# Patient Record
Sex: MALE | Race: WHITE | NOT HISPANIC OR LATINO | Employment: OTHER | URBAN - METROPOLITAN AREA
[De-identification: names, ages, dates, MRNs, and addresses within clinical notes are randomized per-mention and may not be internally consistent; named-entity substitution may affect disease eponyms.]

---

## 2017-01-02 DIAGNOSIS — F41.1 GENERALIZED ANXIETY DISORDER: ICD-10-CM

## 2017-01-04 ENCOUNTER — TELEPHONE (OUTPATIENT)
Dept: INTERNAL MEDICINE | Facility: CLINIC | Age: 62
End: 2017-01-04

## 2017-01-04 DIAGNOSIS — F41.1 GENERALIZED ANXIETY DISORDER: ICD-10-CM

## 2017-01-04 RX ORDER — ALPRAZOLAM 0.5 MG/1
TABLET ORAL
Qty: 60 TABLET | Refills: 0 | OUTPATIENT
Start: 2017-01-04

## 2017-01-04 RX ORDER — ALPRAZOLAM 0.5 MG/1
0.5 TABLET ORAL 2 TIMES DAILY PRN
Qty: 60 TABLET | Refills: 2 | OUTPATIENT
Start: 2017-01-04 | End: 2017-04-02 | Stop reason: SDUPTHER

## 2017-01-04 NOTE — TELEPHONE ENCOUNTER
Spoke with Sandee Rogers wife.    His last refill was 10/3/2016   Alaprazolam  #60 with 2 refills.   He is due for a refill.

## 2017-01-12 ENCOUNTER — OFFICE VISIT (OUTPATIENT)
Dept: INTERNAL MEDICINE | Facility: CLINIC | Age: 62
End: 2017-01-12

## 2017-01-12 VITALS
HEART RATE: 72 BPM | RESPIRATION RATE: 20 BRPM | SYSTOLIC BLOOD PRESSURE: 124 MMHG | WEIGHT: 195.38 LBS | DIASTOLIC BLOOD PRESSURE: 70 MMHG | TEMPERATURE: 97.7 F | BODY MASS INDEX: 28.44 KG/M2

## 2017-01-12 DIAGNOSIS — F41.1 GENERALIZED ANXIETY DISORDER: Primary | ICD-10-CM

## 2017-01-12 DIAGNOSIS — Z79.899 HIGH RISK MEDICATION USE: ICD-10-CM

## 2017-01-12 PROCEDURE — 99213 OFFICE O/P EST LOW 20 MIN: CPT | Performed by: INTERNAL MEDICINE

## 2017-01-12 RX ORDER — TRAZODONE HYDROCHLORIDE 100 MG/1
TABLET ORAL
Qty: 60 TABLET | Refills: 2
Start: 2017-01-12 | End: 2017-09-07

## 2017-01-12 RX ORDER — TRAZODONE HYDROCHLORIDE 100 MG/1
TABLET ORAL
Qty: 60 TABLET | Refills: 0 | Status: CANCELLED | OUTPATIENT
Start: 2017-01-12

## 2017-01-12 RX ORDER — TRAZODONE HYDROCHLORIDE 100 MG/1
100 TABLET ORAL NIGHTLY PRN
Qty: 60 TABLET | Refills: 2 | Status: SHIPPED | OUTPATIENT
Start: 2017-01-12 | End: 2017-01-12 | Stop reason: SDUPTHER

## 2017-01-12 RX ORDER — CLOBETASOL PROPIONATE 0.5 MG/G
CREAM TOPICAL 2 TIMES DAILY
Qty: 60 G | Refills: 3 | Status: SHIPPED | OUTPATIENT
Start: 2017-01-12 | End: 2017-09-26 | Stop reason: SDUPTHER

## 2017-01-12 NOTE — PROGRESS NOTES
Subjective       Yevgeniy Vaughn is a 61 y.o. male.     Chief Complaint   Patient presents with   • Diabetes     3 month follow up   fasting        History of Present Illness     Anxiety Disorder (Follow-Up): The patient is being seen for follow-up of Anxiety. The patient reports doing well.   Interval symptoms: stable anxiety, but denies difficulty concentrating, denies restlessness, denies sleep disruption, denies panic attacks,  and denies depression    Associated symptoms: no suicidal ideation.   Medication: Alprazolam BID.     The [facility].    Current Outpatient Prescriptions on File Prior to Visit   Medication Sig Dispense Refill   • ALPRAZolam (XANAX) 0.5 MG tablet Take 1 tablet by mouth 2 (Two) Times a Day As Needed for anxiety. 60 tablet 2   • atorvastatin (LIPITOR) 20 MG tablet TAKE ONE TABLET BY MOUTH AT BEDTIME 30 tablet 5   • Canagliflozin (INVOKANA) 100 MG tablet Take 1 tablet by mouth     • gabapentin (NEURONTIN) 800 MG tablet Take 1 tablet by mouth 3 (three) times a day.     • ibuprofen (ADVIL,MOTRIN) 800 MG tablet TAKE ONE TABLET BY MOUTH THREE TIMES DAILY AS NEEDED 90 tablet 0   • JANUVIA 100 MG tablet TAKE ONE TABLET BY MOUTH ONCE DAILY 30 tablet 6   • lisinopril (PRINIVIL,ZESTRIL) 40 MG tablet Take 1 tablet by mouth     • metFORMIN (GLUCOPHAGE) 1000 MG tablet TAKE ONE TABLET BY MOUTH TWICE DAILY 60 tablet 5   • methocarbamol (ROBAXIN) 750 MG tablet TAKE ONE TABLET BY MOUTH THREE TIMES DAILY AS NEEDED FOR PAIN 90 tablet 0   • Omega-3 Fatty Acids (FISH OIL) 1000 MG capsule capsule Take 1 capsule by mouth every 12 (twelve) hours     • oxyCODONE-acetaminophen (PERCOCET) 7.5-325 MG per tablet Take 1 tablet by mouth 3 (three) times a day.     • promethazine (PHENERGAN) 25 MG tablet TAKE ONE TABLET BY MOUTH EVERY 4 TO 6 HOURS AS NEEDED FOR NAUSEA AND VOMITING 30 tablet 0   • traZODone (DESYREL) 100 MG tablet TAKE ONE & ONE-HALF TO TWO TABLETS BY  MOUTH AT BEDTIME AS NEEDED FOR SLEEP 60 tablet 2   • [DISCONTINUED] clobetasol (TEMOVATE) 0.05 % cream APPLY  CREAM TOPICALLY (SPARINGLY) TO AFFECTED AREA(S)  TWICE DAILY 60 g 1     No current facility-administered medications on file prior to visit.          The following portions of the patient's history were reviewed and updated as appropriate: allergies, current medications, past family history, past medical history, past social history, past surgical history and problem list.    Review of Systems   Constitutional: Negative for unexpected weight change (intentional 3-4 pound weight loss).   Musculoskeletal: Positive for back pain (chronic).        Chronic right arm and shoulder pain.   Neurological:        Denies memory and concentration problems.   Psychiatric/Behavioral: Negative for decreased concentration, sleep disturbance and suicidal ideas. The patient is nervous/anxious.         Denies depression.         Objective       Blood pressure 124/70, pulse 72, temperature 97.7 °F (36.5 °C), temperature source Temporal Artery , resp. rate 20, weight 195 lb 6 oz (88.6 kg).      Physical Exam   Constitutional:   Overweight.   Psychiatric: He has a normal mood and affect.   Nursing note and vitals reviewed.       Counseling was given to patient for the following topics: discussed labs and diagnostic tests performed since last visit, importance of medication compliance, risks and benefits of treament options and side effects of medications . Total time of the encounter was 15 minutes and 15 minutes was spent counseling.        Assessment / Plan:    Diagnoses and all orders for this visit:    Generalized anxiety disorder    High risk medication use    Other orders  -     clobetasol (TEMOVATE) 0.05 % cream; Apply  topically 2 (Two) Times a Day.      The patient was instructed in the side effects of the medication. Risks of the potential for tolerance, dependence, and addiction were discussed. The patient was  instructed to take the lowest dosage of the medication, at the lowest frequency, and for the shortest period of time possible. The patient was instructed not to receive controlled substances or narcotics from other doctors, and not to giveaway or sell the medication.    The patient was instructed to abstain from illicit drug use.     Narcotics/controlled substance agreement, Lonnie report, and Urine Drug Screen were updated today if needed.    Return in about 3 months (around 4/12/2017) for Recheck-Diabetes fasting.

## 2017-01-12 NOTE — MR AVS SNAPSHOT
Yevgeniy Vaughn   1/12/2017 9:00 AM   Office Visit    Provider:  Christine Rousseau MD   Department:  [facility]   Dept Phone:  [phone]                Your Full Care Plan              Today's Medication Changes          These changes are accurate as of: 1/12/17 10:18 AM.  If you have any questions, ask your nurse or doctor.               Medication(s)that have changed:     clobetasol 0.05 % cream   Commonly known as:  TEMOVATE   Apply  topically 2 (Two) Times a Day.   What changed:  See the new instructions.   Changed by:  Christine Rousseau MD            Where to Get Your Medications      These medications were sent to [facility] [address] [place] [address] - [address] [place] [address] - [phone]  - [phone] [place] [id number]     Phone:  [phone]     clobetasol 0.05 % cream                  Your Updated Medication List          This list is accurate as of: 1/12/17 10:18 AM.  Always use your most recent med list.                ALPRAZolam 0.5 MG tablet   Commonly known as:  XANAX   Take 1 tablet by mouth 2 (Two) Times a Day As Needed for anxiety.       atorvastatin 20 MG tablet   Commonly known as:  LIPITOR   TAKE ONE TABLET BY MOUTH AT BEDTIME       clobetasol 0.05 % cream   Commonly known as:  TEMOVATE   Apply  topically 2 (Two) Times a Day.       fish oil 1000 MG capsule capsule       gabapentin 800 MG tablet   Commonly known as:  NEURONTIN       ibuprofen 800 MG tablet   Commonly known as:  ADVIL,MOTRIN   TAKE ONE TABLET BY MOUTH THREE TIMES DAILY AS NEEDED       INVOKANA 100 MG tablet   Generic drug:  Canagliflozin       JANUVIA 100 MG tablet   Generic drug:  SITagliptin   TAKE ONE TABLET BY MOUTH ONCE DAILY       lisinopril 40 MG tablet   Commonly known as:  PRINIVIL,ZESTRIL       metFORMIN 1000 MG tablet   Commonly known as:  GLUCOPHAGE   TAKE ONE TABLET BY MOUTH TWICE DAILY       methocarbamol 750 MG  tablet   Commonly known as:  ROBAXIN   TAKE ONE TABLET BY MOUTH THREE TIMES DAILY AS NEEDED FOR PAIN       oxyCODONE-acetaminophen 7.5-325 MG per tablet   Commonly known as:  PERCOCET       promethazine 25 MG tablet   Commonly known as:  PHENERGAN   TAKE ONE TABLET BY MOUTH EVERY 4 TO 6 HOURS AS NEEDED FOR NAUSEA AND VOMITING       traZODone 100 MG tablet   Commonly known as:  DESYREL   TAKE ONE & ONE-HALF TO TWO TABLETS BY MOUTH AT BEDTIME AS NEEDED FOR SLEEP               You Were Diagnosed With        Codes Comments    Generalized anxiety disorder    -  Primary ICD-10-CM: F41.1  ICD-9-CM: 300.02     High risk medication use     ICD-10-CM: Z79.899  ICD-9-CM: V58.69       Instructions     None    Patient Instructions History      MyChart Signup     Our records indicate that you have declined [facility] signup. If you would like to sign up for [employer], please email [email or web address] or call [phone] to obtain an activation code.             Other Info from Your Visit           Your Appointments     Apr 12, 2017  9:00 AM EDT   Follow Up with Christine Rousseau MD   [facility] (--)    [address] [address] [phone]           Arrive 15 minutes prior to appointment.              Allergies     Lovaza  [Omega-3-acid Ethyl Esters]      Other reaction(s): CRAWLING FEELING      Reason for Visit     Anxiety 3 month follow up         Vital Signs     Blood Pressure Pulse Temperature Respirations Weight Body Mass Index    124/70 (BP Location: Left arm) 72 97.7 °F (36.5 °C) (Temporal Artery ) 20 195 lb 6 oz (88.6 kg) 28.44 kg/m2    Smoking Status                   Current Every Day Smoker           Problems and Diagnoses Noted     Anxiety disorder    High risk medication use             Care Plan (most recent)      Care Management - 01/12/17 2774     Lifestyle Goals    Lifestyle Goals Decrease stress;Eat  breakfast;Exercise 150 min/wk - moderate activity;Exercise a number of times per week;Family dinner at table more often;Fewer doctor appointments;Fewer ER/urgent care visits;Increase physical activity;Less pain;Less sadness/anxiety;Lose weight;Lower blood sugar;Maintain blood pressure < 130/80;Quit smoking;Reduce caffeine intake;Routine foot care;Self monitor blood sugar    Barriers    Barriers Family Obligations;Financial;Pain;Stress;Unhealthy food    Self Management     Self Management Dietary Changes - Eat More Fruits/Vegetables;Dietary Changes -  Reduce Caloric Intake;Get flu/pneumonia shot;Home BP Monitoring;Home Glucose Monitoring;Weight Monitoring    Medication Adherence    Medication Adherence Financial reason;Medication side effects;Medications understood    Goal Progress    Goal Progress Making Progress Toward Goal(s)    Readiness Scale    Readiness Scale 10    COPD Assessment Test    Cough frequency 0 (I never cough)    Phlegm 0 (I have no phlegm in my chest)    Chest tightness 0 (I have no phlegm in my chest)    Breathless when walking 0 (When I walk up a hill or flight of stairs I am not breathless)    Home activities 5 (I am very limited doing activities at home)    Confidence leaving home 0 (I am confident leaving my home despite my lung condition)    Sleep 0 (I sleep soundly)    Energy 2

## 2017-01-13 ENCOUNTER — TELEPHONE (OUTPATIENT)
Dept: INTERNAL MEDICINE | Facility: CLINIC | Age: 62
End: 2017-01-13

## 2017-01-13 NOTE — TELEPHONE ENCOUNTER
----- Message from Savannah Nogueira sent at 1/12/2017 11:44 AM EST -----  Contact: JAMEL  PATIENT CALLED TO GIVE THE NAME OF HIS DIABETIC SUPPLY. IT IS ONE TOUCH ULTRA MINI

## 2017-01-16 RX ORDER — ATORVASTATIN CALCIUM 20 MG/1
TABLET, FILM COATED ORAL
Qty: 30 TABLET | Refills: 5 | Status: SHIPPED | OUTPATIENT
Start: 2017-01-16 | End: 2017-07-19 | Stop reason: SDUPTHER

## 2017-01-16 RX ORDER — SITAGLIPTIN 100 MG/1
TABLET, FILM COATED ORAL
Qty: 30 TABLET | Refills: 5 | Status: SHIPPED | OUTPATIENT
Start: 2017-01-16 | End: 2017-07-15 | Stop reason: SDUPTHER

## 2017-01-20 ENCOUNTER — TELEPHONE (OUTPATIENT)
Dept: INTERNAL MEDICINE | Facility: CLINIC | Age: 62
End: 2017-01-20

## 2017-01-20 RX ORDER — LANCETS
EACH MISCELLANEOUS
Qty: 100 EACH | Refills: 5 | Status: SHIPPED | OUTPATIENT
Start: 2017-01-20 | End: 2017-01-25 | Stop reason: SDUPTHER

## 2017-01-20 RX ORDER — BLOOD-GLUCOSE METER
1 EACH MISCELLANEOUS 2 TIMES DAILY
Qty: 1 KIT | Refills: 0 | Status: SHIPPED | OUTPATIENT
Start: 2017-01-20 | End: 2020-06-11

## 2017-01-20 NOTE — TELEPHONE ENCOUNTER
Spoke with pharmacist   She states the accu-chek teddy plus monitor, test stripes and lancets are covered.  Rx sent to pharmacy.

## 2017-01-20 NOTE — TELEPHONE ENCOUNTER
----- Message from Simran Porter sent at 1/20/2017  8:59 AM EST -----  Contact: Pharmacy  [facility] received rx for glucose blood test strips and needs specific directions for insurance.  Call [facility] at [phone].

## 2017-01-25 RX ORDER — LANCETS
EACH MISCELLANEOUS
Qty: 100 EACH | Refills: 5 | Status: SHIPPED | OUTPATIENT
Start: 2017-01-25 | End: 2020-06-10 | Stop reason: SDUPTHER

## 2017-01-30 RX ORDER — PROMETHAZINE HYDROCHLORIDE 25 MG/1
TABLET ORAL
Qty: 30 TABLET | Refills: 0 | Status: SHIPPED | OUTPATIENT
Start: 2017-01-30 | End: 2017-06-07 | Stop reason: SDUPTHER

## 2017-02-06 RX ORDER — METHOCARBAMOL 750 MG/1
TABLET, FILM COATED ORAL
Qty: 90 TABLET | Refills: 1 | Status: SHIPPED | OUTPATIENT
Start: 2017-02-06 | End: 2017-04-07 | Stop reason: SDUPTHER

## 2017-02-10 RX ORDER — IBUPROFEN 800 MG/1
TABLET ORAL
Qty: 90 TABLET | Refills: 3 | Status: SHIPPED | OUTPATIENT
Start: 2017-02-10 | End: 2017-06-11 | Stop reason: SDUPTHER

## 2017-02-13 RX ORDER — LISINOPRIL 40 MG/1
TABLET ORAL
Qty: 30 TABLET | Refills: 5 | Status: SHIPPED | OUTPATIENT
Start: 2017-02-13 | End: 2017-08-05 | Stop reason: SDUPTHER

## 2017-02-13 RX ORDER — CANAGLIFLOZIN 100 MG/1
TABLET, FILM COATED ORAL
Qty: 30 TABLET | Refills: 5 | Status: SHIPPED | OUTPATIENT
Start: 2017-02-13 | End: 2017-08-14 | Stop reason: SDUPTHER

## 2017-04-02 DIAGNOSIS — F41.1 GENERALIZED ANXIETY DISORDER: ICD-10-CM

## 2017-04-03 RX ORDER — ALPRAZOLAM 0.5 MG/1
TABLET ORAL
Qty: 60 TABLET | Refills: 0 | OUTPATIENT
Start: 2017-04-03 | End: 2017-07-03 | Stop reason: SDUPTHER

## 2017-04-03 NOTE — TELEPHONE ENCOUNTER
"Yes... That is what I said.  \"30 days\" which equals 60 tablets.  Yevgeniy Tapia MD  4:18 PM  04/03/17    "

## 2017-04-07 RX ORDER — METHOCARBAMOL 750 MG/1
TABLET, FILM COATED ORAL
Qty: 90 TABLET | Refills: 0 | Status: SHIPPED | OUTPATIENT
Start: 2017-04-07 | End: 2017-06-07 | Stop reason: SDUPTHER

## 2017-04-12 ENCOUNTER — OFFICE VISIT (OUTPATIENT)
Dept: INTERNAL MEDICINE | Facility: CLINIC | Age: 62
End: 2017-04-12

## 2017-04-12 VITALS
RESPIRATION RATE: 21 BRPM | WEIGHT: 195 LBS | BODY MASS INDEX: 28.38 KG/M2 | HEART RATE: 98 BPM | DIASTOLIC BLOOD PRESSURE: 74 MMHG | SYSTOLIC BLOOD PRESSURE: 118 MMHG | TEMPERATURE: 98 F

## 2017-04-12 DIAGNOSIS — G47.00 INSOMNIA, UNSPECIFIED TYPE: ICD-10-CM

## 2017-04-12 DIAGNOSIS — Z12.5 SCREENING FOR PROSTATE CANCER: ICD-10-CM

## 2017-04-12 DIAGNOSIS — Z11.59 NEED FOR HEPATITIS C SCREENING TEST: ICD-10-CM

## 2017-04-12 DIAGNOSIS — E78.5 HYPERLIPIDEMIA, UNSPECIFIED HYPERLIPIDEMIA TYPE: ICD-10-CM

## 2017-04-12 DIAGNOSIS — I10 ESSENTIAL HYPERTENSION: ICD-10-CM

## 2017-04-12 DIAGNOSIS — K63.5 BENIGN COLONIC POLYP: ICD-10-CM

## 2017-04-12 DIAGNOSIS — Z79.899 HIGH RISK MEDICATION USE: ICD-10-CM

## 2017-04-12 DIAGNOSIS — G89.4 CHRONIC PAIN SYNDROME: ICD-10-CM

## 2017-04-12 DIAGNOSIS — E11.43 TYPE 2 DIABETES MELLITUS WITH DIABETIC AUTONOMIC NEUROPATHY, WITHOUT LONG-TERM CURRENT USE OF INSULIN (HCC): Primary | ICD-10-CM

## 2017-04-12 DIAGNOSIS — F41.1 GENERALIZED ANXIETY DISORDER: ICD-10-CM

## 2017-04-12 LAB
ALBUMIN SERPL-MCNC: 4.5 G/DL (ref 3.2–4.8)
ALBUMIN/GLOB SERPL: 1.6 G/DL (ref 1.5–2.5)
ALP SERPL-CCNC: 70 U/L (ref 25–100)
ALT SERPL W P-5'-P-CCNC: 25 U/L (ref 7–40)
ANION GAP SERPL CALCULATED.3IONS-SCNC: 3 MMOL/L (ref 3–11)
ARTICHOKE IGE QN: 84 MG/DL (ref 0–130)
AST SERPL-CCNC: 27 U/L (ref 0–33)
BASOPHILS # BLD AUTO: 0.02 10*3/MM3 (ref 0–0.2)
BASOPHILS NFR BLD AUTO: 0.2 % (ref 0–1)
BILIRUB SERPL-MCNC: 0.4 MG/DL (ref 0.3–1.2)
BUN BLD-MCNC: 8 MG/DL (ref 9–23)
BUN/CREAT SERPL: 10 (ref 7–25)
CALCIUM SPEC-SCNC: 10.3 MG/DL (ref 8.7–10.4)
CHLORIDE SERPL-SCNC: 95 MMOL/L (ref 99–109)
CHOLEST SERPL-MCNC: 152 MG/DL (ref 0–200)
CO2 SERPL-SCNC: 31 MMOL/L (ref 20–31)
CREAT BLD-MCNC: 0.8 MG/DL (ref 0.6–1.3)
DEPRECATED RDW RBC AUTO: 44.7 FL (ref 37–54)
EOSINOPHIL # BLD AUTO: 0.39 10*3/MM3 (ref 0.1–0.3)
EOSINOPHIL NFR BLD AUTO: 2.9 % (ref 0–3)
ERYTHROCYTE [DISTWIDTH] IN BLOOD BY AUTOMATED COUNT: 13.2 % (ref 11.3–14.5)
EXPIRATION DATE: NORMAL
GFR SERPL CREATININE-BSD FRML MDRD: 99 ML/MIN/1.73
GLOBULIN UR ELPH-MCNC: 2.8 GM/DL
GLUCOSE BLD-MCNC: 136 MG/DL (ref 70–100)
HBA1C MFR BLD: 8.2 %
HCT VFR BLD AUTO: 46.5 % (ref 38.9–50.9)
HCV AB SER DONR QL: NORMAL
HDLC SERPL-MCNC: 35 MG/DL (ref 40–60)
HGB BLD-MCNC: 16.5 G/DL (ref 13.1–17.5)
IMM GRANULOCYTES # BLD: 0.05 10*3/MM3 (ref 0–0.03)
IMM GRANULOCYTES NFR BLD: 0.4 % (ref 0–0.6)
LYMPHOCYTES # BLD AUTO: 2.19 10*3/MM3 (ref 0.6–4.8)
LYMPHOCYTES NFR BLD AUTO: 16.5 % (ref 24–44)
Lab: NORMAL
MCH RBC QN AUTO: 32.8 PG (ref 27–31)
MCHC RBC AUTO-ENTMCNC: 35.5 G/DL (ref 32–36)
MCV RBC AUTO: 92.4 FL (ref 80–99)
MONOCYTES # BLD AUTO: 1.31 10*3/MM3 (ref 0–1)
MONOCYTES NFR BLD AUTO: 9.9 % (ref 0–12)
NEUTROPHILS # BLD AUTO: 9.29 10*3/MM3 (ref 1.5–8.3)
NEUTROPHILS NFR BLD AUTO: 70.1 % (ref 41–71)
PLATELET # BLD AUTO: 221 10*3/MM3 (ref 150–450)
PMV BLD AUTO: 9 FL (ref 6–12)
POTASSIUM BLD-SCNC: 5.1 MMOL/L (ref 3.5–5.5)
PROT SERPL-MCNC: 7.3 G/DL (ref 5.7–8.2)
PSA SERPL-MCNC: 0.21 NG/ML (ref 0–4)
RBC # BLD AUTO: 5.03 10*6/MM3 (ref 4.2–5.76)
SODIUM BLD-SCNC: 129 MMOL/L (ref 132–146)
TRIGL SERPL-MCNC: 245 MG/DL (ref 0–150)
TSH SERPL DL<=0.05 MIU/L-ACNC: 0.59 MIU/ML (ref 0.35–5.35)
WBC NRBC COR # BLD: 13.25 10*3/MM3 (ref 3.5–10.8)

## 2017-04-12 PROCEDURE — 80053 COMPREHEN METABOLIC PANEL: CPT | Performed by: INTERNAL MEDICINE

## 2017-04-12 PROCEDURE — 86803 HEPATITIS C AB TEST: CPT | Performed by: INTERNAL MEDICINE

## 2017-04-12 PROCEDURE — 85025 COMPLETE CBC W/AUTO DIFF WBC: CPT | Performed by: INTERNAL MEDICINE

## 2017-04-12 PROCEDURE — 80061 LIPID PANEL: CPT | Performed by: INTERNAL MEDICINE

## 2017-04-12 PROCEDURE — 99214 OFFICE O/P EST MOD 30 MIN: CPT | Performed by: INTERNAL MEDICINE

## 2017-04-12 PROCEDURE — G0103 PSA SCREENING: HCPCS | Performed by: INTERNAL MEDICINE

## 2017-04-12 PROCEDURE — 83036 HEMOGLOBIN GLYCOSYLATED A1C: CPT | Performed by: INTERNAL MEDICINE

## 2017-04-12 PROCEDURE — 84443 ASSAY THYROID STIM HORMONE: CPT | Performed by: INTERNAL MEDICINE

## 2017-04-12 NOTE — PROGRESS NOTES
Subjective       Yevgeniy Vaughn is a 61 y.o. male.     Chief Complaint   Patient presents with   • Hyperlipidemia, unspecified     Follow up       History obtained from the patient.      History of Present Illness     HPI: The patient is here for a 6 month follow-up.    Primary Care Cardiac Diagnostic Constellation: The patient is here today for a follow-up visit.      His Diabetes Mellitus type 2 is stable.  The patient is adherent with his medication regimen. He denies medication side effects.   Medication(s): Metformin HCl, Invokana,  and Januvia.   His Hypertension is primary and stable. The patient is adherent with his medication regimen.   He denies medication side effects.   Medication(s): Lisinopril.   His Hyperlipidemia has been stable. His LDL goal is 70 mg/dL and last LDL was 51 mg/dL, .   The patient is adherent with his medication regimen. He denies medication side effects.   Medication(s): Atorvastatin and fish oil.      Interval Events:  Last HgA1C was 6.6.  The patient states his blood sugar at home is 100-130 fasting and < 200 post prandial. He denies episodes of hypoglycemia.  He states his blood pressure at home has been 120 's / 80's. The patient states he checks his feet regularly. He states his last ophthalmology appointment was in 2014.      Symptoms: Stable numbness of the feet and stable foot pain, but denies a foot ulcer.  Denies chest pain, denies intermittent leg claudication, denies dyspnea, denies lower extremity edema, denies exercise intolerance, denies fatigue,  denies visual impairment, denies muscle pain and denies muscle weakness. Associated symptoms include no recent weight changes,  no palpitations, no syncope, no headache, no orthopnea, no PND, no polydipsia, no polyuria, no focal neurologic deficits, and no memory loss.      Lifestyle and Disease Management: Diet: He does  have a healthy diet. Weight Issues: He has weight concerns. Exercise: He does not exercise  regularly. He walks once a week.   Smoking: He does not use tobacco.      Chronic Pain (Follow-Up): The patient is being seen for follow-up of Chronic Neck Pain and Chronic Right Arm and Shoulder Pain. The patient reports symptoms are stable.   The patient is seeing pain management for his chronic pain.   Interval symptoms: stable neck pain and stable upper extremity pain, but denies headache, denies back pain, denies abdominal pain,  and denies lower extremity pain.   Medications include Robaxin, Neurontin, and Percocet.   Medications: the patient is adherent to his medication regimen, but he denies medication side effects.      Anxiety Disorder (Follow-Up): The patient is being seen for follow-up of Anxiety. The patient reports doing well.   Interval symptoms: stable anxiety, denies difficulty concentrating, denies restlessness, denies sleep disruption, denies panic attacks,  and denies depression       Associated symptoms: no suicidal ideation.   Medication: Alprazolam BID.       Colonic Polyp (Brief): The patient is being seen for a routine clinic follow-up of Colon Polyp(s). Current diagnosis was determined by colonoscopy and last 1/20/14 was normal, but poor prep.   Symptoms: no hematochezia, no melena, no diarrhea, no constipation, no decreased stool caliber, no change in bowel habits and no abdominal pain. Associated symptoms: no rectal prolapse.   The patient is not currently being treated for this problem.      Insomnia (Follow-Up): The patient is being seen for follow-up of Insomnia. The patient reports doing well. Comorbid Illnesses: anxiety.   The Trazodone is not working as well as it used to.   Interval symptoms: improved difficulty falling asleep, improved  difficulty staying asleep and denies fatigue.   Medications include Trazodone.   Medications: the patient is adherent to his medication regimen, but he denies medication side effects.     Current Outpatient Prescriptions on File Prior to Visit    Medication Sig Dispense Refill   • ALPRAZolam (XANAX) 0.5 MG tablet TAKE TWO TABLETS BY MOUTH ONCE DAILY AS NEEDED 60 tablet 0   • atorvastatin (LIPITOR) 20 MG tablet TAKE ONE TABLET BY MOUTH AT BEDTIME 30 tablet 5   • Blood Glucose Monitoring Suppl (ACCU-CHEK ELYSIA PLUS) W/DEVICE kit 1 each 2 (Two) Times a Day. 1 kit 0   • clobetasol (TEMOVATE) 0.05 % cream Apply  topically 2 (Two) Times a Day. 60 g 3   • gabapentin (NEURONTIN) 800 MG tablet Take 1 tablet by mouth 3 (three) times a day.     • glucose blood (ACCU-CHEK ELYSIA PLUS) test strip Test twice daily 100 each 5   • ibuprofen (ADVIL,MOTRIN) 800 MG tablet TAKE ONE TABLET BY MOUTH THREE TIMES DAILY AS NEEDED 90 tablet 3   • INVOKANA 100 MG tablet TAKE ONE TABLET BY MOUTH ONCE DAILY 30 tablet 5   • JANUVIA 100 MG tablet TAKE ONE TABLET BY MOUTH ONCE DAILY 30 tablet 5   • Lancet Devices (ACCU-CHEK SOFTCLIX) lancets Test twice daily as needed 100 each 5   • Lancets (ACCU-CHEK MULTICLIX) lancets Test twice daily as needed 100 each 5   • lisinopril (PRINIVIL,ZESTRIL) 40 MG tablet TAKE ONE TABLET BY MOUTH AT BEDTIME 30 tablet 5   • metFORMIN (GLUCOPHAGE) 1000 MG tablet TAKE ONE TABLET BY MOUTH TWICE DAILY 60 tablet 5   • methocarbamol (ROBAXIN) 750 MG tablet TAKE ONE TABLET BY MOUTH THREE TIMES DAILY AS NEEDED FOR PAIN 90 tablet 0   • Omega-3 Fatty Acids (FISH OIL) 1000 MG capsule capsule Take 1 capsule by mouth every 12 (twelve) hours     • oxyCODONE-acetaminophen (PERCOCET) 7.5-325 MG per tablet Take 1 tablet by mouth 3 (three) times a day.     • promethazine (PHENERGAN) 25 MG tablet TAKE ONE TABLET BY MOUTH EVERY 4 TO 6 HOURS AS NEEDED FOR NAUSEA AND VOMITING 30 tablet 0   • traZODone (DESYREL) 100 MG tablet Take 1 1/2 to 2 tablets at night as needed for sleep 60 tablet 2     No current facility-administered medications on file prior to visit.          The following portions of the patient's history were reviewed and updated as appropriate: allergies, current  medications, past family history, past medical history, past social history, past surgical history and problem list.    Review of Systems   Constitutional: Negative for fatigue and unexpected weight change.   Eyes: Negative for visual disturbance.   Respiratory: Negative for cough, shortness of breath and wheezing.    Cardiovascular: Negative for chest pain, palpitations and leg swelling.        No SALAMANCA, orthopnea, or claudication.   Gastrointestinal: Negative for abdominal pain, blood in stool, constipation, diarrhea, nausea and vomiting.        Denies.   Endocrine: Negative for polydipsia and polyuria.   Musculoskeletal: Positive for neck pain. Negative for arthralgias and myalgias.   Neurological: Negative for dizziness, syncope, light-headedness and headaches.        No memory issues.   Psychiatric/Behavioral: Negative for decreased concentration, sleep disturbance and suicidal ideas. The patient is nervous/anxious.         Denies depression.         Objective       Blood pressure 118/74, pulse 98, temperature 98 °F (36.7 °C), temperature source Temporal Artery , resp. rate 21, weight 195 lb (88.5 kg).      Physical Exam   Constitutional:   Overweight.   Neck: Normal range of motion. Neck supple. Carotid bruit is not present. No thyromegaly present.   Cardiovascular: Normal rate, regular rhythm, normal heart sounds and intact distal pulses.  Exam reveals no gallop and no friction rub.    No murmur heard.  No peripheral edema.   Pulmonary/Chest: Effort normal and breath sounds normal.   Abdominal: Soft. Bowel sounds are normal. He exhibits no distension, no abdominal bruit and no mass. There is no hepatosplenomegaly. There is no tenderness.    Yevgeniy had a diabetic foot exam performed today.   During the foot exam he had a monofilament test performed (see form).   Skin Integrity  -  His right foot skin is not intact.   Yevgeniy's left foot skin is not intact. .  Psychiatric: He has a normal mood and affect.    Nursing note and vitals reviewed.    Lab Results   Component Value Date    HGBA1C 8.2 04/12/2017       Assessment / Plan:    Yevgeniy was seen today for hyperlipidemia, unspecified.    Diagnoses and all orders for this visit:    Type 2 diabetes mellitus with diabetic autonomic neuropathy, without long-term current use of insulin  -     POC Glycosylated Hemoglobin (Hb A1C)  -     Comprehensive Metabolic Panel  -     CBC & Differential  -     TSH  -     CBC Auto Differential    Essential hypertension    Hyperlipidemia, unspecified hyperlipidemia type  -     Lipid Panel    Benign colonic polyp    Chronic pain syndrome    Insomnia, unspecified type    Generalized anxiety disorder    Need for hepatitis C screening test  -     Hepatitis C Antibody    High risk medication use  -     Cancel: Urine Drug Screen    Screening for prostate cancer  -     PSA Screen      The patient was instructed in the side effects of the medication. Risks of the potential for tolerance, dependence, and addiction were discussed. The patient was instructed to take the lowest dosage of the medication, at the lowest frequency, and for the shortest period of time possible. The patient was instructed not to receive controlled substances or narcotics from other doctors, and not to giveaway or sell the medication.     The patient was instructed to abstain from illicit drug use.      Narcotics/controlled substance agreement, Lonnie report, and Urine Drug Screen were updated today if needed.    Return in about 6 months (around 10/12/2017) for Recheck-Diabetes fasting.

## 2017-04-17 DIAGNOSIS — E87.1 HYPONATREMIA: ICD-10-CM

## 2017-04-17 DIAGNOSIS — D72.828 OTHER ELEVATED WHITE BLOOD CELL (WBC) COUNT: Primary | ICD-10-CM

## 2017-04-25 ENCOUNTER — TELEPHONE (OUTPATIENT)
Dept: INTERNAL MEDICINE | Facility: CLINIC | Age: 62
End: 2017-04-25

## 2017-04-25 RX ORDER — TRAZODONE HYDROCHLORIDE 100 MG/1
TABLET ORAL
Qty: 60 TABLET | Refills: 5 | Status: SHIPPED | OUTPATIENT
Start: 2017-04-25 | End: 2017-11-08 | Stop reason: SDUPTHER

## 2017-04-25 NOTE — TELEPHONE ENCOUNTER
----- Message from Celi Romano sent at 4/24/2017  5:42 PM EDT -----  HOPE [phone]    RETURNED CALL-PLEASE CALL BACK

## 2017-04-25 NOTE — TELEPHONE ENCOUNTER
Spoke with pt's wife and informed her that I was just waiting to see what Dr. Rousseau says about pt's Xanax refill.

## 2017-05-08 RX ORDER — METHOCARBAMOL 750 MG/1
TABLET, FILM COATED ORAL
Qty: 90 TABLET | Refills: 0 | OUTPATIENT
Start: 2017-05-08

## 2017-06-07 ENCOUNTER — TELEPHONE (OUTPATIENT)
Dept: INTERNAL MEDICINE | Facility: CLINIC | Age: 62
End: 2017-06-07

## 2017-06-07 NOTE — TELEPHONE ENCOUNTER
S/W patients wife she stated patient will be in this week for his blood work.  She greatly appreciated the phone call to reminder them of the blood work.

## 2017-06-08 RX ORDER — METHOCARBAMOL 750 MG/1
TABLET, FILM COATED ORAL
Qty: 90 TABLET | Refills: 5 | Status: SHIPPED | OUTPATIENT
Start: 2017-06-08 | End: 2018-01-09 | Stop reason: SDUPTHER

## 2017-06-08 RX ORDER — PROMETHAZINE HYDROCHLORIDE 25 MG/1
TABLET ORAL
Qty: 30 TABLET | Refills: 5 | Status: SHIPPED | OUTPATIENT
Start: 2017-06-08 | End: 2020-06-16 | Stop reason: HOSPADM

## 2017-06-09 ENCOUNTER — LAB (OUTPATIENT)
Dept: INTERNAL MEDICINE | Facility: CLINIC | Age: 62
End: 2017-06-09

## 2017-06-09 DIAGNOSIS — E87.1 HYPONATREMIA: ICD-10-CM

## 2017-06-09 DIAGNOSIS — D72.828 OTHER ELEVATED WHITE BLOOD CELL (WBC) COUNT: ICD-10-CM

## 2017-06-09 LAB
ANION GAP SERPL CALCULATED.3IONS-SCNC: 4 MMOL/L (ref 3–11)
BASOPHILS # BLD AUTO: 0.02 10*3/MM3 (ref 0–0.2)
BASOPHILS NFR BLD AUTO: 0.2 % (ref 0–1)
BUN BLD-MCNC: 8 MG/DL (ref 9–23)
BUN/CREAT SERPL: 10 (ref 7–25)
CALCIUM SPEC-SCNC: 10.2 MG/DL (ref 8.7–10.4)
CHLORIDE SERPL-SCNC: 98 MMOL/L (ref 99–109)
CO2 SERPL-SCNC: 29 MMOL/L (ref 20–31)
CREAT BLD-MCNC: 0.8 MG/DL (ref 0.6–1.3)
DEPRECATED RDW RBC AUTO: 45.3 FL (ref 37–54)
EOSINOPHIL # BLD AUTO: 0.32 10*3/MM3 (ref 0.1–0.3)
EOSINOPHIL NFR BLD AUTO: 3.9 % (ref 0–3)
ERYTHROCYTE [DISTWIDTH] IN BLOOD BY AUTOMATED COUNT: 13.2 % (ref 11.3–14.5)
GFR SERPL CREATININE-BSD FRML MDRD: 99 ML/MIN/1.73
GLUCOSE BLD-MCNC: 137 MG/DL (ref 70–100)
HCT VFR BLD AUTO: 45.2 % (ref 38.9–50.9)
HGB BLD-MCNC: 15.6 G/DL (ref 13.1–17.5)
IMM GRANULOCYTES # BLD: 0.04 10*3/MM3 (ref 0–0.03)
IMM GRANULOCYTES NFR BLD: 0.5 % (ref 0–0.6)
LYMPHOCYTES # BLD AUTO: 1.52 10*3/MM3 (ref 0.6–4.8)
LYMPHOCYTES NFR BLD AUTO: 18.3 % (ref 24–44)
MCH RBC QN AUTO: 32.3 PG (ref 27–31)
MCHC RBC AUTO-ENTMCNC: 34.5 G/DL (ref 32–36)
MCV RBC AUTO: 93.6 FL (ref 80–99)
MONOCYTES # BLD AUTO: 0.71 10*3/MM3 (ref 0–1)
MONOCYTES NFR BLD AUTO: 8.6 % (ref 0–12)
NEUTROPHILS # BLD AUTO: 5.69 10*3/MM3 (ref 1.5–8.3)
NEUTROPHILS NFR BLD AUTO: 68.5 % (ref 41–71)
PLATELET # BLD AUTO: 209 10*3/MM3 (ref 150–450)
PMV BLD AUTO: 9 FL (ref 6–12)
POTASSIUM BLD-SCNC: 5.3 MMOL/L (ref 3.5–5.5)
RBC # BLD AUTO: 4.83 10*6/MM3 (ref 4.2–5.76)
SODIUM BLD-SCNC: 131 MMOL/L (ref 132–146)
WBC NRBC COR # BLD: 8.3 10*3/MM3 (ref 3.5–10.8)

## 2017-06-09 PROCEDURE — 80048 BASIC METABOLIC PNL TOTAL CA: CPT | Performed by: INTERNAL MEDICINE

## 2017-06-09 PROCEDURE — 36415 COLL VENOUS BLD VENIPUNCTURE: CPT | Performed by: INTERNAL MEDICINE

## 2017-06-09 PROCEDURE — 85025 COMPLETE CBC W/AUTO DIFF WBC: CPT | Performed by: INTERNAL MEDICINE

## 2017-06-12 RX ORDER — IBUPROFEN 800 MG/1
TABLET ORAL
Qty: 90 TABLET | Refills: 0 | Status: SHIPPED | OUTPATIENT
Start: 2017-06-12 | End: 2017-10-16 | Stop reason: SDUPTHER

## 2017-07-03 DIAGNOSIS — F41.1 GENERALIZED ANXIETY DISORDER: ICD-10-CM

## 2017-07-06 NOTE — TELEPHONE ENCOUNTER
Okay for #60 with 2 refills.  The patient is due for his 3 month follow-up for controlled medication use, which we forgot to schedule last visit.  Please schedule appointment this month nonfasting.,

## 2017-07-10 RX ORDER — ALPRAZOLAM 0.5 MG/1
TABLET ORAL
Qty: 60 TABLET | Refills: 0 | Status: SHIPPED | OUTPATIENT
Start: 2017-07-10 | End: 2017-10-06 | Stop reason: SDUPTHER

## 2017-07-17 RX ORDER — SITAGLIPTIN 100 MG/1
TABLET, FILM COATED ORAL
Qty: 30 TABLET | Refills: 5 | Status: SHIPPED | OUTPATIENT
Start: 2017-07-17 | End: 2018-01-09 | Stop reason: SDUPTHER

## 2017-07-19 RX ORDER — ATORVASTATIN CALCIUM 20 MG/1
TABLET, FILM COATED ORAL
Qty: 30 TABLET | Refills: 5 | Status: SHIPPED | OUTPATIENT
Start: 2017-07-19 | End: 2018-01-09 | Stop reason: SDUPTHER

## 2017-08-07 RX ORDER — LISINOPRIL 40 MG/1
TABLET ORAL
Qty: 30 TABLET | Refills: 5 | Status: SHIPPED | OUTPATIENT
Start: 2017-08-07 | End: 2018-02-04 | Stop reason: SDUPTHER

## 2017-08-14 RX ORDER — CANAGLIFLOZIN 100 MG/1
TABLET, FILM COATED ORAL
Qty: 30 TABLET | Refills: 0 | Status: SHIPPED | OUTPATIENT
Start: 2017-08-14 | End: 2017-09-22 | Stop reason: SDUPTHER

## 2017-09-07 ENCOUNTER — OFFICE VISIT (OUTPATIENT)
Dept: INTERNAL MEDICINE | Facility: CLINIC | Age: 62
End: 2017-09-07

## 2017-09-07 VITALS
RESPIRATION RATE: 20 BRPM | TEMPERATURE: 97.5 F | HEART RATE: 100 BPM | SYSTOLIC BLOOD PRESSURE: 132 MMHG | WEIGHT: 197.38 LBS | DIASTOLIC BLOOD PRESSURE: 70 MMHG | BODY MASS INDEX: 28.73 KG/M2

## 2017-09-07 DIAGNOSIS — E11.43 TYPE 2 DIABETES MELLITUS WITH DIABETIC AUTONOMIC NEUROPATHY, WITHOUT LONG-TERM CURRENT USE OF INSULIN (HCC): Primary | ICD-10-CM

## 2017-09-07 DIAGNOSIS — I10 ESSENTIAL HYPERTENSION: ICD-10-CM

## 2017-09-07 DIAGNOSIS — Z79.899 HIGH RISK MEDICATION USE: ICD-10-CM

## 2017-09-07 DIAGNOSIS — K63.5 BENIGN COLONIC POLYP: ICD-10-CM

## 2017-09-07 DIAGNOSIS — F41.1 GENERALIZED ANXIETY DISORDER: ICD-10-CM

## 2017-09-07 DIAGNOSIS — G47.00 INSOMNIA, UNSPECIFIED TYPE: ICD-10-CM

## 2017-09-07 DIAGNOSIS — G89.4 CHRONIC PAIN SYNDROME: ICD-10-CM

## 2017-09-07 DIAGNOSIS — E78.5 HYPERLIPIDEMIA, UNSPECIFIED HYPERLIPIDEMIA TYPE: ICD-10-CM

## 2017-09-07 LAB
A/C: NORMAL
ALBUMIN SERPL-MCNC: 4.4 G/DL (ref 3.2–4.8)
ALBUMIN/GLOB SERPL: 1.6 G/DL (ref 1.5–2.5)
ALP SERPL-CCNC: 73 U/L (ref 25–100)
ALT SERPL W P-5'-P-CCNC: 34 U/L (ref 7–40)
ANION GAP SERPL CALCULATED.3IONS-SCNC: 7 MMOL/L (ref 3–11)
ARTICHOKE IGE QN: 109 MG/DL (ref 0–130)
AST SERPL-CCNC: 24 U/L (ref 0–33)
BASOPHILS # BLD AUTO: 0.04 10*3/MM3 (ref 0–0.2)
BASOPHILS NFR BLD AUTO: 0.5 % (ref 0–1)
BILIRUB SERPL-MCNC: 0.5 MG/DL (ref 0.3–1.2)
BUN BLD-MCNC: 9 MG/DL (ref 9–23)
BUN/CREAT SERPL: 11.3 (ref 7–25)
CALCIUM SPEC-SCNC: 9.9 MG/DL (ref 8.7–10.4)
CHLORIDE SERPL-SCNC: 95 MMOL/L (ref 99–109)
CHOLEST SERPL-MCNC: 184 MG/DL (ref 0–200)
CLARITY, POC: CLEAR
CO2 SERPL-SCNC: 28 MMOL/L (ref 20–31)
COLOR UR: YELLOW
CREAT BLD-MCNC: 0.8 MG/DL (ref 0.6–1.3)
DEPRECATED RDW RBC AUTO: 46.3 FL (ref 37–54)
EOSINOPHIL # BLD AUTO: 0.33 10*3/MM3 (ref 0–0.3)
EOSINOPHIL NFR BLD AUTO: 4.2 % (ref 0–3)
ERYTHROCYTE [DISTWIDTH] IN BLOOD BY AUTOMATED COUNT: 13 % (ref 11.3–14.5)
EXPIRATION DATE: ABNORMAL
EXPIRATION DATE: NORMAL
EXPIRATION DATE: NORMAL
GFR SERPL CREATININE-BSD FRML MDRD: 99 ML/MIN/1.73
GLOBULIN UR ELPH-MCNC: 2.8 GM/DL
GLUCOSE BLD-MCNC: 130 MG/DL (ref 70–100)
GLUCOSE UR STRIP-MCNC: ABNORMAL MG/DL
HBA1C MFR BLD: 7.2 %
HCT VFR BLD AUTO: 46.2 % (ref 38.9–50.9)
HDLC SERPL-MCNC: 40 MG/DL (ref 40–60)
HGB BLD-MCNC: 16.2 G/DL (ref 13.1–17.5)
IMM GRANULOCYTES # BLD: 0.03 10*3/MM3 (ref 0–0.03)
IMM GRANULOCYTES NFR BLD: 0.4 % (ref 0–0.6)
KETONES UR QL: NEGATIVE
LEUKOCYTE EST, POC: NEGATIVE
LYMPHOCYTES # BLD AUTO: 2.31 10*3/MM3 (ref 0.6–4.8)
LYMPHOCYTES NFR BLD AUTO: 29.5 % (ref 24–44)
Lab: ABNORMAL
Lab: NORMAL
Lab: NORMAL
MCH RBC QN AUTO: 34 PG (ref 27–31)
MCHC RBC AUTO-ENTMCNC: 35.1 G/DL (ref 32–36)
MCV RBC AUTO: 97.1 FL (ref 80–99)
MONOCYTES # BLD AUTO: 0.77 10*3/MM3 (ref 0–1)
MONOCYTES NFR BLD AUTO: 9.8 % (ref 0–12)
NEUTROPHILS # BLD AUTO: 4.36 10*3/MM3 (ref 1.5–8.3)
NEUTROPHILS NFR BLD AUTO: 55.6 % (ref 41–71)
NITRITE UR-MCNC: NEGATIVE MG/ML
PH UR: 6 [PH] (ref 5–8)
PLATELET # BLD AUTO: 193 10*3/MM3 (ref 150–450)
PMV BLD AUTO: 9.5 FL (ref 6–12)
POC CREATININE URINE: 10
POC MICROALBUMIN URINE: 10
POTASSIUM BLD-SCNC: 5.1 MMOL/L (ref 3.5–5.5)
PROT SERPL-MCNC: 7.2 G/DL (ref 5.7–8.2)
PROT UR STRIP-MCNC: NEGATIVE MG/DL
PROT/CREAT UR: 50 MG/G CREA
RBC # BLD AUTO: 4.76 10*6/MM3 (ref 4.2–5.76)
RBC # UR STRIP: NEGATIVE /UL
SODIUM BLD-SCNC: 130 MMOL/L (ref 132–146)
SP GR UR: 1 (ref 1–1.03)
TRIGL SERPL-MCNC: 340 MG/DL (ref 0–150)
WBC NRBC COR # BLD: 7.84 10*3/MM3 (ref 3.5–10.8)

## 2017-09-07 PROCEDURE — 83036 HEMOGLOBIN GLYCOSYLATED A1C: CPT | Performed by: INTERNAL MEDICINE

## 2017-09-07 PROCEDURE — 80061 LIPID PANEL: CPT | Performed by: INTERNAL MEDICINE

## 2017-09-07 PROCEDURE — 82044 UR ALBUMIN SEMIQUANTITATIVE: CPT | Performed by: INTERNAL MEDICINE

## 2017-09-07 PROCEDURE — 99214 OFFICE O/P EST MOD 30 MIN: CPT | Performed by: INTERNAL MEDICINE

## 2017-09-07 PROCEDURE — 80053 COMPREHEN METABOLIC PANEL: CPT | Performed by: INTERNAL MEDICINE

## 2017-09-07 PROCEDURE — 85025 COMPLETE CBC W/AUTO DIFF WBC: CPT | Performed by: INTERNAL MEDICINE

## 2017-09-07 NOTE — PROGRESS NOTES
Subjective       Yevgeniy Vaughn is a 61 y.o. male.     Chief Complaint   Patient presents with   • Diabetes     6 month follow up  fasting        History obtained from the patient.      History of Present Illness     Primary Care Cardiac Diagnostic Constellation: The patient is here today for a follow-up visit. The patient had a CMP and CBC done on 7/11/17 per Pain Management.      His Diabetes Mellitus type 2 is unstable.  Medication(s): Metformin HCl, Invokana,  and Januvia.   His Hypertension is stable.   Medication(s): Lisinopril.   His Hyperlipidemia has been stable. His LDL goal is 70 mg/dL and last LDL was 84 mg/dL, .   Medication(s): Atorvastatin and fish oil.  The patient is adherent with his medication regimen. He denies medication side effects.      Interval Events:  Last HgA1C was 8.2.  The patient states his blood sugar at home is 130-140 fasting and < 200 post prandial. He denies episodes of hypoglycemia.  He states his blood pressure at home has been 120 's / 80's. The patient states he checks his feet regularly. He states his last ophthalmology appointment was in 2014.      Symptoms: Stable numbness of the feet, cold feet,  And foot pain, but denies a foot ulcer.  Denies chest pain, denies intermittent leg claudication, denies dyspnea, denies lower extremity edema, denies exercise intolerance, denies fatigue,  denies visual impairment, denies muscle pain and denies muscle weakness.   Associated symptoms include a 2 pound  weight gain, but no palpitations, no syncope, no headache, no orthopnea, no PND, no polydipsia, no polyuria, no focal neurologic deficits, and no memory loss.      Lifestyle and Disease Management: Diet: He does have a healthy diet. Weight Issues: He has weight concerns. Exercise: He does not exercise regularly. He walks once a week.   Smoking: He does not use tobacco.       Chronic Pain (Follow-Up): The patient is being seen for follow-up of Chronic Neck Pain and Chronic Left  Arm and Shoulder Pain. The patient reports symptoms are stable.   Interval Events:  The patient is seeing pain management for his chronic pain.   Interval symptoms: stable neck pain and stable upper extremity pain, but denies headache, denies back pain, denies abdominal pain,  and denies lower extremity pain.   Medications include Robaxin, Neurontin, and Percocet.   Medications: the patient is adherent to his medication regimen, but he denies medication side effects.         Colonic Polyp (Brief): The patient is being seen for a routine clinic follow-up of Colon Polyp(s).   Current diagnosis was determined by Colonoscopy and last 1/20/14 was normal, but poor prep.   Symptoms: no hematochezia, no melena, no diarrhea, no constipation, no decreased stool caliber, no change in bowel habits and no abdominal pain. Associated symptoms: no rectal prolapse.   The patient is not currently being treated for this problem.       Insomnia (Follow-Up): The patient is being seen for follow-up of Insomnia. The patient reports doing well.   Comorbid Illnesses: Anxiety.   Interval Events:  None.  Interval symptoms:  Stable  difficulty falling asleep and difficulty staying asleep.  Denies fatigue.   Medications include Trazodone.   The patient is adherent to his medication regimen, and he denies medication side effects.     Anxiety Disorder (Follow-Up): The patient is being seen for follow-up of Anxiety. The patient reports doing well.   Interval symptoms: stable anxiety and denies sleep disruption.  Denies difficulty concentrating, denies restlessness,  denies panic attacks,  and denies depression    Associated symptoms: no suicidal ideation.   Medication: Alprazolam BID.       Current Outpatient Prescriptions on File Prior to Visit   Medication Sig Dispense Refill   • ALPRAZolam (XANAX) 0.5 MG tablet TAKE TWO TABLETS BY MOUTH ONCE DAILY AS NEEDED 60 tablet 0   • atorvastatin (LIPITOR) 20 MG tablet TAKE ONE TABLET BY MOUTH AT BEDTIME  30 tablet 5   • Blood Glucose Monitoring Suppl (ACCU-CHEK ELYSIA PLUS) W/DEVICE kit 1 each 2 (Two) Times a Day. 1 kit 0   • clobetasol (TEMOVATE) 0.05 % cream Apply  topically 2 (Two) Times a Day. 60 g 3   • gabapentin (NEURONTIN) 800 MG tablet Take 1 tablet by mouth 3 (three) times a day.     • glucose blood (ACCU-CHEK ELYSIA PLUS) test strip Test twice daily 100 each 5   • ibuprofen (ADVIL,MOTRIN) 800 MG tablet TAKE ONE TABLET BY MOUTH THREE TIMES DAILY AS NEEDED 90 tablet 0   • INVOKANA 100 MG tablet TAKE ONE TABLET BY MOUTH ONCE DAILY 30 tablet 0   • JANUVIA 100 MG tablet TAKE ONE TABLET BY MOUTH ONCE DAILY 30 tablet 5   • Lancet Devices (ACCU-CHEK SOFTCLIX) lancets Test twice daily as needed 100 each 5   • Lancets (ACCU-CHEK MULTICLIX) lancets Test twice daily as needed 100 each 5   • lisinopril (PRINIVIL,ZESTRIL) 40 MG tablet TAKE ONE TABLET BY MOUTH ONCE DAILY AT BEDTIME 30 tablet 5   • metFORMIN (GLUCOPHAGE) 1000 MG tablet TAKE ONE TABLET BY MOUTH TWICE DAILY 60 tablet 5   • methocarbamol (ROBAXIN) 750 MG tablet TAKE ONE TABLET BY MOUTH THREE TIMES DAILY AS NEEDED FOR PAIN 90 tablet 5   • Omega-3 Fatty Acids (FISH OIL) 1000 MG capsule capsule Take 1 capsule by mouth every 12 (twelve) hours     • oxyCODONE-acetaminophen (PERCOCET) 7.5-325 MG per tablet Take 1 tablet by mouth 3 (three) times a day.     • promethazine (PHENERGAN) 25 MG tablet TAKE ONE TABLET BY MOUTH EVERY 4 TO 6 HOURS AS NEEDED FOR NAUSEA AND VOMITING 30 tablet 5   • traZODone (DESYREL) 100 MG tablet TAKE ONE AND ONE-HALF TO TWO TABLETS BY MOUTH AT BEDTIME AS NEEDED FOR SLEEP 60 tablet 5   • [DISCONTINUED] traZODone (DESYREL) 100 MG tablet Take 1 1/2 to 2 tablets at night as needed for sleep 60 tablet 2     No current facility-administered medications on file prior to visit.          The following portions of the patient's history were reviewed and updated as appropriate: allergies, current medications, past family history, past medical history,  past social history, past surgical history and problem list.    Review of Systems   Constitutional: Negative for fatigue and unexpected weight change.   Eyes: Negative for visual disturbance.   Respiratory: Negative for cough, shortness of breath and wheezing.    Cardiovascular: Negative for chest pain, palpitations and leg swelling.        No SALAMANCA, orthopnea, or claudication.   Gastrointestinal: Negative for abdominal pain, blood in stool, constipation, diarrhea, nausea and vomiting.        Denies melena.   Endocrine: Negative for polydipsia and polyuria.   Musculoskeletal: Negative for arthralgias and myalgias.   Neurological: Negative for dizziness, syncope, light-headedness and headaches.        No memory issues.   Psychiatric/Behavioral: Positive for sleep disturbance. Negative for decreased concentration and suicidal ideas. The patient is nervous/anxious.         Denies depression.         Objective       Blood pressure 132/70, pulse 100, temperature 97.5 °F (36.4 °C), temperature source Temporal Artery , resp. rate 20, weight 197 lb 6 oz (89.5 kg).      Physical Exam   Constitutional:   Overweight.   Neck: Normal range of motion. Neck supple. Carotid bruit is not present. No thyromegaly present.   Cardiovascular: Normal rate, regular rhythm, normal heart sounds and intact distal pulses.  Exam reveals no gallop and no friction rub.    No murmur heard.  No peripheral edema.   Pulmonary/Chest: Effort normal and breath sounds normal.   Abdominal: Soft. Bowel sounds are normal. He exhibits no distension, no abdominal bruit and no mass. There is no hepatosplenomegaly. There is no tenderness.   Psychiatric: He has a normal mood and affect.   Nursing note and vitals reviewed.       Results for orders placed or performed in visit on 09/07/17   POC Glycosylated Hemoglobin (Hb A1C)   Result Value Ref Range    Hemoglobin A1C 7.2 %    Lot Number [id number]     Expiration Date 5-19    POC Microalbumin   Result Value Ref Range     Microalbumin, Urine 10     Creatinine, Urine 10     A/C 30-300mg/g ABNORMAL     Lot Number [id number]     Expiration Date 8-31-18    POC Urinalysis Dipstick, Multipro   Result Value Ref Range    Color Yellow Yellow, Straw, Dark Yellow, Loli    Clarity, UA Clear Clear    Glucose,  mg/dL (A) Negative, 1000 mg/dL (3+) mg/dL    Ketones, UA Negative Negative    Specific Gravity  1.005 1.005 - 1.030    Blood, UA Negative Negative    pH, Urine 6.0 5.0 - 8.0    Protein, POC Negative Negative mg/dL    Leukocytes Negative Negative    Nitrite, UA Negative Negative    Protein/Creatinine Ratio, Urine 50.0 mg/G Crea    Lot Number [id number]     Expiration Date 11-17          Assessment / Plan:    Yevgeniy was seen today for diabetes.    Diagnoses and all orders for this visit:    Type 2 diabetes mellitus with diabetic autonomic neuropathy, without long-term current use of insulin  -     POC Glycosylated Hemoglobin (Hb A1C)  -     POC Microalbumin  -     POC Urinalysis Dipstick, Multipro  -     Comprehensive Metabolic Panel    Essential hypertension    Hyperlipidemia, unspecified hyperlipidemia type  -     Lipid Panel    Benign colonic polyp    Chronic pain syndrome    Insomnia, unspecified type    Generalized anxiety disorder    High risk medication use  -     CBC & Differential    The patient agrees to check on Zostavax coverage at the pharmacy.    The patient's wife states that he has an ophthalmology form at home to give at his upcoming appointment.      The patient was instructed in the side effects of the medication.  Risks of the potential for tolerance, dependence, and addiction were discussed.  The patient was instructed to take the lowest dosage of the medication, at the lowest frequency, and for the shortest period of time possible.  The patient was instructed not to receive controlled substances or narcotics from other doctors, and not to giveaway or sell the medication.    The patient was instructed to abstain from  illicit drug use.  The patient was instructed to avoid alcohol while taking these medications.      Narcotics/controlled substance agreement, Lonnie report, and Urine Drug Screen were updated today if needed.      Return in about 3 months (around 12/7/2017) for Recheck-Anxiety, non-fasting.

## 2017-09-25 RX ORDER — CANAGLIFLOZIN 100 MG/1
TABLET, FILM COATED ORAL
Qty: 30 TABLET | Refills: 5 | Status: SHIPPED | OUTPATIENT
Start: 2017-09-25 | End: 2018-03-03 | Stop reason: SDUPTHER

## 2017-09-26 RX ORDER — CLOBETASOL PROPIONATE 0.5 MG/G
CREAM TOPICAL
Qty: 60 G | Refills: 3 | Status: SHIPPED | OUTPATIENT
Start: 2017-09-26 | End: 2017-12-22 | Stop reason: SDUPTHER

## 2017-10-06 DIAGNOSIS — F41.1 GENERALIZED ANXIETY DISORDER: ICD-10-CM

## 2017-10-06 RX ORDER — ALPRAZOLAM 0.5 MG/1
TABLET ORAL
Qty: 60 TABLET | Refills: 2 | Status: SHIPPED | OUTPATIENT
Start: 2017-10-06 | End: 2018-01-04 | Stop reason: SDUPTHER

## 2017-10-16 RX ORDER — IBUPROFEN 800 MG/1
TABLET ORAL
Qty: 90 TABLET | Refills: 3 | Status: SHIPPED | OUTPATIENT
Start: 2017-10-16 | End: 2018-02-04 | Stop reason: SDUPTHER

## 2017-11-08 ENCOUNTER — OFFICE VISIT (OUTPATIENT)
Dept: INTERNAL MEDICINE | Facility: CLINIC | Age: 62
End: 2017-11-08

## 2017-11-08 VITALS
BODY MASS INDEX: 30.35 KG/M2 | TEMPERATURE: 97.2 F | SYSTOLIC BLOOD PRESSURE: 110 MMHG | HEIGHT: 68 IN | RESPIRATION RATE: 20 BRPM | HEART RATE: 80 BPM | DIASTOLIC BLOOD PRESSURE: 62 MMHG | WEIGHT: 200.25 LBS

## 2017-11-08 DIAGNOSIS — Z00.00 ENCOUNTER FOR HEALTH MAINTENANCE EXAMINATION IN ADULT: Primary | ICD-10-CM

## 2017-11-08 DIAGNOSIS — F41.1 GENERALIZED ANXIETY DISORDER: ICD-10-CM

## 2017-11-08 PROCEDURE — 99396 PREV VISIT EST AGE 40-64: CPT | Performed by: INTERNAL MEDICINE

## 2017-11-08 RX ORDER — TRAZODONE HYDROCHLORIDE 100 MG/1
TABLET ORAL
Qty: 60 TABLET | Refills: 5 | Status: SHIPPED | OUTPATIENT
Start: 2017-11-08 | End: 2018-10-17 | Stop reason: SDUPTHER

## 2017-11-08 NOTE — PATIENT INSTRUCTIONS
Health Maintenance, Male  A healthy lifestyle and preventative care can promote health and wellness.  · Maintain regular health, dental, and eye exams.  · Eat a healthy diet. Foods like vegetables, fruits, whole grains, low-fat dairy products, and lean protein foods contain the nutrients you need and are low in calories. Decrease your intake of foods high in solid fats, added sugars, and salt. Get information about a proper diet from your health care provider, if necessary.  · Regular physical exercise is one of the most important things you can do for your health. Most adults should get at least 150 minutes of moderate-intensity exercise (any activity that increases your heart rate and causes you to sweat) each week. In addition, most adults need muscle-strengthening exercises on 2 or more days a week.    · Maintain a healthy weight. The body mass index (BMI) is a screening tool to identify possible weight problems. It provides an estimate of body fat based on height and weight. Your health care provider can find your BMI and can help you achieve or maintain a healthy weight. For males 20 years and older:    A BMI below 18.5 is considered underweight.    A BMI of 18.5 to 24.9 is normal.    A BMI of 25 to 29.9 is considered overweight.    A BMI of 30 and above is considered obese.  · Maintain normal blood lipids and cholesterol by exercising and minimizing your intake of saturated fat. Eat a balanced diet with plenty of fruits and vegetables. Blood tests for lipids and cholesterol should begin at age 20 and be repeated every 5 years. If your lipid or cholesterol levels are high, you are over age 50, or you are at high risk for heart disease, you may need your cholesterol levels checked more frequently. Ongoing high lipid and cholesterol levels should be treated with medicines if diet and exercise are not working.  · If you smoke, find out from your health care provider how to quit. If you do not use tobacco, do not  start.  · Lung cancer screening is recommended for adults aged 55-80 years who are at high risk for developing lung cancer because of a history of smoking. A yearly low-dose CT scan of the lungs is recommended for people who have at least a 30-pack-year history of smoking and are current smokers or have quit within the past 15 years. A pack year of smoking is smoking an average of 1 pack of cigarettes a day for 1 year (for example, a 30-pack-year history of smoking could mean smoking 1 pack a day for 30 years or 2 packs a day for 15 years). Yearly screening should continue until the smoker has stopped smoking for at least 15 years. Yearly screening should be stopped for people who develop a health problem that would prevent them from having lung cancer treatment.  · If you choose to drink alcohol, do not have more than 2 drinks per day. One drink is considered to be 12 oz (360 mL) of beer, 5 oz (150 mL) of wine, or 1.5 oz (45 mL) of liquor.  · Avoid the use of street drugs. Do not share needles with anyone. Ask for help if you need support or instructions about stopping the use of drugs.  · High blood pressure causes heart disease and increases the risk of stroke. High blood pressure is more likely to develop in:    People who have blood pressure in the end of the normal range (100-139/85-89 mm Hg).    People who are overweight or obese.    People who are .  · If you are 18-39 years of age, have your blood pressure checked every 3-5 years. If you are 40 years of age or older, have your blood pressure checked every year. You should have your blood pressure measured twice--once when you are at a hospital or clinic, and once when you are not at a hospital or clinic. Record the average of the two measurements. To check your blood pressure when you are not at a hospital or clinic, you can use:    An automated blood pressure machine at a pharmacy.    A home blood pressure monitor.  · If you are 45-79 years  old, ask your health care provider if you should take aspirin to prevent heart disease.  · Diabetes screening involves taking a blood sample to check your fasting blood sugar level. This should be done once every 3 years after age 45 if you are at a normal weight and without risk factors for diabetes. Testing should be considered at a younger age or be carried out more frequently if you are overweight and have at least 1 risk factor for diabetes.  · Colorectal cancer can be detected and often prevented. Most routine colorectal cancer screening begins at the age of 50 and continues through age 75. However, your health care provider may recommend screening at an earlier age if you have risk factors for colon cancer. On a yearly basis, your health care provider may provide home test kits to check for hidden blood in the stool. A small camera at the end of a tube may be used to directly examine the colon (sigmoidoscopy or colonoscopy) to detect the earliest forms of colorectal cancer. Talk to your health care provider about this at age 50 when routine screening begins. A direct exam of the colon should be repeated every 5-10 years through age 75, unless early forms of precancerous polyps or small growths are found.  · People who are at an increased risk for hepatitis B should be screened for this virus. You are considered at high risk for hepatitis B if:    You were born in a country where hepatitis B occurs often. Talk with your health care provider about which countries are considered high risk.    Your parents were born in a high-risk country and you have not received a shot to protect against hepatitis B (hepatitis B vaccine).    You have HIV or AIDS.    You use needles to inject street drugs.    You live with, or have sex with, someone who has hepatitis B.    You are a man who has sex with other men (MSM).    You get hemodialysis treatment.    You take certain medicines for conditions like cancer, organ  transplantation, and autoimmune conditions.  · Hepatitis C blood testing is recommended for all people born from 1945 through 1965 and any individual with known risk factors for hepatitis C.  · Healthy men should no longer receive prostate-specific antigen (PSA) blood tests as part of routine cancer screening. Talk to your health care provider about prostate cancer screening.  · Testicular cancer screening is not recommended for adolescents or adult males who have no symptoms. Screening includes self-exam, a health care provider exam, and other screening tests. Consult with your health care provider about any symptoms you have or any concerns you have about testicular cancer.  · Practice safe sex. Use condoms and avoid high-risk sexual practices to reduce the spread of sexually transmitted infections (STIs).  · You should be screened for STIs, including gonorrhea and chlamydia if:    You are sexually active and are younger than 24 years.    You are older than 24 years, and your health care provider tells you that you are at risk for this type of infection.    Your sexual activity has changed since you were last screened, and you are at an increased risk for chlamydia or gonorrhea. Ask your health care provider if you are at risk.  · If you are at risk of being infected with HIV, it is recommended that you take a prescription medicine daily to prevent HIV infection. This is called pre-exposure prophylaxis (PrEP). You are considered at risk if:    You are a man who has sex with other men (MSM).    You are a heterosexual man who is sexually active with multiple partners.    You take drugs by injection.    You are sexually active with a partner who has HIV.    Talk with your health care provider about whether you are at high risk of being infected with HIV. If you choose to begin PrEP, you should first be tested for HIV. You should then be tested every 3 months for as long as you are taking PrEP.  · Use sunscreen. Apply  sunscreen liberally and repeatedly throughout the day. You should seek shade when your shadow is shorter than you. Protect yourself by wearing long sleeves, pants, a wide-brimmed hat, and sunglasses year round whenever you are outdoors.  · Tell your health care provider of new moles or changes in moles, especially if there is a change in shape or color. Also, tell your health care provider if a mole is larger than the size of a pencil eraser.  · A one-time screening for abdominal aortic aneurysm (AAA) and surgical repair of large AAAs by ultrasound is recommended for men aged 65-75 years who are current or former smokers.  · Stay current with your vaccines (immunizations).     This information is not intended to replace advice given to you by your health care provider. Make sure you discuss any questions you have with your health care provider.     Document Released: 06/15/2009 Document Revised: 01/08/2016 Document Reviewed: 09/20/2016  [employer] Patient Education ©2017 [employer]

## 2017-11-08 NOTE — PROGRESS NOTES
Subjective   History of Present Illness    History obtained from the patient.       Anxiety Disorder (Follow-Up): The patient is being seen for follow-up of Anxiety. The patient reports doing well.   Interval symptoms: Stable anxiety.  Denies difficulty concentrating, denies restlessness,  denies panic attacks, denies depression,  and denies sleep disruption.      Associated symptoms: no suicidal ideation.   Medication: Alprazolam BID.     Smokes 1 ppd, not quite ready to quit.      Yevgeniy Vaughn is a 61 y.o. male who presents for an Annual Physical.      PMH, PSH, SocHx, FamHx, Allergies, and Medications: Reviewed and updated.    Outpatient Medications Prior to Visit   Medication Sig Dispense Refill   • ALPRAZolam (XANAX) 0.5 MG tablet TAKE ONE TABLET BY MOUTH TWICE DAILY AS NEEDED 60 tablet 2   • atorvastatin (LIPITOR) 20 MG tablet TAKE ONE TABLET BY MOUTH AT BEDTIME 30 tablet 5   • Blood Glucose Monitoring Suppl (ACCU-CHEK ELYSIA PLUS) W/DEVICE kit 1 each 2 (Two) Times a Day. 1 kit 0   • clobetasol (TEMOVATE) 0.05 % cream APPLY CREAM TOPICALLY TO AFFECTED AREA TWICE DAILY 60 g 3   • gabapentin (NEURONTIN) 800 MG tablet Take 1 tablet by mouth 3 (three) times a day.     • glucose blood (ACCU-CHEK ELYSIA PLUS) test strip Test twice daily 100 each 5   • ibuprofen (ADVIL,MOTRIN) 800 MG tablet TAKE ONE TABLET BY MOUTH THREE TIMES DAILY AS NEEDED 90 tablet 3   • INVOKANA 100 MG tablet TAKE ONE TABLET BY MOUTH ONCE DAILY 30 tablet 5   • JANUVIA 100 MG tablet TAKE ONE TABLET BY MOUTH ONCE DAILY 30 tablet 5   • Lancet Devices (ACCU-CHEK SOFTCLIX) lancets Test twice daily as needed 100 each 5   • Lancets (ACCU-CHEK MULTICLIX) lancets Test twice daily as needed 100 each 5   • lisinopril (PRINIVIL,ZESTRIL) 40 MG tablet TAKE ONE TABLET BY MOUTH ONCE DAILY AT BEDTIME 30 tablet 5   • metFORMIN (GLUCOPHAGE) 1000 MG tablet TAKE ONE TABLET BY MOUTH TWICE DAILY 60 tablet 5   • methocarbamol (ROBAXIN) 750 MG tablet TAKE ONE  TABLET BY MOUTH THREE TIMES DAILY AS NEEDED FOR PAIN 90 tablet 5   • Omega-3 Fatty Acids (FISH OIL) 1000 MG capsule capsule Take 1 capsule by mouth every 12 (twelve) hours     • oxyCODONE-acetaminophen (PERCOCET) 7.5-325 MG per tablet Take 1 tablet by mouth 3 (three) times a day.     • promethazine (PHENERGAN) 25 MG tablet TAKE ONE TABLET BY MOUTH EVERY 4 TO 6 HOURS AS NEEDED FOR NAUSEA AND VOMITING 30 tablet 5   • traZODone (DESYREL) 100 MG tablet TAKE ONE & ONE-HALF TO TWO TABLETS BY MOUTH AT BEDTIME AS NEEDED FOR SLEEP 60 tablet 5   • traZODone (DESYREL) 100 MG tablet TAKE ONE AND ONE-HALF TO TWO TABLETS BY MOUTH AT BEDTIME AS NEEDED FOR SLEEP 60 tablet 5     No facility-administered medications prior to visit.        Immunization History   Administered Date(s) Administered   • Flu Vaccine Quad PF >18YRS 09/14/2016   • Influenza Quad Vaccine (Inpatient) 10/09/2017, 10/09/2017   • Influenza, Quadrivalent 12/22/2011, 10/21/2014, 11/04/2015   • Pneumococcal Conjugate 13-Valent 07/08/2016   • Pneumococcal Polysaccharide 11/19/2008   • Td, Not Adsorbed 07/01/2016   • Tdap 11/19/2008         Patient Active Problem List   Diagnosis   • Acute recurrent pancreatitis   • Anxiety disorder   • Benign colonic polyp   • Chronic neck pain   • Chronic pain syndrome   • Erectile dysfunction   • Hyperlipidemia   • Hypertension   • Insomnia   • Shoulder joint pain   • Psoriasis   • Ptosis of eyelid   • Type 2 diabetes mellitus   • Hyponatremia       Health Habits:  Dental Exam. up to date  Eye Exam. up to date  Hearing Loss:  No  Exercise: 0 times/week.  Current exercise activities include: none  Diet: Healthy  Multivitamin: Yes    Safe Driving:  Yes  Seat Belt:  Yes  Bike Helmet: Yes (motrocycle)  Skin Screening:  Yes  Sunscreen: Yes  SBE / DEVIN: Yes  Sexual Activity:  Yes  Birth Control:  N/A  STD Prevention:  N/A    Last Pap: N/A  Last Mammogram:  N/A  Last DEXA Scan: N/A  Last Colonoscopy: 1/20/14, normal, poor prep  Last PSA:  4/12/17 (0.210).    Social:    Social History     Social History   • Marital status:      Spouse name: N/A   • Number of children: 4   • Years of education: N/A     Occupational History   • Retired     Social History Main Topics   • Smoking status: Current Every Day Smoker   • Smokeless tobacco: Former user, chewed x 1 year in 21's      Comment: 1978- present  1 1/2 ppd.  Quit 6/1/14.  Restarted approximately August 2015, 3/4 ppd. Now 1 ppd   • Alcohol use None   • Drug use: None   • Sexual activity: Not on file     Other Topics Concern   • Not on file     Social History Narrative         Current Medical Providers:    Christine Rousseau MD (Internal Medicine / Pediatrics)    The [facility] providers who are involved in the care of this patient are listed above.         Review of Systems   Constitutional: Negative for appetite change, chills, fatigue, fever and unexpected weight change.        No weight gain or weight loss.  No night sweats.  No generalized pain.     HENT: Positive for ear discharge and tinnitus. Negative for congestion, ear pain, facial swelling, hearing loss, nosebleeds, postnasal drip, rhinorrhea, sinus pressure, sneezing, sore throat, trouble swallowing and voice change.         Reports snoring.   Eyes: Positive for discharge (watery) and itching. Negative for photophobia, pain, redness and visual disturbance.   Respiratory: Positive for cough (dry with allergies). Negative for chest tightness, shortness of breath and wheezing.         No chest congestion.  No hemoptysis. No orthopnea, shortness of breath with exertion, or PND.   Cardiovascular: Negative for chest pain, palpitations and leg swelling.        No claudication or syncope.   Gastrointestinal: Negative for abdominal distention, abdominal pain, blood in stool, constipation, diarrhea, nausea and vomiting.        No melena, heartburn, odynophagia, dysphagia, belching, or bloating.   Endocrine: Positive for cold intolerance.  "Negative for heat intolerance, polydipsia, polyphagia and polyuria.        No hair loss or dry skin. No generalized weakness.   Genitourinary: Negative for decreased urine volume, difficulty urinating, discharge, dysuria, flank pain, frequency, hematuria, scrotal swelling, testicular pain and urgency.        No nocturia, incomplete emptying, or incontinence.  ED better on Cialis and Viagra.   Musculoskeletal: Positive for arthralgias, back pain and myalgias. Negative for gait problem, joint swelling, neck pain and neck stiffness.        No joint stiffness.   Skin: Positive for rash (erythematous, right side of face, has Psoriasis).        No new skin lesions or changes in skin lesions.   Neurological: Negative for dizziness, tremors, speech difficulty, weakness, light-headedness, numbness and headaches.        No tingling. No memory loss. No decreased concentration.   Hematological: Negative for adenopathy. Does not bruise/bleed easily.   Psychiatric/Behavioral: Negative for confusion, sleep disturbance and suicidal ideas. The patient is nervous/anxious (stable).         No depression.           Objective     Vitals:    11/08/17 0936   BP: 110/62   BP Location: Right arm   Pulse: 80   Resp: 20   Temp: 97.2 °F (36.2 °C)   TempSrc: Temporal Artery    Weight: 200 lb 4 oz (90.8 kg)   Height: 68\" (172.7 cm)       Body mass index is 30.45 kg/(m^2).    Physical Exam   Constitutional:   Obese.   HENT:   Head: Normocephalic and atraumatic.   Right Ear: Tympanic membrane and ear canal normal.   Left Ear: Tympanic membrane and ear canal normal.   Mouth/Throat: Oropharynx is clear and moist.   Eyes: Conjunctivae and EOM are normal. Pupils are equal, round, and reactive to light.   Neck: Normal range of motion. Neck supple. No thyromegaly present.   Cardiovascular: Normal rate, regular rhythm and intact distal pulses.  Exam reveals no gallop and no friction rub.    No murmur heard.  Pulmonary/Chest: Effort normal and breath " sounds normal.   Abdominal: Soft. Bowel sounds are normal. He exhibits no distension and no mass. There is no tenderness. There is no rebound and no guarding.   Normal rectal tone.  No rectal masses.   Genitourinary: Prostate normal and penis normal. Right testis shows no mass, no swelling and no tenderness. Left testis shows no mass, no swelling and no tenderness.   Musculoskeletal: Normal range of motion.   Lymphadenopathy:     He has no cervical adenopathy.        Right: No inguinal and no supraclavicular adenopathy present.        Left: No inguinal and no supraclavicular adenopathy present.   Neurological: He is alert. He has normal strength and normal reflexes. No cranial nerve deficit. Coordination and gait normal.   Skin: Rash (scaly, erythematous on right side of face) noted.   No atypical skin lesions.   Psychiatric: He has a normal mood and affect.   Nursing note and vitals reviewed.          Assessment/Plan      Yevgeniy was seen today for annual exam.    Diagnoses and all orders for this visit:    Encounter for health maintenance examination in adult  -     Ambulatory Referral to Dermatology    Generalized anxiety disorder      The patient was instructed in the side effects of the medication.  Risks of the potential for tolerance, dependence, and addiction were discussed.  The patient was instructed to take the lowest dosage of the medication, at the lowest frequency, and for the shortest period of time possible.  The patient was instructed not to receive controlled substances or narcotics from other doctors, and not to giveaway or sell the medication.     The patient was instructed to abstain from illicit drug use.  The patient was instructed to avoid alcohol while taking these medications.       Narcotics/controlled substance agreement, Lonnie report, and Urine Drug Screen were updated today if needed.      Return in about 3 months (around 2/8/2018) for Recheck-Diabetes fasting.

## 2017-12-22 RX ORDER — CLOBETASOL PROPIONATE 0.5 MG/G
CREAM TOPICAL
Qty: 60 G | Refills: 3 | Status: SHIPPED | OUTPATIENT
Start: 2017-12-22 | End: 2021-10-12 | Stop reason: SDUPTHER

## 2018-01-04 DIAGNOSIS — F41.1 GENERALIZED ANXIETY DISORDER: ICD-10-CM

## 2018-01-09 RX ORDER — METHOCARBAMOL 750 MG/1
TABLET, FILM COATED ORAL
Qty: 90 TABLET | Refills: 5 | Status: SHIPPED | OUTPATIENT
Start: 2018-01-09 | End: 2018-02-08

## 2018-01-09 RX ORDER — SITAGLIPTIN 100 MG/1
TABLET, FILM COATED ORAL
Qty: 30 TABLET | Refills: 5 | Status: SHIPPED | OUTPATIENT
Start: 2018-01-09 | End: 2018-06-22 | Stop reason: SDUPTHER

## 2018-01-09 RX ORDER — ATORVASTATIN CALCIUM 20 MG/1
TABLET, FILM COATED ORAL
Qty: 30 TABLET | Refills: 5 | Status: SHIPPED | OUTPATIENT
Start: 2018-01-09 | End: 2018-06-22 | Stop reason: SDUPTHER

## 2018-01-09 RX ORDER — ALPRAZOLAM 0.5 MG/1
TABLET ORAL
Qty: 60 TABLET | Refills: 1 | OUTPATIENT
Start: 2018-01-09 | End: 2018-03-05 | Stop reason: SDUPTHER

## 2018-01-24 RX ORDER — BLOOD SUGAR DIAGNOSTIC
STRIP MISCELLANEOUS
Qty: 100 EACH | Refills: 5 | Status: SHIPPED | OUTPATIENT
Start: 2018-01-24 | End: 2019-06-25 | Stop reason: SDUPTHER

## 2018-01-30 ENCOUNTER — OFFICE VISIT (OUTPATIENT)
Dept: INTERNAL MEDICINE | Facility: CLINIC | Age: 63
End: 2018-01-30

## 2018-01-30 VITALS
WEIGHT: 205.25 LBS | HEART RATE: 72 BPM | RESPIRATION RATE: 20 BRPM | SYSTOLIC BLOOD PRESSURE: 130 MMHG | TEMPERATURE: 97 F | DIASTOLIC BLOOD PRESSURE: 80 MMHG | BODY MASS INDEX: 31.21 KG/M2

## 2018-01-30 DIAGNOSIS — H65.03 BILATERAL ACUTE SEROUS OTITIS MEDIA, RECURRENCE NOT SPECIFIED: Primary | ICD-10-CM

## 2018-01-30 PROCEDURE — 99214 OFFICE O/P EST MOD 30 MIN: CPT | Performed by: INTERNAL MEDICINE

## 2018-01-30 RX ORDER — FEXOFENADINE HCL 180 MG/1
180 TABLET ORAL DAILY
Qty: 30 TABLET | Refills: 2 | Status: SHIPPED | OUTPATIENT
Start: 2018-01-30 | End: 2018-05-08

## 2018-01-30 RX ORDER — METHYLPREDNISOLONE 4 MG/1
TABLET ORAL
Qty: 1 EACH | Refills: 0 | Status: SHIPPED | OUTPATIENT
Start: 2018-01-30 | End: 2018-02-08

## 2018-01-30 RX ORDER — FLUTICASONE PROPIONATE 50 MCG
2 SPRAY, SUSPENSION (ML) NASAL DAILY
Qty: 1 BOTTLE | Refills: 2 | Status: SHIPPED | OUTPATIENT
Start: 2018-01-30 | End: 2018-05-08

## 2018-01-30 NOTE — PROGRESS NOTES
Subjective       Yevgeniy Vaughn is a 62 y.o. male.     Chief Complaint   Patient presents with   • Ear Fullness     Rt ear x2 days        History obtained from the patient.      Ear Fullness    There is pain in the right ear. This is a new problem. Episode onset: 2-3 days ago. The problem occurs constantly. The problem has been unchanged. There has been no fever. The patient is experiencing no pain. Associated symptoms include coughing (chronic, productive), ear discharge (clear, non-bloody), hearing loss and rhinorrhea (clear). Pertinent negatives include no abdominal pain, diarrhea, neck pain, rash, sore throat or vomiting. Treatments tried: Peroxide. The treatment provided no relief. There is no history of a chronic ear infection, hearing loss or a tympanostomy tube.      He is not on any allergy medications.    The following portions of the patient's history were reviewed and updated as appropriate: allergies, current medications, past family history, past medical history, past social history, past surgical history and problem list.      Review of Systems   Constitutional: Negative for chills and fever.   HENT: Positive for congestion, ear discharge (clear, non-bloody), hearing loss and rhinorrhea (clear). Negative for postnasal drip, sinus pain, sinus pressure, sore throat and voice change.    Eyes: Negative for pain, discharge, redness and itching.   Respiratory: Positive for cough (chronic, productive). Negative for shortness of breath and wheezing.    Gastrointestinal: Negative for abdominal pain, diarrhea, nausea and vomiting.   Musculoskeletal: Negative for neck pain.   Skin: Negative for rash.   Neurological: Positive for dizziness.   Hematological: Negative for adenopathy.           Objective     Blood pressure 130/80, pulse 72, temperature 97 °F (36.1 °C), temperature source Temporal Artery , resp. rate 20, weight 93.1 kg (205 lb 4 oz).    Physical Exam   Constitutional: He appears well-developed and  well-nourished.   HENT:   Head: Normocephalic and atraumatic.   Right Ear: External ear and ear canal normal. Tympanic membrane is not erythematous (dull).   Left Ear: External ear and ear canal normal. Tympanic membrane is not erythematous (dull).   Mouth/Throat: Oropharynx is clear and moist and mucous membranes are normal. No oral lesions.   Tonsils normal.  No sinus tenderness to palpation.   Eyes: Conjunctivae are normal.   Neck: Normal range of motion. Neck supple.   Cardiovascular: Normal rate and regular rhythm.    No murmur heard.  Pulmonary/Chest: Effort normal and breath sounds normal.   Lymphadenopathy:     He has no cervical adenopathy.   Skin: No rash noted.   Psychiatric: He has a normal mood and affect.   Nursing note and vitals reviewed.        Assessment/Plan   Yevgeniy was seen today for ear fullness.    Diagnoses and all orders for this visit:    Bilateral acute serous otitis media, recurrence not specified  -     fexofenadine (ALLEGRA ALLERGY) 180 MG tablet; Take 1 tablet by mouth Daily.  -     fluticasone (FLONASE) 50 MCG/ACT nasal spray; 2 sprays into each nostril Daily.  -     MethylPREDNISolone (MEDROL, PILLO,) 4 MG tablet; Take as directed on package instructions.        Return for Next scheduled follow up.

## 2018-02-05 RX ORDER — IBUPROFEN 800 MG/1
TABLET ORAL
Qty: 90 TABLET | Refills: 3 | Status: SHIPPED | OUTPATIENT
Start: 2018-02-05 | End: 2018-05-25 | Stop reason: SDUPTHER

## 2018-02-05 RX ORDER — LISINOPRIL 40 MG/1
TABLET ORAL
Qty: 30 TABLET | Refills: 5 | Status: SHIPPED | OUTPATIENT
Start: 2018-02-05 | End: 2018-07-21 | Stop reason: SDUPTHER

## 2018-02-08 ENCOUNTER — OFFICE VISIT (OUTPATIENT)
Dept: INTERNAL MEDICINE | Facility: CLINIC | Age: 63
End: 2018-02-08

## 2018-02-08 VITALS
BODY MASS INDEX: 30.33 KG/M2 | RESPIRATION RATE: 20 BRPM | SYSTOLIC BLOOD PRESSURE: 120 MMHG | DIASTOLIC BLOOD PRESSURE: 62 MMHG | HEART RATE: 80 BPM | TEMPERATURE: 96.3 F | WEIGHT: 199.5 LBS

## 2018-02-08 DIAGNOSIS — G89.4 CHRONIC PAIN SYNDROME: ICD-10-CM

## 2018-02-08 DIAGNOSIS — H92.01 RIGHT EAR PAIN: ICD-10-CM

## 2018-02-08 DIAGNOSIS — Z79.899 HIGH RISK MEDICATION USE: ICD-10-CM

## 2018-02-08 DIAGNOSIS — Z72.0 TOBACCO ABUSE: ICD-10-CM

## 2018-02-08 DIAGNOSIS — E78.5 HYPERLIPIDEMIA, UNSPECIFIED HYPERLIPIDEMIA TYPE: ICD-10-CM

## 2018-02-08 DIAGNOSIS — F41.1 GENERALIZED ANXIETY DISORDER: ICD-10-CM

## 2018-02-08 DIAGNOSIS — E11.43 TYPE 2 DIABETES MELLITUS WITH DIABETIC AUTONOMIC NEUROPATHY, WITHOUT LONG-TERM CURRENT USE OF INSULIN (HCC): Primary | ICD-10-CM

## 2018-02-08 DIAGNOSIS — H91.8X1 OTHER SPECIFIED HEARING LOSS OF RIGHT EAR, UNSPECIFIED HEARING STATUS ON CONTRALATERAL SIDE: ICD-10-CM

## 2018-02-08 DIAGNOSIS — G47.00 INSOMNIA, UNSPECIFIED TYPE: ICD-10-CM

## 2018-02-08 DIAGNOSIS — K63.5 BENIGN COLONIC POLYP: ICD-10-CM

## 2018-02-08 DIAGNOSIS — I10 ESSENTIAL HYPERTENSION: ICD-10-CM

## 2018-02-08 LAB
ALBUMIN SERPL-MCNC: 4.6 G/DL (ref 3.2–4.8)
ALBUMIN/GLOB SERPL: 1.8 G/DL (ref 1.5–2.5)
ALP SERPL-CCNC: 63 U/L (ref 25–100)
ALT SERPL W P-5'-P-CCNC: 58 U/L (ref 7–40)
ANION GAP SERPL CALCULATED.3IONS-SCNC: 8 MMOL/L (ref 3–11)
ARTICHOKE IGE QN: 89 MG/DL (ref 0–130)
AST SERPL-CCNC: 34 U/L (ref 0–33)
BILIRUB SERPL-MCNC: 0.6 MG/DL (ref 0.3–1.2)
BUN BLD-MCNC: 13 MG/DL (ref 9–23)
BUN/CREAT SERPL: 13 (ref 7–25)
CALCIUM SPEC-SCNC: 9.8 MG/DL (ref 8.7–10.4)
CHLORIDE SERPL-SCNC: 95 MMOL/L (ref 99–109)
CHOLEST SERPL-MCNC: 164 MG/DL (ref 0–200)
CO2 SERPL-SCNC: 29 MMOL/L (ref 20–31)
CREAT BLD-MCNC: 1 MG/DL (ref 0.6–1.3)
EXPIRATION DATE: NORMAL
GFR SERPL CREATININE-BSD FRML MDRD: 76 ML/MIN/1.73
GLOBULIN UR ELPH-MCNC: 2.5 GM/DL
GLUCOSE BLD-MCNC: 157 MG/DL (ref 70–100)
HBA1C MFR BLD: 7.5 %
HDLC SERPL-MCNC: 41 MG/DL (ref 40–60)
Lab: NORMAL
POTASSIUM BLD-SCNC: 4.9 MMOL/L (ref 3.5–5.5)
PROT SERPL-MCNC: 7.1 G/DL (ref 5.7–8.2)
SODIUM BLD-SCNC: 132 MMOL/L (ref 132–146)
TRIGL SERPL-MCNC: 410 MG/DL (ref 0–150)

## 2018-02-08 PROCEDURE — 99214 OFFICE O/P EST MOD 30 MIN: CPT | Performed by: INTERNAL MEDICINE

## 2018-02-08 PROCEDURE — 80061 LIPID PANEL: CPT | Performed by: INTERNAL MEDICINE

## 2018-02-08 PROCEDURE — 80053 COMPREHEN METABOLIC PANEL: CPT | Performed by: INTERNAL MEDICINE

## 2018-02-08 PROCEDURE — 83036 HEMOGLOBIN GLYCOSYLATED A1C: CPT | Performed by: INTERNAL MEDICINE

## 2018-02-08 RX ORDER — OFLOXACIN 3 MG/ML
10 SOLUTION AURICULAR (OTIC) DAILY
Qty: 10 ML | Refills: 0 | Status: SHIPPED | OUTPATIENT
Start: 2018-02-08 | End: 2018-03-09 | Stop reason: SDUPTHER

## 2018-02-08 NOTE — PROGRESS NOTES
Subjective       Yevgeniy Vaughn is a 62 y.o. male.     Chief Complaint   Patient presents with   • Diabetes     3 month follow up fasting    • Hearing Loss     RT ear follow up        History obtained from the patient.      History of Present Illness       The patient was seen on 1/30/18 for right ear pain and hearing loss.  He was put on Allegra, Flonase, and steroids.  He is no better.    Primary Care Cardiac Diagnostic Constellation: The patient is here today for a follow-up visit.       His Diabetes Mellitus type 2 is unstable.  Medication(s): Metformin HCl, Invokana,  and Januvia.   His Hypertension is stable.   Medication(s): Lisinopril.   His Hyperlipidemia has been unstable.   His LDL goal is 70 mg/dL and last LDL was 109 mg/dL, .   Medication(s): Atorvastatin and Fish Oil.  The patient is adherent with his medication regimen. He denies medication side effects.       Interval Events:  Last HgA1C was 7.2.  The patient states his blood sugar at home is 130-140 fasting and < 200 post prandial. He denies episodes of hypoglycemia.  He states his blood pressure at home has been 120 's / 80's. The patient states he checks his feet regularly. His last ophthalmology appointment was 9/29/17, normal.      Symptoms: Stable numbness of the feet, cold feet,  And foot pain, but denies a foot ulcer.  Denies chest pain,  intermittent leg claudication, dyspnea, lower extremity edema, exercise intolerance, fatigue,  visual impairment, muscle pain,  and  muscle weakness.   Associated symptoms include a 6 pound  weight loss, polyuria, and polydipsia,  but no palpitations, no syncope, no headache, no orthopnea, no PND, no focal neurologic deficits, and no memory loss.      Lifestyle and Disease Management: Diet: He does have a healthy diet. Weight Issues: He has weight concerns. Exercise: He does not exercise regularly. He walks once a week.   Smoking: He is smoking 1 1/2 ppd.      Chronic Pain Follow-Up: The patient is  being seen for follow-up of Chronic Neck Pain and Chronic Left Arm and Shoulder Pain. The patient reports symptoms are stable.   Interval Events:  The patient is seeing pain management for his chronic pain.   Symptoms: stable neck pain and upper extremity pain, but denies headache,back pain,  abdominal pain,  and lower extremity pain.   Medications:   Neurontin and Percocet.  Off Robaxin.  The patient is adherent to his medication regimen, but he denies medication side effects.       Colonic Polyp Follow-up: The patient is being seen for a routine clinic follow-up of Colon Polyp(s).   Current diagnosis was determined by Colonoscopy and last 1/20/14 was normal, but poor prep.   Symptoms: no hematochezia, no melena, no diarrhea, no constipation, no decreased stool caliber, no change in bowel habits,  and no abdominal pain. Associated symptoms: no rectal prolapse.   The patient is not currently being treated for this problem.       Insomnia Follow-Up: The patient is being seen for follow-up of Insomnia. The patient reports doing well.   Comorbid Illnesses: Anxiety.   Interval Events:  None.  Symptoms:  Stable  difficulty falling asleep and difficulty staying asleep.  Denies fatigue.   Medications:  Trazodone.   The patient is adherent to his medication regimen, and he denies medication side effects.      Anxiety Disorder Follow-Up: The patient is being seen for follow-up of Anxiety. The patient reports doing well.   Symptoms: stable anxiety and  sleep disruption.  Denies difficulty concentrating,  restlessness, panic attacks,  and depression    Associated symptoms: no suicidal ideation.   Medication: Alprazolam BID.   The patient is adherent to his medication regimen, and he denies medication side ef    Current Outpatient Prescriptions on File Prior to Visit   Medication Sig Dispense Refill   • ACCU-CHEK ELYSIA PLUS test strip USE ONE STRIP TO CHECK GLUCOSE TWICE DAILY 100 each 5   • ALPRAZolam (XANAX) 0.5 MG tablet  TAKE ONE TABLET BY MOUTH TWICE DAILY AS NEEDED 60 tablet 1   • atorvastatin (LIPITOR) 20 MG tablet TAKE ONE TABLET BY MOUTH AT BEDTIME 30 tablet 5   • Blood Glucose Monitoring Suppl (ACCU-CHEK ELYSIA PLUS) W/DEVICE kit 1 each 2 (Two) Times a Day. 1 kit 0   • clobetasol (TEMOVATE) 0.05 % cream APPLY CREAM TOPICALLY TO AFFECTED AREA TWICE DAILY 60 g 3   • fexofenadine (ALLEGRA ALLERGY) 180 MG tablet Take 1 tablet by mouth Daily. 30 tablet 2   • fluticasone (FLONASE) 50 MCG/ACT nasal spray 2 sprays into each nostril Daily. 1 bottle 2   • gabapentin (NEURONTIN) 800 MG tablet Take 1 tablet by mouth 3 (three) times a day.     • glucose blood (ACCU-CHEK ELYSIA PLUS) test strip Test twice daily 100 each 5   • ibuprofen (ADVIL,MOTRIN) 800 MG tablet TAKE ONE TABLET BY MOUTH THREE TIMES DAILY AS NEEDED 90 tablet 3   • INVOKANA 100 MG tablet TAKE ONE TABLET BY MOUTH ONCE DAILY 30 tablet 5   • JANUVIA 100 MG tablet TAKE ONE TABLET BY MOUTH ONCE DAILY 30 tablet 5   • Lancet Devices (ACCU-CHEK SOFTCLIX) lancets Test twice daily as needed 100 each 5   • Lancets (ACCU-CHEK MULTICLIX) lancets Test twice daily as needed 100 each 5   • lisinopril (PRINIVIL,ZESTRIL) 40 MG tablet TAKE ONE TABLET BY MOUTH AT BEDTIME 30 tablet 5   • metFORMIN (GLUCOPHAGE) 1000 MG tablet TAKE ONE TABLET BY MOUTH TWICE DAILY 60 tablet 5   • Omega-3 Fatty Acids (FISH OIL) 1000 MG capsule capsule Take 1 capsule by mouth every 12 (twelve) hours     • oxyCODONE-acetaminophen (PERCOCET) 7.5-325 MG per tablet Take 1 tablet by mouth 3 (three) times a day.     • promethazine (PHENERGAN) 25 MG tablet TAKE ONE TABLET BY MOUTH EVERY 4 TO 6 HOURS AS NEEDED FOR NAUSEA AND VOMITING 30 tablet 5   • traZODone (DESYREL) 100 MG tablet TAKE ONE & ONE-HALF TO TWO TABLETS BY MOUTH AT BEDTIME AS NEEDED FOR SLEEP 60 tablet 5   • [DISCONTINUED] methocarbamol (ROBAXIN) 750 MG tablet TAKE ONE TABLET BY MOUTH THREE TIMES DAILY AS NEEDED FOR PAIN 90 tablet 5   • [DISCONTINUED]  MethylPREDNISolone (MEDROL, PILLO,) 4 MG tablet Take as directed on package instructions. 1 each 0     No current facility-administered medications on file prior to visit.          The following portions of the patient's history were reviewed and updated as appropriate: allergies, current medications, past family history, past medical history, past social history, past surgical history and problem list.    Review of Systems   Constitutional: Positive for unexpected weight change (6 pound intentional weight loss.). Negative for chills, fatigue and fever.   HENT: Positive for ear pain and hearing loss. Negative for congestion, ear discharge, postnasal drip, rhinorrhea, sinus pain, sinus pressure and sore throat.    Eyes: Negative for visual disturbance.   Respiratory: Negative for cough, shortness of breath and wheezing.    Cardiovascular: Negative for chest pain, palpitations and leg swelling.        No SALAMANCA, orthopnea, or claudication.   Gastrointestinal: Negative for abdominal pain, blood in stool, constipation, diarrhea, nausea and vomiting.        Denies melena.   Endocrine: Positive for polydipsia and polyuria.   Musculoskeletal: Negative for arthralgias and myalgias.   Neurological: Positive for dizziness (with ear pain) and light-headedness (with ear pain). Negative for syncope and headaches.        No memory issues.  Balance off with ear pain.   Hematological: Negative for adenopathy.   Psychiatric/Behavioral: Negative for decreased concentration.         Objective       Blood pressure 120/62, pulse 80, temperature 96.3 °F (35.7 °C), temperature source Temporal Artery , resp. rate 20, weight 90.5 kg (199 lb 8 oz).      Physical Exam   Constitutional:   Borderline obese.   HENT:   Right Ear: Tympanic membrane and external ear normal.   Left Ear: Tympanic membrane, external ear and ear canal normal.   Mouth/Throat: No oropharyngeal exudate or posterior oropharyngeal erythema.   Right ear canal with mild edema and  erythema.   Eyes: Conjunctivae are normal.   Neck: Normal range of motion. Neck supple. Carotid bruit is not present. No thyromegaly present.   Cardiovascular: Normal rate, regular rhythm, normal heart sounds and intact distal pulses.  Exam reveals no gallop and no friction rub.    No murmur heard.  No peripheral edema.   Pulmonary/Chest: Effort normal and breath sounds normal.   Abdominal: Soft. Bowel sounds are normal. He exhibits no distension, no abdominal bruit and no mass. There is no hepatosplenomegaly. There is no tenderness.   Lymphadenopathy:     He has no cervical adenopathy.   Psychiatric: He has a normal mood and affect.   Nursing note and vitals reviewed.      Lab Results   Component Value Date    HGBA1C 7.5 02/08/2018         Assessment / Plan:    Yevgeniy was seen today for diabetes and hearing loss.    Diagnoses and all orders for this visit:    Type 2 diabetes mellitus with diabetic autonomic neuropathy, without long-term current use of insulin  -     POC Glycosylated Hemoglobin (Hb A1C)  -     Comprehensive Metabolic Panel    Essential hypertension    Hyperlipidemia, unspecified hyperlipidemia type  -     Lipid Panel    Benign colonic polyp    Chronic pain syndrome    Insomnia, unspecified type    Generalized anxiety disorder    Tobacco abuse  -     varenicline (CHANTIX STARTING MONTH PAK) 0.5 MG X 11 & 1 MG X 42 tablet; Take 0.5 mg one daily on days 1-3 and and 0.5 mg twice daily on days 4-7.Then 1 mg twice daily for a total of 12 weeks.    Right ear pain  -     ofloxacin (FLOXIN OTIC) 0.3 % otic solution; Administer 10 drops to the right ear Daily.  -     Ambulatory Referral to ENT (Otolaryngology)    Other specified hearing loss of right ear, unspecified hearing status on contralateral side  -     Ambulatory Referral to ENT (Otolaryngology)    High risk medication use  -     Urine Drug Screen - Urine, Clean Catch    The patient was instructed in the side effects of the medication.  Risks of the  potential for tolerance, dependence, and addiction were discussed.  The patient was instructed to take the lowest dosage of the medication, at the lowest frequency, and for the shortest period of time possible.  The patient was instructed not to receive controlled substances or narcotics from other doctors, and not to giveaway or sell the medication.      The patient was instructed to abstain from illicit drug use.  The patient was instructed to avoid alcohol while taking these medications.        Narcotics/controlled substance agreement, Lonnie report, and Urine Drug Screen were updated today if needed.      Return in about 3 months (around 5/8/2018) for Recheck-Anxiety, non-fasting.

## 2018-02-22 ENCOUNTER — TELEPHONE (OUTPATIENT)
Dept: INTERNAL MEDICINE | Facility: CLINIC | Age: 63
End: 2018-02-22

## 2018-02-22 NOTE — TELEPHONE ENCOUNTER
----- Message from Berta Beckwith sent at 2/22/2018  2:33 PM EST -----  Concerning derm referral, pt cancelled the appointment and did not reschedule it. He has not responded to phone call or letter. Is this okay to cancel for now?

## 2018-02-26 ENCOUNTER — TRANSCRIBE ORDERS (OUTPATIENT)
Dept: ADMINISTRATIVE | Facility: HOSPITAL | Age: 63
End: 2018-02-26

## 2018-02-26 DIAGNOSIS — H90.3 NEURAL HEARING LOSS, BILATERAL: Primary | ICD-10-CM

## 2018-03-01 ENCOUNTER — HOSPITAL ENCOUNTER (OUTPATIENT)
Dept: MRI IMAGING | Facility: HOSPITAL | Age: 63
Discharge: HOME | End: 2018-03-01
Attending: OTOLARYNGOLOGY | Admitting: OTOLARYNGOLOGY

## 2018-03-01 DIAGNOSIS — H90.3 NEURAL HEARING LOSS, BILATERAL: ICD-10-CM

## 2018-03-01 PROCEDURE — A9577 INJ MULTIHANCE: HCPCS | Performed by: OTOLARYNGOLOGY

## 2018-03-01 PROCEDURE — 70553 MRI BRAIN STEM W/O & W/DYE: CPT

## 2018-03-01 PROCEDURE — 0 GADOBENATE DIMEGLUMINE 529 MG/ML SOLUTION: Performed by: OTOLARYNGOLOGY

## 2018-03-01 RX ADMIN — GADOBENATE DIMEGLUMINE 18 ML: 529 INJECTION, SOLUTION INTRAVENOUS at 16:15

## 2018-03-05 DIAGNOSIS — F41.1 GENERALIZED ANXIETY DISORDER: ICD-10-CM

## 2018-03-05 RX ORDER — CANAGLIFLOZIN 100 MG/1
TABLET, FILM COATED ORAL
Qty: 30 TABLET | Refills: 5 | Status: SHIPPED | OUTPATIENT
Start: 2018-03-05 | End: 2018-08-18 | Stop reason: SDUPTHER

## 2018-03-05 RX ORDER — ALPRAZOLAM 0.5 MG/1
TABLET ORAL
Qty: 60 TABLET | Refills: 2 | Status: SHIPPED | OUTPATIENT
Start: 2018-03-05 | End: 2018-06-04 | Stop reason: SDUPTHER

## 2018-03-05 NOTE — TELEPHONE ENCOUNTER
Last refill 1/9/2018  # 60  1 refill     2/8/18-  CSA signed, UDS neg for Alprazolam and pos for etoh (needs repeat random), and Lonnie appropriate.

## 2018-03-09 DIAGNOSIS — H92.01 RIGHT EAR PAIN: ICD-10-CM

## 2018-03-09 RX ORDER — OFLOXACIN 3 MG/ML
SOLUTION AURICULAR (OTIC)
Qty: 10 ML | Refills: 0 | Status: SHIPPED | OUTPATIENT
Start: 2018-03-09 | End: 2018-08-21

## 2018-03-14 ENCOUNTER — TELEPHONE (OUTPATIENT)
Dept: INTERNAL MEDICINE | Facility: CLINIC | Age: 63
End: 2018-03-14

## 2018-03-14 DIAGNOSIS — H91.8X1 OTHER SPECIFIED HEARING LOSS OF RIGHT EAR, UNSPECIFIED HEARING STATUS ON CONTRALATERAL SIDE: Primary | ICD-10-CM

## 2018-03-14 NOTE — TELEPHONE ENCOUNTER
MRI was normal with the exception of mild age related changes.  Have they discussed need for any additional evaluation or for hearing aids?

## 2018-03-14 NOTE — TELEPHONE ENCOUNTER
Yevgeniy states he went to [facility] Gauth[facility] and does not want to go back to him . He said he was useless and did not tell him anything.   Dr Gauthier did not tell him anything   Told him I will request the records from Dr Gauthier office and have Dr Rousseau review.      Called Lilia and she is going to fax the notes.

## 2018-03-14 NOTE — TELEPHONE ENCOUNTER
See other message    Harshal is c/o abdominal pain and thinks his pancreas is inflamed.    He states he got the lab letter from Dr Rousseau and he cannot exercise as it is too cold and it is too far to go to the mall to walk.  He is trying to eat healthier but he would like to increase his medication for triglyerides.

## 2018-03-14 NOTE — TELEPHONE ENCOUNTER
----- Message from Celi Mckeon sent at 3/14/2018 10:46 AM EDT -----  WIFE-PAULA STONECPZBS-[phone]    PT HAS HEARING LOSS-WENT TO ENT.  THEY SENT HIM TO HAVE MRI-HAVE YOU GOTTEN RESULTS?  ENT HAS NOT TOLD THEM ANYTHING EXCEPT THAT HE DOES NOT HAVE TUMORS.  PLEASE CALL TO DISCUSS

## 2018-03-15 NOTE — TELEPHONE ENCOUNTER
Dr. Gauthier's note stated he would prescribe antivirals and steroids if the MRI was normal.  I would be glad to send him to another ENT for a second opinion ([facility] only other option with insurance), but would recommend he first call Dr. Gauthier's office regarding the medication.    Please get more information on the abdominal pain.  Elevated triglyceride levels can cause pancreatitis.  I can see him in the office to evaluate tomorrow, but if pain is significant or severe, he should go to the ER today.

## 2018-03-16 NOTE — TELEPHONE ENCOUNTER
Yevgeniy states he did get a steroid rx taking 6 pills a day from Dr Gauthier.     And he had started taking them but his sugar went up to 300  . He stopped the steroid pills and his sugar 155 today  . He is not having any more abdominal pain is better.      He will will call Dr Gauthier office and see about the anti viral mediation     He would like a referral to [facility]  He is wondering if Dr Mackay takes his insurance       Advised him if his Abd pain gets worse he will go to the ER

## 2018-03-22 DIAGNOSIS — Z72.0 TOBACCO ABUSE: ICD-10-CM

## 2018-05-03 ENCOUNTER — TELEPHONE (OUTPATIENT)
Dept: INTERNAL MEDICINE | Facility: CLINIC | Age: 63
End: 2018-05-03

## 2018-05-03 DIAGNOSIS — G89.29 CHRONIC MIDLINE LOW BACK PAIN, WITH SCIATICA PRESENCE UNSPECIFIED: Primary | ICD-10-CM

## 2018-05-03 DIAGNOSIS — M54.5 CHRONIC MIDLINE LOW BACK PAIN, WITH SCIATICA PRESENCE UNSPECIFIED: Primary | ICD-10-CM

## 2018-05-03 NOTE — TELEPHONE ENCOUNTER
----- Message from Berta Beckwith sent at 5/3/2018  1:49 PM EDT -----  Shahab Beavers, pain management called and said they just needed updated referral sent for patient due to his insurance    Dx: M54.5--Low back pain]    Fax to [phone] attluis Celis

## 2018-05-08 ENCOUNTER — OFFICE VISIT (OUTPATIENT)
Dept: INTERNAL MEDICINE | Facility: CLINIC | Age: 63
End: 2018-05-08

## 2018-05-08 VITALS
DIASTOLIC BLOOD PRESSURE: 62 MMHG | WEIGHT: 199 LBS | BODY MASS INDEX: 29.39 KG/M2 | RESPIRATION RATE: 19 BRPM | SYSTOLIC BLOOD PRESSURE: 112 MMHG | HEART RATE: 84 BPM | TEMPERATURE: 97.7 F

## 2018-05-08 DIAGNOSIS — G89.4 CHRONIC PAIN SYNDROME: ICD-10-CM

## 2018-05-08 DIAGNOSIS — Z79.899 HIGH RISK MEDICATION USE: ICD-10-CM

## 2018-05-08 DIAGNOSIS — I10 ESSENTIAL HYPERTENSION: ICD-10-CM

## 2018-05-08 DIAGNOSIS — Z12.5 SCREENING FOR PROSTATE CANCER: ICD-10-CM

## 2018-05-08 DIAGNOSIS — F41.1 GENERALIZED ANXIETY DISORDER: ICD-10-CM

## 2018-05-08 DIAGNOSIS — E78.49 OTHER HYPERLIPIDEMIA: ICD-10-CM

## 2018-05-08 DIAGNOSIS — E11.43 TYPE 2 DIABETES MELLITUS WITH DIABETIC AUTONOMIC NEUROPATHY, WITHOUT LONG-TERM CURRENT USE OF INSULIN (HCC): Primary | ICD-10-CM

## 2018-05-08 LAB
ALT SERPL W P-5'-P-CCNC: 36 U/L (ref 7–40)
ARTICHOKE IGE QN: 90 MG/DL (ref 0–130)
AST SERPL-CCNC: 28 U/L (ref 0–33)
CHOLEST SERPL-MCNC: 145 MG/DL (ref 0–200)
EXPIRATION DATE: NORMAL
HBA1C MFR BLD: 7 %
HDLC SERPL-MCNC: 41 MG/DL (ref 40–60)
Lab: NORMAL
PSA SERPL-MCNC: 0.23 NG/ML (ref 0–4)
TRIGL SERPL-MCNC: 186 MG/DL (ref 0–150)

## 2018-05-08 PROCEDURE — 99214 OFFICE O/P EST MOD 30 MIN: CPT | Performed by: INTERNAL MEDICINE

## 2018-05-08 PROCEDURE — 84460 ALANINE AMINO (ALT) (SGPT): CPT | Performed by: INTERNAL MEDICINE

## 2018-05-08 PROCEDURE — 80061 LIPID PANEL: CPT | Performed by: INTERNAL MEDICINE

## 2018-05-08 PROCEDURE — G0103 PSA SCREENING: HCPCS | Performed by: INTERNAL MEDICINE

## 2018-05-08 PROCEDURE — 83036 HEMOGLOBIN GLYCOSYLATED A1C: CPT | Performed by: INTERNAL MEDICINE

## 2018-05-08 PROCEDURE — 84450 TRANSFERASE (AST) (SGOT): CPT | Performed by: INTERNAL MEDICINE

## 2018-05-08 NOTE — PROGRESS NOTES
Subjective       Yevgeniy Vaughn is a 62 y.o. male.     Chief Complaint   Patient presents with   • Diabetes     Follow up       History obtained from the patient.      History of Present Illness     The patient saw an ENT at  for second opinion on his right sided hearing loss.  He states he has an appointment on 5/21/18 to have a 1 1/2 hour hearing test, and to see if he needs hearing aids.    Primary Care Cardiac Diagnostic Constellation: The patient is here today for a follow-up visit.       His Diabetes Mellitus type 2 is unstable.  Medication(s): Metformin, Invokana,  and Januvia.   His Hypertension is stable.   Medication(s): Lisinopril.   His Hyperlipidemia has been unstable.   His LDL goal is 70 mg/dL and last LDL was 89 mg/dL, .   Medication(s): Atorvastatin and Fish Oil.  The patient is adherent with his medication regimen. He denies medication side effects.       Interval Events:  Last HgA1C was 7.5.  No medication changes were made after last visit.  The patient states his blood sugar at home is 100-130 fasting, but does not check post prandial. He denies episodes of hypoglycemia.  He states his blood pressure at home has been 120 's / 80's. The patient states he checks his feet regularly. His last ophthalmology appointment was 9/29/17, normal.      Symptoms: Stable numbness and tingling of the feet, cold feet, and foot pain, but denies a foot ulcer.  Denies chest pain,  intermittent leg claudication, dyspnea, lower extremity edema, exercise intolerance, fatigue,  visual impairment, muscle pain,  and  muscle weakness.   Associated symptoms:  no weight changes.  Has stable polyuria and polydipsia,  but no palpitations, no syncope, no headache, no orthopnea, no PND, no focal neurologic deficits, and no memory loss.      Lifestyle and Disease Management: Diet: He does have a healthy diet. Weight Issues: He has weight concerns. Exercise: He does exercise regularly. He walks twice a week, for the past  month.   Smoking: He quit smoking completely 4/1/18.      Chronic Pain Follow-Up: The patient is being seen for follow-up of Chronic Neck Pain and Chronic Left Arm and Shoulder Pain. The patient reports symptoms are stable.   Interval Events:  The patient is seeing pain management for his chronic pain. The patient states he has started drinking 2-3 beers in the evenings, which he does periodically.  Symptoms: stable neck pain, back pain, and upper extremity pain, but denies headache,  abdominal pain,  and lower extremity pain.   Medications:   Neurontin and Percocet.   The patient is adherent to his medication regimen, but he denies medication side effects.     Anxiety Disorder Follow-Up: The patient is being seen for follow-up of Anxiey, which is stable.  Symptoms: stable anxiety and sleep disruption.  Denies difficulty concentrating,  restlessness, panic attacks,  and depression    Associated symptoms: no suicidal ideation.   Medication: Alprazolam BID.   The patient is adherent to his medication regimen, and he denies medication side effects.    Current Outpatient Prescriptions on File Prior to Visit   Medication Sig Dispense Refill   • ACCU-CHEK ELYSIA PLUS test strip USE ONE STRIP TO CHECK GLUCOSE TWICE DAILY 100 each 5   • ALPRAZolam (XANAX) 0.5 MG tablet TAKE ONE TABLET BY MOUTH TWICE DAILY AS NEEDED 60 tablet 2   • atorvastatin (LIPITOR) 20 MG tablet TAKE ONE TABLET BY MOUTH AT BEDTIME 30 tablet 5   • Blood Glucose Monitoring Suppl (ACCU-CHEK ELYSIA PLUS) W/DEVICE kit 1 each 2 (Two) Times a Day. 1 kit 0   • clobetasol (TEMOVATE) 0.05 % cream APPLY CREAM TOPICALLY TO AFFECTED AREA TWICE DAILY 60 g 3   • gabapentin (NEURONTIN) 800 MG tablet Take 1 tablet by mouth 3 (three) times a day.     • glucose blood (ACCU-CHEK ELYSIA PLUS) test strip Test twice daily 100 each 5   • INVOKANA 100 MG tablet TAKE ONE TABLET BY MOUTH ONCE DAILY 30 tablet 5   • JANUVIA 100 MG tablet TAKE ONE TABLET BY MOUTH ONCE DAILY 30 tablet 5    • Lancet Devices (ACCU-CHEK SOFTCLIX) lancets Test twice daily as needed 100 each 5   • Lancets (ACCU-CHEK MULTICLIX) lancets Test twice daily as needed 100 each 5   • lisinopril (PRINIVIL,ZESTRIL) 40 MG tablet TAKE ONE TABLET BY MOUTH AT BEDTIME 30 tablet 5   • metFORMIN (GLUCOPHAGE) 1000 MG tablet TAKE ONE TABLET BY MOUTH TWICE DAILY 60 tablet 5   • ofloxacin (FLOXIN) 0.3 % otic solution INSTILL 10 DROPS INTO RIGHT EAR ONCE DAILY 10 mL 0   • Omega-3 Fatty Acids (FISH OIL) 1000 MG capsule capsule Take 1 capsule by mouth every 12 (twelve) hours     • oxyCODONE-acetaminophen (PERCOCET) 7.5-325 MG per tablet Take 1 tablet by mouth 3 (three) times a day.     • promethazine (PHENERGAN) 25 MG tablet TAKE ONE TABLET BY MOUTH EVERY 4 TO 6 HOURS AS NEEDED FOR NAUSEA AND VOMITING 30 tablet 5   • traZODone (DESYREL) 100 MG tablet TAKE ONE & ONE-HALF TO TWO TABLETS BY MOUTH AT BEDTIME AS NEEDED FOR SLEEP 60 tablet 5   • [DISCONTINUED] fexofenadine (ALLEGRA ALLERGY) 180 MG tablet Take 1 tablet by mouth Daily. 30 tablet 2   • [DISCONTINUED] fluticasone (FLONASE) 50 MCG/ACT nasal spray 2 sprays into each nostril Daily. 1 bottle 2   • CHANTIX STARTING MONTH PILLO 0.5 MG X 11 & 1 MG X 42 tablet TAKE BY MOUTH AS DIRECTED PER PACKAGE INSTRUCTIONS 53 tablet 0   • ibuprofen (ADVIL,MOTRIN) 800 MG tablet TAKE ONE TABLET BY MOUTH THREE TIMES DAILY AS NEEDED 90 tablet 3     No current facility-administered medications on file prior to visit.        Current outpatient and discharge medications have been reconciled for the patient.  Christine Rousseau MD        The following portions of the patient's history were reviewed and updated as appropriate: allergies, current medications, past family history, past medical history, past social history, past surgical history and problem list.    Review of Systems   Constitutional: Negative for fatigue and unexpected weight change.   Eyes: Negative for visual disturbance.   Respiratory: Negative for cough,  shortness of breath and wheezing.    Cardiovascular: Negative for chest pain, palpitations and leg swelling.        No SALAMANCA, orthopnea, or claudication.   Gastrointestinal: Negative for abdominal pain, blood in stool, constipation, diarrhea, nausea and vomiting.        Denies melena.   Endocrine: Positive for polydipsia and polyuria.   Musculoskeletal: Positive for arthralgias, back pain, myalgias and neck pain.   Neurological: Positive for numbness. Negative for dizziness, syncope, light-headedness and headaches.        No memory issues.   Psychiatric/Behavioral: Negative for decreased concentration, sleep disturbance and suicidal ideas. The patient is nervous/anxious.          Objective       Blood pressure 112/62, pulse 84, temperature 97.7 °F (36.5 °C), temperature source Temporal Artery , resp. rate 19, weight 90.3 kg (199 lb).      Physical Exam    Yevgeniy had a diabetic foot exam performed today.   During the foot exam he had a monofilament test performed (see form.).  Vascular Status -  His right foot exhibits normal foot vasculature . His left foot exhibits normal foot vasculature .  Skin Integrity  -  His right foot skin is intact (dry).His left foot skin is intact (dry)..    Results for orders placed or performed in visit on 05/08/18   POC Glycosylated Hemoglobin (Hb A1C)   Result Value Ref Range    Hemoglobin A1C 7.0 %    Lot Number [id number]     Expiration Date 11-19      Assessment / Plan:  Yevgeniy was seen today for hyperlipidemia.    Diagnoses and all orders for this visit:    Type 2 diabetes mellitus with diabetic autonomic neuropathy, without long-term current use of insulin  -     POC Glycosylated Hemoglobin (Hb A1C)    Other hyperlipidemia  -     Lipid Panel    Essential hypertension    Chronic pain syndrome    Generalized anxiety disorder    Screening for prostate cancer  -     PSA Screen    High risk medication use  -     AST  -     ALT      The patient was instructed in the side effects of the  medication.  Risks of the potential for tolerance, dependence, and addiction were discussed.  The patient was instructed to take the lowest dosage of the medication, at the lowest frequency, and for the shortest period of time possible.  The patient was instructed not to receive controlled substances or narcotics from other doctors, and not to giveaway or sell the medication.    The patient was instructed to abstain from illicit drug use.  The patient was instructed to avoid alcohol while taking these medications.  This was specifically discussed with the patient, since he has started drinking 2-3 beers in the evenings.    Narcotics/controlled substance agreement, Lonnie report, and Urine Drug Screen were updated today if needed.    Return in about 3 months (around 8/8/2018) for Recheck-Diabetes fasting.

## 2018-05-25 RX ORDER — IBUPROFEN 800 MG/1
TABLET ORAL
Qty: 90 TABLET | Refills: 3 | Status: SHIPPED | OUTPATIENT
Start: 2018-05-25 | End: 2019-02-10 | Stop reason: SDUPTHER

## 2018-06-04 DIAGNOSIS — F41.1 GENERALIZED ANXIETY DISORDER: ICD-10-CM

## 2018-06-05 RX ORDER — ALPRAZOLAM 0.5 MG/1
TABLET ORAL
Qty: 60 TABLET | Refills: 2 | OUTPATIENT
Start: 2018-06-05 | End: 2018-08-28 | Stop reason: SDUPTHER

## 2018-06-05 NOTE — TELEPHONE ENCOUNTER
Last refill   3/5/2018 # 60  Refills 2     2/8/18-  CSA signed, UDS neg for Alprazolam and pos for etoh (needs repeat random).  5/10/18- Lonnie appropriate.

## 2018-06-22 RX ORDER — ATORVASTATIN CALCIUM 20 MG/1
TABLET, FILM COATED ORAL
Qty: 30 TABLET | Refills: 5 | Status: SHIPPED | OUTPATIENT
Start: 2018-06-22 | End: 2019-01-14 | Stop reason: SDUPTHER

## 2018-06-22 RX ORDER — SITAGLIPTIN 100 MG/1
TABLET, FILM COATED ORAL
Qty: 30 TABLET | Refills: 5 | Status: SHIPPED | OUTPATIENT
Start: 2018-06-22 | End: 2019-01-14 | Stop reason: SDUPTHER

## 2018-07-23 RX ORDER — LISINOPRIL 40 MG/1
TABLET ORAL
Qty: 30 TABLET | Refills: 5 | Status: SHIPPED | OUTPATIENT
Start: 2018-07-23 | End: 2019-02-10 | Stop reason: SDUPTHER

## 2018-08-20 RX ORDER — CANAGLIFLOZIN 100 MG/1
TABLET, FILM COATED ORAL
Qty: 90 TABLET | Refills: 1 | Status: SHIPPED | OUTPATIENT
Start: 2018-08-20 | End: 2019-03-10 | Stop reason: SDUPTHER

## 2018-08-21 ENCOUNTER — OFFICE VISIT (OUTPATIENT)
Dept: INTERNAL MEDICINE | Facility: CLINIC | Age: 63
End: 2018-08-21

## 2018-08-21 VITALS
WEIGHT: 197.38 LBS | RESPIRATION RATE: 18 BRPM | DIASTOLIC BLOOD PRESSURE: 72 MMHG | TEMPERATURE: 96.9 F | SYSTOLIC BLOOD PRESSURE: 124 MMHG | BODY MASS INDEX: 29.15 KG/M2 | HEART RATE: 82 BPM

## 2018-08-21 DIAGNOSIS — K63.5 BENIGN COLONIC POLYP: ICD-10-CM

## 2018-08-21 DIAGNOSIS — E11.43 TYPE 2 DIABETES MELLITUS WITH DIABETIC AUTONOMIC NEUROPATHY, WITHOUT LONG-TERM CURRENT USE OF INSULIN (HCC): Primary | ICD-10-CM

## 2018-08-21 DIAGNOSIS — I10 ESSENTIAL HYPERTENSION: ICD-10-CM

## 2018-08-21 DIAGNOSIS — F41.1 GENERALIZED ANXIETY DISORDER: ICD-10-CM

## 2018-08-21 DIAGNOSIS — E78.49 OTHER HYPERLIPIDEMIA: ICD-10-CM

## 2018-08-21 LAB
A/C: NORMAL
ALBUMIN SERPL-MCNC: 4.42 G/DL (ref 3.2–4.8)
ALBUMIN/GLOB SERPL: 1.7 G/DL (ref 1.5–2.5)
ALP SERPL-CCNC: 53 U/L (ref 25–100)
ALT SERPL W P-5'-P-CCNC: 33 U/L (ref 7–40)
ANION GAP SERPL CALCULATED.3IONS-SCNC: 7 MMOL/L (ref 3–11)
ARTICHOKE IGE QN: 77 MG/DL (ref 0–130)
AST SERPL-CCNC: 26 U/L (ref 0–33)
BASOPHILS # BLD AUTO: 0.02 10*3/MM3 (ref 0–0.2)
BASOPHILS NFR BLD AUTO: 0.2 % (ref 0–1)
BILIRUB SERPL-MCNC: 0.4 MG/DL (ref 0.3–1.2)
BUN BLD-MCNC: 8 MG/DL (ref 9–23)
BUN/CREAT SERPL: 9.1 (ref 7–25)
CALCIUM SPEC-SCNC: 9.6 MG/DL (ref 8.7–10.4)
CHLORIDE SERPL-SCNC: 98 MMOL/L (ref 99–109)
CHOLEST SERPL-MCNC: 139 MG/DL (ref 0–200)
CLARITY, POC: CLEAR
CO2 SERPL-SCNC: 28 MMOL/L (ref 20–31)
COLOR UR: YELLOW
CREAT BLD-MCNC: 0.88 MG/DL (ref 0.6–1.3)
DEPRECATED RDW RBC AUTO: 44.2 FL (ref 37–54)
EOSINOPHIL # BLD AUTO: 0.17 10*3/MM3 (ref 0–0.3)
EOSINOPHIL NFR BLD AUTO: 1.6 % (ref 0–3)
ERYTHROCYTE [DISTWIDTH] IN BLOOD BY AUTOMATED COUNT: 12.6 % (ref 11.3–14.5)
EXPIRATION DATE: ABNORMAL
EXPIRATION DATE: NORMAL
EXPIRATION DATE: NORMAL
GFR SERPL CREATININE-BSD FRML MDRD: 88 ML/MIN/1.73
GLOBULIN UR ELPH-MCNC: 2.6 GM/DL
GLUCOSE BLD-MCNC: 149 MG/DL (ref 70–100)
GLUCOSE UR STRIP-MCNC: ABNORMAL MG/DL
HBA1C MFR BLD: 6.4 %
HCT VFR BLD AUTO: 46.9 % (ref 38.9–50.9)
HDLC SERPL-MCNC: 53 MG/DL (ref 40–60)
HGB BLD-MCNC: 16.3 G/DL (ref 13.1–17.5)
IMM GRANULOCYTES # BLD: 0.04 10*3/MM3 (ref 0–0.03)
IMM GRANULOCYTES NFR BLD: 0.4 % (ref 0–0.6)
KETONES UR QL: NEGATIVE
LEUKOCYTE EST, POC: NEGATIVE
LYMPHOCYTES # BLD AUTO: 2 10*3/MM3 (ref 0.6–4.8)
LYMPHOCYTES NFR BLD AUTO: 18.4 % (ref 24–44)
Lab: ABNORMAL
Lab: NORMAL
Lab: NORMAL
MCH RBC QN AUTO: 33.3 PG (ref 27–31)
MCHC RBC AUTO-ENTMCNC: 34.8 G/DL (ref 32–36)
MCV RBC AUTO: 95.9 FL (ref 80–99)
MONOCYTES # BLD AUTO: 0.96 10*3/MM3 (ref 0–1)
MONOCYTES NFR BLD AUTO: 8.8 % (ref 0–12)
NEUTROPHILS # BLD AUTO: 7.7 10*3/MM3 (ref 1.5–8.3)
NEUTROPHILS NFR BLD AUTO: 71 % (ref 41–71)
NITRITE UR-MCNC: NEGATIVE MG/ML
PH UR: 7 [PH] (ref 5–8)
PLATELET # BLD AUTO: 188 10*3/MM3 (ref 150–450)
PMV BLD AUTO: 9.5 FL (ref 6–12)
POC CREATININE URINE: 100
POC MICROALBUMIN URINE: 10
POTASSIUM BLD-SCNC: 5.3 MMOL/L (ref 3.5–5.5)
PROT SERPL-MCNC: 7 G/DL (ref 5.7–8.2)
PROT UR STRIP-MCNC: NEGATIVE MG/DL
PROT/CREAT UR: 50 MG/G CREA
RBC # BLD AUTO: 4.89 10*6/MM3 (ref 4.2–5.76)
RBC # UR STRIP: NEGATIVE /UL
SODIUM BLD-SCNC: 133 MMOL/L (ref 132–146)
SP GR UR: 1.01 (ref 1–1.03)
TRIGL SERPL-MCNC: 86 MG/DL (ref 0–150)
TSH SERPL DL<=0.05 MIU/L-ACNC: 0.47 MIU/ML (ref 0.35–5.35)
WBC NRBC COR # BLD: 10.85 10*3/MM3 (ref 3.5–10.8)

## 2018-08-21 PROCEDURE — 80061 LIPID PANEL: CPT | Performed by: INTERNAL MEDICINE

## 2018-08-21 PROCEDURE — 80053 COMPREHEN METABOLIC PANEL: CPT | Performed by: INTERNAL MEDICINE

## 2018-08-21 PROCEDURE — 83036 HEMOGLOBIN GLYCOSYLATED A1C: CPT | Performed by: INTERNAL MEDICINE

## 2018-08-21 PROCEDURE — 82044 UR ALBUMIN SEMIQUANTITATIVE: CPT | Performed by: INTERNAL MEDICINE

## 2018-08-21 PROCEDURE — 85025 COMPLETE CBC W/AUTO DIFF WBC: CPT | Performed by: INTERNAL MEDICINE

## 2018-08-21 PROCEDURE — 84443 ASSAY THYROID STIM HORMONE: CPT | Performed by: INTERNAL MEDICINE

## 2018-08-21 PROCEDURE — 99214 OFFICE O/P EST MOD 30 MIN: CPT | Performed by: INTERNAL MEDICINE

## 2018-08-21 NOTE — PROGRESS NOTES
Subjective       Yevgeniy Vaughn is a 62 y.o. male.     Chief Complaint   Patient presents with   • Follow-up     Diabetes, Refill medications       History obtained from the patient.      History of Present Illness     Primary Care Cardiac Diagnostic Constellation: The patient is here today for a follow-up visit.       His Diabetes Mellitus type 2 is stable.  Medication(s): Metformin, Invokana,  and Januvia.   His Hypertension is stable.   Medication(s): Lisinopril.   His Hyperlipidemia has been unstable.   His LDL goal is 70 mg/dL and last LDL was 90  mg/dL, .   Medication(s): Atorvastatin and Fish Oil.  The patient is adherent with his medication regimen. He denies medication side effects.       Interval Events:  Last HgA1C was 7.0.  The patient has not been checking his blood sugar at home.  He states his blood pressure at home has been 130s / 80s.  He denies episodes of hypoglycemia.  The patient states he checks his feet regularly. His last ophthalmology appointment was 9/29/17, normal.      Symptoms: Stable numbness and tingling of the feet, cold feet, and foot pain, but denies a foot ulcer.  Denies chest pain,  intermittent leg claudication, dyspnea, lower extremity edema, exercise intolerance, fatigue, visual impairment, muscle pain, and  muscle weakness.   Associated symptoms:  no significant weight changes.  Has stable polyuria and polydipsia,  but no palpitations, no syncope, no headache, no orthopnea, no PND, no focal neurologic deficits, and no memory loss.      Lifestyle and Disease Management: Diet: He does have a healthy diet. Weight Issues: He has weight concerns. Exercise: He does not exercise regularly. He walks occasionally, and play with his dog.  Smoking: He quit smoking completely 4/1/18.      Chronic Pain Follow-Up: The patient is being seen for follow-up of Chronic Neck Pain and Chronic Left Arm and Shoulder Pain, which are stable.   Interval Events:  The patient is seeing pain  management for his chronic pain.   Symptoms: stable neck pain, back pain, and upper extremity pain, but denies headache,  abdominal pain,  and lower extremity pain.   Medications:   Neurontin, Ibuprofen,  and Percocet.   The patient is adherent to his medication regimen, and he denies medication side effects.     Colonic Polyp Follow-up: The patient is being seen for a routine clinic follow-up of Colon Polyp(s), which is stable.   Interval Events:  Current diagnosis was determined by Colonoscopy and last 1/20/14 was normal, but poor prep.   Symptoms: no hematochezia, no melena, no diarrhea, no constipation, no decreased stool caliber, no change in bowel habits,  and no abdominal pain. Associated symptoms: no rectal prolapse.   Medication:  None.      Insomnia Follow-Up: The patient is being seen for follow-up of Insomnia, which is stable.  Comorbid Illnesses: Anxiety.   Interval Events:  None.  Symptoms:  Stable difficulty falling asleep and difficulty staying asleep.  Denies fatigue.   Medications:  Trazodone.   The patient is adherent to his medication regimen, and he denies medication side effects.      Anxiety Disorder Follow-Up: The patient is being seen for follow-up of Anxiey, which is stable.  Comorbid Illnesses: Insomnia   Symptoms: stable anxiety and sleep disruption.  Denies difficulty concentrating,  restlessness, panic attacks,  and depression    Associated symptoms: no suicidal ideation.   Medication: Alprazolam BID.   The patient is adherent to his medication regimen, and he denies medication side effects.    Current Outpatient Prescriptions on File Prior to Visit   Medication Sig Dispense Refill   • ACCU-CHEK ELYSIA PLUS test strip USE ONE STRIP TO CHECK GLUCOSE TWICE DAILY 100 each 5   • ALPRAZolam (XANAX) 0.5 MG tablet TAKE 1 TABLET BY MOUTH TWICE DAILY AS NEEDED 60 tablet 2   • atorvastatin (LIPITOR) 20 MG tablet TAKE ONE TABLET BY MOUTH AT BEDTIME 30 tablet 5   • Blood Glucose Monitoring Suppl  (ACCU-CHEK ELYSIA PLUS) W/DEVICE kit 1 each 2 (Two) Times a Day. 1 kit 0   • clobetasol (TEMOVATE) 0.05 % cream APPLY CREAM TOPICALLY TO AFFECTED AREA TWICE DAILY 60 g 3   • gabapentin (NEURONTIN) 800 MG tablet Take 1 tablet by mouth 3 (three) times a day.     • glucose blood (ACCU-CHEK ELYSIA PLUS) test strip Test twice daily 100 each 5   • ibuprofen (ADVIL,MOTRIN) 800 MG tablet TAKE 1 TABLET BY MOUTH THREE TIMES DAILY AS NEEDED 90 tablet 3   • INVOKANA 100 MG tablet TAKE 1 TABLET BY MOUTH ONCE DAILY 90 tablet 1   • JANUVIA 100 MG tablet TAKE ONE TABLET BY MOUTH ONCE DAILY 30 tablet 5   • Lancet Devices (ACCU-CHEK SOFTCLIX) lancets Test twice daily as needed 100 each 5   • Lancets (ACCU-CHEK MULTICLIX) lancets Test twice daily as needed 100 each 5   • lisinopril (PRINIVIL,ZESTRIL) 40 MG tablet TAKE 1 TABLET BY MOUTH AT BEDTIME 30 tablet 5   • metFORMIN (GLUCOPHAGE) 1000 MG tablet TAKE ONE TABLET BY MOUTH TWICE DAILY 60 tablet 5   • Omega-3 Fatty Acids (FISH OIL) 1000 MG capsule capsule Take 1 capsule by mouth every 12 (twelve) hours     • oxyCODONE-acetaminophen (PERCOCET) 7.5-325 MG per tablet Take 1 tablet by mouth 3 (three) times a day.     • promethazine (PHENERGAN) 25 MG tablet TAKE ONE TABLET BY MOUTH EVERY 4 TO 6 HOURS AS NEEDED FOR NAUSEA AND VOMITING 30 tablet 5   • traZODone (DESYREL) 100 MG tablet TAKE ONE & ONE-HALF TO TWO TABLETS BY MOUTH AT BEDTIME AS NEEDED FOR SLEEP 60 tablet 5   • CHANTIX STARTING MONTH PILLO 0.5 MG X 11 & 1 MG X 42 tablet TAKE BY MOUTH AS DIRECTED PER PACKAGE INSTRUCTIONS 53 tablet 0   • [DISCONTINUED] ofloxacin (FLOXIN) 0.3 % otic solution INSTILL 10 DROPS INTO RIGHT EAR ONCE DAILY 10 mL 0     No current facility-administered medications on file prior to visit.        Current outpatient and discharge medications have been reconciled for the patient.  Reviewed by: Christine Rousseau MD        The following portions of the patient's history were reviewed and updated as appropriate:  allergies, current medications, past family history, past medical history, past social history, past surgical history and problem list.    Review of Systems   Constitutional: Negative for fatigue and unexpected weight change.   Eyes: Negative for visual disturbance.   Respiratory: Negative for cough, shortness of breath and wheezing.    Cardiovascular: Negative for chest pain, palpitations and leg swelling.        No SALAMANCA, orthopnea, or claudication.   Gastrointestinal: Negative for abdominal pain, blood in stool, constipation, diarrhea, nausea and vomiting.        Denies melena.   Endocrine: Positive for polydipsia and polyuria.   Musculoskeletal: Positive for arthralgias, back pain and neck pain. Negative for myalgias.   Neurological: Negative for dizziness, syncope, light-headedness and headaches.        No memory issues.   Psychiatric/Behavioral: Negative for decreased concentration. The patient is nervous/anxious.          Objective       Blood pressure 124/72, pulse 82, temperature 96.9 °F (36.1 °C), temperature source Temporal Artery , resp. rate 18, weight 89.5 kg (197 lb 6 oz).      Physical Exam   Constitutional: He appears well-developed and well-nourished.   Neck: Normal range of motion. Neck supple. Carotid bruit is not present. No thyromegaly present.   Cardiovascular: Normal rate, regular rhythm, normal heart sounds and intact distal pulses.  Exam reveals no gallop and no friction rub.    No murmur heard.  No peripheral edema.   Pulmonary/Chest: Effort normal and breath sounds normal.   Abdominal: Soft. Bowel sounds are normal. He exhibits no distension, no abdominal bruit and no mass. There is no hepatosplenomegaly. There is no tenderness.   Skin:   Superficial scrape along toes.   Psychiatric: He has a normal mood and affect.   Nursing note and vitals reviewed.    Results for orders placed or performed in visit on 08/21/18   POC Urinalysis Dipstick, Multipro   Result Value Ref Range    Color Yellow  Yellow, Straw, Dark Yellow, Loli    Clarity, UA Clear Clear    Glucose,  mg/dL (A) Negative, 1000 mg/dL (3+) mg/dL    Ketones, UA Negative Negative    Specific Gravity  1.015 1.005 - 1.030    Blood, UA Negative Negative    pH, Urine 7.0 5.0 - 8.0    Protein, POC Negative Negative mg/dL    Nitrite, UA Negative Negative    Leukocytes Negative Negative    Protein/Creatinine Ratio, Urine 50.0 mg/G Crea    Lot Number [id number]     Expiration Date 12-31-18    POC Microalbumin   Result Value Ref Range    Microalbumin, Urine 10     Creatinine, Urine 100     A/C <30mg/g NORMAL     Lot Number [id number]     Expiration Date 6-30-19    POC Glycosylated Hemoglobin (Hb A1C)   Result Value Ref Range    Hemoglobin A1C 6.4 %    Lot Number [id number]     Expiration Date 4-20        Assessment / Plan:  Yevgeniy was seen today for follow-up.    Diagnoses and all orders for this visit:    Type 2 diabetes mellitus with diabetic autonomic neuropathy, without long-term current use of insulin (CMS/[facility])  -     CBC & Differential  -     Comprehensive Metabolic Panel  -     TSH  -     POC Urinalysis Dipstick, Multipro  -     POC Microalbumin  -     POC Glycosylated Hemoglobin (Hb A1C)  -     CBC Auto Differential    Essential hypertension    Other hyperlipidemia  -     Lipid Panel    Benign colonic polyp    Generalized anxiety disorder         Recommended Shingrix vaccine at the pharmacy.    The patient was instructed in the side effects of the medication.  Risks of the potential for tolerance, dependence, and addiction were discussed.  The patient was instructed to take the lowest dosage of the medication, at the lowest frequency, and for the shortest period of time possible.  The patient was instructed not to receive controlled substances or narcotics from other doctors, and not to giveaway or sell the medication.    The patient was instructed to abstain from illicit drug use.  The patient was instructed to avoid alcohol while taking these  medications.      Narcotics/controlled substance agreement, Lonnie report, and Urine Drug Screen were updated today if needed.    Return in about 3 months (around 11/21/2018) for Recheck- Anxiety, non-fasting.

## 2018-08-28 DIAGNOSIS — F41.1 GENERALIZED ANXIETY DISORDER: ICD-10-CM

## 2018-08-28 NOTE — TELEPHONE ENCOUNTER
Last refill 6/5/2018  #60  Refills 2     2/8/18-  CSA signed, UDS neg for Alprazolam and pos for etoh (needs repeat random).  8/24/18- Lonnie appropriate.

## 2018-08-29 RX ORDER — ALPRAZOLAM 0.5 MG/1
TABLET ORAL
Qty: 60 TABLET | Refills: 2 | OUTPATIENT
Start: 2018-08-29 | End: 2018-11-26 | Stop reason: SDUPTHER

## 2018-10-17 RX ORDER — TRAZODONE HYDROCHLORIDE 100 MG/1
TABLET ORAL
Qty: 60 TABLET | Refills: 5 | Status: SHIPPED | OUTPATIENT
Start: 2018-10-17 | End: 2019-04-07 | Stop reason: SDUPTHER

## 2018-11-26 DIAGNOSIS — F41.1 GENERALIZED ANXIETY DISORDER: ICD-10-CM

## 2018-11-27 ENCOUNTER — OFFICE VISIT (OUTPATIENT)
Dept: INTERNAL MEDICINE | Facility: CLINIC | Age: 63
End: 2018-11-27

## 2018-11-27 VITALS
SYSTOLIC BLOOD PRESSURE: 140 MMHG | RESPIRATION RATE: 20 BRPM | HEART RATE: 80 BPM | DIASTOLIC BLOOD PRESSURE: 72 MMHG | WEIGHT: 193.5 LBS | TEMPERATURE: 97 F | BODY MASS INDEX: 28.57 KG/M2

## 2018-11-27 DIAGNOSIS — J20.9 ACUTE BRONCHITIS, UNSPECIFIED ORGANISM: ICD-10-CM

## 2018-11-27 DIAGNOSIS — F41.1 GENERALIZED ANXIETY DISORDER: ICD-10-CM

## 2018-11-27 DIAGNOSIS — G89.4 CHRONIC PAIN SYNDROME: Primary | ICD-10-CM

## 2018-11-27 PROCEDURE — 99214 OFFICE O/P EST MOD 30 MIN: CPT | Performed by: INTERNAL MEDICINE

## 2018-11-27 RX ORDER — AMOXICILLIN AND CLAVULANATE POTASSIUM 875; 125 MG/1; MG/1
1 TABLET, FILM COATED ORAL 2 TIMES DAILY
Qty: 20 TABLET | Refills: 0 | Status: SHIPPED | OUTPATIENT
Start: 2018-11-27 | End: 2018-12-07

## 2018-11-27 RX ORDER — ALPRAZOLAM 0.5 MG/1
TABLET ORAL
Qty: 60 TABLET | Refills: 2 | Status: SHIPPED | OUTPATIENT
Start: 2018-11-27 | End: 2019-05-06 | Stop reason: SDUPTHER

## 2018-11-27 NOTE — TELEPHONE ENCOUNTER
8/29/2018  Last refill #60  2 refills     2/8/18-  CSA signed, UDS neg for Alprazolam and pos for etoh (needs repeat random).  8/24/18- Lonnie appropriate.

## 2018-11-27 NOTE — PROGRESS NOTES
Subjective       Yevgeniy Vaughn is a 62 y.o. male.     Chief Complaint   Patient presents with   • Anxiety     3 month follow up    • Cough       History obtained from the patient.    Chronic Pain Follow-Up: The patient is being seen for follow-up of Chronic Neck Pain and Chronic Left Arm and Shoulder Pain, which are stable.   Interval Events:  The patient is seeing Pain Management for his chronic pain.   Symptoms: stable neck pain, back pain, and upper extremity pain, but denies headache, abdominal pain, and lower extremity pain.   Medications:   Neurontin, Ibuprofen, and Percocet (per Pain Management)   The patient is adherent to his medication regimen, and he denies medication side effects.      Anxiety Disorder Follow-Up: The patient is being seen for follow-up of Anxiey, which is stable.  Comorbid Illnesses: Insomnia   Symptoms: stable anxiety and sleep disruption.  Denies difficulty concentrating,  restlessness, panic attacks,  and depression    Associated symptoms: no suicidal ideation.   Medication: Alprazolam BID.   The patient is adherent to his medication regimen, and he denies medication side effects.      Cough   This is a new problem. Episode onset: 2-3 weeks ago. The problem has been unchanged. The problem occurs every few minutes. The cough is productive of purulent sputum (dry). Associated symptoms include myalgias and rhinorrhea (clear). Pertinent negatives include no chest pain, chills, ear pain, eye redness, fever, headaches, hemoptysis, nasal congestion, postnasal drip, rash, sore throat, shortness of breath or wheezing. Nothing aggravates the symptoms. Treatments tried: Mucinex and Ibuprofen. The treatment provided mild relief. There is no history of asthma, COPD or environmental allergies.        Current Outpatient Medications on File Prior to Visit   Medication Sig Dispense Refill   • ACCU-CHEK ELYSIA PLUS test strip USE ONE STRIP TO CHECK GLUCOSE TWICE DAILY 100 each 5   • atorvastatin  (LIPITOR) 20 MG tablet TAKE ONE TABLET BY MOUTH AT BEDTIME 30 tablet 5   • Blood Glucose Monitoring Suppl (ACCU-CHEK ELYSIA PLUS) W/DEVICE kit 1 each 2 (Two) Times a Day. 1 kit 0   • clobetasol (TEMOVATE) 0.05 % cream APPLY CREAM TOPICALLY TO AFFECTED AREA TWICE DAILY 60 g 3   • gabapentin (NEURONTIN) 800 MG tablet Take 1 tablet by mouth 3 (three) times a day.     • glucose blood (ACCU-CHEK ELYSIA PLUS) test strip Test twice daily 100 each 5   • ibuprofen (ADVIL,MOTRIN) 800 MG tablet TAKE 1 TABLET BY MOUTH THREE TIMES DAILY AS NEEDED 90 tablet 3   • INVOKANA 100 MG tablet TAKE 1 TABLET BY MOUTH ONCE DAILY 90 tablet 1   • JANUVIA 100 MG tablet TAKE ONE TABLET BY MOUTH ONCE DAILY 30 tablet 5   • Lancet Devices (ACCU-CHEK SOFTCLIX) lancets Test twice daily as needed 100 each 5   • Lancets (ACCU-CHEK MULTICLIX) lancets Test twice daily as needed 100 each 5   • lisinopril (PRINIVIL,ZESTRIL) 40 MG tablet TAKE 1 TABLET BY MOUTH AT BEDTIME 30 tablet 5   • metFORMIN (GLUCOPHAGE) 1000 MG tablet TAKE ONE TABLET BY MOUTH TWICE DAILY 60 tablet 5   • Omega-3 Fatty Acids (FISH OIL) 1000 MG capsule capsule Take 1 capsule by mouth every 12 (twelve) hours     • oxyCODONE-acetaminophen (PERCOCET) 7.5-325 MG per tablet Take 1 tablet by mouth 3 (three) times a day.     • promethazine (PHENERGAN) 25 MG tablet TAKE ONE TABLET BY MOUTH EVERY 4 TO 6 HOURS AS NEEDED FOR NAUSEA AND VOMITING 30 tablet 5   • traZODone (DESYREL) 100 MG tablet TAKE ONE & ONE-HALF TO TWO TABLETS BY MOUTH AT BEDTIME AS NEEDED FOR SLEEP 60 tablet 5   • ALPRAZolam (XANAX) 0.5 MG tablet TAKE 1 TABLET BY MOUTH TWICE DAILY 60 tablet 2   • [DISCONTINUED] CHANTIX STARTING MONTH PILLO 0.5 MG X 11 & 1 MG X 42 tablet TAKE BY MOUTH AS DIRECTED PER PACKAGE INSTRUCTIONS 53 tablet 0     No current facility-administered medications on file prior to visit.        Current outpatient and discharge medications have been reconciled for the patient.  Reviewed by: Christine Rousseau  MD        The following portions of the patient's history were reviewed and updated as appropriate: allergies, current medications, past family history, past medical history, past social history, past surgical history and problem list.    Review of Systems   Constitutional: Positive for fatigue. Negative for appetite change, chills, fever and unexpected weight change.   HENT: Positive for rhinorrhea (clear). Negative for congestion, ear pain, postnasal drip, sinus pressure, sinus pain, sneezing, sore throat and voice change.    Eyes: Negative for pain, discharge, redness and itching.   Respiratory: Positive for cough. Negative for hemoptysis, shortness of breath and wheezing.    Cardiovascular: Negative for chest pain.   Gastrointestinal: Negative for abdominal pain, diarrhea, nausea and vomiting.   Musculoskeletal: Positive for back pain, myalgias and neck pain.   Skin: Negative for rash.   Allergic/Immunologic: Negative for environmental allergies.   Neurological: Negative for headaches.        Denies memory loss.   Hematological: Negative for adenopathy.   Psychiatric/Behavioral: Negative for confusion, decreased concentration, sleep disturbance and suicidal ideas. The patient is nervous/anxious.          Objective       Blood pressure 140/72, pulse 80, temperature 97 °F (36.1 °C), temperature source Temporal, resp. rate 20, weight 87.8 kg (193 lb 8 oz).      Physical Exam   Constitutional:   Overweight.   HENT:   Head: Normocephalic and atraumatic.   Right Ear: Tympanic membrane, external ear and ear canal normal.   Left Ear: Tympanic membrane, external ear and ear canal normal.   Mouth/Throat: Oropharynx is clear and moist and mucous membranes are normal. No oral lesions.   Tonsils normal.  No sinus tenderness to palpation.   Eyes: Conjunctivae are normal.   Neck: Normal range of motion. Neck supple.   Cardiovascular: Normal rate and regular rhythm.   No murmur heard.  Pulmonary/Chest: Effort normal and breath  sounds normal.   Lymphadenopathy:     He has no cervical adenopathy.   Skin: No rash noted.   Psychiatric: He has a normal mood and affect.   Nursing note and vitals reviewed.    Counseling was given to patient for the following topics: appropriate exercise, discussed labs and diagnostic tests performed last visit or since last visit, disease prevention, healthy eating habits, side effects of medications, symptoms of anxiety and symptoms of depression . Total time of the encounter was 15 minutes and 12 minutes was spent counseling.        Assessment / Plan:  Yevgeniy was seen today for anxiety and cough.    Diagnoses and all orders for this visit:    Chronic pain syndrome   Continue current medication.    Generalized anxiety disorder   Continue current medication.    Acute bronchitis, unspecified organism  -     amoxicillin-clavulanate (AUGMENTIN) 875-125 MG per tablet; Take 1 tablet by mouth 2 (Two) Times a Day for 10 days.    Continue Mucinex.      The patient was instructed in the side effects of the medication.  Risks of the potential for tolerance, dependence, and addiction were discussed.  The patient was instructed to take the lowest dosage of the medication, at the lowest frequency, and for the shortest period of time possible.  The patient was instructed not to receive controlled substances or narcotics from other doctors, and not to giveaway or sell the medication.    The patient was instructed to abstain from illicit drug use.  The patient was instructed to avoid alcohol while taking these medications.      Narcotics/controlled substance agreement, Lonnie report, and Urine Drug Screen were updated today if needed.      Return in about 3 months (around 2/27/2019) for Annual physical, fasting.

## 2019-01-14 RX ORDER — ATORVASTATIN CALCIUM 20 MG/1
TABLET, FILM COATED ORAL
Qty: 90 TABLET | Refills: 1 | Status: SHIPPED | OUTPATIENT
Start: 2019-01-14 | End: 2019-07-21 | Stop reason: SDUPTHER

## 2019-01-14 RX ORDER — SITAGLIPTIN 100 MG/1
TABLET, FILM COATED ORAL
Qty: 90 TABLET | Refills: 1 | Status: SHIPPED | OUTPATIENT
Start: 2019-01-14 | End: 2019-07-21 | Stop reason: SDUPTHER

## 2019-02-11 RX ORDER — LISINOPRIL 40 MG/1
TABLET ORAL
Qty: 90 TABLET | Refills: 1 | Status: SHIPPED | OUTPATIENT
Start: 2019-02-11 | End: 2019-07-21 | Stop reason: SDUPTHER

## 2019-02-11 RX ORDER — IBUPROFEN 800 MG/1
TABLET ORAL
Qty: 90 TABLET | Refills: 3 | Status: SHIPPED | OUTPATIENT
Start: 2019-02-11 | End: 2020-02-25

## 2019-02-22 DIAGNOSIS — F41.1 GENERALIZED ANXIETY DISORDER: ICD-10-CM

## 2019-02-25 RX ORDER — ALPRAZOLAM 0.5 MG/1
TABLET ORAL
Qty: 60 TABLET | Refills: 2 | OUTPATIENT
Start: 2019-02-25

## 2019-02-25 NOTE — TELEPHONE ENCOUNTER
RX called into his pharmacy on 02/25/2019. LVM informing patient and need to keep upcoming appointment

## 2019-02-25 NOTE — TELEPHONE ENCOUNTER
2/8/18-  CSA signed, UDS neg for Alprazolam and pos for etoh (needs repeat random).  8/24/18- Lonnie appropriate.      11/27/2018- Last refill

## 2019-02-27 ENCOUNTER — OFFICE VISIT (OUTPATIENT)
Dept: INTERNAL MEDICINE | Facility: CLINIC | Age: 64
End: 2019-02-27

## 2019-02-27 VITALS
WEIGHT: 201.4 LBS | SYSTOLIC BLOOD PRESSURE: 126 MMHG | DIASTOLIC BLOOD PRESSURE: 74 MMHG | BODY MASS INDEX: 29.83 KG/M2 | HEIGHT: 69 IN | RESPIRATION RATE: 20 BRPM | TEMPERATURE: 97 F | HEART RATE: 92 BPM | OXYGEN SATURATION: 97 %

## 2019-02-27 DIAGNOSIS — E11.43 TYPE 2 DIABETES MELLITUS WITH DIABETIC AUTONOMIC NEUROPATHY, WITHOUT LONG-TERM CURRENT USE OF INSULIN (HCC): ICD-10-CM

## 2019-02-27 DIAGNOSIS — F51.01 PRIMARY INSOMNIA: ICD-10-CM

## 2019-02-27 DIAGNOSIS — I10 ESSENTIAL HYPERTENSION: ICD-10-CM

## 2019-02-27 DIAGNOSIS — F41.1 GENERALIZED ANXIETY DISORDER: ICD-10-CM

## 2019-02-27 DIAGNOSIS — Z79.899 HIGH RISK MEDICATION USE: ICD-10-CM

## 2019-02-27 DIAGNOSIS — K63.5 BENIGN COLONIC POLYP: ICD-10-CM

## 2019-02-27 DIAGNOSIS — E78.49 OTHER HYPERLIPIDEMIA: ICD-10-CM

## 2019-02-27 DIAGNOSIS — Z72.0 TOBACCO ABUSE: ICD-10-CM

## 2019-02-27 DIAGNOSIS — Z00.00 ENCOUNTER FOR HEALTH MAINTENANCE EXAMINATION IN ADULT: Primary | ICD-10-CM

## 2019-02-27 DIAGNOSIS — G89.4 CHRONIC PAIN SYNDROME: ICD-10-CM

## 2019-02-27 LAB
ALBUMIN SERPL-MCNC: 4.83 G/DL (ref 3.2–4.8)
ALBUMIN/GLOB SERPL: 1.8 G/DL (ref 1.5–2.5)
ALP SERPL-CCNC: 60 U/L (ref 25–100)
ALT SERPL W P-5'-P-CCNC: 34 U/L (ref 7–40)
ANION GAP SERPL CALCULATED.3IONS-SCNC: 11 MMOL/L (ref 3–11)
ARTICHOKE IGE QN: 97 MG/DL (ref 0–130)
AST SERPL-CCNC: 28 U/L (ref 0–33)
BASOPHILS # BLD AUTO: 0.02 10*3/MM3 (ref 0–0.2)
BASOPHILS NFR BLD AUTO: 0.2 % (ref 0–1)
BILIRUB SERPL-MCNC: 0.5 MG/DL (ref 0.3–1.2)
BUN BLD-MCNC: 8 MG/DL (ref 9–23)
BUN/CREAT SERPL: 9 (ref 7–25)
CALCIUM SPEC-SCNC: 10.2 MG/DL (ref 8.7–10.4)
CHLORIDE SERPL-SCNC: 94 MMOL/L (ref 99–109)
CHOLEST SERPL-MCNC: 157 MG/DL (ref 0–200)
CO2 SERPL-SCNC: 24 MMOL/L (ref 20–31)
CREAT BLD-MCNC: 0.89 MG/DL (ref 0.6–1.3)
DEPRECATED RDW RBC AUTO: 44.3 FL (ref 37–54)
EOSINOPHIL # BLD AUTO: 0.4 10*3/MM3 (ref 0–0.3)
EOSINOPHIL NFR BLD AUTO: 4.6 % (ref 0–3)
ERYTHROCYTE [DISTWIDTH] IN BLOOD BY AUTOMATED COUNT: 12.8 % (ref 11.3–14.5)
EXPIRATION DATE: NORMAL
GFR SERPL CREATININE-BSD FRML MDRD: 87 ML/MIN/1.73
GLOBULIN UR ELPH-MCNC: 2.7 GM/DL
GLUCOSE BLD-MCNC: 156 MG/DL (ref 70–100)
HBA1C MFR BLD: 7.1 %
HCT VFR BLD AUTO: 46.6 % (ref 38.9–50.9)
HDLC SERPL-MCNC: 45 MG/DL (ref 40–60)
HGB BLD-MCNC: 16.3 G/DL (ref 13.1–17.5)
IMM GRANULOCYTES # BLD AUTO: 0.03 10*3/MM3 (ref 0–0.05)
IMM GRANULOCYTES NFR BLD AUTO: 0.3 % (ref 0–0.6)
LYMPHOCYTES # BLD AUTO: 1.99 10*3/MM3 (ref 0.6–4.8)
LYMPHOCYTES NFR BLD AUTO: 22.7 % (ref 24–44)
Lab: NORMAL
MCH RBC QN AUTO: 33.3 PG (ref 27–31)
MCHC RBC AUTO-ENTMCNC: 35 G/DL (ref 32–36)
MCV RBC AUTO: 95.1 FL (ref 80–99)
MONOCYTES # BLD AUTO: 0.82 10*3/MM3 (ref 0–1)
MONOCYTES NFR BLD AUTO: 9.4 % (ref 0–12)
NEUTROPHILS # BLD AUTO: 5.49 10*3/MM3 (ref 1.5–8.3)
NEUTROPHILS NFR BLD AUTO: 62.8 % (ref 41–71)
PLATELET # BLD AUTO: 191 10*3/MM3 (ref 150–450)
PMV BLD AUTO: 9.2 FL (ref 6–12)
POTASSIUM BLD-SCNC: 4.8 MMOL/L (ref 3.5–5.5)
PROT SERPL-MCNC: 7.5 G/DL (ref 5.7–8.2)
RBC # BLD AUTO: 4.9 10*6/MM3 (ref 4.2–5.76)
SODIUM BLD-SCNC: 129 MMOL/L (ref 132–146)
TRIGL SERPL-MCNC: 109 MG/DL (ref 0–150)
WBC NRBC COR # BLD: 8.75 10*3/MM3 (ref 3.5–10.8)

## 2019-02-27 PROCEDURE — 99396 PREV VISIT EST AGE 40-64: CPT | Performed by: INTERNAL MEDICINE

## 2019-02-27 PROCEDURE — 83036 HEMOGLOBIN GLYCOSYLATED A1C: CPT | Performed by: INTERNAL MEDICINE

## 2019-02-27 PROCEDURE — 99406 BEHAV CHNG SMOKING 3-10 MIN: CPT | Performed by: INTERNAL MEDICINE

## 2019-02-27 PROCEDURE — 80053 COMPREHEN METABOLIC PANEL: CPT | Performed by: INTERNAL MEDICINE

## 2019-02-27 PROCEDURE — 85025 COMPLETE CBC W/AUTO DIFF WBC: CPT | Performed by: INTERNAL MEDICINE

## 2019-02-27 PROCEDURE — 80061 LIPID PANEL: CPT | Performed by: INTERNAL MEDICINE

## 2019-03-11 RX ORDER — CANAGLIFLOZIN 100 MG/1
TABLET, FILM COATED ORAL
Qty: 90 TABLET | Refills: 1 | Status: SHIPPED | OUTPATIENT
Start: 2019-03-11 | End: 2019-03-15 | Stop reason: CLARIF

## 2019-03-13 ENCOUNTER — TELEPHONE (OUTPATIENT)
Dept: INTERNAL MEDICINE | Facility: CLINIC | Age: 64
End: 2019-03-13

## 2019-03-13 NOTE — TELEPHONE ENCOUNTER
Sue Vaughn, [phone]. (Wife)  Calling to check on the status of the PA for Invokana.     Advised PA has been submitted, there were a couple of questions to update during today's phone call, I submitted the answer to those questions. Advised pt's wife, Malik states to  allow 5 business days for a response from insurance. Pt has enough of the medication to last until that time frame. Advised if PT runs out of the prescription prior to PA approval contact the office for samples until time being. Good verbal understanding.   Contact plan to follow up on [id number]

## 2019-03-14 ENCOUNTER — PRIOR AUTHORIZATION (OUTPATIENT)
Dept: INTERNAL MEDICINE | Facility: CLINIC | Age: 64
End: 2019-03-14

## 2019-03-14 NOTE — TELEPHONE ENCOUNTER
RAIMUNDO Rousseau changed to preferred medication   Steglatro 5 mg qam.  Rx sent to pharmacy . Yevgeniy notified on vm      Other preferred medication was Segluromet

## 2019-03-15 ENCOUNTER — TELEPHONE (OUTPATIENT)
Dept: INTERNAL MEDICINE | Facility: CLINIC | Age: 64
End: 2019-03-15

## 2019-03-15 NOTE — TELEPHONE ENCOUNTER
PA for wagner denied   Dr Rousseau changed to Steglatro 5mg qam - preferred medication with his insurance.  Rx sent to pharmacy  Notified Yevgeniy on his vm .   Medlist updated

## 2019-04-08 RX ORDER — TRAZODONE HYDROCHLORIDE 100 MG/1
TABLET ORAL
Qty: 180 TABLET | Refills: 1 | Status: SHIPPED | OUTPATIENT
Start: 2019-04-08 | End: 2020-01-30

## 2019-05-06 DIAGNOSIS — F41.1 GENERALIZED ANXIETY DISORDER: ICD-10-CM

## 2019-05-09 ENCOUNTER — TELEPHONE (OUTPATIENT)
Dept: INTERNAL MEDICINE | Facility: CLINIC | Age: 64
End: 2019-05-09

## 2019-05-09 RX ORDER — ALPRAZOLAM 0.5 MG/1
0.5 TABLET ORAL 2 TIMES DAILY PRN
Qty: 30 TABLET | Refills: 2 | Status: SHIPPED | OUTPATIENT
Start: 2019-05-09 | End: 2019-08-07 | Stop reason: SDUPTHER

## 2019-05-09 NOTE — TELEPHONE ENCOUNTER
Yevgeniy Vaughn [phone]  Pharmacy: [facility] in [place]    Pt is calling stating [facility] hasn't received the refill for Xanax 0.5 mg tablet. Advised I show the prescription was e-scribed to the pharmacy today, not showing a receipt from the pharmacy. Please confirm?

## 2019-05-10 NOTE — TELEPHONE ENCOUNTER
Patient's wife Sue called and states [facility] only gave him QTY 30 and it's supposed to be QTY 60. She can be reached at [phone].     ALPRAZolam (XANAX) 0.5 MG tablet   Take 1 tablet by mouth 2 (Two) Times a Day As Needed for Anxiety

## 2019-06-05 ENCOUNTER — TELEPHONE (OUTPATIENT)
Dept: INTERNAL MEDICINE | Facility: CLINIC | Age: 64
End: 2019-06-05

## 2019-06-05 DIAGNOSIS — L40.9 PSORIASIS: Primary | ICD-10-CM

## 2019-06-05 NOTE — TELEPHONE ENCOUNTER
----- Message from Deepthi Avilez sent at 6/5/2019  3:24 PM EDT -----  Patient states he needs a referral to [facility] for porosis. He can be reached at [phone].

## 2019-06-26 RX ORDER — BLOOD SUGAR DIAGNOSTIC
STRIP MISCELLANEOUS
Qty: 100 EACH | Refills: 11 | Status: SHIPPED | OUTPATIENT
Start: 2019-06-26 | End: 2020-06-11

## 2019-07-22 RX ORDER — ATORVASTATIN CALCIUM 20 MG/1
TABLET, FILM COATED ORAL
Qty: 90 TABLET | Refills: 1 | Status: SHIPPED | OUTPATIENT
Start: 2019-07-22 | End: 2019-12-30

## 2019-07-22 RX ORDER — LISINOPRIL 40 MG/1
TABLET ORAL
Qty: 90 TABLET | Refills: 1 | Status: SHIPPED | OUTPATIENT
Start: 2019-07-22 | End: 2020-01-30

## 2019-07-22 RX ORDER — SITAGLIPTIN 100 MG/1
TABLET, FILM COATED ORAL
Qty: 90 TABLET | Refills: 1 | Status: SHIPPED | OUTPATIENT
Start: 2019-07-22 | End: 2020-01-30

## 2019-08-07 DIAGNOSIS — F41.1 GENERALIZED ANXIETY DISORDER: ICD-10-CM

## 2019-08-08 NOTE — TELEPHONE ENCOUNTER
Last rx sent 7/22/2019  # 30  Refill 2   2/27/19--  CSA signed, UDS (leaked in transit) and Lonnie appropriate.

## 2019-08-09 RX ORDER — ALPRAZOLAM 0.5 MG/1
TABLET ORAL
Qty: 60 TABLET | Refills: 2 | Status: SHIPPED | OUTPATIENT
Start: 2019-08-09 | End: 2019-11-08 | Stop reason: SDUPTHER

## 2019-08-09 NOTE — TELEPHONE ENCOUNTER
Last refil was 5/9/2019 # 30  Refill 2   2/27/19--  CSA signed, UDS (leaked in transit) and Lonnie appropriate.

## 2019-08-27 ENCOUNTER — OFFICE VISIT (OUTPATIENT)
Dept: INTERNAL MEDICINE | Facility: CLINIC | Age: 64
End: 2019-08-27

## 2019-08-27 VITALS
DIASTOLIC BLOOD PRESSURE: 70 MMHG | SYSTOLIC BLOOD PRESSURE: 126 MMHG | HEART RATE: 78 BPM | WEIGHT: 201.13 LBS | RESPIRATION RATE: 20 BRPM | TEMPERATURE: 97 F | BODY MASS INDEX: 29.69 KG/M2

## 2019-08-27 DIAGNOSIS — K63.5 BENIGN COLONIC POLYP: ICD-10-CM

## 2019-08-27 DIAGNOSIS — Z12.83 SCREENING FOR SKIN CANCER: ICD-10-CM

## 2019-08-27 DIAGNOSIS — E11.43 TYPE 2 DIABETES MELLITUS WITH DIABETIC AUTONOMIC NEUROPATHY, WITHOUT LONG-TERM CURRENT USE OF INSULIN (HCC): Primary | ICD-10-CM

## 2019-08-27 DIAGNOSIS — E78.49 OTHER HYPERLIPIDEMIA: ICD-10-CM

## 2019-08-27 DIAGNOSIS — I10 ESSENTIAL HYPERTENSION: ICD-10-CM

## 2019-08-27 DIAGNOSIS — G89.4 CHRONIC PAIN SYNDROME: ICD-10-CM

## 2019-08-27 DIAGNOSIS — F51.01 PRIMARY INSOMNIA: ICD-10-CM

## 2019-08-27 DIAGNOSIS — Z12.5 SCREENING FOR PROSTATE CANCER: ICD-10-CM

## 2019-08-27 DIAGNOSIS — F41.1 GENERALIZED ANXIETY DISORDER: ICD-10-CM

## 2019-08-27 LAB
A/C: NORMAL
ALBUMIN SERPL-MCNC: 4.6 G/DL (ref 3.5–5.2)
ALBUMIN/GLOB SERPL: 1.6 G/DL
ALP SERPL-CCNC: 54 U/L (ref 39–117)
ALT SERPL W P-5'-P-CCNC: 41 U/L (ref 1–41)
ANION GAP SERPL CALCULATED.3IONS-SCNC: 11.3 MMOL/L (ref 5–15)
AST SERPL-CCNC: 30 U/L (ref 1–40)
BASOPHILS # BLD AUTO: 0.05 10*3/MM3 (ref 0–0.2)
BASOPHILS NFR BLD AUTO: 0.9 % (ref 0–1.5)
BILIRUB SERPL-MCNC: 0.4 MG/DL (ref 0.2–1.2)
BUN BLD-MCNC: 6 MG/DL (ref 8–23)
BUN/CREAT SERPL: 7.5 (ref 7–25)
CALCIUM SPEC-SCNC: 9.6 MG/DL (ref 8.6–10.5)
CHLORIDE SERPL-SCNC: 88 MMOL/L (ref 98–107)
CHOLEST SERPL-MCNC: 107 MG/DL (ref 0–200)
CLARITY, POC: CLEAR
CO2 SERPL-SCNC: 25.7 MMOL/L (ref 22–29)
COLOR UR: YELLOW
CREAT BLD-MCNC: 0.8 MG/DL (ref 0.76–1.27)
DEPRECATED RDW RBC AUTO: 40.5 FL (ref 37–54)
EOSINOPHIL # BLD AUTO: 0.25 10*3/MM3 (ref 0–0.4)
EOSINOPHIL NFR BLD AUTO: 4.7 % (ref 0.3–6.2)
ERYTHROCYTE [DISTWIDTH] IN BLOOD BY AUTOMATED COUNT: 11.7 % (ref 12.3–15.4)
EXPIRATION DATE: ABNORMAL
EXPIRATION DATE: NORMAL
EXPIRATION DATE: NORMAL
GFR SERPL CREATININE-BSD FRML MDRD: 98 ML/MIN/1.73
GLOBULIN UR ELPH-MCNC: 2.8 GM/DL
GLUCOSE BLD-MCNC: 188 MG/DL (ref 65–99)
GLUCOSE UR STRIP-MCNC: ABNORMAL MG/DL
HBA1C MFR BLD: 7 %
HCT VFR BLD AUTO: 41.5 % (ref 37.5–51)
HDLC SERPL-MCNC: 40 MG/DL (ref 40–60)
HGB BLD-MCNC: 14.7 G/DL (ref 13–17.7)
IMM GRANULOCYTES # BLD AUTO: 0.03 10*3/MM3 (ref 0–0.05)
IMM GRANULOCYTES NFR BLD AUTO: 0.6 % (ref 0–0.5)
KETONES UR QL: NEGATIVE
LDLC SERPL CALC-MCNC: 38 MG/DL (ref 0–100)
LDLC/HDLC SERPL: 0.95 {RATIO}
LEUKOCYTE EST, POC: NEGATIVE
LYMPHOCYTES # BLD AUTO: 1.58 10*3/MM3 (ref 0.7–3.1)
LYMPHOCYTES NFR BLD AUTO: 29.4 % (ref 19.6–45.3)
Lab: ABNORMAL
Lab: NORMAL
Lab: NORMAL
MCH RBC QN AUTO: 33.6 PG (ref 26.6–33)
MCHC RBC AUTO-ENTMCNC: 35.4 G/DL (ref 31.5–35.7)
MCV RBC AUTO: 95 FL (ref 79–97)
MONOCYTES # BLD AUTO: 0.59 10*3/MM3 (ref 0.1–0.9)
MONOCYTES NFR BLD AUTO: 11 % (ref 5–12)
NEUTROPHILS # BLD AUTO: 2.87 10*3/MM3 (ref 1.7–7)
NEUTROPHILS NFR BLD AUTO: 53.4 % (ref 42.7–76)
NITRITE UR-MCNC: NEGATIVE MG/ML
NRBC BLD AUTO-RTO: 0 /100 WBC (ref 0–0.2)
PH UR: 7 [PH] (ref 5–8)
PLATELET # BLD AUTO: 219 10*3/MM3 (ref 140–450)
PMV BLD AUTO: 8.8 FL (ref 6–12)
POC CREATININE URINE: 50
POC MICROALBUMIN URINE: 30
POTASSIUM BLD-SCNC: 5 MMOL/L (ref 3.5–5.2)
PROT SERPL-MCNC: 7.4 G/DL (ref 6–8.5)
PROT UR STRIP-MCNC: NEGATIVE MG/DL
PROT/CREAT UR: 50 MG/G CREA
PSA SERPL-MCNC: 0.22 NG/ML (ref 0–4)
RBC # BLD AUTO: 4.37 10*6/MM3 (ref 4.14–5.8)
RBC # UR STRIP: NEGATIVE /UL
SODIUM BLD-SCNC: 125 MMOL/L (ref 136–145)
SP GR UR: 1.01 (ref 1–1.03)
TRIGL SERPL-MCNC: 146 MG/DL (ref 0–150)
TSH SERPL DL<=0.05 MIU/L-ACNC: 0.59 MIU/ML (ref 0.27–4.2)
VLDLC SERPL-MCNC: 29.2 MG/DL (ref 5–40)
WBC NRBC COR # BLD: 5.37 10*3/MM3 (ref 3.4–10.8)

## 2019-08-27 PROCEDURE — 99214 OFFICE O/P EST MOD 30 MIN: CPT | Performed by: INTERNAL MEDICINE

## 2019-08-27 PROCEDURE — 80053 COMPREHEN METABOLIC PANEL: CPT | Performed by: INTERNAL MEDICINE

## 2019-08-27 PROCEDURE — 85025 COMPLETE CBC W/AUTO DIFF WBC: CPT | Performed by: INTERNAL MEDICINE

## 2019-08-27 PROCEDURE — 80061 LIPID PANEL: CPT | Performed by: INTERNAL MEDICINE

## 2019-08-27 PROCEDURE — 82044 UR ALBUMIN SEMIQUANTITATIVE: CPT | Performed by: INTERNAL MEDICINE

## 2019-08-27 PROCEDURE — G0103 PSA SCREENING: HCPCS | Performed by: INTERNAL MEDICINE

## 2019-08-27 PROCEDURE — 83036 HEMOGLOBIN GLYCOSYLATED A1C: CPT | Performed by: INTERNAL MEDICINE

## 2019-08-27 PROCEDURE — 84443 ASSAY THYROID STIM HORMONE: CPT | Performed by: INTERNAL MEDICINE

## 2019-08-27 NOTE — PROGRESS NOTES
Subjective       Yevgeniy Vaughn is a 63 y.o. male.     Chief Complaint   Patient presents with   • Diabetes     6 month follow up  fasting        History obtained from the patient.      History of Present Illness     Primary Care Cardiac Diagnostic Constellation: The patient is here today for a follow-up visit.       His Diabetes Mellitus type 2 is stable.  Medication(s): Metformin and Januvia.   His Hypertension is stable.   Medication(s): Lisinopril.   His Hyperlipidemia has been unstable.   His LDL goal is < 70 mg/dL and last LDL was 97 mg/dL, .   Medication(s): Atorvastatin and Fish Oil.  The patient is adherent with his medication regimen. He denies medication side effects.       Interval Events: The patient states he stopped Steglatro one month ago due to decreased energy and a rash.  He states his symptoms resolved when he went off the medication.  Last HgA1C was 7.1. The patient  states his blood sugar at home has been 120-147 non-fasting.  He has not checked fasting levels.  He denies episodes of low blood sugar. He states his blood pressure at home has been 130s / 80s.  The patient states he checks his feet regularly. His last Ophthalmology appointment was 9/29/17, normal.      Symptoms:  Denies chest pain, dyspnea, SALAMANCA, orthopnea, PND, palpitations, syncope, lower extremity edema (except feet), intermittent leg claudication, lightheadedness, and dizziness.  Associated symptoms: No significant weight change since last visit.  Has stable arthralgias. No fatigue, headache, polyuria, polydipsia, myalgias, visual impairment, memory loss, concentration problems, or focal neurologic deficits.  Stable pain, numbness, and tingling of the feet,  but denies a foot ulcer. On Neurontin.      Lifestyle and Disease Management: Diet: He does have a healthy diet. Weight Issues: He has weight concerns. Exercise: He walks daily and is a lot more active overall.  Smoking: He quit smoking completely 4/1/18. Re-started  Sept 2018, 1 ppd         Chronic Pain Follow-Up: The patient is being seen for follow-up of Chronic Neck Pain and Chronic Left Arm and Shoulder Pain, which are stable.   Interval Events:  The patient is seeing Pain Management for his chronic pain.   Symptoms: stable neck pain, back pain, and upper extremity pain, but denies headache, abdominal pain, and lower extremity pain.   Medications:   Neurontin, Ibuprofen, and Percocet per Pain Management.   The patient is adherent to his medication regimen, and he denies medication side effects.      Colonic Polyp Follow-up: The patient is being seen for a routine clinic follow-up of Colon Polyp(s), which is stable.   Interval Events:  Current diagnosis was determined by Colonoscopy and last 1/20/14 was normal, but poor prep.   Symptoms: no abdominal pain,  diarrhea, constipation, hematochezia, melena, decreased stool caliber, or change in bowel habits.  Associated Symptoms: no rectal prolapse.   Medication:  None.      Insomnia Follow-Up: The patient is being seen for follow-up of Insomnia, which is stable.  Comorbid Illnesses: Anxiety.   Interval Events:  None.  Symptoms:  Stable insomnia and anxiety.  Medications:  Trazodone.   The patient is adherent to his medication regimen, and he denies medication side effects.      Anxiety Disorder Follow-Up: The patient is being seen for follow-up of Anxiey, which is stable.  Comorbid Illnesses: Insomnia   Symptoms: stable anxiety and insomnia.  Denies depression, panic attacks, anhedonia, memory loss, and difficulty concentrating.    Associated Symptoms: no suicidal ideation.   Medication: Alprazolam BID   The patient is adherent to his medication regimen, and he denies medication side effects.    Current Outpatient Medications on File Prior to Visit   Medication Sig Dispense Refill   • ACCU-CHEK ELYSIA PLUS test strip USE ONE STRIP TO CHECK GLUCOSE TWICE DAILY 100 each 11   • ALPRAZolam (XANAX) 0.5 MG tablet TAKE 1 TABLET BY MOUTH  TWICE DAILY AS NEEDED FOR ANXIETY 60 tablet 2   • atorvastatin (LIPITOR) 20 MG tablet TAKE 1 TABLET BY MOUTH AT BEDTIME 90 tablet 1   • Blood Glucose Monitoring Suppl (ACCU-CHEK ELYSIA PLUS) W/DEVICE kit 1 each 2 (Two) Times a Day. 1 kit 0   • clobetasol (TEMOVATE) 0.05 % cream APPLY CREAM TOPICALLY TO AFFECTED AREA TWICE DAILY 60 g 3   • glucose blood (ACCU-CHEK ELYSIA PLUS) test strip Test twice daily 100 each 5   • ibuprofen (ADVIL,MOTRIN) 800 MG tablet TAKE 1 TABLET BY MOUTH THREE TIMES DAILY AS NEEDED 90 tablet 3   • JANUVIA 100 MG tablet TAKE 1 TABLET BY MOUTH ONCE DAILY 90 tablet 1   • Lancet Devices (ACCU-CHEK SOFTCLIX) lancets Test twice daily as needed 100 each 5   • Lancets (ACCU-CHEK MULTICLIX) lancets Test twice daily as needed 100 each 5   • lisinopril (PRINIVIL,ZESTRIL) 40 MG tablet TAKE 1 TABLET BY MOUTH AT BEDTIME 90 tablet 1   • metFORMIN (GLUCOPHAGE) 1000 MG tablet TAKE 1 TABLET BY MOUTH TWICE DAILY 180 tablet 1   • Omega-3 Fatty Acids (FISH OIL) 1000 MG capsule capsule Take 1 capsule by mouth every 12 (twelve) hours     • oxyCODONE-acetaminophen (PERCOCET) 7.5-325 MG per tablet Take 1 tablet by mouth 3 (three) times a day.     • promethazine (PHENERGAN) 25 MG tablet TAKE ONE TABLET BY MOUTH EVERY 4 TO 6 HOURS AS NEEDED FOR NAUSEA AND VOMITING 30 tablet 5   • traZODone (DESYREL) 100 MG tablet TAKE 1 & 1/2 TO 2 (ONE & ONE-HALF TO TWO) TABLETS BY MOUTH AT BEDTIME AS NEEDED FOR SLEEP 180 tablet 1   • gabapentin (NEURONTIN) 800 MG tablet Take 1 tablet by mouth 3 (three) times a day.     • [DISCONTINUED] Ertugliflozin L-PyroglutamicAc (STEGLATRO) 5 MG tablet Take 1 tablet by mouth Every Morning. 30 tablet 5     No current facility-administered medications on file prior to visit.        Current outpatient and discharge medications have been reconciled for the patient.  Reviewed by: Christine Rousseau MD        The following portions of the patient's history were reviewed and updated as appropriate: allergies,  current medications, past family history, past medical history, past social history, past surgical history and problem list.    Review of Systems   Constitutional: Negative for fatigue and unexpected weight change.   Eyes: Negative for visual disturbance.   Respiratory: Negative for cough, shortness of breath and wheezing.    Cardiovascular: Negative for chest pain, palpitations and leg swelling.        No SALAMANCA, orthopnea, or claudication.   Gastrointestinal: Negative for abdominal pain, blood in stool, constipation, diarrhea, nausea and vomiting.        Denies melena.   Endocrine: Negative for polydipsia and polyuria.   Musculoskeletal: Positive for arthralgias, back pain, joint swelling (feet) and neck pain. Negative for myalgias.   Neurological: Positive for numbness. Negative for dizziness, syncope, light-headedness and headaches.        No memory issues.   Psychiatric/Behavioral: Positive for sleep disturbance. Negative for decreased concentration and suicidal ideas. The patient is nervous/anxious.          Objective       Blood pressure 126/70, pulse 78, temperature 97 °F (36.1 °C), temperature source Temporal, resp. rate 20, weight 91.2 kg (201 lb 2 oz).      Physical Exam   Constitutional:   Overweight.   Neck: Normal range of motion. Neck supple. Carotid bruit is not present. No thyromegaly present.   Cardiovascular: Normal rate, regular rhythm, normal heart sounds and intact distal pulses. Exam reveals no gallop and no friction rub.   No murmur heard.  No peripheral edema.   Pulmonary/Chest: Effort normal and breath sounds normal.   Abdominal: Soft. Bowel sounds are normal. He exhibits no distension, no abdominal bruit and no mass. There is no hepatosplenomegaly. There is no tenderness.    Yevgeniy had a diabetic foot exam performed today.   During the foot exam he had a monofilament test performed (see form).  Vascular Status -  His right foot exhibits normal foot vasculature . His left foot exhibits normal  foot vasculature .  Skin Integrity  -  His right foot skin is intact.His left foot skin is intact..  Psychiatric: He has a normal mood and affect.   Nursing note and vitals reviewed.    Results for orders placed or performed in visit on 08/27/19   POC Glycosylated Hemoglobin (Hb A1C)   Result Value Ref Range    Hemoglobin A1C 7.0 %    Lot Number [id number]     Expiration Date 4-10-21    POC Urinalysis Dipstick, Multipro   Result Value Ref Range    Color Yellow Yellow, Straw, Dark Yellow, Loli    Clarity, UA Clear Clear    Glucose,  mg/dL (A) Negative, 1000 mg/dL (3+) mg/dL    Ketones, UA Negative Negative    Specific Gravity  1.010 1.005 - 1.030    Blood, UA Negative Negative    pH, Urine 7.0 5.0 - 8.0    Protein, POC Negative Negative mg/dL    Nitrite, UA Negative Negative    Leukocytes Negative Negative    Protein/Creatinine Ratio, Urine 50.0 mg/G Crea    Lot Number [id number]     Expiration Date 3-31-20    POC Microalbumin   Result Value Ref Range    Microalbumin, Urine 30     Creatinine, Urine 50     A/C 30-300Amg/g ABNORMAL     Lot Number [id number]     Expiration Date 7-31-20        Assessment / Plan:  Yevgeniy was seen today for diabetes.    Diagnoses and all orders for this visit:    Type 2 diabetes mellitus with diabetic autonomic neuropathy, without long-term current use of insulin (CMS/[facility])  -     POC Glycosylated Hemoglobin (Hb A1C)  -     POC Urinalysis Dipstick, Multipro  -     POC Microalbumin  -     Lipid Panel  -     Comprehensive Metabolic Panel  -     CBC & Differential  -     TSH  -     CBC Auto Differential   Continue current medication(s) as noted in the history of present illness.    Essential hypertension  -     POC Urinalysis Dipstick, Multipro  -     Lipid Panel  -     Comprehensive Metabolic Panel  -     CBC & Differential  -     TSH  -     CBC Auto Differential   Continue current medication(s) as noted in the history of present illness.    Other hyperlipidemia  -     Lipid Panel  -      Comprehensive Metabolic Panel  -     CBC & Differential  -     TSH  -     CBC Auto Differential   Continue current medication(s) as noted in the history of present illness.    Chronic pain syndrome   Continue current medication(s) as noted in the history of present illness.   Follow-up per Pain Management.    Benign colonic polyp   Colonoscopy due 2021.    Primary insomnia   Continue current medication(s) as noted in the history of present illness.    Generalized anxiety disorder   Continue current medication(s) as noted in the history of present illness.    The patient was instructed in the side effects of the medication.  Risks of the potential for tolerance, dependence, and addiction were discussed.  The patient was instructed to take the lowest dosage of the medication, at the lowest frequency, and for the shortest period of time possible.  The patient was instructed not to receive controlled substances or narcotics from other doctors, and not to giveaway or sell the medication.     The patient was instructed to abstain from illicit drug use.  The patient was instructed to avoid alcohol while taking these medications.       Narcotics/controlled substance agreement, Lonnie report, and Urine Drug Screen were updated today if needed.    Screening for prostate cancer  -     PSA Screen    Screening for skin cancer  -     Ambulatory Referral to Dermatology         Return in about 3 months (around 11/27/2019) for Recheck Anxiety, non-fasting.

## 2019-08-29 DIAGNOSIS — E87.1 HYPONATREMIA: Primary | ICD-10-CM

## 2019-09-26 ENCOUNTER — LAB (OUTPATIENT)
Dept: INTERNAL MEDICINE | Facility: CLINIC | Age: 64
End: 2019-09-26

## 2019-09-26 DIAGNOSIS — E87.1 HYPONATREMIA: ICD-10-CM

## 2019-09-26 LAB — SODIUM BLD-SCNC: 127 MMOL/L (ref 136–145)

## 2019-09-26 PROCEDURE — 84295 ASSAY OF SERUM SODIUM: CPT | Performed by: INTERNAL MEDICINE

## 2019-09-26 PROCEDURE — 36415 COLL VENOUS BLD VENIPUNCTURE: CPT | Performed by: INTERNAL MEDICINE

## 2019-11-08 DIAGNOSIS — F41.1 GENERALIZED ANXIETY DISORDER: ICD-10-CM

## 2019-11-08 NOTE — TELEPHONE ENCOUNTER
8/9/2019 Last refill   # 60  Refill 2   2/27/19--  CSA signed, UDS (leaked in transit).  9/6/19- Lonnie appropriate.

## 2019-11-11 RX ORDER — ALPRAZOLAM 0.5 MG/1
TABLET ORAL
Qty: 60 TABLET | Refills: 2 | Status: SHIPPED | OUTPATIENT
Start: 2019-11-11 | End: 2020-02-21

## 2019-12-11 ENCOUNTER — OFFICE VISIT (OUTPATIENT)
Dept: INTERNAL MEDICINE | Facility: CLINIC | Age: 64
End: 2019-12-11

## 2019-12-11 VITALS
BODY MASS INDEX: 29.83 KG/M2 | RESPIRATION RATE: 20 BRPM | DIASTOLIC BLOOD PRESSURE: 80 MMHG | TEMPERATURE: 98.1 F | HEART RATE: 88 BPM | SYSTOLIC BLOOD PRESSURE: 130 MMHG | WEIGHT: 202.13 LBS

## 2019-12-11 DIAGNOSIS — Z23 NEED FOR IMMUNIZATION AGAINST INFLUENZA: ICD-10-CM

## 2019-12-11 DIAGNOSIS — F41.1 GENERALIZED ANXIETY DISORDER: Primary | ICD-10-CM

## 2019-12-11 PROCEDURE — 99213 OFFICE O/P EST LOW 20 MIN: CPT | Performed by: INTERNAL MEDICINE

## 2019-12-11 PROCEDURE — 90471 IMMUNIZATION ADMIN: CPT | Performed by: INTERNAL MEDICINE

## 2019-12-11 PROCEDURE — 90686 IIV4 VACC NO PRSV 0.5 ML IM: CPT | Performed by: INTERNAL MEDICINE

## 2019-12-11 NOTE — PROGRESS NOTES
Subjective       Yevgeniy Vaughn is a 64 y.o. male.     Chief Complaint: Follow up Anxiety      History obtained from the patient.      History of Present Illness        Anxiety Disorder Follow-Up: The patient is being seen for follow-up of Anxiey, which is stable.  Comorbid Illnesses: Insomnia   Symptoms: stable anxiety and insomnia.  Denies depression, panic attacks, anhedonia, memory loss, and difficulty concentrating.    Associated Symptoms: no suicidal ideation.   Medication: Alprazolam BID   The patient is adherent to his medication regimen, and he denies medication side effects.    Current Outpatient Medications on File Prior to Visit   Medication Sig Dispense Refill   • ACCU-CHEK ELYSIA PLUS test strip USE ONE STRIP TO CHECK GLUCOSE TWICE DAILY 100 each 11   • ALPRAZolam (XANAX) 0.5 MG tablet TAKE 1 TABLET BY MOUTH TWICE DAILY AS NEEDED FOR ANXIETY 60 tablet 2   • atorvastatin (LIPITOR) 20 MG tablet TAKE 1 TABLET BY MOUTH AT BEDTIME 90 tablet 1   • Blood Glucose Monitoring Suppl (ACCU-CHEK ELYSIA PLUS) W/DEVICE kit 1 each 2 (Two) Times a Day. 1 kit 0   • clobetasol (TEMOVATE) 0.05 % cream APPLY CREAM TOPICALLY TO AFFECTED AREA TWICE DAILY 60 g 3   • gabapentin (NEURONTIN) 800 MG tablet Take 1 tablet by mouth 3 (three) times a day.     • glucose blood (ACCU-CHEK ELYSIA PLUS) test strip Test twice daily 100 each 5   • ibuprofen (ADVIL,MOTRIN) 800 MG tablet TAKE 1 TABLET BY MOUTH THREE TIMES DAILY AS NEEDED 90 tablet 3   • JANUVIA 100 MG tablet TAKE 1 TABLET BY MOUTH ONCE DAILY 90 tablet 1   • Lancet Devices (ACCU-CHEK SOFTCLIX) lancets Test twice daily as needed 100 each 5   • Lancets (ACCU-CHEK MULTICLIX) lancets Test twice daily as needed 100 each 5   • lisinopril (PRINIVIL,ZESTRIL) 40 MG tablet TAKE 1 TABLET BY MOUTH AT BEDTIME 90 tablet 1   • metFORMIN (GLUCOPHAGE) 1000 MG tablet TAKE 1 TABLET BY MOUTH TWICE DAILY 180 tablet 1   • Omega-3 Fatty Acids (FISH OIL) 1000 MG capsule capsule Take 1 capsule by mouth  every 12 (twelve) hours     • oxyCODONE-acetaminophen (PERCOCET) 7.5-325 MG per tablet Take 1 tablet by mouth 3 (three) times a day.     • promethazine (PHENERGAN) 25 MG tablet TAKE ONE TABLET BY MOUTH EVERY 4 TO 6 HOURS AS NEEDED FOR NAUSEA AND VOMITING 30 tablet 5   • traZODone (DESYREL) 100 MG tablet TAKE 1 & 1/2 TO 2 (ONE & ONE-HALF TO TWO) TABLETS BY MOUTH AT BEDTIME AS NEEDED FOR SLEEP 180 tablet 1     No current facility-administered medications on file prior to visit.        Current outpatient and discharge medications have been reconciled for the patient.  Reviewed by: Christine Rousseau MD        The following portions of the patient's history were reviewed and updated as appropriate: allergies, current medications, past family history, past medical history, past social history, past surgical history and problem list.    Review of Systems   Constitutional: Negative for unexpected weight change.   Neurological:        Denies memory loss   Psychiatric/Behavioral: Positive for sleep disturbance. Negative for decreased concentration and suicidal ideas. The patient is nervous/anxious.          Objective       Blood pressure 130/80, pulse 88, temperature 98.1 °F (36.7 °C), temperature source Temporal, resp. rate 20, weight 91.7 kg (202 lb 2 oz).      Physical Exam   Constitutional:   Overweight.   Cardiovascular: Normal rate, regular rhythm and normal heart sounds.   No murmur heard.  Pulmonary/Chest: Effort normal and breath sounds normal.   Neurological: He is alert.   Psychiatric: He has a normal mood and affect.   Nursing note and vitals reviewed.      Assessment / Plan:    The patient is here for follow-up of anxiety.    Diagnoses and all orders for this visit:    Generalized anxiety disorder   Continue current medication(s) as noted in the history of present illness.      The patient was instructed in the side effects of the medication.  Risks of the potential for tolerance, dependence, and addiction were  discussed.  The patient was instructed to take the lowest dosage of the medication, at the lowest frequency, and for the shortest period of time possible.  The patient was instructed not to receive controlled substances or narcotics from other doctors, and not to giveaway or sell the medication.    The patient was instructed to abstain from illicit drug use.  The patient was instructed to avoid alcohol while taking these medications.      Narcotics/controlled substance agreement, Lonnie report, and Urine Drug Screen were updated today if needed.    The patient agrees to call his insurance company to see if the varicella titer lab is covered, so we can check them, and have him get Shingrix (new Shingles vaccine) at the pharmacy.    Need for immunization against influenza  -     Fluarix/Fluzone/Afluria Quad>6 Months         Return in about 3 months (around 3/11/2020) for Annual physical, fasting .

## 2019-12-26 ENCOUNTER — TELEPHONE (OUTPATIENT)
Dept: INTERNAL MEDICINE | Facility: CLINIC | Age: 64
End: 2019-12-26

## 2019-12-26 DIAGNOSIS — F17.210 CIGARETTE SMOKER: Primary | ICD-10-CM

## 2019-12-30 RX ORDER — ATORVASTATIN CALCIUM 20 MG/1
TABLET, FILM COATED ORAL
Qty: 90 TABLET | Refills: 0 | Status: SHIPPED | OUTPATIENT
Start: 2019-12-30 | End: 2020-04-01

## 2020-01-30 RX ORDER — SITAGLIPTIN 100 MG/1
TABLET, FILM COATED ORAL
Qty: 90 TABLET | Refills: 0 | Status: SHIPPED | OUTPATIENT
Start: 2020-01-30 | End: 2020-05-11

## 2020-01-30 RX ORDER — TRAZODONE HYDROCHLORIDE 100 MG/1
TABLET ORAL
Qty: 180 TABLET | Refills: 0 | Status: SHIPPED | OUTPATIENT
Start: 2020-01-30 | End: 2020-05-26

## 2020-01-30 RX ORDER — LISINOPRIL 40 MG/1
TABLET ORAL
Qty: 90 TABLET | Refills: 0 | Status: SHIPPED | OUTPATIENT
Start: 2020-01-30 | End: 2020-04-01

## 2020-02-20 DIAGNOSIS — F41.1 GENERALIZED ANXIETY DISORDER: ICD-10-CM

## 2020-02-20 NOTE — TELEPHONE ENCOUNTER
Last appointment: 12/11/2019  Next appointment: 03/18/2020  Lonnie: 12/17/2019  UDS:02/27/2019  CSA: 02/27/2019    Last Refill: 11/11/2019  Quantity: 60  Refills:  2

## 2020-02-21 RX ORDER — ALPRAZOLAM 0.5 MG/1
TABLET ORAL
Qty: 60 TABLET | Refills: 0 | Status: SHIPPED | OUTPATIENT
Start: 2020-02-21 | End: 2020-03-19

## 2020-02-25 RX ORDER — IBUPROFEN 800 MG/1
TABLET ORAL
Qty: 180 TABLET | Refills: 2 | Status: SHIPPED | OUTPATIENT
Start: 2020-02-25 | End: 2020-06-16 | Stop reason: HOSPADM

## 2020-03-19 DIAGNOSIS — F41.1 GENERALIZED ANXIETY DISORDER: ICD-10-CM

## 2020-03-19 RX ORDER — ALPRAZOLAM 0.5 MG/1
TABLET ORAL
Qty: 60 TABLET | Refills: 0 | Status: SHIPPED | OUTPATIENT
Start: 2020-03-19 | End: 2020-04-20

## 2020-03-19 NOTE — TELEPHONE ENCOUNTER
LOV- 12/11/2019  Last Rx- 02/21/2020 2/27/19--  CSA signed, UDS (leaked in transit).  12/17/19- Lonnie appropriate.

## 2020-03-27 ENCOUNTER — TELEPHONE (OUTPATIENT)
Dept: INTERNAL MEDICINE | Facility: CLINIC | Age: 65
End: 2020-03-27

## 2020-04-01 RX ORDER — ATORVASTATIN CALCIUM 20 MG/1
TABLET, FILM COATED ORAL
Qty: 90 TABLET | Refills: 0 | Status: SHIPPED | OUTPATIENT
Start: 2020-04-01 | End: 2020-05-26

## 2020-04-01 RX ORDER — LISINOPRIL 40 MG/1
TABLET ORAL
Qty: 90 TABLET | Refills: 0 | Status: SHIPPED | OUTPATIENT
Start: 2020-04-01 | End: 2020-05-26

## 2020-04-18 DIAGNOSIS — F41.1 GENERALIZED ANXIETY DISORDER: ICD-10-CM

## 2020-04-20 RX ORDER — ALPRAZOLAM 0.5 MG/1
TABLET ORAL
Qty: 60 TABLET | Refills: 2 | Status: SHIPPED | OUTPATIENT
Start: 2020-04-20 | End: 2020-05-26

## 2020-04-20 NOTE — TELEPHONE ENCOUNTER
3/19/2020  Last refill # 60 refill 0   2/27/19--  CSA signed, UDS (leaked in transit).  12/17/19- Lonnie appropriate.

## 2020-05-11 RX ORDER — SITAGLIPTIN 100 MG/1
TABLET, FILM COATED ORAL
Qty: 30 TABLET | Refills: 0 | Status: SHIPPED | OUTPATIENT
Start: 2020-05-11 | End: 2020-05-26

## 2020-05-20 DIAGNOSIS — F17.210 CIGARETTE SMOKER: ICD-10-CM

## 2020-05-26 DIAGNOSIS — F17.210 CIGARETTE SMOKER: ICD-10-CM

## 2020-05-26 DIAGNOSIS — F41.1 GENERALIZED ANXIETY DISORDER: ICD-10-CM

## 2020-05-26 RX ORDER — TRAZODONE HYDROCHLORIDE 100 MG/1
TABLET ORAL
Qty: 180 TABLET | Refills: 1 | Status: SHIPPED | OUTPATIENT
Start: 2020-05-26 | End: 2020-06-16 | Stop reason: HOSPADM

## 2020-05-26 RX ORDER — ALPRAZOLAM 0.5 MG/1
TABLET ORAL
Qty: 60 TABLET | Refills: 0 | Status: SHIPPED | OUTPATIENT
Start: 2020-05-26 | End: 2020-07-23 | Stop reason: SDUPTHER

## 2020-05-26 RX ORDER — VARENICLINE TARTRATE 1 MG/1
1 TABLET, FILM COATED ORAL 2 TIMES DAILY
Qty: 60 TABLET | Refills: 4 | Status: SHIPPED | OUTPATIENT
Start: 2020-05-26 | End: 2021-01-04 | Stop reason: SDDI

## 2020-05-26 RX ORDER — ATORVASTATIN CALCIUM 20 MG/1
TABLET, FILM COATED ORAL
Qty: 90 TABLET | Refills: 1 | Status: SHIPPED | OUTPATIENT
Start: 2020-05-26 | End: 2020-11-29

## 2020-05-26 RX ORDER — LISINOPRIL 40 MG/1
TABLET ORAL
Qty: 90 TABLET | Refills: 1 | Status: SHIPPED | OUTPATIENT
Start: 2020-05-26 | End: 2020-11-29

## 2020-05-26 RX ORDER — SITAGLIPTIN 100 MG/1
TABLET, FILM COATED ORAL
Qty: 90 TABLET | Refills: 1 | Status: SHIPPED | OUTPATIENT
Start: 2020-05-26 | End: 2020-11-29

## 2020-05-26 NOTE — TELEPHONE ENCOUNTER
Spoke with Sue they have appointment on 6/10/ 2020      She is requesting a Chantix starter pack   Is this ok to refill?    4/20/2020 last rx Alprazolam  # 60  Refill 2

## 2020-06-10 ENCOUNTER — HOSPITAL ENCOUNTER (OUTPATIENT)
Dept: GENERAL RADIOLOGY | Facility: HOSPITAL | Age: 65
Discharge: HOME | End: 2020-06-10
Admitting: INTERNAL MEDICINE

## 2020-06-10 ENCOUNTER — OFFICE VISIT (OUTPATIENT)
Dept: INTERNAL MEDICINE | Facility: CLINIC | Age: 65
End: 2020-06-10

## 2020-06-10 VITALS
HEIGHT: 68 IN | BODY MASS INDEX: 30.16 KG/M2 | HEART RATE: 80 BPM | WEIGHT: 199 LBS | DIASTOLIC BLOOD PRESSURE: 70 MMHG | SYSTOLIC BLOOD PRESSURE: 130 MMHG | TEMPERATURE: 98.3 F | RESPIRATION RATE: 20 BRPM

## 2020-06-10 DIAGNOSIS — K63.5 BENIGN COLONIC POLYP: ICD-10-CM

## 2020-06-10 DIAGNOSIS — G89.4 CHRONIC PAIN SYNDROME: ICD-10-CM

## 2020-06-10 DIAGNOSIS — F41.1 GENERALIZED ANXIETY DISORDER: ICD-10-CM

## 2020-06-10 DIAGNOSIS — L97.319 CHRONIC ULCER OF RIGHT ANKLE, UNSPECIFIED ULCER STAGE (HCC): ICD-10-CM

## 2020-06-10 DIAGNOSIS — F17.219 CIGARETTE NICOTINE DEPENDENCE WITH NICOTINE-INDUCED DISORDER: ICD-10-CM

## 2020-06-10 DIAGNOSIS — I10 ESSENTIAL HYPERTENSION: ICD-10-CM

## 2020-06-10 DIAGNOSIS — Z79.899 HIGH RISK MEDICATION USE: ICD-10-CM

## 2020-06-10 DIAGNOSIS — E11.43 TYPE 2 DIABETES MELLITUS WITH DIABETIC AUTONOMIC NEUROPATHY, WITHOUT LONG-TERM CURRENT USE OF INSULIN (HCC): ICD-10-CM

## 2020-06-10 DIAGNOSIS — E78.49 OTHER HYPERLIPIDEMIA: ICD-10-CM

## 2020-06-10 DIAGNOSIS — Z00.00 ENCOUNTER FOR HEALTH MAINTENANCE EXAMINATION IN ADULT: Primary | ICD-10-CM

## 2020-06-10 PROBLEM — F17.210 CIGARETTE NICOTINE DEPENDENCE: Status: ACTIVE | Noted: 2020-06-10

## 2020-06-10 LAB
A/C: NORMAL
CLARITY, POC: CLEAR
COLOR UR: YELLOW
EXPIRATION DATE: ABNORMAL
EXPIRATION DATE: NORMAL
EXPIRATION DATE: NORMAL
GLUCOSE UR STRIP-MCNC: ABNORMAL MG/DL
HBA1C MFR BLD: 7.5 %
KETONES UR QL: NEGATIVE
LEUKOCYTE EST, POC: NEGATIVE
Lab: 1051
Lab: NORMAL
Lab: NORMAL
NITRITE UR-MCNC: NEGATIVE MG/ML
PH UR: 7 [PH] (ref 5–8)
POC CREATININE URINE: 50
POC MICROALBUMIN URINE: 80
PROT UR STRIP-MCNC: NEGATIVE MG/DL
PROT/CREAT UR: 50 MG/G CREA (ref 0–200)
RBC # UR STRIP: NEGATIVE /UL
SP GR UR: 1.01 (ref 1–1.03)

## 2020-06-10 PROCEDURE — 82306 VITAMIN D 25 HYDROXY: CPT | Performed by: INTERNAL MEDICINE

## 2020-06-10 PROCEDURE — 73610 X-RAY EXAM OF ANKLE: CPT

## 2020-06-10 PROCEDURE — 82044 UR ALBUMIN SEMIQUANTITATIVE: CPT | Performed by: INTERNAL MEDICINE

## 2020-06-10 PROCEDURE — 84443 ASSAY THYROID STIM HORMONE: CPT | Performed by: INTERNAL MEDICINE

## 2020-06-10 PROCEDURE — 85025 COMPLETE CBC W/AUTO DIFF WBC: CPT | Performed by: INTERNAL MEDICINE

## 2020-06-10 PROCEDURE — 83036 HEMOGLOBIN GLYCOSYLATED A1C: CPT | Performed by: INTERNAL MEDICINE

## 2020-06-10 PROCEDURE — 80053 COMPREHEN METABOLIC PANEL: CPT | Performed by: INTERNAL MEDICINE

## 2020-06-10 PROCEDURE — 80061 LIPID PANEL: CPT | Performed by: INTERNAL MEDICINE

## 2020-06-10 PROCEDURE — 99213 OFFICE O/P EST LOW 20 MIN: CPT | Performed by: INTERNAL MEDICINE

## 2020-06-10 PROCEDURE — 99396 PREV VISIT EST AGE 40-64: CPT | Performed by: INTERNAL MEDICINE

## 2020-06-10 NOTE — PATIENT INSTRUCTIONS
Please call if you would like to have a Cardiac CT Scan scheduled (information given previously).      Health Maintenance, Male  Adopting a healthy lifestyle and getting preventive care are important in promoting health and wellness. Ask your health care provider about:  · The right schedule for you to have regular tests and exams.  · Things you can do on your own to prevent diseases and keep yourself healthy.  What should I know about diet, weight, and exercise?  Eat a healthy diet    · Eat a diet that includes plenty of vegetables, fruits, low-fat dairy products, and lean protein.  · Do not eat a lot of foods that are high in solid fats, added sugars, or sodium.  Maintain a healthy weight  Body mass index (BMI) is a measurement that can be used to identify possible weight problems. It estimates body fat based on height and weight. Your health care provider can help determine your BMI and help you achieve or maintain a healthy weight.  Get regular exercise  Get regular exercise. This is one of the most important things you can do for your health. Most adults should:  · Exercise for at least 150 minutes each week. The exercise should increase your heart rate and make you sweat (moderate-intensity exercise).  · Do strengthening exercises at least twice a week. This is in addition to the moderate-intensity exercise.  · Spend less time sitting. Even light physical activity can be beneficial.  Watch cholesterol and blood lipids  Have your blood tested for lipids and cholesterol at 20 years of age, then have this test every 5 years.  You may need to have your cholesterol levels checked more often if:  · Your lipid or cholesterol levels are high.  · You are older than 40 years of age.  · You are at high risk for heart disease.  What should I know about cancer screening?  Many types of cancers can be detected early and may often be prevented. Depending on your health history and family history, you may need to have cancer  screening at various ages. This may include screening for:  · Colorectal cancer.  · Prostate cancer.  · Skin cancer.  · Lung cancer.  What should I know about heart disease, diabetes, and high blood pressure?  Blood pressure and heart disease  · High blood pressure causes heart disease and increases the risk of stroke. This is more likely to develop in people who have high blood pressure readings, are of  descent, or are overweight.  · Talk with your health care provider about your target blood pressure readings.  · Have your blood pressure checked:  ? Every 3-5 years if you are 18-39 years of age.  ? Every year if you are 40 years old or older.  · If you are between the ages of 65 and 75 and are a current or former smoker, ask your health care provider if you should have a one-time screening for abdominal aortic aneurysm (AAA).  Diabetes  Have regular diabetes screenings. This checks your fasting blood sugar level. Have the screening done:  · Once every three years after age 45 if you are at a normal weight and have a low risk for diabetes.  · More often and at a younger age if you are overweight or have a high risk for diabetes.  What should I know about preventing infection?  Hepatitis B  If you have a higher risk for hepatitis B, you should be screened for this virus. Talk with your health care provider to find out if you are at risk for hepatitis B infection.  Hepatitis C  Blood testing is recommended for:  · Everyone born from 1945 through 1965.  · Anyone with known risk factors for hepatitis C.  Sexually transmitted infections (STIs)  · You should be screened each year for STIs, including gonorrhea and chlamydia, if:  ? You are sexually active and are younger than 24 years of age.  ? You are older than 24 years of age and your health care provider tells you that you are at risk for this type of infection.  ? Your sexual activity has changed since you were last screened, and you are at increased risk  for chlamydia or gonorrhea. Ask your health care provider if you are at risk.  · Ask your health care provider about whether you are at high risk for HIV. Your health care provider may recommend a prescription medicine to help prevent HIV infection. If you choose to take medicine to prevent HIV, you should first get tested for HIV. You should then be tested every 3 months for as long as you are taking the medicine.  Follow these instructions at home:  Lifestyle  · Do not use any products that contain nicotine or tobacco, such as cigarettes, e-cigarettes, and chewing tobacco. If you need help quitting, ask your health care provider.  · Do not use street drugs.  · Do not share needles.  · Ask your health care provider for help if you need support or information about quitting drugs.  Alcohol use  · Do not drink alcohol if your health care provider tells you not to drink.  · If you drink alcohol:  ? Limit how much you have to 0-2 drinks a day.  ? Be aware of how much alcohol is in your drink. In the [place] one drink equals one 12 oz bottle of beer (355 mL), one 5 oz glass of wine (148 mL), or one 1½ oz glass of hard liquor (44 mL).  General instructions  · Schedule regular health, dental, and eye exams.  · Stay current with your vaccines.  · Tell your health care provider if:  ? You often feel depressed.  ? You have ever been abused or do not feel safe at home.  Summary  · Adopting a healthy lifestyle and getting preventive care are important in promoting health and wellness.  · Follow your health care provider's instructions about healthy diet, exercising, and getting tested or screened for diseases.  · Follow your health care provider's instructions on monitoring your cholesterol and blood pressure.  This information is not intended to replace advice given to you by your health care provider. Make sure you discuss any questions you have with your health care provider.  Document Released: 06/15/2009 Document Revised:  12/11/2019 Document Reviewed: 12/11/2019  [employer] Patient Education © 2020 [employer]      Heart-Healthy Eating Plan  Many factors influence your heart (coronary) health, including eating and exercise habits. Coronary risk increases with abnormal blood fat (lipid) levels. Heart-healthy meal planning includes limiting unhealthy fats, increasing healthy fats, and making other diet and lifestyle changes.  What is my plan?  Your health care provider may recommend that you:  · Limit your fat intake to _________% or less of your total calories each day.  · Limit your saturated fat intake to _________% or less of your total calories each day.  · Limit the amount of cholesterol in your diet to less than _________ mg per day.  What are tips for following this plan?  Cooking  Cook foods using methods other than frying. Baking, boiling, grilling, and broiling are all good options. Other ways to reduce fat include:  · Removing the skin from poultry.  · Removing all visible fats from meats.  · Steaming vegetables in water or broth.  Meal planning    · At meals, imagine dividing your plate into fourths:  ? Fill one-half of your plate with vegetables and green salads.  ? Fill one-fourth of your plate with whole grains.  ? Fill one-fourth of your plate with lean protein foods.  · Eat 4-5 servings of vegetables per day. One serving equals 1 cup raw or cooked vegetable, or 2 cups raw leafy greens.  · Eat 4-5 servings of fruit per day. One serving equals 1 medium whole fruit, ¼ cup dried fruit, ½ cup fresh, frozen, or canned fruit, or ½ cup 100% fruit juice.  · Eat more foods that contain soluble fiber. Examples include apples, broccoli, carrots, beans, peas, and barley. Aim to get 25-30 g of fiber per day.  · Increase your consumption of legumes, nuts, and seeds to 4-5 servings per week. One serving of dried beans or legumes equals ½ cup cooked, 1 serving of nuts is ¼ cup, and 1 serving of seeds equals 1  tablespoon.  Fats  · Choose healthy fats more often. Choose monounsaturated and polyunsaturated fats, such as olive and canola oils, flaxseeds, walnuts, almonds, and seeds.  · Eat more omega-3 fats. Choose salmon, mackerel, sardines, tuna, flaxseed oil, and ground flaxseeds. Aim to eat fish at least 2 times each week.  · Check food labels carefully to identify foods with trans fats or high amounts of saturated fat.  · Limit saturated fats. These are found in animal products, such as meats, butter, and cream. Plant sources of saturated fats include palm oil, palm kernel oil, and coconut oil.  · Avoid foods with partially hydrogenated oils in them. These contain trans fats. Examples are stick margarine, some tub margarines, cookies, crackers, and other baked goods.  · Avoid fried foods.  General information  · Eat more home-cooked food and less restaurant, buffet, and fast food.  · Limit or avoid alcohol.  · Limit foods that are high in starch and sugar.  · Lose weight if you are overweight. Losing just 5-10% of your body weight can help your overall health and prevent diseases such as diabetes and heart disease.  · Monitor your salt (sodium) intake, especially if you have high blood pressure. Talk with your health care provider about your sodium intake.  · Try to incorporate more vegetarian meals weekly.  What foods can I eat?  Fruits  All fresh, canned (in natural juice), or frozen fruits.  Vegetables  Fresh or frozen vegetables (raw, steamed, roasted, or grilled). Green salads.  Grains  Most grains. Choose whole wheat and whole grains most of the time. Rice and pasta, including brown rice and pastas made with whole wheat.  Meats and other proteins  Lean, well-trimmed beef, veal, pork, and lamb. Chicken and turkey without skin. All fish and shellfish. Wild duck, rabbit, pheasant, and venison. Egg whites or low-cholesterol egg substitutes. Dried beans, peas, lentils, and tofu. Seeds and most nuts.  Dairy  Low-fat or  nonfat cheeses, including ricotta and mozzarella. Skim or 1% milk (liquid, powdered, or evaporated). Buttermilk made with low-fat milk. Nonfat or low-fat yogurt.  Fats and oils  Non-hydrogenated (trans-free) margarines. Vegetable oils, including soybean, sesame, sunflower, olive, peanut, safflower, corn, canola, and cottonseed. Salad dressings or mayonnaise made with a vegetable oil.  Beverages  Water (mineral or sparkling). Coffee and tea. Diet carbonated beverages.  Sweets and desserts  Sherbet, gelatin, and fruit ice. Small amounts of dark chocolate.  Limit all sweets and desserts.  Seasonings and condiments  All seasonings and condiments.  The items listed above may not be a complete list of foods and beverages you can eat. Contact a dietitian for more options.  What foods are not recommended?  Fruits  Canned fruit in heavy syrup. Fruit in cream or butter sauce. Fried fruit. Limit coconut.  Vegetables  Vegetables cooked in cheese, cream, or butter sauce. Fried vegetables.  Grains  Breads made with saturated or trans fats, oils, or whole milk. Croissants. Sweet rolls. Donuts. High-fat crackers, such as cheese crackers.  Meats and other proteins  Fatty meats, such as hot dogs, ribs, sausage, fields, rib-eye roast or steak. High-fat deli meats, such as salami and bologna. Caviar. Domestic duck and goose. Organ meats, such as liver.  Dairy  Cream, sour cream, cream cheese, and creamed cottage cheese. Whole milk cheeses. Whole or 2% milk (liquid, evaporated, or condensed). Whole buttermilk. Cream sauce or high-fat cheese sauce. Whole-milk yogurt.  Fats and oils  Meat fat, or shortening. Cocoa butter, hydrogenated oils, palm oil, coconut oil, palm kernel oil. Solid fats and shortenings, including fields fat, salt pork, lard, and butter. Nondairy cream substitutes. Salad dressings with cheese or sour cream.  Beverages  Regular sodas and any drinks with added sugar.  Sweets and desserts  Frosting. Pudding. Cookies. Cakes.  Pies. Milk chocolate or white chocolate. Buttered syrups. Full-fat ice cream or ice cream drinks.  The items listed above may not be a complete list of foods and beverages to avoid. Contact a dietitian for more information.  Summary  · Heart-healthy meal planning includes limiting unhealthy fats, increasing healthy fats, and making other diet and lifestyle changes.  · Lose weight if you are overweight. Losing just 5-10% of your body weight can help your overall health and prevent diseases such as diabetes and heart disease.  · Focus on eating a balance of foods, including fruits and vegetables, low-fat or nonfat dairy, lean protein, nuts and legumes, whole grains, and heart-healthy oils and fats.  This information is not intended to replace advice given to you by your health care provider. Make sure you discuss any questions you have with your health care provider.  Document Released: 09/26/2009 Document Revised: 01/25/2019 Document Reviewed: 01/25/2019  [employer] Patient Education © 2020 [employer]      Exercising to Lose Weight  Exercise is structured, repetitive physical activity to improve fitness and health. Getting regular exercise is important for everyone. It is especially important if you are overweight. Being overweight increases your risk of heart disease, stroke, diabetes, high blood pressure, and several types of cancer. Reducing your calorie intake and exercising can help you lose weight.  Exercise is usually categorized as moderate or vigorous intensity. To lose weight, most people need to do a certain amount of moderate-intensity or vigorous-intensity exercise each week.  Moderate-intensity exercise    Moderate-intensity exercise is any activity that gets you moving enough to burn at least three times more energy (calories) than if you were sitting.  Examples of moderate exercise include:  · Walking a mile in 15 minutes.  · Doing light yard work.  · Biking at an easy pace.  Most people should get  at least 150 minutes (2 hours and 30 minutes) a week of moderate-intensity exercise to maintain their body weight.  Vigorous-intensity exercise  Vigorous-intensity exercise is any activity that gets you moving enough to burn at least six times more calories than if you were sitting. When you exercise at this intensity, you should be working hard enough that you are not able to carry on a conversation.  Examples of vigorous exercise include:  · Running.  · Playing a team sport, such as football, basketball, and soccer.  · Jumping rope.  Most people should get at least 75 minutes (1 hour and 15 minutes) a week of vigorous-intensity exercise to maintain their body weight.  How can exercise affect me?  When you exercise enough to burn more calories than you eat, you lose weight. Exercise also reduces body fat and builds muscle. The more muscle you have, the more calories you burn. Exercise also:  · Improves mood.  · Reduces stress and tension.  · Improves your overall fitness, flexibility, and endurance.  · Increases bone strength.  The amount of exercise you need to lose weight depends on:  · Your age.  · The type of exercise.  · Any health conditions you have.  · Your overall physical ability.  Talk to your health care provider about how much exercise you need and what types of activities are safe for you.  What actions can I take to lose weight?  Nutrition    · Make changes to your diet as told by your health care provider or diet and nutrition specialist (dietitian). This may include:  ? Eating fewer calories.  ? Eating more protein.  ? Eating less unhealthy fats.  ? Eating a diet that includes fresh fruits and vegetables, whole grains, low-fat dairy products, and lean protein.  ? Avoiding foods with added fat, salt, and sugar.  · Drink plenty of water while you exercise to prevent dehydration or heat stroke.  Activity  · Choose an activity that you enjoy and set realistic goals. Your health care provider can help  you make an exercise plan that works for you.  · Exercise at a moderate or vigorous intensity most days of the week.  ? The intensity of exercise may vary from person to person. You can tell how intense a workout is for you by paying attention to your breathing and heartbeat. Most people will notice their breathing and heartbeat get faster with more intense exercise.  · Do resistance training twice each week, such as:  ? Push-ups.  ? Sit-ups.  ? Lifting weights.  ? Using resistance bands.  · Getting short amounts of exercise can be just as helpful as long structured periods of exercise. If you have trouble finding time to exercise, try to include exercise in your daily routine.  ? Get up, stretch, and walk around every 30 minutes throughout the day.  ? Go for a walk during your lunch break.  ? Park your car farther away from your destination.  ? If you take public transportation, get off one stop early and walk the rest of the way.  ? Make phone calls while standing up and walking around.  ? Take the stairs instead of elevators or escalators.  · Wear comfortable clothes and shoes with good support.  · Do not exercise so much that you hurt yourself, feel dizzy, or get very short of breath.  Where to find more information  · [place] Department of Health and Human Services: www.hhs.gov  · Centers for Disease Control and Prevention (CDC): www.cdc.gov  Contact a health care provider:  · Before starting a new exercise program.  · If you have questions or concerns about your weight.  · If you have a medical problem that keeps you from exercising.  Get help right away if you have any of the following while exercising:  · Injury.  · Dizziness.  · Difficulty breathing or shortness of breath that does not go away when you stop exercising.  · Chest pain.  · Rapid heartbeat.  Summary  · Being overweight increases your risk of heart disease, stroke, diabetes, high blood pressure, and several types of cancer.  · Losing weight happens  when you burn more calories than you eat.  · Reducing the amount of calories you eat in addition to getting regular moderate or vigorous exercise each week helps you lose weight.  This information is not intended to replace advice given to you by your health care provider. Make sure you discuss any questions you have with your health care provider.  Document Released: 01/20/2012 Document Revised: 12/31/2018 Document Reviewed: 12/31/2018  [employer] Patient Education © 2020 [employer]

## 2020-06-10 NOTE — PROGRESS NOTES
Subjective     Chief Complaint:  Physical Exam.    History of Present Illness    History obtained from the patient and and his wife.    The patient is here today for a follow-up visit.     Primary Care Cardiac Diagnostic Constellation:      His Diabetes Mellitus type has been stable.  Medication(s): Metformin and Januvia.   His Hypertension has been stable.   Medication(s): Lisinopril.   His Hyperlipidemia has been stable.   His LDL goal is < 70 mg/dL and last LDL was 38 mg/dL, .   Medication(s): Atorvastatin and Fish Oil.  The patient is adherent with his medication regimen. He denies medication side effects.       Interval Events:   Last HgA1C was 7.0. The patient  states his blood sugar at home has been around 130 fasting, but he only checks it periodically.  He denies episodes of low blood sugar. He states his blood pressure at home has been 130s / 80s.  The patient states he checks his feet regularly.  His last Ophthalmology appointment was 9/29/17, normal.  Blood pressure at home has been 130's/70's.      Symptoms: Reports lower extremity edema, and feet, for several months.  Denies chest pain, dyspnea, SALAMANCA, orthopnea, PND, palpitations, syncope, lower extremity edema (except feet), intermittent leg claudication, lightheadedness, and dizziness.  Associated Symptoms:  He has noticed a superficial ulcer on his right ankle for the past 6 months, unchanged.  He has not treated this with anything.  Weight down 3 pounds since last visit.  Has stable arthralgias and memory issues. No fatigue, headache, polyuria, polydipsia, myalgias, visual impairment,  concentration problems, or focal neurologic deficits.  Stable pain, numbness, and tingling of the feet,  but denies a foot ulcer. On Neurontin.      Lifestyle and Disease Management: Diet: He does have a healthy diet. Weight Issues: He has weight concerns. Exercise: He walks twice per week.  Smoking: He quit smoking completely 4/1/18. Re-started Sept  2018, 1 ppd.  He has Chantix at home and states he will be starting it soon.    Chronic Pain Follow-Up: The patient is being seen for follow-up of Chronic Neck Pain and Chronic Left Arm and Shoulder Pain, which are stable.   Interval Events:  The patient is seeing Pain Management for his chronic pain.   Symptoms: stable neck pain, back pain, and upper extremity pain, but denies headache, abdominal pain, and lower extremity pain.   Medications:   Neurontin, Ibuprofen, and Percocet per Pain Management.   The patient is adherent to his medication regimen, and he denies medication side effects.      Colonic Polyp Follow-up: The patient is being seen for a routine clinic follow-up of Colon Polyp(s), which is stable.   Interval Events:  Current diagnosis was determined by Colonoscopy and last 1/20/14 was normal, but poor prep.   Symptoms: Reports intermittent blood from hemorrhoids.  No abdominal pain, diarrhea, constipation, melena, decreased stool caliber, or change in bowel habits.  Associated Symptoms: no rectal prolapse.   Medication:  None.      Insomnia Follow-Up: The patient is being seen for follow-up of Insomnia, which is stable.  Comorbid Illnesses: Anxiety.   Interval Events:  None.  Symptoms:  Stable insomnia and anxiety.  Medications:  Trazodone.   The patient is adherent to his medication regimen, and he denies medication side effects.     Anxiety Disorder Follow-Up: The patient is being seen for follow-up of Anxiey, which is stable.  Comorbid Illnesses: Insomnia   Symptoms: stable anxiety and insomnia.  Denies depression, panic attacks, anhedonia, memory loss, and difficulty concentrating.    Associated Symptoms: no suicidal ideation.   Medication: Alprazolam BID (last dose this morning)  The patient is adherent to his medication regimen, and he denies medication side effects.      Yevgeniy Vaughn is a 64 y.o. male who presents for an Annual Physical.      PMH, PSH, SocHx, FamHx, Allergies, and  Medications: Reviewed and updated.    Outpatient Medications Prior to Visit   Medication Sig Dispense Refill   • ACCU-CHEK ELYSIA PLUS test strip USE ONE STRIP TO CHECK GLUCOSE TWICE DAILY (Patient taking differently: Check blood sugar once daily) 100 each 11   • ALPRAZolam (XANAX) 0.5 MG tablet Take 1 tablet by mouth twice daily as needed for anxiety 60 tablet 0   • atorvastatin (LIPITOR) 20 MG tablet TAKE 1 TABLET BY MOUTH AT BEDTIME 90 tablet 1   • Blood Glucose Monitoring Suppl (ACCU-CHEK ELYSIA PLUS) W/DEVICE kit 1 each 2 (Two) Times a Day. 1 kit 0   • clobetasol (TEMOVATE) 0.05 % cream APPLY CREAM TOPICALLY TO AFFECTED AREA TWICE DAILY 60 g 3   • gabapentin (NEURONTIN) 800 MG tablet Take 1 tablet by mouth 3 (three) times a day.     • glucose blood (ACCU-CHEK ELYSIA PLUS) test strip Test twice daily 100 each 5   • ibuprofen (ADVIL,MOTRIN) 800 MG tablet TAKE 1 TABLET BY MOUTH THREE TIMES DAILY AS NEEDED 180 tablet 2   • JANUVIA 100 MG tablet Take 1 tablet by mouth once daily 90 tablet 1   • Lancet Devices (ACCU-CHEK SOFTCLIX) lancets Test twice daily as needed 100 each 5   • lisinopril (PRINIVIL,ZESTRIL) 40 MG tablet TAKE 1 TABLET BY MOUTH AT BEDTIME 90 tablet 1   • metFORMIN (GLUCOPHAGE) 1000 MG tablet Take 1 tablet by mouth twice daily 180 tablet 1   • Omega-3 Fatty Acids (FISH OIL) 1000 MG capsule capsule Take 1 capsule by mouth every 12 (twelve) hours     • oxyCODONE-acetaminophen (PERCOCET) 7.5-325 MG per tablet Take 1 tablet by mouth 3 (three) times a day.     • promethazine (PHENERGAN) 25 MG tablet TAKE ONE TABLET BY MOUTH EVERY 4 TO 6 HOURS AS NEEDED FOR NAUSEA AND VOMITING 30 tablet 5   • traZODone (DESYREL) 100 MG tablet TAKE ONE & ONE-HALF TO 2 TABLETS AT BEDTIME AS NEEDED FOR SLEEP 180 tablet 1   • varenicline (Chantix Continuing Month Casa) 1 MG tablet Take 1 tablet by mouth 2 (Two) Times a Day. 60 tablet 4   • Lancets (ACCU-CHEK MULTICLIX) lancets Test twice daily as needed 100 each 5     No  facility-administered medications prior to visit.        Immunization History   Administered Date(s) Administered   • FLUARIX/FLUZONE/AFLURIA/FLULAVAL QUAD 2019   • Flu Mist 2011, 10/21/2014, 2015   • Flu Vaccine Quad PF >18YRS 2016   • Flu Vaccine Quad PF >36MO 10/09/2018   • Hepatitis A 2018, 2018   • Influenza Quad Vaccine (Inpatient) 10/09/2017, 10/09/2017   • Pneumococcal Conjugate 13-Valent (PCV13) 2016   • Pneumococcal Polysaccharide (PPSV23) 2008   • Td, Not Adsorbed 2016   • Tdap 2008         Patient Active Problem List   Diagnosis   • Acute recurrent pancreatitis   • Anxiety disorder   • Benign colonic polyp   • Chronic neck pain   • Chronic pain syndrome   • Erectile dysfunction   • Hyperlipidemia   • Hypertension   • Insomnia   • Shoulder joint pain   • Psoriasis   • Ptosis of eyelid   • Type 2 diabetes mellitus (CMS/HCC)   • Cigarette nicotine dependence       Health Habits:  Dental Exam. up to date  Eye Exam. not up to date - last visit   Hearing Loss:  Yes  Exercise: 2 times/week.  Current exercise activities include: walking  Diet: Healthy  Multivitamin: No    Safe Driving:  Yes  Seat Belt:  Yes  Bike Helmet:  Yes  Skin Screening:  Yes  Sunscreen: Yes  SBE / DEVIN: Yes  Sexual Activity:  Yes  Birth Control:  Menopause  STD Prevention:  N/A    Last Pap: N/A  Last Mammogram:  N/A  Last DEXA Scan: N/A  Last Colonoscopy: 2014, normal (poor prep).  Last PSA: 2019 (0.220)    Social:    Social History     Socioeconomic History   • Marital status:      Spouse name: Not on file   • Number of children: 4   • Years of education: Not on file   • Highest education level: Not on file   Occupational History   • Occupation: Disabled   Tobacco Use   • Smoking status: Current Every Day Smoker     Types: Cigarettes     Last attempt to quit: 2018     Years since quittin.1   • Smokeless tobacco: Former User     Types: Chew     Quit  date: 1978   • Tobacco comment: 1978- present  1 1/2 ppd.  Quit 6/1/14.  Restarted approximately August 2015, 3/4 ppd, then 1 ppd.  Quit 4/21/18, re-started Sept 2018 1 ppd   Substance and Sexual Activity   • Alcohol use: No   • Drug use: No   • Sexual activity: Yes     Partners: Female     Birth control/protection: Post-menopausal         Current Medical Providers:    Christine Rousseau MD (Internal Medicine / Pediatrics)    The [facility] providers who are involved in the care of this patient are listed above.         Review of Systems   Constitutional: Negative for appetite change, chills, fatigue, fever and unexpected weight change.         No night sweats.     HENT: Positive for hearing loss. Negative for congestion, ear pain, facial swelling, nosebleeds, postnasal drip, rhinorrhea, sinus pressure, sinus pain, sneezing, sore throat, tinnitus, trouble swallowing and voice change.         Reports snoring.   Eyes: Negative for photophobia, pain, discharge, redness, itching and visual disturbance.   Respiratory: Negative for cough, chest tightness, shortness of breath and wheezing.         No chest congestion.  No hemoptysis.    Cardiovascular: Positive for leg swelling. Negative for chest pain and palpitations.        No orthopnea, SALAMANCA, or PND.  No claudication or syncope.   Gastrointestinal: Positive for blood in stool. Negative for abdominal distention, abdominal pain, constipation, diarrhea, nausea and vomiting.        Reports intermittent heartburn with certain foods.  No melena, odynophagia, dysphagia, early satiety, belching, or bloating.   Endocrine: Negative for cold intolerance, heat intolerance, polydipsia, polyphagia and polyuria.        No hair loss, but has dry skin.    Genitourinary: Negative for decreased urine volume, difficulty urinating, discharge, dysuria, flank pain, frequency, hematuria, scrotal swelling, testicular pain and urgency.        No nocturia, incomplete emptying, or incontinence.   "No erectile dysfunction.   Musculoskeletal: Positive for arthralgias, back pain and neck pain. Negative for gait problem, joint swelling, myalgias and neck stiffness.        No joint stiffness.   Skin: Negative for rash.        No new skin lesions or changes in skin lesions.     Neurological: Negative for dizziness, tremors, speech difficulty, weakness, light-headedness, numbness and headaches.        No tingling. Stable memory issues. No decreased concentration.  He reports shaking of his legs when he bends over.  Has some balance issues.   Hematological: Negative for adenopathy. Does not bruise/bleed easily.   Psychiatric/Behavioral: Positive for sleep disturbance. Negative for confusion and suicidal ideas. The patient is nervous/anxious.         No depression.           Objective     Vitals:    06/10/20 1338   BP: 130/70   BP Location: Right arm   Pulse: 80   Resp: 20   Temp: 98.3 °F (36.8 °C)   TempSrc: Temporal   Weight: 90.3 kg (199 lb)   Height: 172.1 cm (67.75\")       Body mass index is 30.48 kg/m².    Physical Exam   Constitutional:   Overweight, borderline obese.   HENT:   Head: Normocephalic and atraumatic.   Right Ear: Tympanic membrane, external ear and ear canal normal.   Left Ear: Tympanic membrane, external ear and ear canal normal.   Mouth/Throat: Oropharynx is clear and moist. No oral lesions.   Tonsils normal.   Eyes: Pupils are equal, round, and reactive to light. Conjunctivae and EOM are normal.   Neck: Normal range of motion. Neck supple. Carotid bruit is not present. No thyroid mass and no thyromegaly present.   Cardiovascular: Normal rate, regular rhythm and intact distal pulses. Exam reveals no gallop and no friction rub.   No murmur heard.  No peripheral edema.   Pulmonary/Chest: Effort normal and breath sounds normal.   Abdominal: Soft. Bowel sounds are normal. He exhibits no distension, no abdominal bruit and no mass. There is no hepatosplenomegaly. There is no tenderness. "   Genitourinary: Rectum normal, prostate normal and penis normal. Right testis shows no mass, no swelling and no tenderness. Left testis shows no mass, no swelling and no tenderness.   Musculoskeletal: Normal range of motion.    Yevgeniy had a diabetic foot exam performed today.   During the foot exam he had a monofilament test performed (see form).  Vascular Status -  His right foot exhibits normal foot vasculature . His left foot exhibits normal foot vasculature .  Skin Integrity  -  His right foot skin is not intact (superficial ulcer and mild edema over right lateral malleolus).His left foot skin is intact..  Lymphadenopathy:     He has no cervical adenopathy.        Right: No inguinal and no supraclavicular adenopathy present.        Left: No inguinal and no supraclavicular adenopathy present.   Neurological: He is alert. He has normal reflexes. No cranial nerve deficit. Coordination and gait normal.   Skin: No lesion and no rash noted.   No atypical skin lesions.   Psychiatric: He has a normal mood and affect.   Nursing note and vitals reviewed.      PHQ-2 Depression Screening  Little interest or pleasure in doing things? 0   Feeling down, depressed, or hopeless? 0   PHQ-2 Total Score 0         Counseling was given to patient for the following topics:  appropriate exercise, healthy eating habits, disease prevention, risk factors for cancer, importance of self testicular exam, sun safety, seatbelt use and safe driving. Also discussed the importance of regular dental and vision care, as well recommendation for a yearly screening skin exam after age 40.  Written information provided to patient on these topics and other health maintenance issues.    Results for orders placed or performed in visit on 06/10/20   POC Urinalysis Dipstick, Multipro   Result Value Ref Range    Color Yellow Yellow, Straw, Dark Yellow, Loli    Clarity, UA Clear Clear    Glucose,  mg/dL (A) Negative, 1000 mg/dL (3+) mg/dL    Ketones,  UA Negative Negative    Specific Gravity  1.015 1.005 - 1.030    Blood, UA Negative Negative    pH, Urine 7.0 5.0 - 8.0    Protein, POC Negative Negative mg/dL    Nitrite, UA Negative Negative    Leukocytes Negative Negative    Protein/Creatinine Ratio, Urine 50.0 0.0 - 200.0 mg/G Crea    Lot Number [id number]     Expiration Date 4-30-21    POC Microalbumin   Result Value Ref Range    Microalbumin, Urine 80     Creatinine, Urine 50     A/C >300mg/g HIGH ABNORMAL     Lot Number [id number]     Expiration Date 4-30-21    POC Glycosylated Hemoglobin (Hb A1C)   Result Value Ref Range    Hemoglobin A1C 7.5 %    Lot Number [id number]     Expiration Date 1-27-22        Assessment/Plan       Yevgeniy was seen today for diabetes, edema and ankle pain.    Diagnoses and all orders for this visit:    Encounter for health maintenance examination in adult  -     Lipid Panel  -     Comprehensive Metabolic Panel  -     TSH  -     Vitamin D 25 Hydroxy  -     CBC & Differential  -     POC Urinalysis Dipstick, Multipro  -     POC Microalbumin  -     CBC Auto Differential    Type 2 diabetes mellitus with diabetic autonomic neuropathy, without long-term current use of insulin (CMS/[facility])  -     Lipid Panel  -     Comprehensive Metabolic Panel  -     TSH  -     CBC & Differential  -     POC Urinalysis Dipstick, Multipro  -     POC Microalbumin  -     CBC Auto Differential  -     POC Glycosylated Hemoglobin (Hb A1C)   Continue current medication(s) as noted in the history of present illness.    Essential hypertension  -     Lipid Panel  -     Comprehensive Metabolic Panel  -     TSH  -     CBC & Differential  -     POC Urinalysis Dipstick, Multipro  -     CBC Auto Differential   Continue current medication(s) as noted in the history of present illness.    Other hyperlipidemia  -     Lipid Panel  -     Comprehensive Metabolic Panel  -     TSH  -     CBC & Differential  -     CBC Auto Differential   Continue current medication(s) as noted in the  history of present illness.    Chronic pain syndrome   Continue current medication(s) as noted in the history of present illness.   Follow-up per Pain Management.    Benign colonic polyp   The patient declined scheduling a Colonoscopy at this time.      Generalized anxiety disorder   Continue current medication(s) as noted in the history of present illness.    The patient was instructed in the side effects of the medication.  Risks of the potential for tolerance, dependence, and addiction were discussed.  The patient was instructed to take the lowest dosage of the medication, at the lowest frequency, and for the shortest period of time possible.  The patient was instructed not to receive controlled substances or narcotics from other doctors, and not to giveaway or sell the medication.    The patient was instructed to abstain from illicit drug use.  The patient was instructed to avoid alcohol while taking these medications.      Narcotics/controlled substance agreement, Lonnie report, and Urine Drug Screen were updated today if needed.    Chronic ulcer of right ankle, unspecified ulcer stage (CMS/HCC)  -     XR Ankle 3+ View Right; Future  -     Menthol-Zinc Oxide (Calmoseptine) 0.44-20.6 % ointment; Apply 1 each topically to the appropriate area as directed 2 (Two) Times a Day for 14 days.    High risk medication use  -     Urine Drug Screen - Urine, Clean Catch; Future    Cigarette nicotine dependence with nicotine-induced disorder   Recommended the patient start the Chantix.    The patient declined scheduling a Cardiac CT Scan at this time.      The patient declined scheduling a Sleep Study at this time.    Return in about 3 months (around 9/10/2020) for Recheck Chronic Pain, non-fasting.

## 2020-06-11 ENCOUNTER — HOSPITAL ENCOUNTER (INPATIENT)
Facility: HOSPITAL | Age: 65
LOS: 5 days | Discharge: HOME | End: 2020-06-16
Attending: EMERGENCY MEDICINE | Admitting: INTERNAL MEDICINE

## 2020-06-11 DIAGNOSIS — M25.511 PAIN OF BOTH SHOULDER JOINTS: ICD-10-CM

## 2020-06-11 DIAGNOSIS — M25.512 PAIN OF BOTH SHOULDER JOINTS: ICD-10-CM

## 2020-06-11 DIAGNOSIS — E87.1 HYPONATREMIA: Primary | ICD-10-CM

## 2020-06-11 LAB
25(OH)D3 SERPL-MCNC: 41 NG/ML (ref 30–100)
ALBUMIN SERPL-MCNC: 4.3 G/DL (ref 3.5–5.2)
ALBUMIN SERPL-MCNC: 4.5 G/DL (ref 3.5–5.2)
ALBUMIN/GLOB SERPL: 1.7 G/DL
ALBUMIN/GLOB SERPL: 1.7 G/DL
ALP SERPL-CCNC: 47 U/L (ref 39–117)
ALP SERPL-CCNC: 50 U/L (ref 39–117)
ALT SERPL W P-5'-P-CCNC: 34 U/L (ref 1–41)
ALT SERPL W P-5'-P-CCNC: 37 U/L (ref 1–41)
ANION GAP SERPL CALCULATED.3IONS-SCNC: 10.8 MMOL/L (ref 5–15)
ANION GAP SERPL CALCULATED.3IONS-SCNC: 9 MMOL/L (ref 5–15)
AST SERPL-CCNC: 34 U/L (ref 1–40)
AST SERPL-CCNC: 36 U/L (ref 1–40)
BASOPHILS # BLD AUTO: 0.05 10*3/MM3 (ref 0–0.2)
BASOPHILS NFR BLD AUTO: 0.8 % (ref 0–1.5)
BILIRUB SERPL-MCNC: 0.4 MG/DL (ref 0.2–1.2)
BILIRUB SERPL-MCNC: 0.5 MG/DL (ref 0.2–1.2)
BUN BLD-MCNC: 5 MG/DL (ref 8–23)
BUN BLD-MCNC: 5 MG/DL (ref 8–23)
BUN/CREAT SERPL: 6.3 (ref 7–25)
BUN/CREAT SERPL: 6.7 (ref 7–25)
CALCIUM SPEC-SCNC: 9.2 MG/DL (ref 8.6–10.5)
CALCIUM SPEC-SCNC: 9.4 MG/DL (ref 8.6–10.5)
CHLORIDE SERPL-SCNC: 82 MMOL/L (ref 98–107)
CHLORIDE SERPL-SCNC: 85 MMOL/L (ref 98–107)
CHOLEST SERPL-MCNC: 91 MG/DL (ref 0–200)
CO2 SERPL-SCNC: 24.2 MMOL/L (ref 22–29)
CO2 SERPL-SCNC: 25 MMOL/L (ref 22–29)
CREAT BLD-MCNC: 0.75 MG/DL (ref 0.76–1.27)
CREAT BLD-MCNC: 0.8 MG/DL (ref 0.76–1.27)
CREAT UR-MCNC: 37.2 MG/DL
DEPRECATED RDW RBC AUTO: 39.5 FL (ref 37–54)
EOSINOPHIL # BLD AUTO: 0.14 10*3/MM3 (ref 0–0.4)
EOSINOPHIL NFR BLD AUTO: 2.1 % (ref 0.3–6.2)
ERYTHROCYTE [DISTWIDTH] IN BLOOD BY AUTOMATED COUNT: 11.9 % (ref 12.3–15.4)
GFR SERPL CREATININE-BSD FRML MDRD: 105 ML/MIN/1.73
GFR SERPL CREATININE-BSD FRML MDRD: 98 ML/MIN/1.73
GLOBULIN UR ELPH-MCNC: 2.5 GM/DL
GLOBULIN UR ELPH-MCNC: 2.7 GM/DL
GLUCOSE BLD-MCNC: 138 MG/DL (ref 65–99)
GLUCOSE BLD-MCNC: 211 MG/DL (ref 65–99)
GLUCOSE BLDC GLUCOMTR-MCNC: 146 MG/DL (ref 70–130)
GLUCOSE BLDC GLUCOMTR-MCNC: 185 MG/DL (ref 70–130)
HBA1C MFR BLD: 7.9 % (ref 4.8–5.6)
HCT VFR BLD AUTO: 41.7 % (ref 37.5–51)
HDLC SERPL-MCNC: 51 MG/DL (ref 40–60)
HGB BLD-MCNC: 15 G/DL (ref 13–17.7)
IMM GRANULOCYTES # BLD AUTO: 0.02 10*3/MM3 (ref 0–0.05)
IMM GRANULOCYTES NFR BLD AUTO: 0.3 % (ref 0–0.5)
LDLC SERPL CALC-MCNC: 28 MG/DL (ref 0–100)
LDLC/HDLC SERPL: 0.55 {RATIO}
LYMPHOCYTES # BLD AUTO: 1.57 10*3/MM3 (ref 0.7–3.1)
LYMPHOCYTES NFR BLD AUTO: 24 % (ref 19.6–45.3)
MAGNESIUM SERPL-MCNC: 1.6 MG/DL (ref 1.6–2.4)
MCH RBC QN AUTO: 32.8 PG (ref 26.6–33)
MCHC RBC AUTO-ENTMCNC: 36 G/DL (ref 31.5–35.7)
MCV RBC AUTO: 91 FL (ref 79–97)
MONOCYTES # BLD AUTO: 0.6 10*3/MM3 (ref 0.1–0.9)
MONOCYTES NFR BLD AUTO: 9.2 % (ref 5–12)
NEUTROPHILS # BLD AUTO: 4.16 10*3/MM3 (ref 1.7–7)
NEUTROPHILS NFR BLD AUTO: 63.6 % (ref 42.7–76)
NRBC BLD AUTO-RTO: 0 /100 WBC (ref 0–0.2)
OSMOLALITY SERPL: 259 MOSM/KG (ref 275–295)
PHOSPHATE SERPL-MCNC: 3.5 MG/DL (ref 2.5–4.5)
PLATELET # BLD AUTO: 184 10*3/MM3 (ref 140–450)
PMV BLD AUTO: 8.9 FL (ref 6–12)
POTASSIUM BLD-SCNC: 4.4 MMOL/L (ref 3.5–5.2)
POTASSIUM BLD-SCNC: 5.1 MMOL/L (ref 3.5–5.2)
POTASSIUM BLD-SCNC: 5.2 MMOL/L (ref 3.5–5.2)
PROT SERPL-MCNC: 6.8 G/DL (ref 6–8.5)
PROT SERPL-MCNC: 7.2 G/DL (ref 6–8.5)
RBC # BLD AUTO: 4.58 10*6/MM3 (ref 4.14–5.8)
SODIUM BLD-SCNC: 117 MMOL/L (ref 136–145)
SODIUM BLD-SCNC: 119 MMOL/L (ref 136–145)
SODIUM BLD-SCNC: 123 MMOL/L (ref 136–145)
SODIUM UR-SCNC: 33 MMOL/L
SODIUM UR-SCNC: <20 MMOL/L
TRIGL SERPL-MCNC: 61 MG/DL (ref 0–150)
TSH SERPL DL<=0.05 MIU/L-ACNC: 0.65 UIU/ML (ref 0.27–4.2)
VLDLC SERPL-MCNC: 12.2 MG/DL (ref 5–40)
WBC NRBC COR # BLD: 6.54 10*3/MM3 (ref 3.4–10.8)

## 2020-06-11 PROCEDURE — 83036 HEMOGLOBIN GLYCOSYLATED A1C: CPT | Performed by: NURSE PRACTITIONER

## 2020-06-11 PROCEDURE — 83735 ASSAY OF MAGNESIUM: CPT | Performed by: EMERGENCY MEDICINE

## 2020-06-11 PROCEDURE — 84300 ASSAY OF URINE SODIUM: CPT | Performed by: NURSE PRACTITIONER

## 2020-06-11 PROCEDURE — 84132 ASSAY OF SERUM POTASSIUM: CPT | Performed by: NURSE PRACTITIONER

## 2020-06-11 PROCEDURE — 99284 EMERGENCY DEPT VISIT MOD MDM: CPT

## 2020-06-11 PROCEDURE — 84100 ASSAY OF PHOSPHORUS: CPT | Performed by: EMERGENCY MEDICINE

## 2020-06-11 PROCEDURE — G0378 HOSPITAL OBSERVATION PER HR: HCPCS

## 2020-06-11 PROCEDURE — 84295 ASSAY OF SERUM SODIUM: CPT | Performed by: NURSE PRACTITIONER

## 2020-06-11 PROCEDURE — 82962 GLUCOSE BLOOD TEST: CPT

## 2020-06-11 PROCEDURE — 84300 ASSAY OF URINE SODIUM: CPT | Performed by: EMERGENCY MEDICINE

## 2020-06-11 PROCEDURE — 80053 COMPREHEN METABOLIC PANEL: CPT | Performed by: EMERGENCY MEDICINE

## 2020-06-11 PROCEDURE — 82570 ASSAY OF URINE CREATININE: CPT | Performed by: EMERGENCY MEDICINE

## 2020-06-11 PROCEDURE — 82570 ASSAY OF URINE CREATININE: CPT | Performed by: NURSE PRACTITIONER

## 2020-06-11 PROCEDURE — 25010000002 HEPARIN (PORCINE) PER 1000 UNITS: Performed by: NURSE PRACTITIONER

## 2020-06-11 PROCEDURE — 99223 1ST HOSP IP/OBS HIGH 75: CPT | Performed by: NURSE PRACTITIONER

## 2020-06-11 PROCEDURE — 93005 ELECTROCARDIOGRAM TRACING: CPT | Performed by: EMERGENCY MEDICINE

## 2020-06-11 PROCEDURE — 83930 ASSAY OF BLOOD OSMOLALITY: CPT | Performed by: NURSE PRACTITIONER

## 2020-06-11 RX ORDER — SODIUM CHLORIDE 0.9 % (FLUSH) 0.9 %
10 SYRINGE (ML) INJECTION EVERY 12 HOURS SCHEDULED
Status: DISCONTINUED | OUTPATIENT
Start: 2020-06-11 | End: 2020-06-16 | Stop reason: HOSPADM

## 2020-06-11 RX ORDER — HEPARIN SODIUM 5000 [USP'U]/ML
5000 INJECTION, SOLUTION INTRAVENOUS; SUBCUTANEOUS EVERY 8 HOURS SCHEDULED
Status: DISCONTINUED | OUTPATIENT
Start: 2020-06-11 | End: 2020-06-16 | Stop reason: HOSPADM

## 2020-06-11 RX ORDER — SODIUM CHLORIDE 0.9 % (FLUSH) 0.9 %
10 SYRINGE (ML) INJECTION AS NEEDED
Status: DISCONTINUED | OUTPATIENT
Start: 2020-06-11 | End: 2020-06-16 | Stop reason: HOSPADM

## 2020-06-11 RX ORDER — ATORVASTATIN CALCIUM 20 MG/1
20 TABLET, FILM COATED ORAL NIGHTLY
Status: DISCONTINUED | OUTPATIENT
Start: 2020-06-11 | End: 2020-06-16 | Stop reason: HOSPADM

## 2020-06-11 RX ORDER — NICOTINE 21 MG/24HR
1 PATCH, TRANSDERMAL 24 HOURS TRANSDERMAL
Status: DISCONTINUED | OUTPATIENT
Start: 2020-06-11 | End: 2020-06-16 | Stop reason: HOSPADM

## 2020-06-11 RX ORDER — LISINOPRIL 40 MG/1
40 TABLET ORAL
Status: DISCONTINUED | OUTPATIENT
Start: 2020-06-11 | End: 2020-06-16 | Stop reason: HOSPADM

## 2020-06-11 RX ORDER — ACETAMINOPHEN 650 MG/1
650 SUPPOSITORY RECTAL EVERY 4 HOURS PRN
Status: DISCONTINUED | OUTPATIENT
Start: 2020-06-11 | End: 2020-06-16 | Stop reason: HOSPADM

## 2020-06-11 RX ORDER — DEXTROSE MONOHYDRATE 25 G/50ML
25 INJECTION, SOLUTION INTRAVENOUS
Status: DISCONTINUED | OUTPATIENT
Start: 2020-06-11 | End: 2020-06-16 | Stop reason: HOSPADM

## 2020-06-11 RX ORDER — CLOBETASOL PROPIONATE 0.5 MG/G
1 CREAM TOPICAL 2 TIMES DAILY
Status: DISCONTINUED | OUTPATIENT
Start: 2020-06-11 | End: 2020-06-16 | Stop reason: HOSPADM

## 2020-06-11 RX ORDER — ALPRAZOLAM 0.5 MG/1
0.5 TABLET ORAL 2 TIMES DAILY PRN
Status: DISCONTINUED | OUTPATIENT
Start: 2020-06-11 | End: 2020-06-12

## 2020-06-11 RX ORDER — CHOLECALCIFEROL (VITAMIN D3) 125 MCG
5 CAPSULE ORAL NIGHTLY PRN
Status: DISCONTINUED | OUTPATIENT
Start: 2020-06-11 | End: 2020-06-16 | Stop reason: HOSPADM

## 2020-06-11 RX ORDER — GABAPENTIN 400 MG/1
400 CAPSULE ORAL EVERY 8 HOURS SCHEDULED
Status: DISCONTINUED | OUTPATIENT
Start: 2020-06-11 | End: 2020-06-16 | Stop reason: HOSPADM

## 2020-06-11 RX ORDER — SODIUM CHLORIDE 9 MG/ML
75 INJECTION, SOLUTION INTRAVENOUS CONTINUOUS
Status: ACTIVE | OUTPATIENT
Start: 2020-06-11 | End: 2020-06-12

## 2020-06-11 RX ORDER — ACETAMINOPHEN 160 MG/5ML
650 SOLUTION ORAL EVERY 4 HOURS PRN
Status: DISCONTINUED | OUTPATIENT
Start: 2020-06-11 | End: 2020-06-16 | Stop reason: HOSPADM

## 2020-06-11 RX ORDER — TRAZODONE HYDROCHLORIDE 50 MG/1
50 TABLET ORAL NIGHTLY
Status: DISCONTINUED | OUTPATIENT
Start: 2020-06-11 | End: 2020-06-16 | Stop reason: HOSPADM

## 2020-06-11 RX ORDER — ACETAMINOPHEN 325 MG/1
650 TABLET ORAL EVERY 4 HOURS PRN
Status: DISCONTINUED | OUTPATIENT
Start: 2020-06-11 | End: 2020-06-16 | Stop reason: HOSPADM

## 2020-06-11 RX ORDER — NICOTINE POLACRILEX 4 MG
15 LOZENGE BUCCAL
Status: DISCONTINUED | OUTPATIENT
Start: 2020-06-11 | End: 2020-06-16 | Stop reason: HOSPADM

## 2020-06-11 RX ORDER — DOCUSATE SODIUM 100 MG/1
100 CAPSULE, LIQUID FILLED ORAL 2 TIMES DAILY PRN
Status: DISCONTINUED | OUTPATIENT
Start: 2020-06-11 | End: 2020-06-16 | Stop reason: HOSPADM

## 2020-06-11 RX ADMIN — GABAPENTIN 400 MG: 400 CAPSULE ORAL at 20:26

## 2020-06-11 RX ADMIN — TRAZODONE HYDROCHLORIDE 50 MG: 50 TABLET ORAL at 20:23

## 2020-06-11 RX ADMIN — HEPARIN SODIUM 5000 UNITS: 5000 INJECTION, SOLUTION INTRAVENOUS; SUBCUTANEOUS at 20:26

## 2020-06-11 RX ADMIN — ATORVASTATIN CALCIUM 20 MG: 20 TABLET, FILM COATED ORAL at 20:23

## 2020-06-11 RX ADMIN — LISINOPRIL 40 MG: 40 TABLET ORAL at 18:55

## 2020-06-11 RX ADMIN — SODIUM CHLORIDE, PRESERVATIVE FREE 10 ML: 5 INJECTION INTRAVENOUS at 20:23

## 2020-06-11 RX ADMIN — SODIUM CHLORIDE 75 ML/HR: 9 INJECTION, SOLUTION INTRAVENOUS at 17:11

## 2020-06-11 RX ADMIN — NICOTINE 1 PATCH: 21 PATCH TRANSDERMAL at 18:06

## 2020-06-11 NOTE — H&P
[facility] Medicine Services  Clinical Decision Unit (CDU)  HISTORY & PHYSICAL    Patient Name: Yevgeniy Vaughn  : 1955  MRN: [id number]  Primary Care Physician: Christine Rousseau MD  Date of admission: 2020  1:02 PM      Subjective   Subjective     Chief Complaint:  Low sodium    HPI:  Yevgeniy Vaughn is a 64 y.o. male PMH DMII, HLD, HTN, Insomnia, smoking hx presenting to the ED under the of his PCP due to sodium level of 117 noted on labs drawn yesterday.  Patient presented to PCP for 3-month follow-up on diabetes.  Over the last few days, patient reports being in the heat and sweating a lot.  He also reports drinking 8-9 bottles of water with 2-3 Gatorade with 3 cups of coffee in 24 hours over the last several days.  Review of chart, patient has chronic hyponatremia with an average sodium level 125-130.  As reviewed normal was in 2016.  View of patient's medications, trazodone can cause hyponatremia.  Patient takes 100 mg at bedtime.  Patient states his wife reports him to be confused at times but he feels like it is because he has trouble hearing out of his right ear.    Review of Systems   Gen- No fevers, chills  CV- No chest pain, palpitations  Resp- No cough, dyspnea  GI- No N/V/D, abd pain    All other systems reviewed and negative    Personal History     Past Medical History:   Diagnosis Date   • Acute recurrent pancreatitis     Description: s/p muple hospitalizations   • Anxiety disorder     Description: and panic disorder dx 711.   • Benign colonic polyp     Description: dx    • Chronic neck pain    • Chronic pain syndrome    • Cigarette nicotine dependence 6/10/2020   • Erectile dysfunction    • Hyperlipidemia     Description: Diagnosed    • Hypertension    • Insomnia    • Obesity     h/o phen-fen use   • Psoriasis    • Ptosis of eyelid    • Shoulder joint pain     Description: Bilateral   • Type 2 diabetes mellitus (CMS/HCC)     Description: Diagnosed   (Normal Stress Test 1/99)   • Unknown varicella vaccination status        Past Surgical History:   Procedure Laterality Date   • CHOLECYSTECTOMY  1998   • HUMERUS SURGERY Right     Repair of Humerus / Arm Right  Description: 1999- repair of biceps tendon rupture. 10/7/15- right biceps tendon repair.  12/7/15- debridement of wound and re-repair of right biceps tendon.   • ROTATOR CUFF REPAIR  12/2010    s/p Biceps muscle repair   • SHOULDER SURGERY Right 05/01/2005    repair of labrum and biceps tendon       Family History: family history includes Coronary artery disease (age of onset: 55) in his brother; Diabetes in his brother; Diabetes type II in his brother, mother, and sister; Diverticulosis in his brother; Heart failure (age of onset: 48) in his sister; Hyperlipidemia in his brother; Rheum arthritis in his mother. Otherwise pertinent FHx was reviewed and unremarkable.     Social History:  reports that he has been smoking cigarettes. He quit smokeless tobacco use about 42 years ago.  His smokeless tobacco use included chew. He reports that he does not drink alcohol or use drugs.  Social History     Social History Narrative   • Not on file       Medications:  Available home medication information reviewed.    (Not in a hospital admission)    Allergies   Allergen Reactions   • Lovaza [Omega-3-Acid Ethyl Esters] Other (See Comments)     Other reaction(s): CRAWLING FEELING   • Steglatro [Ertugliflozin] Nausea Only and Rash       Objective   Objective     Vital Signs:   Temp:  [98.4 °F (36.9 °C)] 98.4 °F (36.9 °C)  Heart Rate:  [75-88] 75  Resp:  [18] 18  BP: (133-155)/(87-89) 133/89        Physical Exam   Constitutional: Awake, alert  Eyes: PERRLA, sclerae anicteric, no conjunctival injection  HENT: NCAT, mucous membranes moist  Neck: Supple, no thyromegaly, no lymphadenopathy, trachea midline  Respiratory: Clear to auscultation bilaterally, nonlabored respirations   Cardiovascular: RRR, no murmurs, rubs, or  gallops, palpable pedal pulses bilaterally  Gastrointestinal: Positive bowel sounds, soft, nontender, nondistended  Musculoskeletal: No bilateral ankle edema, no clubbing or cyanosis to extremities  Psychiatric: Appropriate affect, cooperative  Neurologic: Oriented x 3, strength symmetric in all extremities, Cranial Nerves grossly intact to confrontation, speech clear  Skin: No rashes    Results Reviewed:  Results from last 7 days   Lab Units 06/10/20  1454   WBC 10*3/mm3 6.54   HEMOGLOBIN g/dL 15.0   HEMATOCRIT % 41.7   PLATELETS 10*3/mm3 184     Results from last 7 days   Lab Units 06/11/20  1359   SODIUM mmol/L 119*   POTASSIUM mmol/L 5.2   CHLORIDE mmol/L 85*   CO2 mmol/L 25.0   BUN mg/dL 5*   CREATININE mg/dL 0.80   CALCIUM mg/dL 9.2   BILIRUBIN mg/dL 0.4   ALK PHOS U/L 50   ALT (SGPT) U/L 34   AST (SGOT) U/L 34   GLUCOSE mg/dL 211*     Assessment/Plan   Assessment / Plan     Active Hospital Problems    Diagnosis POA   • Hyponatremia [E87.1] Yes     Hospital Course:  Yevgeniy Vaughn is a 64 y.o. male PMH DMII, HLD, HTN, Insomnia, smoking hx presenting to the ED under the of his PCP due to sodium level of 117 noted on labs drawn yesterday at PCP office.  Patient appears to have chronic hyponatremia since 2016.  Patient's sodium level typically between 125 and 130.  Today in ED, patient's sodium was 119.  Spouse reports him to be confused at times but he thinks is related to hearing difficulty.  Patient eats he drinks a lot of fluid throughout the day, to include 8-9 bottles of water, 2-3 Gatorade, 3 cups of coffee.    Plan:    Hyponatremia  -Sodium level 119 in the ED.  Corrected due to hyperglycemia sodium is 121  -sodium deficit with sodium goal of 130 is 397.4 mEq  -Hyponatremia possibly related to excessive fluid intake, trazodone.  -Decrease trazodone to 50 mg at bedtime, consider discontinuing over time  -Gentle IV fluid with normal saline at 75 mils per hour.  -Serial sodium every 6 hours  -Urine sodium,  urine creatinine, urine osmolarity  - seizure precaution due to hyponatremia    DMII  -A1c  -Hold oral diabetic medications  -Sliding scale insulin with a mild correction to adjust as needed  -Gabapentin 400 mg 3 times a day    HTN/HLD  -Lisinopril 40 mg at bedtime.  Monitor potassium level is potassium is 5.2 on arrival, repeat 4.4.  -Atorvastatin 20 mg at bedtime    Insomnia  -Titrate off of trazodone.  Patient will start trazodone 50 mg at bedtime  -Melatonin 5 mg at bedtime    Smoking history  -Nicotine patches/Nicorette gum  -Educated on the health benefits of smoking cessation      Admission Communication  Due to current limited visitation policies, an attempt will be made to update patient's identified best point-of-contact(s)   Contact: Sue Vaughn   Relation: Spouse   Time of communication: [phone]   Notes (if applicable):      CODE STATUS:    Code Status and Medical Interventions:   Ordered at: 06/11/20 1623     Level Of Support Discussed With:    Patient     Code Status:    CPR     Medical Interventions (Level of Support Prior to Arrest):    Full     Admission Status:   I believe this patient meets observation status as it is expected the patient will be stable and ready for discharged less than 2 midnights.     Discharge Blueprint (criteria for discharge):   1. Hyponatremia resolved  2. Vital signs stable  3. Labs with normal limits    Electronically signed by DIMITRY Levy, 06/11/20, 4:26 PM.

## 2020-06-11 NOTE — PROGRESS NOTES
Discharge Planning Assessment  [facility]     Patient Name: Yevgeniy Vaughn  MRN: [id number]  Today's Date: 6/11/2020    Admit Date: 6/11/2020    Discharge Needs Assessment     Row Name 06/11/20 1618       Living Environment    Lives With  spouse    Name(s) of Who Lives With Patient  Sue - spouse    Current Living Arrangements  home/apartment/condo    Primary Care Provided by  self    Provides Primary Care For  no one    Family Caregiver if Needed  spouse    Family Caregiver Names  Sue - spouse    Quality of Family Relationships  helpful;involved;supportive    Able to Return to Prior Arrangements  yes       Resource/Environmental Concerns    Resource/Environmental Concerns  none    Transportation Concerns  car, none       Transition Planning    Patient/Family Anticipates Transition to  home with family    Patient/Family Anticipated Services at Transition  none    Transportation Anticipated  family or friend will provide       Discharge Needs Assessment    Readmission Within the Last 30 Days  no previous admission in last 30 days    Concerns to be Addressed  denies needs/concerns at this time    Equipment Currently Used at Home  cane, straight    Anticipated Changes Related to Illness  none    Equipment Needed After Discharge  none        Discharge Plan     Row Name 06/11/20 1614       Plan    Plan  Home    Plan Comments  CM spoke with patient at bedside. Patient resides in [facility] w/ spouse. Patient is independent with ADL's. Patient denies any DME. Patient denies any current HH or outpatient services. Discharge plan is to return home with family. Patient denies any discharge planning needs at this time. CM will continue to follow.     Final Discharge Disposition Code  30 - still a patient               Demographic Summary     Row Name 06/11/20 1619       General Information    Arrived From  home    Referral Source  emergency department    Reason for Consult  discharge planning    Preferred Language   English     Used During This Interaction  no    General Information Comments  PCP: Christine Rousseau       Contact Information    Contact Information Comments  Spouse - Sue        Functional Status     Row Name 06/11/20 4488       Functional Status    Usual Activity Tolerance  good    Current Activity Tolerance  good       Functional Status, IADL    Medications  independent    Meal Preparation  independent    Housekeeping  independent    Laundry  independent    Shopping  independent       Mental Status    General Appearance WDL  WDL       Mental Status Summary    Recent Changes in Mental Status/Cognitive Functioning  no changes       Employment/    Employment Status  unemployed    Employment/ Comments  Patient states he is able to afford/obtain his medications without dfficulty                Shyla NGUYEN'David

## 2020-06-11 NOTE — PLAN OF CARE
Problem: Patient Care Overview  Goal: Plan of Care Review  Flowsheets  Taken 6/11/2020 2159  Progress: no change  Outcome Summary: Pt admit from ED at 1700. NSR per tele. NS infusing at 75 ml/hour. Q6 hour sodium labs. Will continue to monitor.  Taken 6/11/2020 1700  Plan of Care Reviewed With: patient

## 2020-06-12 LAB
ALBUMIN SERPL-MCNC: 3.9 G/DL (ref 3.5–5.2)
ALBUMIN/GLOB SERPL: 1.6 G/DL
ALP SERPL-CCNC: 47 U/L (ref 39–117)
ALT SERPL W P-5'-P-CCNC: 34 U/L (ref 1–41)
ANION GAP SERPL CALCULATED.3IONS-SCNC: 10 MMOL/L (ref 5–15)
AST SERPL-CCNC: 36 U/L (ref 1–40)
BASOPHILS # BLD AUTO: 0.02 10*3/MM3 (ref 0–0.2)
BASOPHILS NFR BLD AUTO: 0.5 % (ref 0–1.5)
BILIRUB SERPL-MCNC: 0.4 MG/DL (ref 0.2–1.2)
BUN BLD-MCNC: 5 MG/DL (ref 8–23)
BUN/CREAT SERPL: 7.2 (ref 7–25)
CALCIUM SPEC-SCNC: 8.8 MG/DL (ref 8.6–10.5)
CHLORIDE SERPL-SCNC: 91 MMOL/L (ref 98–107)
CO2 SERPL-SCNC: 23 MMOL/L (ref 22–29)
CREAT BLD-MCNC: 0.69 MG/DL (ref 0.76–1.27)
CREAT UR-MCNC: 17.9 MG/DL
DEPRECATED RDW RBC AUTO: 39.9 FL (ref 37–54)
EOSINOPHIL # BLD AUTO: 0.2 10*3/MM3 (ref 0–0.4)
EOSINOPHIL NFR BLD AUTO: 4.8 % (ref 0.3–6.2)
ERYTHROCYTE [DISTWIDTH] IN BLOOD BY AUTOMATED COUNT: 11.9 % (ref 12.3–15.4)
GFR SERPL CREATININE-BSD FRML MDRD: 116 ML/MIN/1.73
GLOBULIN UR ELPH-MCNC: 2.5 GM/DL
GLUCOSE BLD-MCNC: 154 MG/DL (ref 65–99)
GLUCOSE BLDC GLUCOMTR-MCNC: 170 MG/DL (ref 70–130)
GLUCOSE BLDC GLUCOMTR-MCNC: 187 MG/DL (ref 70–130)
GLUCOSE BLDC GLUCOMTR-MCNC: 273 MG/DL (ref 70–130)
HCT VFR BLD AUTO: 38.2 % (ref 37.5–51)
HGB BLD-MCNC: 13.6 G/DL (ref 13–17.7)
IMM GRANULOCYTES # BLD AUTO: 0.01 10*3/MM3 (ref 0–0.05)
IMM GRANULOCYTES NFR BLD AUTO: 0.2 % (ref 0–0.5)
LYMPHOCYTES # BLD AUTO: 1.35 10*3/MM3 (ref 0.7–3.1)
LYMPHOCYTES NFR BLD AUTO: 32.1 % (ref 19.6–45.3)
MCH RBC QN AUTO: 32.8 PG (ref 26.6–33)
MCHC RBC AUTO-ENTMCNC: 35.6 G/DL (ref 31.5–35.7)
MCV RBC AUTO: 92 FL (ref 79–97)
MONOCYTES # BLD AUTO: 0.53 10*3/MM3 (ref 0.1–0.9)
MONOCYTES NFR BLD AUTO: 12.6 % (ref 5–12)
NEUTROPHILS # BLD AUTO: 2.09 10*3/MM3 (ref 1.7–7)
NEUTROPHILS NFR BLD AUTO: 49.8 % (ref 42.7–76)
NRBC BLD AUTO-RTO: 0 /100 WBC (ref 0–0.2)
OSMOLALITY UR: 297 MOSM/KG (ref 300–1100)
PLATELET # BLD AUTO: 161 10*3/MM3 (ref 140–450)
PMV BLD AUTO: 8.8 FL (ref 6–12)
POTASSIUM BLD-SCNC: 4.5 MMOL/L (ref 3.5–5.2)
PROT SERPL-MCNC: 6.4 G/DL (ref 6–8.5)
RBC # BLD AUTO: 4.15 10*6/MM3 (ref 4.14–5.8)
SODIUM BLD-SCNC: 123 MMOL/L (ref 136–145)
SODIUM BLD-SCNC: 124 MMOL/L (ref 136–145)
SODIUM BLD-SCNC: 124 MMOL/L (ref 136–145)
SODIUM BLD-SCNC: 126 MMOL/L (ref 136–145)
SODIUM UR-SCNC: 48 MMOL/L
URATE SERPL-MCNC: 4.6 MG/DL (ref 3.4–7)
WBC NRBC COR # BLD: 4.2 10*3/MM3 (ref 3.4–10.8)

## 2020-06-12 PROCEDURE — 84300 ASSAY OF URINE SODIUM: CPT | Performed by: INTERNAL MEDICINE

## 2020-06-12 PROCEDURE — 63710000001 INSULIN LISPRO (HUMAN) PER 5 UNITS: Performed by: NURSE PRACTITIONER

## 2020-06-12 PROCEDURE — 84550 ASSAY OF BLOOD/URIC ACID: CPT | Performed by: INTERNAL MEDICINE

## 2020-06-12 PROCEDURE — 25010000002 HEPARIN (PORCINE) PER 1000 UNITS: Performed by: NURSE PRACTITIONER

## 2020-06-12 PROCEDURE — 85025 COMPLETE CBC W/AUTO DIFF WBC: CPT | Performed by: NURSE PRACTITIONER

## 2020-06-12 PROCEDURE — 82962 GLUCOSE BLOOD TEST: CPT

## 2020-06-12 PROCEDURE — 84295 ASSAY OF SERUM SODIUM: CPT | Performed by: INTERNAL MEDICINE

## 2020-06-12 PROCEDURE — 99232 SBSQ HOSP IP/OBS MODERATE 35: CPT | Performed by: INTERNAL MEDICINE

## 2020-06-12 PROCEDURE — 80053 COMPREHEN METABOLIC PANEL: CPT | Performed by: NURSE PRACTITIONER

## 2020-06-12 PROCEDURE — 63710000001 ONDANSETRON PER 8 MG: Performed by: INTERNAL MEDICINE

## 2020-06-12 PROCEDURE — 83935 ASSAY OF URINE OSMOLALITY: CPT | Performed by: INTERNAL MEDICINE

## 2020-06-12 RX ORDER — ONDANSETRON 4 MG/1
4 TABLET, FILM COATED ORAL EVERY 6 HOURS PRN
Status: DISCONTINUED | OUTPATIENT
Start: 2020-06-12 | End: 2020-06-16 | Stop reason: HOSPADM

## 2020-06-12 RX ORDER — OXYCODONE AND ACETAMINOPHEN 7.5; 325 MG/1; MG/1
1 TABLET ORAL 3 TIMES DAILY
Status: DISCONTINUED | OUTPATIENT
Start: 2020-06-12 | End: 2020-06-16 | Stop reason: HOSPADM

## 2020-06-12 RX ORDER — ALPRAZOLAM 0.5 MG/1
0.5 TABLET ORAL 2 TIMES DAILY
Status: DISCONTINUED | OUTPATIENT
Start: 2020-06-12 | End: 2020-06-16 | Stop reason: HOSPADM

## 2020-06-12 RX ADMIN — LISINOPRIL 40 MG: 40 TABLET ORAL at 09:46

## 2020-06-12 RX ADMIN — SODIUM CHLORIDE, PRESERVATIVE FREE 10 ML: 5 INJECTION INTRAVENOUS at 09:47

## 2020-06-12 RX ADMIN — ALPRAZOLAM 0.5 MG: 0.5 TABLET ORAL at 09:46

## 2020-06-12 RX ADMIN — SODIUM CHLORIDE, PRESERVATIVE FREE 10 ML: 5 INJECTION INTRAVENOUS at 20:38

## 2020-06-12 RX ADMIN — OXYCODONE HYDROCHLORIDE AND ACETAMINOPHEN 1 TABLET: 7.5; 325 TABLET ORAL at 20:38

## 2020-06-12 RX ADMIN — DOCUSATE SODIUM 100 MG: 100 CAPSULE, LIQUID FILLED ORAL at 20:38

## 2020-06-12 RX ADMIN — OXYCODONE HYDROCHLORIDE AND ACETAMINOPHEN 1 TABLET: 7.5; 325 TABLET ORAL at 09:46

## 2020-06-12 RX ADMIN — NICOTINE 1 PATCH: 21 PATCH TRANSDERMAL at 09:48

## 2020-06-12 RX ADMIN — ACETAMINOPHEN 650 MG: 325 TABLET, FILM COATED ORAL at 03:39

## 2020-06-12 RX ADMIN — TRAZODONE HYDROCHLORIDE 50 MG: 50 TABLET ORAL at 20:38

## 2020-06-12 RX ADMIN — ALPRAZOLAM 0.5 MG: 0.5 TABLET ORAL at 20:38

## 2020-06-12 RX ADMIN — HEPARIN SODIUM 5000 UNITS: 5000 INJECTION, SOLUTION INTRAVENOUS; SUBCUTANEOUS at 20:38

## 2020-06-12 RX ADMIN — HEPARIN SODIUM 5000 UNITS: 5000 INJECTION, SOLUTION INTRAVENOUS; SUBCUTANEOUS at 05:16

## 2020-06-12 RX ADMIN — ATORVASTATIN CALCIUM 20 MG: 20 TABLET, FILM COATED ORAL at 20:38

## 2020-06-12 RX ADMIN — ONDANSETRON HYDROCHLORIDE 4 MG: 4 TABLET, FILM COATED ORAL at 20:38

## 2020-06-12 RX ADMIN — CLOBETASOL PROPIONATE 1 APPLICATION: 0.5 CREAM TOPICAL at 09:47

## 2020-06-12 RX ADMIN — GABAPENTIN 400 MG: 400 CAPSULE ORAL at 20:38

## 2020-06-12 RX ADMIN — HEPARIN SODIUM 5000 UNITS: 5000 INJECTION, SOLUTION INTRAVENOUS; SUBCUTANEOUS at 15:37

## 2020-06-12 RX ADMIN — CLOBETASOL PROPIONATE 1 APPLICATION: 0.5 CREAM TOPICAL at 20:41

## 2020-06-12 RX ADMIN — ONDANSETRON HYDROCHLORIDE 4 MG: 4 TABLET, FILM COATED ORAL at 12:22

## 2020-06-12 RX ADMIN — INSULIN LISPRO 4 UNITS: 100 INJECTION, SOLUTION INTRAVENOUS; SUBCUTANEOUS at 17:55

## 2020-06-12 RX ADMIN — INSULIN LISPRO 2 UNITS: 100 INJECTION, SOLUTION INTRAVENOUS; SUBCUTANEOUS at 12:26

## 2020-06-12 RX ADMIN — OXYCODONE HYDROCHLORIDE AND ACETAMINOPHEN 1 TABLET: 7.5; 325 TABLET ORAL at 15:36

## 2020-06-12 RX ADMIN — GABAPENTIN 400 MG: 400 CAPSULE ORAL at 15:36

## 2020-06-12 RX ADMIN — GABAPENTIN 400 MG: 400 CAPSULE ORAL at 05:16

## 2020-06-12 NOTE — PLAN OF CARE
Problem: Patient Care Overview  Goal: Plan of Care Review  Outcome: Ongoing (interventions implemented as appropriate)  Flowsheets (Taken 6/12/2020 1826)  Progress: improving  Plan of Care Reviewed With: patient  Outcome Summary: pt a/o x4, VSS on RA, NSR; seizure precautions maintained; NS stopped per orders; pt given tylenol overnight for pain relief with little improvement; explained to patient that percocet was not reordered; will continue to monitor Na with 0600 lab draw

## 2020-06-12 NOTE — ED PROVIDER NOTES
Subjective   Pt presents with hyponatremia.  He was sent to the ED by PCP.  He says he had 3 month checkup yesterday and they mendez labs and called him about a sodium of 117.  He has had some nausea and generalized weakness for a few weeks but otherwise feels at baseline.  He has had no medication changes.  He denies fever, vomiting, cough, chest pain or altered mental status.          Review of Systems   Constitutional: Negative for fever.   Respiratory: Negative for shortness of breath.    Cardiovascular: Negative for chest pain.   Gastrointestinal: Positive for nausea. Negative for abdominal pain.   Neurological: Positive for weakness. Negative for headaches.   All other systems reviewed and are negative.      Past Medical History:   Diagnosis Date   • Acute recurrent pancreatitis     Description: s/p muple hospitalizations   • Anxiety disorder     Description: and panic disorder dx 711.   • Benign colonic polyp     Description: dx 12/12   • Chronic neck pain    • Chronic pain syndrome    • Cigarette nicotine dependence 6/10/2020   • Erectile dysfunction    • Hyperlipidemia     Description: Diagnosed 2000   • Hypertension    • Insomnia    • Obesity     h/o phen-fen use   • Psoriasis    • Ptosis of eyelid    • Shoulder joint pain     Description: Bilateral   • Type 2 diabetes mellitus (CMS/[facility])     Description: Diagnosed 1992 (Normal Stress Test 1/99)   • Unknown varicella vaccination status        Allergies   Allergen Reactions   • Lovaza [Omega-3-Acid Ethyl Esters] Other (See Comments)     Other reaction(s): CRAWLING FEELING   • Steglatro [Ertugliflozin] Nausea Only and Rash       Past Surgical History:   Procedure Laterality Date   • CHOLECYSTECTOMY  1998   • HUMERUS SURGERY Right     Repair of Humerus / Arm Right  Description: 1999- repair of biceps tendon rupture. 10/7/15- right biceps tendon repair.  12/7/15- debridement of wound and re-repair of right biceps tendon.   • ROTATOR CUFF REPAIR  12/2010    s/p  Biceps muscle repair   • SHOULDER SURGERY Right 2005    repair of labrum and biceps tendon       Family History   Problem Relation Age of Onset   • Rheum arthritis Mother    • Diabetes type II Mother    • Heart failure Sister 48   • Diabetes type II Sister    • Coronary artery disease Brother 55        Coronary artery bypass grafting (CABG)   • Hyperlipidemia Brother    • Diabetes type II Brother    • Diabetes Brother    • Diverticulosis Brother        Social History     Socioeconomic History   • Marital status:      Spouse name: Not on file   • Number of children: 4   • Years of education: Not on file   • Highest education level: Not on file   Occupational History   • Occupation: Disabled   Tobacco Use   • Smoking status: Current Every Day Smoker     Types: Cigarettes     Last attempt to quit: 2018     Years since quittin.1   • Smokeless tobacco: Former User     Types: Chew     Quit date:    • Tobacco comment: - present   ppd.  Quit 14.  Restarted approximately 2015, 3/4 ppd, then 1 ppd.  Quit 18, re-started 2018 1 ppd   Substance and Sexual Activity   • Alcohol use: No   • Drug use: No   • Sexual activity: Yes     Partners: Female     Birth control/protection: Post-menopausal           Objective   Physical Exam   Constitutional: He is oriented to person, place, and time. He appears well-developed and well-nourished. He is cooperative.  Non-toxic appearance. No distress.   HENT:   Head: Normocephalic and atraumatic.   Mouth/Throat: Oropharynx is clear and moist and mucous membranes are normal.   Eyes: Conjunctivae and EOM are normal. No scleral icterus.   Neck: Normal range of motion. Neck supple.   Cardiovascular: Normal rate, regular rhythm and normal heart sounds.   No murmur heard.  Pulmonary/Chest: Effort normal and breath sounds normal. No respiratory distress. He has no wheezes. He has no rhonchi. He has no rales.   Abdominal: Soft. Bowel sounds are  normal. There is no tenderness. There is no rebound and no guarding.   Musculoskeletal: Normal range of motion.   Neurological: He is alert and oriented to person, place, and time. He has normal strength.   Skin: Skin is warm and dry. No rash noted.   Psychiatric: He has a normal mood and affect. His speech is normal and behavior is normal.   Nursing note and vitals reviewed.      Procedures           ED Course         Severe hyponatremia which appears acute-on-chronic since old values are upper 120s.  He is not on a diuretic or other obvious medication to cause this.  Patient stable on serial rechecks.  Discussed exam findings, test results so far and concerns in detail at the bedside.  Discussed need for admission for further evaluation and treatment.                                    MDM  Number of Diagnoses or Management Options  Hyponatremia:      Amount and/or Complexity of Data Reviewed  Clinical lab tests: reviewed and ordered  Decide to obtain previous medical records or to obtain history from someone other than the patient: yes  Review and summarize past medical records: yes  Discuss the patient with other providers: yes        Final diagnoses:   Hyponatremia            Victorino Saeed MD  06/11/20 2009

## 2020-06-12 NOTE — CONSULTS
NAL Consult Note    Yevgeniy Vaughn  1955  [id number]    Date of Admit:  6/11/2020    Date of Consult: 6/12/2020        Requesting Provider: No ref. provider found  Evaluating Physician: Delio Barnes MD        Reason for Consultation: Hyponatremia    History of present illness:    Patient is a 64 y.o.  Yr old male with PMH DMII, HLD, HTN, Insomnia, smoking hx presenting to the ED under the of his PCP due to sodium level of 117 noted on labs drawn yesterday.  Patient presented to PCP for 3-month follow-up on diabetes.  Over the last few days, patient reports being in the heat and sweating a lot.  He also reports drinking 8-9 bottles of water with 2-3 Gatorade with 3 cups of coffee in 24 hours over the last several days.  Pt says he does not add salt and actually restrict salt in food too. Review of chart, patient has chronic hyponatremia with an average sodium level 125-130.  As reviewed normal was in 2016.  View of patient's medications, trazodone can cause hyponatremia.  Patient takes 100 mg at bedtime.  Patient was found to be confused by his wife     Past Medical History:   Diagnosis Date   • Acute recurrent pancreatitis     Description: s/p muple hospitalizations   • Anxiety disorder     Description: and panic disorder dx 711.   • Benign colonic polyp     Description: dx 12/12   • Chronic neck pain    • Chronic pain syndrome    • Cigarette nicotine dependence 6/10/2020   • Erectile dysfunction    • Hyperlipidemia     Description: Diagnosed 2000   • Hypertension    • Insomnia    • Obesity     h/o phen-fen use   • Psoriasis    • Ptosis of eyelid    • Shoulder joint pain     Description: Bilateral   • Type 2 diabetes mellitus (CMS/HCC)     Description: Diagnosed 1992 (Normal Stress Test 1/99)   • Unknown varicella vaccination status        Past Surgical History:   Procedure Laterality Date   • CHOLECYSTECTOMY  1998   • HUMERUS SURGERY Right     Repair of Humerus / Arm Right  Description: 1999- repair of  biceps tendon rupture. 10/7/15- right biceps tendon repair.  12/7/15- debridement of wound and re-repair of right biceps tendon.   • ROTATOR CUFF REPAIR  2010    s/p Biceps muscle repair   • SHOULDER SURGERY Right 2005    repair of labrum and biceps tendon       Social History     Socioeconomic History   • Marital status:      Spouse name: Not on file   • Number of children: 4   • Years of education: Not on file   • Highest education level: Not on file   Occupational History   • Occupation: Disabled   Tobacco Use   • Smoking status: Current Every Day Smoker     Types: Cigarettes     Last attempt to quit: 2018     Years since quittin.2   • Smokeless tobacco: Former User     Types: Chew     Quit date:    • Tobacco comment: - present   ppd.  Quit 14.  Restarted approximately 2015, 3/4 ppd, then 1 ppd.  Quit 18, re-started 2018 1 ppd   Substance and Sexual Activity   • Alcohol use: No   • Drug use: No   • Sexual activity: Yes     Partners: Female     Birth control/protection: Post-menopausal       family history includes Coronary artery disease (age of onset: 55) in his brother; Diabetes in his brother; Diabetes type II in his brother, mother, and sister; Diverticulosis in his brother; Heart failure (age of onset: 48) in his sister; Hyperlipidemia in his brother; Rheum arthritis in his mother.    Allergies   Allergen Reactions   • Lovaza [Omega-3-Acid Ethyl Esters] Other (See Comments)     Other reaction(s): CRAWLING FEELING   • Steglatro [Ertugliflozin] Nausea Only and Rash       Medication:    Current Facility-Administered Medications:   •  acetaminophen (TYLENOL) tablet 650 mg, 650 mg, Oral, Q4H PRN, 650 mg at 20 0339 **OR** acetaminophen (TYLENOL) 160 MG/5ML solution 650 mg, 650 mg, Oral, Q4H PRN **OR** acetaminophen (TYLENOL) suppository 650 mg, 650 mg, Rectal, Q4H PRN, Waleska Bray APRN  •  ALPRAZolam (XANAX) tablet 0.5 mg, 0.5 mg, Oral, BID,  Sumi Johnson, DO, 0.5 mg at 06/12/20 0946  •  atorvastatin (LIPITOR) tablet 20 mg, 20 mg, Oral, Nightly, Waleska Bray APRN, 20 mg at 06/11/20 2023  •  clobetasol (TEMOVATE) 0.05 % cream 1 application, 1 application, Topical, BID, Waleska Bray APRN, 1 application at 06/12/20 0947  •  dextrose (D50W) 25 g/ 50mL Intravenous Solution 25 g, 25 g, Intravenous, Q15 Min PRN, Waleska Bray APRN  •  dextrose (GLUTOSE) oral gel 15 g, 15 g, Oral, Q15 Min PRN, Waleska Bray APRN  •  docusate sodium (COLACE) capsule 100 mg, 100 mg, Oral, BID PRN, Waleska Bray APRN  •  gabapentin (NEURONTIN) capsule 400 mg, 400 mg, Oral, Q8H, Waleska Bray APRN, 400 mg at 06/12/20 0516  •  glucagon (human recombinant) (GLUCAGEN DIAGNOSTIC) injection 1 mg, 1 mg, Subcutaneous, Q15 Min PRN, Waleska Bray APRN  •  heparin (porcine) 5000 UNIT/ML injection 5,000 Units, 5,000 Units, Subcutaneous, Q8H, Waleska Bray APRN, 5,000 Units at 06/12/20 0516  •  insulin lispro (humaLOG) injection 0-7 Units, 0-7 Units, Subcutaneous, TID AC, Waleska Bray APRN  •  lisinopril (PRINIVIL,ZESTRIL) tablet 40 mg, 40 mg, Oral, Q24H, Waleska Bray APRN, 40 mg at 06/12/20 0946  •  melatonin tablet 5 mg, 5 mg, Oral, Nightly PRN, Waleska Bray APRN  •  nicotine (NICODERM CQ) 21 MG/24HR patch 1 patch, 1 patch, Transdermal, Q24H, Waleska Bray APRN, 1 patch at 06/12/20 0948  •  nicotine polacrilex (NICORETTE) gum 2 mg, 2 mg, Mouth/Throat, Q1H PRN, Bray, Waleska, APRN  •  ondansetron (ZOFRAN) tablet 4 mg, 4 mg, Oral, Q6H PRN, Sumi Johnson DO  •  oxyCODONE-acetaminophen (PERCOCET) 7.5-325 MG per tablet 1 tablet, 1 tablet, Oral, TID, Sumi Johnson DO, 1 tablet at 06/12/20 0946  •  [COMPLETED] Insert peripheral IV, , , Once **AND** sodium chloride 0.9 % flush 10 mL, 10 mL, Intravenous, PRN, Victorino Saeed MD  •  sodium chloride 0.9 % flush 10 mL, 10 mL, Intravenous, Q12H, Waleska Bray APRN, 10 mL at  06/12/20 0947  •  sodium chloride 0.9 % flush 10 mL, 10 mL, Intravenous, PRN, Waleska Bray APRN  •  traZODone (DESYREL) tablet 50 mg, 50 mg, Oral, Nightly, Waleska Bray APRN, 50 mg at 06/11/20 2023    Medications Prior to Admission   Medication Sig Dispense Refill Last Dose   • ALPRAZolam (XANAX) 0.5 MG tablet Take 1 tablet by mouth twice daily as needed for anxiety 60 tablet 0 Taking   • atorvastatin (LIPITOR) 20 MG tablet TAKE 1 TABLET BY MOUTH AT BEDTIME 90 tablet 1 Taking   • clobetasol (TEMOVATE) 0.05 % cream APPLY CREAM TOPICALLY TO AFFECTED AREA TWICE DAILY 60 g 3 Taking   • gabapentin (NEURONTIN) 800 MG tablet Take 1 tablet by mouth 3 (three) times a day.   Taking   • ibuprofen (ADVIL,MOTRIN) 800 MG tablet TAKE 1 TABLET BY MOUTH THREE TIMES DAILY AS NEEDED 180 tablet 2 Taking   • JANUVIA 100 MG tablet Take 1 tablet by mouth once daily 90 tablet 1 Taking   • lisinopril (PRINIVIL,ZESTRIL) 40 MG tablet TAKE 1 TABLET BY MOUTH AT BEDTIME 90 tablet 1 Taking   • Menthol-Zinc Oxide (Calmoseptine) 0.44-20.6 % ointment Apply 1 each topically to the appropriate area as directed 2 (Two) Times a Day for 14 days. 71 g 0    • metFORMIN (GLUCOPHAGE) 1000 MG tablet Take 1 tablet by mouth twice daily 180 tablet 1 Taking   • Omega-3 Fatty Acids (FISH OIL) 1000 MG capsule capsule Take 1 capsule by mouth every 12 (twelve) hours   Taking   • oxyCODONE-acetaminophen (PERCOCET) 7.5-325 MG per tablet Take 1 tablet by mouth 3 (three) times a day.   Taking   • promethazine (PHENERGAN) 25 MG tablet TAKE ONE TABLET BY MOUTH EVERY 4 TO 6 HOURS AS NEEDED FOR NAUSEA AND VOMITING 30 tablet 5 Taking   • traZODone (DESYREL) 100 MG tablet TAKE ONE & ONE-HALF TO 2 TABLETS AT BEDTIME AS NEEDED FOR SLEEP 180 tablet 1 Taking   • varenicline (Chantix Continuing Month Casa) 1 MG tablet Take 1 tablet by mouth 2 (Two) Times a Day. 60 tablet 4 Taking       Review of Systems:    Constitutional-- No Fever, chills or sweats. No significant change  "in weight  Eye-- no diplopia, no conjunctivitis  ENT-- No new hearing or throat complaints.  No epistaxis or oral sores. No odynophagia or dysphagia. No headache, photophobia or neck stiffness.  CV-- No chest pain, palpitations, soa, or edema  Resp-- No SOB/cough/Hemoptysis  GI- No nausea, vomiting, or diarrhea.  No hematochezia, melena, or hematemesis.  -- No dysuria, hematuria, or flank pain. No lower tract obstructive symptoms  Skin-- No rash, warm and dry  Lymph- no painful or swollen lymph nodes in neck/axilla or groin.   Heme- No active bruising or bleeding; no Hx of DVT or PE.  MS-- no swelling or pain in the joints  Neuro-- No acute focal weakness or numbness in the arms or legs.  No seizures.  Psych--No anxiety or depression  Endo--No cold or heat intolerance.  No polyuria, polydipsia, or polyphagia    Full review of systems reviewed and negative otherwise for acute complaints    Physical Exam:   Vital Signs   Blood pressure 114/81, pulse 77, temperature 97.7 °F (36.5 °C), temperature source Oral, resp. rate 18, height 171.5 cm (67.5\"), weight 90.3 kg (199 lb 1.6 oz), SpO2 94 %.     GENERAL: Awake and alert, in no acute distress.   HEENT: Normocephalic, atraumatic.  PER.  No conjunctivitis. No icterus. Oropharynx clear without evidence of thrush or exudate. No evidence of peridontal disease.    NECK: Supple without nuchal rigidity. No mass.  HEART: RRR; No murmur, rubs, gallops. No bruits in neck, abdomen, or groins, no edema  LUNGS: Normal respiratory effort. Nonlabored. No dullness.  No crepitus.  Clear to auscultation bilaterally without wheezing, rales, rhonchi.  ABDOMEN: Soft, nontender, nondistended. Positive bowel sounds. No rebound or guarding. NO mass or HSM.  JOINTS:  Full range of motion, no redness or tenderness.  EXT:  No cyanosis, clubbing or edema.  :  No rodriguez  SKIN: Warm and dry without rash  NEURO: Oriented to PPT. No focal neurological deficits. Strength equal bilateral  PSYCHIATRIC: " Normal insight and judgement. Cooperative with PE    Laboratory Data  Results from last 7 days   Lab Units 06/12/20  0553 06/10/20  1454   HEMOGLOBIN g/dL 13.6 15.0   HEMATOCRIT % 38.2 41.7     Results from last 7 days   Lab Units 06/12/20  0904 06/12/20  0553 06/11/20  2347 06/11/20  1736 06/11/20  1359 06/10/20  1439   SODIUM mmol/L 123* 124* 124* 123* 119* 117*   POTASSIUM mmol/L  --  4.5  --  4.4 5.2 5.1   CHLORIDE mmol/L  --  91*  --   --  85* 82*   CO2 mmol/L  --  23.0  --   --  25.0 24.2   BUN mg/dL  --  5*  --   --  5* 5*   CREATININE mg/dL  --  0.69*  --   --  0.80 0.75*   CALCIUM mg/dL  --  8.8  --   --  9.2 9.4   PHOSPHORUS mg/dL  --   --   --   --  3.5  --    MAGNESIUM mg/dL  --   --   --   --  1.6  --    ALBUMIN g/dL  --  3.90  --   --  4.30 4.50     Results from last 7 days   Lab Units 06/12/20  0553   GLUCOSE mg/dL 154*     Results from last 7 days   Lab Units 06/10/20  1455   COLOR UA  Yellow   CLARITY UA  Clear   PH, URINE  7.0   KETONES UA  Negative   LEUKOCYTES UA  Negative     Results from last 7 days   Lab Units 06/12/20  0553   ALK PHOS U/L 47   BILIRUBIN mg/dL 0.4   ALT (SGPT) U/L 34   AST (SGOT) U/L 36     Estimated Creatinine Clearance: 118.6 mL/min (A) (by C-G formula based on SCr of 0.69 mg/dL (L)).    Radiology:      Renal Imaging:    I personally read  the radiographic studies    Impression:   Hyponatremia. Likely due to low salt intake and increase free fluid intake. Recent drop seems to be from loos of salt in sweat and also use of trazodone with SIADH element    PLAN: Thank you for asking us to see Yevgeniy Vaughn, I recommend the following:   Restrict fluid intake to 1000 cc a day  Liberalize salt in food  Cont recking serum sodium every 4 hours if sodium dropping under 122 may need ivf normal saline 0.9    high risk \  Monitor closely plz      Please note that portions of this note were completed with a voice recognition program efforts were made to add at the dictations but words  may be mistranscribed.    Delio Barnes MD  6/12/2020  11:59

## 2020-06-12 NOTE — PROGRESS NOTES
[facility] Medicine Services  PROGRESS NOTE    Patient Name: Yevgeniy Vaughn  : 1955  MRN: [id number]    Date of Admission: 2020  Primary Care Physician: Christine Rousseau MD    Subjective   Subjective     CC:  Hyponatremia     HPI:  No acute events.  States that he overall feels fine and had no symptoms of hyponatremia.  Wife at bedside and answered all questions.    Review of Systems  Gen- No fevers, chills  CV- No chest pain, palpitations  Resp- No cough, dyspnea  GI- No N/V/D, abd pain    Objective   Objective     Vital Signs:   Temp:  [97.6 °F (36.4 °C)-98.4 °F (36.9 °C)] 97.7 °F (36.5 °C)  Heart Rate:  [70-89] 77  Resp:  [18-20] 18  BP: (118-177)/(69-92) 138/80        Physical Exam:  Constitutional: No acute distress, awake, alert  HENT: NCAT, mucous membranes moist, hard of hearing  Respiratory: Clear to auscultation bilaterally, respiratory effort normal   Cardiovascular: RRR, no murmurs, rubs, or gallops, palpable pedal pulses bilaterally  Gastrointestinal: Positive bowel sounds, soft, nontender, nondistended  Musculoskeletal: No bilateral ankle edema  Psychiatric: flat affect, cooperative  Neurologic: Oriented x 3, strength symmetric in all extremities, Cranial Nerves grossly intact to confrontation, speech clear  Skin: No rashes; multiple tattoos    Results Reviewed:  Results from last 7 days   Lab Units 20  0553 06/10/20  1454   WBC 10*3/mm3 4.20 6.54   HEMOGLOBIN g/dL 13.6 15.0   HEMATOCRIT % 38.2 41.7   PLATELETS 10*3/mm3 161 184     Results from last 7 days   Lab Units 20  0553 20  2347 20  1736 20  1359 06/10/20  1439   SODIUM mmol/L 124* 124* 123* 119* 117*   POTASSIUM mmol/L 4.5  --  4.4 5.2 5.1   CHLORIDE mmol/L 91*  --   --  85* 82*   CO2 mmol/L 23.0  --   --  25.0 24.2   BUN mg/dL 5*  --   --  5* 5*   CREATININE mg/dL 0.69*  --   --  0.80 0.75*   GLUCOSE mg/dL 154*  --   --  211* 138*   CALCIUM mg/dL 8.8  --   --  9.2 9.4   ALT  (SGPT) U/L 34  --   --  34 37   AST (SGOT) U/L 36  --   --  34 36     Estimated Creatinine Clearance: 118.6 mL/min (A) (by C-G formula based on SCr of 0.69 mg/dL (L)).    Microbiology Results Abnormal     None          Imaging Results (Last 24 Hours)     ** No results found for the last 24 hours. **               I have reviewed the medications:  Scheduled Meds:  ALPRAZolam 0.5 mg Oral BID   atorvastatin 20 mg Oral Nightly   clobetasol 1 application Topical BID   gabapentin 400 mg Oral Q8H   heparin (porcine) 5,000 Units Subcutaneous Q8H   insulin lispro 0-7 Units Subcutaneous TID AC   lisinopril 40 mg Oral Q24H   nicotine 1 patch Transdermal Q24H   oxyCODONE-acetaminophen 1 tablet Oral TID   sodium chloride 10 mL Intravenous Q12H   traZODone 50 mg Oral Nightly     Continuous Infusions:   PRN Meds:.•  acetaminophen **OR** acetaminophen **OR** acetaminophen  •  dextrose  •  dextrose  •  docusate sodium  •  glucagon (human recombinant)  •  melatonin  •  nicotine polacrilex  •  [COMPLETED] Insert peripheral IV **AND** sodium chloride  •  sodium chloride    Assessment/Plan   Assessment & Plan     Active Hospital Problems    Diagnosis  POA   • Hyponatremia [E87.1]  Yes      Resolved Hospital Problems   No resolved problems to display.        Brief Hospital Course to date:  Yevgeniy Vaughn is a 64 y.o. male PMH DMII, HLD, HTN, Insomnia, smoking hx presenting to the ED under the of his PCP due to sodium level of 117 noted on labs drawn yesterday at PCP office.  Patient appears to have chronic hyponatremia since 2016.  Patient's sodium level typically between 125 and 130.  Today in ED, patient's sodium was 119.  Spouse reports him to be confused at times but he thinks is related to hearing difficulty.  Patient eats he drinks a lot of fluid throughout the day, to include 8-9 bottles of water, 2-3 Gatorade, 3 cups of coffee.     Plan:     Hyponatremia  -Sodium level 119 in the ED.  Corrected due to hyperglycemia sodium is  121  -Hyponatremia possibly related to excessive fluid intake, trazodone.  -Decrease trazodone to 50 mg at bedtime, consider discontinuing over time  - Low urine Na and urine osms   - Continue to monitor q4 hrs   - Neph consult   - up to 124 goal not greater than 127  - hold any additional fluids      DMII  -A1c 7.9  -Hold oral diabetic medications  -Sliding scale insulin with a mild correction to adjust as needed  -Gabapentin 400 mg 3 times a day     HTN/HLD  -Lisinopril 40 mg at bedtime.  Monitor potassium level is potassium is 5.2 on arrival, repeat improved  -Atorvastatin 20 mg at bedtime     Insomnia  -Titrate off of trazodone.  Patient will start trazodone 50 mg at bedtime  -Melatonin 5 mg at bedtime     Smoking history  -Nicotine patches/Nicorette gum  -Educated on the health benefits of smoking cessation    Chronic pain  Anxiety   - Take percocet and xanax scheduled at home - will resume       DVT - heparin     Daily Care Communication  Due to current limited visitation policies, an attempt will be made daily to update patient's identified best point-of-contact(s)   Contact:    Relation:    Type of communication (phone or televideo):    Time of communication:    Notes (if applicable): Wife at bedside      Disposition: I expect the patient to be discharged home in 1-3 days.    CODE STATUS:   Code Status and Medical Interventions:   Ordered at: 06/11/20 1623     Level Of Support Discussed With:    Patient     Code Status:    CPR     Medical Interventions (Level of Support Prior to Arrest):    Full         Electronically signed by Sumi Johnson DO, 06/12/20, 09:30.

## 2020-06-13 LAB
ANION GAP SERPL CALCULATED.3IONS-SCNC: 10 MMOL/L (ref 5–15)
BUN BLD-MCNC: 6 MG/DL (ref 8–23)
BUN/CREAT SERPL: 8.5 (ref 7–25)
CALCIUM SPEC-SCNC: 9 MG/DL (ref 8.6–10.5)
CHLORIDE SERPL-SCNC: 94 MMOL/L (ref 98–107)
CO2 SERPL-SCNC: 22 MMOL/L (ref 22–29)
CREAT BLD-MCNC: 0.71 MG/DL (ref 0.76–1.27)
GFR SERPL CREATININE-BSD FRML MDRD: 112 ML/MIN/1.73
GLUCOSE BLD-MCNC: 170 MG/DL (ref 65–99)
GLUCOSE BLDC GLUCOMTR-MCNC: 180 MG/DL (ref 70–130)
GLUCOSE BLDC GLUCOMTR-MCNC: 186 MG/DL (ref 70–130)
GLUCOSE BLDC GLUCOMTR-MCNC: 215 MG/DL (ref 70–130)
GLUCOSE BLDC GLUCOMTR-MCNC: 226 MG/DL (ref 70–130)
GLUCOSE BLDC GLUCOMTR-MCNC: 235 MG/DL (ref 70–130)
POTASSIUM BLD-SCNC: 4.4 MMOL/L (ref 3.5–5.2)
SODIUM BLD-SCNC: 126 MMOL/L (ref 136–145)
SODIUM BLD-SCNC: 127 MMOL/L (ref 136–145)
SODIUM BLD-SCNC: 128 MMOL/L (ref 136–145)
SODIUM BLD-SCNC: 129 MMOL/L (ref 136–145)
SODIUM BLD-SCNC: 129 MMOL/L (ref 136–145)

## 2020-06-13 PROCEDURE — 25010000002 HEPARIN (PORCINE) PER 1000 UNITS: Performed by: NURSE PRACTITIONER

## 2020-06-13 PROCEDURE — 99232 SBSQ HOSP IP/OBS MODERATE 35: CPT | Performed by: INTERNAL MEDICINE

## 2020-06-13 PROCEDURE — 80048 BASIC METABOLIC PNL TOTAL CA: CPT | Performed by: INTERNAL MEDICINE

## 2020-06-13 PROCEDURE — 84295 ASSAY OF SERUM SODIUM: CPT | Performed by: INTERNAL MEDICINE

## 2020-06-13 PROCEDURE — 63710000001 INSULIN LISPRO (HUMAN) PER 5 UNITS: Performed by: NURSE PRACTITIONER

## 2020-06-13 PROCEDURE — 82962 GLUCOSE BLOOD TEST: CPT

## 2020-06-13 PROCEDURE — 63710000001 ONDANSETRON PER 8 MG: Performed by: INTERNAL MEDICINE

## 2020-06-13 RX ORDER — DEXTROSE MONOHYDRATE 50 MG/ML
75 INJECTION, SOLUTION INTRAVENOUS CONTINUOUS
Status: ACTIVE | OUTPATIENT
Start: 2020-06-13 | End: 2020-06-13

## 2020-06-13 RX ORDER — DEXTROSE MONOHYDRATE 50 MG/ML
100 INJECTION, SOLUTION INTRAVENOUS CONTINUOUS
Status: ACTIVE | OUTPATIENT
Start: 2020-06-13 | End: 2020-06-13

## 2020-06-13 RX ADMIN — INSULIN LISPRO 3 UNITS: 100 INJECTION, SOLUTION INTRAVENOUS; SUBCUTANEOUS at 11:59

## 2020-06-13 RX ADMIN — NICOTINE 1 PATCH: 21 PATCH TRANSDERMAL at 08:13

## 2020-06-13 RX ADMIN — OXYCODONE HYDROCHLORIDE AND ACETAMINOPHEN 1 TABLET: 7.5; 325 TABLET ORAL at 08:11

## 2020-06-13 RX ADMIN — ONDANSETRON HYDROCHLORIDE 4 MG: 4 TABLET, FILM COATED ORAL at 21:06

## 2020-06-13 RX ADMIN — HEPARIN SODIUM 5000 UNITS: 5000 INJECTION, SOLUTION INTRAVENOUS; SUBCUTANEOUS at 06:28

## 2020-06-13 RX ADMIN — INSULIN LISPRO 2 UNITS: 100 INJECTION, SOLUTION INTRAVENOUS; SUBCUTANEOUS at 08:12

## 2020-06-13 RX ADMIN — ALPRAZOLAM 0.5 MG: 0.5 TABLET ORAL at 08:11

## 2020-06-13 RX ADMIN — HEPARIN SODIUM 5000 UNITS: 5000 INJECTION, SOLUTION INTRAVENOUS; SUBCUTANEOUS at 13:41

## 2020-06-13 RX ADMIN — ONDANSETRON HYDROCHLORIDE 4 MG: 4 TABLET, FILM COATED ORAL at 12:11

## 2020-06-13 RX ADMIN — ALPRAZOLAM 0.5 MG: 0.5 TABLET ORAL at 21:05

## 2020-06-13 RX ADMIN — GABAPENTIN 400 MG: 400 CAPSULE ORAL at 06:28

## 2020-06-13 RX ADMIN — SODIUM CHLORIDE, PRESERVATIVE FREE 10 ML: 5 INJECTION INTRAVENOUS at 06:28

## 2020-06-13 RX ADMIN — OXYCODONE HYDROCHLORIDE AND ACETAMINOPHEN 1 TABLET: 7.5; 325 TABLET ORAL at 16:56

## 2020-06-13 RX ADMIN — HEPARIN SODIUM 5000 UNITS: 5000 INJECTION, SOLUTION INTRAVENOUS; SUBCUTANEOUS at 21:04

## 2020-06-13 RX ADMIN — INSULIN LISPRO 3 UNITS: 100 INJECTION, SOLUTION INTRAVENOUS; SUBCUTANEOUS at 16:56

## 2020-06-13 RX ADMIN — ONDANSETRON HYDROCHLORIDE 4 MG: 4 TABLET, FILM COATED ORAL at 06:28

## 2020-06-13 RX ADMIN — OXYCODONE HYDROCHLORIDE AND ACETAMINOPHEN 1 TABLET: 7.5; 325 TABLET ORAL at 21:05

## 2020-06-13 RX ADMIN — TRAZODONE HYDROCHLORIDE 50 MG: 50 TABLET ORAL at 21:06

## 2020-06-13 RX ADMIN — DOCUSATE SODIUM 100 MG: 100 CAPSULE, LIQUID FILLED ORAL at 21:06

## 2020-06-13 RX ADMIN — GABAPENTIN 400 MG: 400 CAPSULE ORAL at 21:05

## 2020-06-13 RX ADMIN — LISINOPRIL 40 MG: 40 TABLET ORAL at 08:11

## 2020-06-13 RX ADMIN — SODIUM CHLORIDE, PRESERVATIVE FREE 10 ML: 5 INJECTION INTRAVENOUS at 08:12

## 2020-06-13 RX ADMIN — ATORVASTATIN CALCIUM 20 MG: 20 TABLET, FILM COATED ORAL at 21:06

## 2020-06-13 RX ADMIN — DEXTROSE MONOHYDRATE 100 ML/HR: 50 INJECTION, SOLUTION INTRAVENOUS at 11:59

## 2020-06-13 RX ADMIN — GABAPENTIN 400 MG: 400 CAPSULE ORAL at 13:41

## 2020-06-13 RX ADMIN — SODIUM CHLORIDE, PRESERVATIVE FREE 10 ML: 5 INJECTION INTRAVENOUS at 21:06

## 2020-06-13 NOTE — PLAN OF CARE
Pt sleeping well tonight. Pt on monitor, SR 70's, no complaints overnight. Na+ 126-127 overnight. Awaiting morning lab levels. Will continue to monitor.

## 2020-06-13 NOTE — PLAN OF CARE
Pt has complained of all over chronic pain, scheduled pain meds given, VSS on RA, NSR on the monitor, Q4 sodium lab draws, Last Sodium 129.

## 2020-06-13 NOTE — PROGRESS NOTES
"NAL Progress Note  Yevgeniy Vaughn  [id number]  1955    6/11/2020    Reason for visit:  Hyponatremia    No over night events    ROS:    Pulm:  No SOA  CV:  No CP    I&O:    Intake/Output Summary (Last 24 hours) at 6/13/2020 1249  Last data filed at 6/13/2020 1156  Gross per 24 hour   Intake 800 ml   Output 1800 ml   Net -1000 ml       PE:   Blood pressure 152/88, pulse 104, temperature 98 °F (36.7 °C), temperature source Oral, resp. rate 16, height 171.5 cm (67.5\"), weight 88.9 kg (196 lb), SpO2 98 %.    GENERAL: Awake and alert, in no acute distress.   HEENT: PER, No conjunctivitis  NECK: Supple, no JVD     HEART: RRR; No murmur, rubs, gallops.   LUNGS: Clear to auscultation bilaterally without wheezing, rales, rhonchi. Normal respiratory effort. Nonlabored. No dullness.  ABDOMEN: Soft, nontender, nondistended. Positive bowel sounds. No rebound or guarding. NO mass or HSM.  EXT:  No cyanosis, clubbing or edema. No cord.  :  Without Phillips catheter.  SKIN: Warm and dry without cutaneous eruptions on Inspection/palpation.    NEURO: Alert and oriented x 3, no focal deficit    Medications:    Current Facility-Administered Medications:   •  acetaminophen (TYLENOL) tablet 650 mg, 650 mg, Oral, Q4H PRN, 650 mg at 06/12/20 0339 **OR** acetaminophen (TYLENOL) 160 MG/5ML solution 650 mg, 650 mg, Oral, Q4H PRN **OR** acetaminophen (TYLENOL) suppository 650 mg, 650 mg, Rectal, Q4H PRN, Waleska Bray APRN  •  ALPRAZolam (XANAX) tablet 0.5 mg, 0.5 mg, Oral, BID, Sumi Johnson, , 0.5 mg at 06/13/20 0811  •  atorvastatin (LIPITOR) tablet 20 mg, 20 mg, Oral, Nightly, Waleska Bray APRN, 20 mg at 06/12/20 2038  •  clobetasol (TEMOVATE) 0.05 % cream 1 application, 1 application, Topical, BID, Waleska Bray APRN, 1 application at 06/12/20 2041  •  dextrose (D50W) 25 g/ 50mL Intravenous Solution 25 g, 25 g, Intravenous, Q15 Min PRN, Waleska Bray APRN  •  dextrose (D5W) 5 % infusion, 100 mL/hr, Intravenous, " Continuous, Delio Barnes MD, Last Rate: 100 mL/hr at 06/13/20 1159, 100 mL/hr at 06/13/20 1159  •  dextrose (GLUTOSE) oral gel 15 g, 15 g, Oral, Q15 Min PRN, Waleska Bray APRN  •  docusate sodium (COLACE) capsule 100 mg, 100 mg, Oral, BID PRN, Waleska Bray APRN, 100 mg at 06/12/20 2038  •  gabapentin (NEURONTIN) capsule 400 mg, 400 mg, Oral, Q8H, Waleska Bray APRN, 400 mg at 06/13/20 0628  •  glucagon (human recombinant) (GLUCAGEN DIAGNOSTIC) injection 1 mg, 1 mg, Subcutaneous, Q15 Min PRN, Waleska Bray APRN  •  heparin (porcine) 5000 UNIT/ML injection 5,000 Units, 5,000 Units, Subcutaneous, Q8H, Waleska Bray APRN, 5,000 Units at 06/13/20 0628  •  insulin lispro (humaLOG) injection 0-7 Units, 0-7 Units, Subcutaneous, TID AC, Waleska Bray APRN, 3 Units at 06/13/20 1159  •  lisinopril (PRINIVIL,ZESTRIL) tablet 40 mg, 40 mg, Oral, Q24H, Waleska Bray APRN, 40 mg at 06/13/20 0811  •  melatonin tablet 5 mg, 5 mg, Oral, Nightly PRN, Waleska Bray APRN  •  nicotine (NICODERM CQ) 21 MG/24HR patch 1 patch, 1 patch, Transdermal, Q24H, Waleska Bray APRN, 1 patch at 06/13/20 0813  •  nicotine polacrilex (NICORETTE) gum 2 mg, 2 mg, Mouth/Throat, Q1H PRN, Waleska Bray APRN  •  ondansetron (ZOFRAN) tablet 4 mg, 4 mg, Oral, Q6H PRN, Sumi Johnson DO, 4 mg at 06/13/20 1211  •  oxyCODONE-acetaminophen (PERCOCET) 7.5-325 MG per tablet 1 tablet, 1 tablet, Oral, TID, Sumi Johnson DO, 1 tablet at 06/13/20 0811  •  [COMPLETED] Insert peripheral IV, , , Once **AND** sodium chloride 0.9 % flush 10 mL, 10 mL, Intravenous, PRN, Victorino Saeed MD  •  sodium chloride 0.9 % flush 10 mL, 10 mL, Intravenous, Q12H, Waleska Bray, APRN, 10 mL at 06/13/20 0812  •  sodium chloride 0.9 % flush 10 mL, 10 mL, Intravenous, PRN, Waleska Bray, APRN, 10 mL at 06/13/20 0628  •  traZODone (DESYREL) tablet 50 mg, 50 mg, Oral, Nightly, Waleska Bray APRN, 50 mg at 06/12/20 2038    Meds  reviewed and doses adjusted for renal function    Labs:  Results from last 7 days   Lab Units 06/13/20  1208 06/13/20  0821 06/13/20  0400 06/12/20  2351 06/12/20  2039 06/12/20  0904 06/12/20  0553  06/11/20  1736 06/11/20  1359 06/10/20  1454 06/10/20  1439   WBC 10*3/mm3  --   --   --   --   --   --  4.20  --   --   --  6.54  --    HEMOGLOBIN g/dL  --   --   --   --   --   --  13.6  --   --   --  15.0  --    HEMATOCRIT %  --   --   --   --   --   --  38.2  --   --   --  41.7  --    PLATELETS 10*3/mm3  --   --   --   --   --   --  161  --   --   --  184  --    SODIUM mmol/L 127* 129* 126*  127* 127* 126* 123* 124*   < > 123* 119*  --  117*   POTASSIUM mmol/L  --   --  4.4  --   --   --  4.5  --  4.4 5.2  --  5.1   CHLORIDE mmol/L  --   --  94*  --   --   --  91*  --   --  85*  --  82*   CO2 mmol/L  --   --  22.0  --   --   --  23.0  --   --  25.0  --  24.2   BUN mg/dL  --   --  6*  --   --   --  5*  --   --  5*  --  5*   CREATININE mg/dL  --   --  0.71*  --   --   --  0.69*  --   --  0.80  --  0.75*   GLUCOSE mg/dL  --   --  170*  --   --   --  154*  --   --  211*  --  138*   CALCIUM mg/dL  --   --  9.0  --   --   --  8.8  --   --  9.2  --  9.4   PHOSPHORUS mg/dL  --   --   --   --   --   --   --   --   --  3.5  --   --    URIC ACID mg/dL  --   --   --   --   --  4.6  --   --   --   --   --   --     < > = values in this interval not displayed.             Radiology:  Imaging Results (Last 72 Hours)     ** No results found for the last 72 hours. **          Renal Imaging:  No radiology results for the last day        IMPRESSION:  Hyponatremia. Likely due to low salt intake and increase free fluid intake. Recent drop seems to be from loos of salt in sweat and also use of trazodone with SIADH element    RECOMMENDATIONS:  Since sodium rise a bit fast though still in range- I will give 500 cc d5w  Sodium on presentation 119- in 48 hrs is 129-127 so change is with in recommended range  He has been eating much better    Cont to check sodium levels     Please note that portions of this note were completed with a voice recognition program efforts were made to add at the dictations but words may be mistranscribed.    Delio Barnes MD  6/13/2020  12:49

## 2020-06-13 NOTE — PROGRESS NOTES
[facility] Medicine Services  PROGRESS NOTE    Patient Name: Yevgeniy Vaughn  : 1955  MRN: [id number]    Date of Admission: 2020  Primary Care Physician: Christine Rousseau MD    Subjective   Subjective     CC:  Hyponatremia     HPI:  No acute events. States he feels fine. Denies any current concerns.     Review of Systems  Gen- No fevers, chills  CV- No chest pain, palpitations  Resp- No cough, dyspnea  GI- No N/V/D, abd pain    Objective   Objective     Vital Signs:   Temp:  [97.5 °F (36.4 °C)-98 °F (36.7 °C)] 98 °F (36.7 °C)  Heart Rate:  [] 104  Resp:  [16-20] 16  BP: (130-165)/(73-98) 152/88        Physical Exam:  Constitutional: No acute distress, awake, alert  HENT: NCAT, mucous membranes moist  Respiratory: Clear to auscultation bilaterally, respiratory effort normal   Cardiovascular: RRR, no murmurs, rubs, or gallops, palpable pedal pulses bilaterally  Gastrointestinal: Positive bowel sounds, soft, nontender, nondistended  Musculoskeletal: No bilateral ankle edema  Psychiatric: Appropriate affect, cooperative  Neurologic: Oriented x 3, strength symmetric in all extremities, Cranial Nerves grossly intact to confrontation, speech clear  Skin: No rashes; multiple tattoos upper arms       Results Reviewed:  Results from last 7 days   Lab Units 20  0553 06/10/20  1454   WBC 10*3/mm3 4.20 6.54   HEMOGLOBIN g/dL 13.6 15.0   HEMATOCRIT % 38.2 41.7   PLATELETS 10*3/mm3 161 184     Results from last 7 days   Lab Units 20  1208 20  0821 20  0400  20  0553  20  1736 20  1359 06/10/20  1439   SODIUM mmol/L 127* 129* 126*  127*   < > 124*   < > 123* 119* 117*   POTASSIUM mmol/L  --   --  4.4  --  4.5  --  4.4 5.2 5.1   CHLORIDE mmol/L  --   --  94*  --  91*  --   --  85* 82*   CO2 mmol/L  --   --  22.0  --  23.0  --   --  25.0 24.2   BUN mg/dL  --   --  6*  --  5*  --   --  5* 5*   CREATININE mg/dL  --   --  0.71*  --  0.69*  --   --   0.80 0.75*   GLUCOSE mg/dL  --   --  170*  --  154*  --   --  211* 138*   CALCIUM mg/dL  --   --  9.0  --  8.8  --   --  9.2 9.4   ALT (SGPT) U/L  --   --   --   --  34  --   --  34 37   AST (SGOT) U/L  --   --   --   --  36  --   --  34 36    < > = values in this interval not displayed.     Estimated Creatinine Clearance: 114.3 mL/min (A) (by C-G formula based on SCr of 0.71 mg/dL (L)).    Microbiology Results Abnormal     None          Imaging Results (Last 24 Hours)     ** No results found for the last 24 hours. **               I have reviewed the medications:  Scheduled Meds:  ALPRAZolam 0.5 mg Oral BID   atorvastatin 20 mg Oral Nightly   clobetasol 1 application Topical BID   gabapentin 400 mg Oral Q8H   heparin (porcine) 5,000 Units Subcutaneous Q8H   insulin lispro 0-7 Units Subcutaneous TID AC   lisinopril 40 mg Oral Q24H   nicotine 1 patch Transdermal Q24H   oxyCODONE-acetaminophen 1 tablet Oral TID   sodium chloride 10 mL Intravenous Q12H   traZODone 50 mg Oral Nightly     Continuous Infusions:  dextrose 100 mL/hr Last Rate: 100 mL/hr (06/13/20 1159)     PRN Meds:.•  acetaminophen **OR** acetaminophen **OR** acetaminophen  •  dextrose  •  dextrose  •  docusate sodium  •  glucagon (human recombinant)  •  melatonin  •  nicotine polacrilex  •  ondansetron  •  [COMPLETED] Insert peripheral IV **AND** sodium chloride  •  sodium chloride    Assessment/Plan   Assessment & Plan     Active Hospital Problems    Diagnosis  POA   • Hyponatremia [E87.1]  Yes   • Type 2 diabetes mellitus (CMS/HCC) [E11.9]  Yes   • Chronic neck pain [M54.2, G89.29]  Yes   • Hypertension [I10]  Yes      Resolved Hospital Problems   No resolved problems to display.        Brief Hospital Course to date:  Yevgeniy Vaughn is a 64 y.o. male PMH DMII, HLD, HTN, Insomnia, smoking hx presenting to the ED under the of his PCP due to sodium level of 117 noted on labs drawn yesterday at PCP office.  Patient appears to have chronic hyponatremia  since 2016.  Patient's sodium level typically between 125 and 130.  Today in ED, patient's sodium was 119.  Spouse reports him to be confused at times but he thinks is related to hearing difficulty.  Patient eats he drinks a lot of fluid throughout the day, to include 8-9 bottles of water, 2-3 Gatorade, 3 cups of coffee.     Plan:     Hyponatremia  -Sodium level 119 in the ED.    -Hyponatremia possibly related to excessive fluid intake, trazodone.  -Decrease trazodone to 50 mg at bedtime, consider discontinuing over time  - Low urine Na and urine osms   - Continue to monitor q4 hrs   - Neph following  - Na 127-129; correction in range but fast; D5 500 ml and monitor Na q4 hrs  - 1000 ml fluid restriction      DMII  -A1c 7.9  -Hold oral diabetic medications  -Sliding scale insulin with a mild correction to adjust as needed  -Gabapentin 400 mg 3 times a day     HTN/HLD  -Lisinopril 40 mg at bedtime.  Monitor potassium level is potassium is 5.2 on arrival, repeat improved  -Atorvastatin 20 mg at bedtime     Insomnia  -Titrate off of trazodone.  Patient will start trazodone 50 mg at bedtime  -Melatonin 5 mg at bedtime     Smoking history  -Nicotine patches/Nicorette gum  -Educated on the health benefits of smoking cessation     Chronic pain  Anxiety   - Take percocet and xanax scheduled at home - will resume       DVT - heparin     Daily Care Communication  Due to current limited visitation policies, an attempt will be made daily to update patient's identified best point-of-contact(s)   Contact:    Relation:    Type of communication (phone or televideo):    Time of communication:    Notes (if applicable): Wife at bedside      Disposition: I expect the patient to be discharged likely 2-4 days to home pending improvement.     CODE STATUS:   Code Status and Medical Interventions:   Ordered at: 06/11/20 1623     Level Of Support Discussed With:    Patient     Code Status:    CPR     Medical Interventions (Level of Support  Prior to Arrest):    Full         Electronically signed by Sumi Johnson DO, 06/13/20, 12:50.

## 2020-06-14 LAB
GLUCOSE BLDC GLUCOMTR-MCNC: 160 MG/DL (ref 70–130)
GLUCOSE BLDC GLUCOMTR-MCNC: 183 MG/DL (ref 70–130)
GLUCOSE BLDC GLUCOMTR-MCNC: 201 MG/DL (ref 70–130)
SODIUM BLD-SCNC: 128 MMOL/L (ref 136–145)
SODIUM BLD-SCNC: 129 MMOL/L (ref 136–145)
SODIUM BLD-SCNC: 130 MMOL/L (ref 136–145)

## 2020-06-14 PROCEDURE — 99232 SBSQ HOSP IP/OBS MODERATE 35: CPT | Performed by: INTERNAL MEDICINE

## 2020-06-14 PROCEDURE — 63710000001 ONDANSETRON PER 8 MG: Performed by: INTERNAL MEDICINE

## 2020-06-14 PROCEDURE — 84295 ASSAY OF SERUM SODIUM: CPT | Performed by: INTERNAL MEDICINE

## 2020-06-14 PROCEDURE — 82962 GLUCOSE BLOOD TEST: CPT

## 2020-06-14 PROCEDURE — 25010000002 HEPARIN (PORCINE) PER 1000 UNITS: Performed by: NURSE PRACTITIONER

## 2020-06-14 PROCEDURE — 63710000001 INSULIN LISPRO (HUMAN) PER 5 UNITS: Performed by: NURSE PRACTITIONER

## 2020-06-14 RX ADMIN — ONDANSETRON HYDROCHLORIDE 4 MG: 4 TABLET, FILM COATED ORAL at 21:57

## 2020-06-14 RX ADMIN — HEPARIN SODIUM 5000 UNITS: 5000 INJECTION, SOLUTION INTRAVENOUS; SUBCUTANEOUS at 21:27

## 2020-06-14 RX ADMIN — ALPRAZOLAM 0.5 MG: 0.5 TABLET ORAL at 08:27

## 2020-06-14 RX ADMIN — HEPARIN SODIUM 5000 UNITS: 5000 INJECTION, SOLUTION INTRAVENOUS; SUBCUTANEOUS at 14:30

## 2020-06-14 RX ADMIN — NICOTINE 1 PATCH: 21 PATCH TRANSDERMAL at 08:29

## 2020-06-14 RX ADMIN — ALPRAZOLAM 0.5 MG: 0.5 TABLET ORAL at 21:27

## 2020-06-14 RX ADMIN — OXYCODONE HYDROCHLORIDE AND ACETAMINOPHEN 1 TABLET: 7.5; 325 TABLET ORAL at 21:27

## 2020-06-14 RX ADMIN — ATORVASTATIN CALCIUM 20 MG: 20 TABLET, FILM COATED ORAL at 21:27

## 2020-06-14 RX ADMIN — INSULIN LISPRO 3 UNITS: 100 INJECTION, SOLUTION INTRAVENOUS; SUBCUTANEOUS at 12:20

## 2020-06-14 RX ADMIN — GABAPENTIN 400 MG: 400 CAPSULE ORAL at 21:27

## 2020-06-14 RX ADMIN — OXYCODONE HYDROCHLORIDE AND ACETAMINOPHEN 1 TABLET: 7.5; 325 TABLET ORAL at 16:03

## 2020-06-14 RX ADMIN — GABAPENTIN 400 MG: 400 CAPSULE ORAL at 14:30

## 2020-06-14 RX ADMIN — OXYCODONE HYDROCHLORIDE AND ACETAMINOPHEN 1 TABLET: 7.5; 325 TABLET ORAL at 08:28

## 2020-06-14 RX ADMIN — SODIUM CHLORIDE, PRESERVATIVE FREE 10 ML: 5 INJECTION INTRAVENOUS at 08:29

## 2020-06-14 RX ADMIN — HEPARIN SODIUM 5000 UNITS: 5000 INJECTION, SOLUTION INTRAVENOUS; SUBCUTANEOUS at 06:42

## 2020-06-14 RX ADMIN — SODIUM CHLORIDE, PRESERVATIVE FREE 10 ML: 5 INJECTION INTRAVENOUS at 21:27

## 2020-06-14 RX ADMIN — DOCUSATE SODIUM 100 MG: 100 CAPSULE, LIQUID FILLED ORAL at 08:27

## 2020-06-14 RX ADMIN — ONDANSETRON HYDROCHLORIDE 4 MG: 4 TABLET, FILM COATED ORAL at 12:21

## 2020-06-14 RX ADMIN — GABAPENTIN 400 MG: 400 CAPSULE ORAL at 06:43

## 2020-06-14 RX ADMIN — LISINOPRIL 40 MG: 40 TABLET ORAL at 08:28

## 2020-06-14 RX ADMIN — ONDANSETRON HYDROCHLORIDE 4 MG: 4 TABLET, FILM COATED ORAL at 06:43

## 2020-06-14 RX ADMIN — TRAZODONE HYDROCHLORIDE 50 MG: 50 TABLET ORAL at 21:27

## 2020-06-14 NOTE — PLAN OF CARE
Problem: Patient Care Overview  Goal: Plan of Care Review  Outcome: Ongoing (interventions implemented as appropriate)  Goal: Individualization and Mutuality  Outcome: Ongoing (interventions implemented as appropriate)  Goal: Discharge Needs Assessment  Outcome: Ongoing (interventions implemented as appropriate)  Goal: Interprofessional Rounds/Family Conf  Outcome: Ongoing (interventions implemented as appropriate)     Problem: Pain, Chronic (Adult)  Goal: Identify Related Risk Factors and Signs and Symptoms  Description  Related risk factors and signs and symptoms are identified upon initiation of Human Response Clinical Practice Guideline (CPG).  Outcome: Ongoing (interventions implemented as appropriate)  Goal: Acceptable Pain/Comfort Level and Functional Ability  Description  Patient will demonstrate the desired outcomes by discharge/transition of care.  Outcome: Ongoing (interventions implemented as appropriate)     Problem: Fluid Volume Deficit (Adult)  Goal: Identify Related Risk Factors and Signs and Symptoms  Description  Related risk factors and signs and symptoms are identified upon initiation of Human Response Clinical Practice Guideline (CPG).  Outcome: Ongoing (interventions implemented as appropriate)  Goal: Optimal Fluid Balance  Description  Patient will demonstrate the desired outcomes by discharge/transition of care.  Outcome: Ongoing (interventions implemented as appropriate)

## 2020-06-14 NOTE — PROGRESS NOTES
[facility] Medicine Services  PROGRESS NOTE    Patient Name: Yevgeniy Vaughn  : 1955  MRN: [id number]    Date of Admission: 2020  Primary Care Physician: Christine Rousseau MD    Subjective   Subjective     CC:  Hyponatremia     HPI:  States that he feels fine today.  He is continue taking his fluid restriction.  Reviewed sodium levels and answered all questions.     Review of Systems  Gen- No fevers, chills  CV- No chest pain, palpitations  Resp- No cough, dyspnea  GI- No N/V/D, abd pain    Objective   Objective     Vital Signs:   Temp:  [97.4 °F (36.3 °C)-98.1 °F (36.7 °C)] 98.1 °F (36.7 °C)  Heart Rate:  [67-76] 76  Resp:  [16] 16  BP: (149-171)/() 158/101        Physical Exam:  Constitutional: No acute distress, awake, alert  HENT: NCAT, mucous membranes moist  Respiratory: Clear to auscultation bilaterally, respiratory effort normal   Cardiovascular: RRR, no murmurs, rubs, or gallops, palpable pedal pulses bilaterally  Gastrointestinal: Positive bowel sounds, soft, nontender, nondistended  Musculoskeletal: No bilateral ankle edema  Psychiatric: Appropriate affect, cooperative  Neurologic: Oriented x 3, strength symmetric in all extremities, Cranial Nerves grossly intact to confrontation, speech clear  Skin: No rashes; multiple tattoos     Results Reviewed:  Results from last 7 days   Lab Units 20  0553 06/10/20  1454   WBC 10*3/mm3 4.20 6.54   HEMOGLOBIN g/dL 13.6 15.0   HEMATOCRIT % 38.2 41.7   PLATELETS 10*3/mm3 161 184     Results from last 7 days   Lab Units 20  0836 20  0349 20  0156  20  0400  20  0553  20  1736 20  1359 06/10/20  1439   SODIUM mmol/L 129* 128* 128*   < > 126*  127*   < > 124*   < > 123* 119* 117*   POTASSIUM mmol/L  --   --   --   --  4.4  --  4.5  --  4.4 5.2 5.1   CHLORIDE mmol/L  --   --   --   --  94*  --  91*  --   --  85* 82*   CO2 mmol/L  --   --   --   --  22.0  --  23.0  --   --  25.0  24.2   BUN mg/dL  --   --   --   --  6*  --  5*  --   --  5* 5*   CREATININE mg/dL  --   --   --   --  0.71*  --  0.69*  --   --  0.80 0.75*   GLUCOSE mg/dL  --   --   --   --  170*  --  154*  --   --  211* 138*   CALCIUM mg/dL  --   --   --   --  9.0  --  8.8  --   --  9.2 9.4   ALT (SGPT) U/L  --   --   --   --   --   --  34  --   --  34 37   AST (SGOT) U/L  --   --   --   --   --   --  36  --   --  34 36    < > = values in this interval not displayed.     Estimated Creatinine Clearance: 113.9 mL/min (A) (by C-G formula based on SCr of 0.71 mg/dL (L)).    Microbiology Results Abnormal     None          Imaging Results (Last 24 Hours)     ** No results found for the last 24 hours. **               I have reviewed the medications:  Scheduled Meds:  ALPRAZolam 0.5 mg Oral BID   atorvastatin 20 mg Oral Nightly   clobetasol 1 application Topical BID   gabapentin 400 mg Oral Q8H   heparin (porcine) 5,000 Units Subcutaneous Q8H   insulin lispro 0-7 Units Subcutaneous TID AC   lisinopril 40 mg Oral Q24H   nicotine 1 patch Transdermal Q24H   oxyCODONE-acetaminophen 1 tablet Oral TID   sodium chloride 10 mL Intravenous Q12H   traZODone 50 mg Oral Nightly     Continuous Infusions:   PRN Meds:.•  acetaminophen **OR** acetaminophen **OR** acetaminophen  •  dextrose  •  dextrose  •  docusate sodium  •  glucagon (human recombinant)  •  melatonin  •  nicotine polacrilex  •  ondansetron  •  [COMPLETED] Insert peripheral IV **AND** sodium chloride  •  sodium chloride    Assessment/Plan   Assessment & Plan     Active Hospital Problems    Diagnosis  POA   • Hyponatremia [E87.1]  Yes   • Type 2 diabetes mellitus (CMS/HCC) [E11.9]  Yes   • Chronic neck pain [M54.2, G89.29]  Yes   • Hypertension [I10]  Yes      Resolved Hospital Problems   No resolved problems to display.        Brief Hospital Course to date:  Yevgeniy Vaughn is a 64 y.o. male PMH DMII, HLD, HTN, Insomnia, smoking hx presenting to the ED under the of his PCP due to  sodium level of 117 noted on labs drawn yesterday at PCP office.  Patient appears to have chronic hyponatremia since 2016.  Patient's sodium level typically between 125 and 130.  Day of admission in ED, patient's sodium was 119.  Spouse reports him to be confused at times but he thinks is related to hearing difficulty.  Patient eats he drinks a lot of fluid throughout the day, to include 8-9 bottles of water, 2-3 Gatorade, 3 cups of coffee.     Plan:     Hyponatremia  -Sodium level 119 in the ED.    -Hyponatremia possibly related to excessive fluid intake, trazodone.  -Decrease trazodone to 50 mg at bedtime, consider discontinuing over time  - Low urine Na and urine osms   - Continue to monitor q4 hrs   - Neph following  - Na 127-129 within the first 24 hrs and 129 on day 2; will hold on additional D5 or DDAVP per neph   - 1000 ml fluid restriction      DMII  -A1c 7.9  -Hold oral diabetic medications  -Sliding scale insulin with a mild correction to adjust as needed  -Gabapentin 400 mg 3 times a day     HTN/HLD  -Lisinopril 40 mg at bedtime.   -Atorvastatin 20 mg at bedtime     Insomnia  -Titrate off of trazodone.  Patient will start trazodone 50 mg at bedtime  -Melatonin 5 mg at bedtime     Smoking history  -Nicotine patches/Nicorette gum  -Educated on the health benefits of smoking cessation     Chronic pain  Anxiety   - Take percocet and xanax scheduled at home - will resume       DVT - heparin     Daily Care Communication  Due to current limited visitation policies, an attempt will be made daily to update patient's identified best point-of-contact(s)   Contact:    Relation:    Type of communication (phone or televideo):    Time of communication:    Notes (if applicable): Wife at bedside      Disposition: I expect the patient to be discharged home in 1-3 days pending Na levels and neph recommendations.    CODE STATUS:   Code Status and Medical Interventions:   Ordered at: 06/11/20 6482     Level Of Support  Discussed With:    Patient     Code Status:    CPR     Medical Interventions (Level of Support Prior to Arrest):    Full         Electronically signed by Sumi Johnson DO, 06/14/20, 11:19.

## 2020-06-14 NOTE — PROGRESS NOTES
"NAL Progress Note  Yevgeniy Vaughn  [id number]  1955    6/11/2020    Reason for visit:  Hyponatremia    No over night events  Feels well  Trying to restrict fluids    ROS:    Pulm:  No SOA  CV:  No CP    I&O:    Intake/Output Summary (Last 24 hours) at 6/14/2020 1105  Last data filed at 6/14/2020 0541  Gross per 24 hour   Intake 1075 ml   Output 2150 ml   Net -1075 ml       PE:   Blood pressure (!) 158/101, pulse 76, temperature 98.1 °F (36.7 °C), temperature source Oral, resp. rate 16, height 171.5 cm (67.5\"), weight 88.1 kg (194 lb 2 oz), SpO2 98 %.    GENERAL: Awake and alert, in no acute distress.   HEENT: PER, No conjunctivitis  NECK: Supple, no JVD , no mass   HEART: RRR; No murmur, rubs, gallops.   LUNGS: Clear to auscultation bilaterally without wheezing, rales, rhonchi. Normal respiratory effort. Nonlabored. No dullness.  ABDOMEN: Soft, nontender, nondistended. Positive bowel sounds. No rebound or guarding. NO mass or HSM.  EXT:  No cyanosis, clubbing or edema. No cord.  :  Without Phillips catheter.  SKIN: Warm and dry without cutaneous eruptions on Inspection/palpation.    NEURO: Alert and oriented x 3, no focal deficit    Medications:    Current Facility-Administered Medications:   •  acetaminophen (TYLENOL) tablet 650 mg, 650 mg, Oral, Q4H PRN, 650 mg at 06/12/20 0339 **OR** acetaminophen (TYLENOL) 160 MG/5ML solution 650 mg, 650 mg, Oral, Q4H PRN **OR** acetaminophen (TYLENOL) suppository 650 mg, 650 mg, Rectal, Q4H PRN, Waleska Bray APRN  •  ALPRAZolam (XANAX) tablet 0.5 mg, 0.5 mg, Oral, BID, Sumi Johnson, DO, 0.5 mg at 06/14/20 0827  •  atorvastatin (LIPITOR) tablet 20 mg, 20 mg, Oral, Nightly, Waleska Bray APRN, 20 mg at 06/13/20 2106  •  clobetasol (TEMOVATE) 0.05 % cream 1 application, 1 application, Topical, BID, Waleska Bray, APRN, 1 application at 06/12/20 2041  •  dextrose (D50W) 25 g/ 50mL Intravenous Solution 25 g, 25 g, Intravenous, Q15 Min PRN, Waleska Bray, " APRN  •  dextrose (GLUTOSE) oral gel 15 g, 15 g, Oral, Q15 Min PRN, Waleska Bray APRN  •  docusate sodium (COLACE) capsule 100 mg, 100 mg, Oral, BID PRN, Waleska Bray APRN, 100 mg at 06/14/20 0827  •  gabapentin (NEURONTIN) capsule 400 mg, 400 mg, Oral, Q8H, Waleska Bray APRN, 400 mg at 06/14/20 0643  •  glucagon (human recombinant) (GLUCAGEN DIAGNOSTIC) injection 1 mg, 1 mg, Subcutaneous, Q15 Min PRN, Waleska Bray APRN  •  heparin (porcine) 5000 UNIT/ML injection 5,000 Units, 5,000 Units, Subcutaneous, Q8H, Waleska Bray APRN, 5,000 Units at 06/14/20 0642  •  insulin lispro (humaLOG) injection 0-7 Units, 0-7 Units, Subcutaneous, TID AC, Waleska Bray APRN, 3 Units at 06/13/20 1656  •  lisinopril (PRINIVIL,ZESTRIL) tablet 40 mg, 40 mg, Oral, Q24H, Waleska Bray APRN, 40 mg at 06/14/20 0828  •  melatonin tablet 5 mg, 5 mg, Oral, Nightly PRN, Waleska Bray APRN  •  nicotine (NICODERM CQ) 21 MG/24HR patch 1 patch, 1 patch, Transdermal, Q24H, Waleska Bray APRN, 1 patch at 06/14/20 0829  •  nicotine polacrilex (NICORETTE) gum 2 mg, 2 mg, Mouth/Throat, Q1H PRN, Waleska Bray APRN  •  ondansetron (ZOFRAN) tablet 4 mg, 4 mg, Oral, Q6H PRN, Sumi Johnson, , 4 mg at 06/14/20 0643  •  oxyCODONE-acetaminophen (PERCOCET) 7.5-325 MG per tablet 1 tablet, 1 tablet, Oral, TID, Sumi Johnson, DO, 1 tablet at 06/14/20 0828  •  [COMPLETED] Insert peripheral IV, , , Once **AND** sodium chloride 0.9 % flush 10 mL, 10 mL, Intravenous, PRN, Victorino Saeed MD  •  sodium chloride 0.9 % flush 10 mL, 10 mL, Intravenous, Q12H, Waleska Bray APRN, 10 mL at 06/14/20 0829  •  sodium chloride 0.9 % flush 10 mL, 10 mL, Intravenous, PRN, Waleska Bray APRN, 10 mL at 06/13/20 0628  •  traZODone (DESYREL) tablet 50 mg, 50 mg, Oral, Nightly, Waleska Bray APRN, 50 mg at 06/13/20 2106    Meds reviewed and doses adjusted for renal function    Labs:  Results from last 7 days   Lab Units  06/14/20  0836 06/14/20  0349 06/14/20  0156 06/13/20  2030 06/13/20  1642 06/13/20  1208 06/13/20  0821 06/13/20  0400  06/12/20  0904 06/12/20  0553  06/11/20  1736 06/11/20  1359 06/10/20  1454 06/10/20  1439   WBC 10*3/mm3  --   --   --   --   --   --   --   --   --   --  4.20  --   --   --  6.54  --    HEMOGLOBIN g/dL  --   --   --   --   --   --   --   --   --   --  13.6  --   --   --  15.0  --    HEMATOCRIT %  --   --   --   --   --   --   --   --   --   --  38.2  --   --   --  41.7  --    PLATELETS 10*3/mm3  --   --   --   --   --   --   --   --   --   --  161  --   --   --  184  --    SODIUM mmol/L 129* 128* 128* 128* 129* 127* 129* 126*  127*   < > 123* 124*   < > 123* 119*  --  117*   POTASSIUM mmol/L  --   --   --   --   --   --   --  4.4  --   --  4.5  --  4.4 5.2  --  5.1   CHLORIDE mmol/L  --   --   --   --   --   --   --  94*  --   --  91*  --   --  85*  --  82*   CO2 mmol/L  --   --   --   --   --   --   --  22.0  --   --  23.0  --   --  25.0  --  24.2   BUN mg/dL  --   --   --   --   --   --   --  6*  --   --  5*  --   --  5*  --  5*   CREATININE mg/dL  --   --   --   --   --   --   --  0.71*  --   --  0.69*  --   --  0.80  --  0.75*   GLUCOSE mg/dL  --   --   --   --   --   --   --  170*  --   --  154*  --   --  211*  --  138*   CALCIUM mg/dL  --   --   --   --   --   --   --  9.0  --   --  8.8  --   --  9.2  --  9.4   PHOSPHORUS mg/dL  --   --   --   --   --   --   --   --   --   --   --   --   --  3.5  --   --    URIC ACID mg/dL  --   --   --   --   --   --   --   --   --  4.6  --   --   --   --   --   --     < > = values in this interval not displayed.             Radiology:  Imaging Results (Last 72 Hours)     ** No results found for the last 72 hours. **          Renal Imaging:  No radiology results for the last day        IMPRESSION:  Hyponatremia. Likely due to low salt intake and increase free fluid intake. Recent drop seems to be from loos of salt in sweat and also use of trazodone with  SIADH element    RECOMMENDATIONS:  Reasonable and appropriate rise- no DDAVP or d5w but cont to restrict po fluid intake   Sodium on presentation 119- in 48 hrs is 129-127 and last 24 hrs up to 129 so change is with in recommended range  He has been eating much better   Cont to check sodium levels     Please note that portions of this note were completed with a voice recognition program efforts were made to add at the dictations but words may be mistranscribed.    Delio Barnes MD  6/14/2020  11:05

## 2020-06-14 NOTE — PLAN OF CARE
Pt has been up in chair most of the day and has ambulated in hallway with spouse, VSS on RA, NSR on the monitor, q4 sodium lab draws, complains of all over chronic pain has scheduled pain medication, pt states that the insulin makes him nauseated so premedicated for afternoon insulin administration.

## 2020-06-15 LAB
GLUCOSE BLDC GLUCOMTR-MCNC: 134 MG/DL (ref 70–130)
GLUCOSE BLDC GLUCOMTR-MCNC: 143 MG/DL (ref 70–130)
GLUCOSE BLDC GLUCOMTR-MCNC: 165 MG/DL (ref 70–130)
GLUCOSE BLDC GLUCOMTR-MCNC: 199 MG/DL (ref 70–130)
GLUCOSE BLDC GLUCOMTR-MCNC: 219 MG/DL (ref 70–130)
SODIUM BLD-SCNC: 127 MMOL/L (ref 136–145)
SODIUM BLD-SCNC: 127 MMOL/L (ref 136–145)
SODIUM BLD-SCNC: 129 MMOL/L (ref 136–145)
SODIUM BLD-SCNC: 129 MMOL/L (ref 136–145)

## 2020-06-15 PROCEDURE — 63710000001 ONDANSETRON PER 8 MG: Performed by: INTERNAL MEDICINE

## 2020-06-15 PROCEDURE — 99232 SBSQ HOSP IP/OBS MODERATE 35: CPT | Performed by: INTERNAL MEDICINE

## 2020-06-15 PROCEDURE — 25010000002 HEPARIN (PORCINE) PER 1000 UNITS: Performed by: NURSE PRACTITIONER

## 2020-06-15 PROCEDURE — 84295 ASSAY OF SERUM SODIUM: CPT | Performed by: INTERNAL MEDICINE

## 2020-06-15 PROCEDURE — 82962 GLUCOSE BLOOD TEST: CPT

## 2020-06-15 PROCEDURE — 63710000001 INSULIN LISPRO (HUMAN) PER 5 UNITS: Performed by: NURSE PRACTITIONER

## 2020-06-15 RX ADMIN — DOCUSATE SODIUM 100 MG: 100 CAPSULE, LIQUID FILLED ORAL at 08:33

## 2020-06-15 RX ADMIN — HEPARIN SODIUM 5000 UNITS: 5000 INJECTION, SOLUTION INTRAVENOUS; SUBCUTANEOUS at 14:41

## 2020-06-15 RX ADMIN — GABAPENTIN 400 MG: 400 CAPSULE ORAL at 14:41

## 2020-06-15 RX ADMIN — OXYCODONE HYDROCHLORIDE AND ACETAMINOPHEN 1 TABLET: 7.5; 325 TABLET ORAL at 08:22

## 2020-06-15 RX ADMIN — NICOTINE 1 PATCH: 21 PATCH TRANSDERMAL at 08:20

## 2020-06-15 RX ADMIN — GABAPENTIN 400 MG: 400 CAPSULE ORAL at 05:14

## 2020-06-15 RX ADMIN — HEPARIN SODIUM 5000 UNITS: 5000 INJECTION, SOLUTION INTRAVENOUS; SUBCUTANEOUS at 22:06

## 2020-06-15 RX ADMIN — LISINOPRIL 40 MG: 40 TABLET ORAL at 08:21

## 2020-06-15 RX ADMIN — ONDANSETRON HYDROCHLORIDE 4 MG: 4 TABLET, FILM COATED ORAL at 22:17

## 2020-06-15 RX ADMIN — GABAPENTIN 400 MG: 400 CAPSULE ORAL at 22:06

## 2020-06-15 RX ADMIN — ATORVASTATIN CALCIUM 20 MG: 20 TABLET, FILM COATED ORAL at 22:06

## 2020-06-15 RX ADMIN — TRAZODONE HYDROCHLORIDE 50 MG: 50 TABLET ORAL at 22:06

## 2020-06-15 RX ADMIN — INSULIN LISPRO 2 UNITS: 100 INJECTION, SOLUTION INTRAVENOUS; SUBCUTANEOUS at 11:53

## 2020-06-15 RX ADMIN — OXYCODONE HYDROCHLORIDE AND ACETAMINOPHEN 1 TABLET: 7.5; 325 TABLET ORAL at 15:46

## 2020-06-15 RX ADMIN — ALPRAZOLAM 0.5 MG: 0.5 TABLET ORAL at 22:06

## 2020-06-15 RX ADMIN — OXYCODONE HYDROCHLORIDE AND ACETAMINOPHEN 1 TABLET: 7.5; 325 TABLET ORAL at 22:06

## 2020-06-15 RX ADMIN — SODIUM CHLORIDE, PRESERVATIVE FREE 10 ML: 5 INJECTION INTRAVENOUS at 22:07

## 2020-06-15 RX ADMIN — HEPARIN SODIUM 5000 UNITS: 5000 INJECTION, SOLUTION INTRAVENOUS; SUBCUTANEOUS at 05:14

## 2020-06-15 RX ADMIN — ALPRAZOLAM 0.5 MG: 0.5 TABLET ORAL at 08:21

## 2020-06-15 RX ADMIN — SODIUM CHLORIDE, PRESERVATIVE FREE 10 ML: 5 INJECTION INTRAVENOUS at 08:41

## 2020-06-15 RX ADMIN — INSULIN LISPRO 2 UNITS: 100 INJECTION, SOLUTION INTRAVENOUS; SUBCUTANEOUS at 08:20

## 2020-06-15 NOTE — PROGRESS NOTES
Continued Stay Note  [facility]     Patient Name: Yevgeniy Vaughn  MRN: [id number]  Today's Date: 6/15/2020    Admit Date: 6/11/2020    Discharge Plan     Row Name 06/15/20 1116       Plan    Plan  Home with family    Patient/Family in Agreement with Plan  yes    Plan Comments  Patient continues to have decreased sodium.  On fluid restrictions.  Nephrology following.  Plans are for home with family at discharge.  Patient declining home health/rehab at this time.  Ambulating well.  Will continue to follow for discharge planning.     Final Discharge Disposition Code  01 - home or self-care        Discharge Codes    No documentation.       Expected Discharge Date and Time     Expected Discharge Date Expected Discharge Time    Jun 13, 2020             Samantha Peña RN

## 2020-06-15 NOTE — PLAN OF CARE
VSS. NSR. Room air. 330mL PO intake and 500mL output since 1900. Last Na 129.    Problem: Patient Care Overview  Goal: Plan of Care Review  Outcome: Ongoing (interventions implemented as appropriate)  Goal: Individualization and Mutuality  Outcome: Ongoing (interventions implemented as appropriate)  Goal: Discharge Needs Assessment  Outcome: Ongoing (interventions implemented as appropriate)  Goal: Interprofessional Rounds/Family Conf  Outcome: Ongoing (interventions implemented as appropriate)     Problem: Pain, Chronic (Adult)  Goal: Identify Related Risk Factors and Signs and Symptoms  Outcome: Ongoing (interventions implemented as appropriate)  Goal: Acceptable Pain/Comfort Level and Functional Ability  Outcome: Ongoing (interventions implemented as appropriate)     Problem: Fluid Volume Deficit (Adult)  Goal: Identify Related Risk Factors and Signs and Symptoms  Outcome: Ongoing (interventions implemented as appropriate)  Goal: Optimal Fluid Balance  Outcome: Ongoing (interventions implemented as appropriate)

## 2020-06-15 NOTE — PROGRESS NOTES
"NAL Progress Note  Yevgeniy Vaughn  [id number]  1955    6/11/2020    Reason for visit:  Hyponatremia    No over night events  Feels well  Sodium ok     ROS:    Pulm:  No SOA  CV:  No CP    I&O:    Intake/Output Summary (Last 24 hours) at 6/15/2020 1301  Last data filed at 6/15/2020 1155  Gross per 24 hour   Intake 1380 ml   Output 1900 ml   Net -520 ml       PE:   Blood pressure 163/96, pulse 91, temperature 98.1 °F (36.7 °C), temperature source Oral, resp. rate 16, height 171.5 cm (67.5\"), weight 87.4 kg (192 lb 9.6 oz), SpO2 90 %.    GENERAL: Awake and alert, in no acute distress.   HEENT: PER, No conjunctivitis  NECK: Supple, no JVD , no mass   HEART: RRR; No murmur, rubs, gallops.   LUNGS: Clear to auscultation bilaterally without wheezing, rales, rhonchi. Normal respiratory effort. Nonlabored. No dullness.  ABDOMEN: Soft, nontender, nondistended. Positive bowel sounds. No rebound or guarding. NO mass or HSM.  EXT:  No cyanosis, clubbing or edema. No cord.  :  Without Phillips catheter. No dc  SKIN: Warm and dry without cutaneous eruptions on Inspection/palpation.    NEURO: Alert and oriented x 3, no focal deficit    Medications:    Current Facility-Administered Medications:   •  acetaminophen (TYLENOL) tablet 650 mg, 650 mg, Oral, Q4H PRN, 650 mg at 06/12/20 0339 **OR** acetaminophen (TYLENOL) 160 MG/5ML solution 650 mg, 650 mg, Oral, Q4H PRN **OR** acetaminophen (TYLENOL) suppository 650 mg, 650 mg, Rectal, Q4H PRN, Waleska Bray APRN  •  ALPRAZolam (XANAX) tablet 0.5 mg, 0.5 mg, Oral, BID, Sumi Johnson, DO, 0.5 mg at 06/15/20 0821  •  atorvastatin (LIPITOR) tablet 20 mg, 20 mg, Oral, Nightly, Waleska Bray APRN, 20 mg at 06/14/20 2127  •  clobetasol (TEMOVATE) 0.05 % cream 1 application, 1 application, Topical, BID, Waleska Bray APRN, 1 application at 06/12/20 2041  •  dextrose (D50W) 25 g/ 50mL Intravenous Solution 25 g, 25 g, Intravenous, Q15 Min PRN, Waleska Bray APRN  •  dextrose " (GLUTOSE) oral gel 15 g, 15 g, Oral, Q15 Min PRN, Waleska Bray, APRN  •  docusate sodium (COLACE) capsule 100 mg, 100 mg, Oral, BID PRN, Waleska Bray APRN, 100 mg at 06/15/20 0833  •  gabapentin (NEURONTIN) capsule 400 mg, 400 mg, Oral, Q8H, Waleska Bray, APRN, 400 mg at 06/15/20 0514  •  glucagon (human recombinant) (GLUCAGEN DIAGNOSTIC) injection 1 mg, 1 mg, Subcutaneous, Q15 Min PRN, Waleska Bray, APRN  •  heparin (porcine) 5000 UNIT/ML injection 5,000 Units, 5,000 Units, Subcutaneous, Q8H, Waleska Bray APRN, 5,000 Units at 06/15/20 0514  •  insulin lispro (humaLOG) injection 0-7 Units, 0-7 Units, Subcutaneous, TID AC, Waleska Bray APRN, 2 Units at 06/15/20 1153  •  lisinopril (PRINIVIL,ZESTRIL) tablet 40 mg, 40 mg, Oral, Q24H, Waleska Bray APRN, 40 mg at 06/15/20 0821  •  melatonin tablet 5 mg, 5 mg, Oral, Nightly PRN, Waleska Bray, APRN  •  nicotine (NICODERM CQ) 21 MG/24HR patch 1 patch, 1 patch, Transdermal, Q24H, Waleska Bray, APRN, 1 patch at 06/15/20 0820  •  nicotine polacrilex (NICORETTE) gum 2 mg, 2 mg, Mouth/Throat, Q1H PRN, Waleska Bray, APRN  •  ondansetron (ZOFRAN) tablet 4 mg, 4 mg, Oral, Q6H PRN, Sumi Johnson, DO, 4 mg at 06/14/20 2157  •  oxyCODONE-acetaminophen (PERCOCET) 7.5-325 MG per tablet 1 tablet, 1 tablet, Oral, TID, Jayla Johnsoney, DO, 1 tablet at 06/15/20 0822  •  [COMPLETED] Insert peripheral IV, , , Once **AND** sodium chloride 0.9 % flush 10 mL, 10 mL, Intravenous, PRN, Victorino Saeed MD  •  sodium chloride 0.9 % flush 10 mL, 10 mL, Intravenous, Q12H, Waleska Bray APRN, 10 mL at 06/15/20 0841  •  sodium chloride 0.9 % flush 10 mL, 10 mL, Intravenous, PRN, Waleska Bray APRN, 10 mL at 06/13/20 0628  •  traZODone (DESYREL) tablet 50 mg, 50 mg, Oral, Nightly, Waleska Bray APRN, 50 mg at 06/14/20 2127    Meds reviewed and doses adjusted for renal function    Labs:  Results from last 7 days   Lab Units 06/15/20  1016  06/15/20  0634 06/15/20  0102 06/14/20  2110 06/14/20  1611 06/14/20  1227 06/14/20  0836  06/13/20  0400  06/12/20  0904 06/12/20  0553  06/11/20  1736 06/11/20  1359 06/10/20  1454 06/10/20  1439   WBC 10*3/mm3  --   --   --   --   --   --   --   --   --   --   --  4.20  --   --   --  6.54  --    HEMOGLOBIN g/dL  --   --   --   --   --   --   --   --   --   --   --  13.6  --   --   --  15.0  --    HEMATOCRIT %  --   --   --   --   --   --   --   --   --   --   --  38.2  --   --   --  41.7  --    PLATELETS 10*3/mm3  --   --   --   --   --   --   --   --   --   --   --  161  --   --   --  184  --    SODIUM mmol/L 127* 129* 129* 128* 130* 128* 129*   < > 126*  127*   < > 123* 124*   < > 123* 119*  --  117*   POTASSIUM mmol/L  --   --   --   --   --   --   --   --  4.4  --   --  4.5  --  4.4 5.2  --  5.1   CHLORIDE mmol/L  --   --   --   --   --   --   --   --  94*  --   --  91*  --   --  85*  --  82*   CO2 mmol/L  --   --   --   --   --   --   --   --  22.0  --   --  23.0  --   --  25.0  --  24.2   BUN mg/dL  --   --   --   --   --   --   --   --  6*  --   --  5*  --   --  5*  --  5*   CREATININE mg/dL  --   --   --   --   --   --   --   --  0.71*  --   --  0.69*  --   --  0.80  --  0.75*   GLUCOSE mg/dL  --   --   --   --   --   --   --   --  170*  --   --  154*  --   --  211*  --  138*   CALCIUM mg/dL  --   --   --   --   --   --   --   --  9.0  --   --  8.8  --   --  9.2  --  9.4   PHOSPHORUS mg/dL  --   --   --   --   --   --   --   --   --   --   --   --   --   --  3.5  --   --    URIC ACID mg/dL  --   --   --   --   --   --   --   --   --   --  4.6  --   --   --   --   --   --     < > = values in this interval not displayed.             Radiology:  Imaging Results (Last 72 Hours)     ** No results found for the last 72 hours. **          Renal Imaging:  No radiology results for the last day        IMPRESSION:  Hyponatremia. Likely due to low salt intake and increase free fluid intake. Recent drop seems to be  from loos of salt in sweat and also use of trazodone with SIADH element    RECOMMENDATIONS:  Reasonable and appropriate rise- drops a bit again will monitor  Cont labs every 6 hours  Sodium on presentation 119- in 48 hrs is 129-127 and last 24 hrs up to 129 than again 127 so change is with in recommended range  He has been eating much better   Cont to check sodium levels     Please note that portions of this note were completed with a voice recognition program efforts were made to add at the dictations but words may be mistranscribed.    Delio Barnes MD  6/15/2020  13:01

## 2020-06-15 NOTE — PLAN OF CARE
Patient very pleasant and is eager to discharge. Sodium levels remain low 127. Ambulatory and VSS.

## 2020-06-15 NOTE — PROGRESS NOTES
[facility]  PROGRESS NOTE    Patient Name: Yevgeniy Vaughn  : 1955  MRN: [id number]    Date of Admission: 2020  Primary Care Physician: Christine Rousseau MD    Subjective   Subjective     CC:  Hyponatremia    HPI:  No acute meds.  States he is overall feeling fine with no new concerns.  Trying to stick with a fluid restriction and doing well with this.  States he feels great after not smoking for a number of days    Review of Systems  Gen- No fevers, chills  CV- No chest pain, palpitations  Resp- No cough, dyspnea  GI- No N/V/D, abd pain    Objective   Objective     Vital Signs:   Temp:  [97.5 °F (36.4 °C)-98.1 °F (36.7 °C)] 98.1 °F (36.7 °C)  Heart Rate:  [67-91] 91  Resp:  [16-18] 16  BP: (148-181)/(83-98) 163/96        Physical Exam:  Constitutional: No acute distress, awake, alert  HENT: NCAT, mucous membranes moist  Respiratory: Clear to auscultation bilaterally, respiratory effort normal   Cardiovascular: RRR, no murmurs, rubs, or gallops, palpable pedal pulses bilaterally  Gastrointestinal: Positive bowel sounds, soft, nontender, nondistended  Musculoskeletal: No bilateral ankle edema  Psychiatric: Appropriate affect, cooperative  Neurologic: Oriented x 3, strength symmetric in all extremities, Cranial Nerves grossly intact to confrontation, speech clear  Skin: No rashes; multiple tattoos     Results Reviewed:  Results from last 7 days   Lab Units 20  0553 06/10/20  1454   WBC 10*3/mm3 4.20 6.54   HEMOGLOBIN g/dL 13.6 15.0   HEMATOCRIT % 38.2 41.7   PLATELETS 10*3/mm3 161 184     Results from last 7 days   Lab Units 06/15/20  1016 06/15/20  0634 06/15/20  0102  20  0400  20  0553  20  1736 20  1359 06/10/20  1439   SODIUM mmol/L 127* 129* 129*   < > 126*  127*   < > 124*   < > 123* 119* 117*   POTASSIUM mmol/L  --   --   --   --  4.4  --  4.5  --  4.4 5.2 5.1   CHLORIDE mmol/L  --   --   --   --  94*  --  91*  --   --  85*  82*   CO2 mmol/L  --   --   --   --  22.0  --  23.0  --   --  25.0 24.2   BUN mg/dL  --   --   --   --  6*  --  5*  --   --  5* 5*   CREATININE mg/dL  --   --   --   --  0.71*  --  0.69*  --   --  0.80 0.75*   GLUCOSE mg/dL  --   --   --   --  170*  --  154*  --   --  211* 138*   CALCIUM mg/dL  --   --   --   --  9.0  --  8.8  --   --  9.2 9.4   ALT (SGPT) U/L  --   --   --   --   --   --  34  --   --  34 37   AST (SGOT) U/L  --   --   --   --   --   --  36  --   --  34 36    < > = values in this interval not displayed.     Estimated Creatinine Clearance: 113.4 mL/min (A) (by C-G formula based on SCr of 0.71 mg/dL (L)).    Microbiology Results Abnormal     None          Imaging Results (Last 24 Hours)     ** No results found for the last 24 hours. **               I have reviewed the medications:  Scheduled Meds:  ALPRAZolam 0.5 mg Oral BID   atorvastatin 20 mg Oral Nightly   clobetasol 1 application Topical BID   gabapentin 400 mg Oral Q8H   heparin (porcine) 5,000 Units Subcutaneous Q8H   insulin lispro 0-7 Units Subcutaneous TID AC   lisinopril 40 mg Oral Q24H   nicotine 1 patch Transdermal Q24H   oxyCODONE-acetaminophen 1 tablet Oral TID   sodium chloride 10 mL Intravenous Q12H   traZODone 50 mg Oral Nightly     Continuous Infusions:   PRN Meds:.•  acetaminophen **OR** acetaminophen **OR** acetaminophen  •  dextrose  •  dextrose  •  docusate sodium  •  glucagon (human recombinant)  •  melatonin  •  nicotine polacrilex  •  ondansetron  •  [COMPLETED] Insert peripheral IV **AND** sodium chloride  •  sodium chloride    Assessment/Plan   Assessment & Plan     Active Hospital Problems    Diagnosis  POA   • Hyponatremia [E87.1]  Yes   • Type 2 diabetes mellitus (CMS/HCC) [E11.9]  Yes   • Chronic neck pain [M54.2, G89.29]  Yes   • Hypertension [I10]  Yes      Resolved Hospital Problems   No resolved problems to display.        Brief Hospital Course to date:  Yevgeniy Vaughn is a 64 y.o. male PMH DMII, HLD, HTN,  Insomnia, smoking hx presenting to the ED under the of his PCP due to sodium level of 117 noted on labs drawn yesterday at PCP office.  Patient appears to have chronic hyponatremia since 2016.  Patient's sodium level typically between 125 and 130.  Day of admission in ED, patient's sodium was 119.  Spouse reports him to be confused at times but he thinks is related to hearing difficulty.  Patient eats he drinks a lot of fluid throughout the day, to include 8-9 bottles of water, 2-3 Gatorade, 3 cups of coffee.     Plan:     Hyponatremia  -Sodium level 119 in the ED on admission    -Hyponatremia possibly related to excessive fluid intake, trazodone.  -Decrease trazodone to 50 mg at bedtime, consider discontinuing over time  - Neph following  - Na 127-129 within the first 24 hrs and 129 on day 2; will hold on additional D5 or DDAVP per neph; continue to monitor q4  - 1000 ml fluid restriction      DMII  -A1c 7.9  -Hold oral diabetic medications  -Sliding scale insulin with a mild correction to adjust as needed  -Gabapentin 400 mg 3 times a day     HTN/HLD  -Lisinopril 40 mg at bedtime.   -Atorvastatin 20 mg at bedtime     Insomnia  -Titrate off of trazodone.  Patient will start trazodone 50 mg at bedtime  -Melatonin 5 mg at bedtime     Smoking history  -Nicotine patches/Nicorette gum  -Educated on the health benefits of smoking cessation  - PCP has Rx for chantix      Chronic pain  Anxiety   - Take percocet and xanax scheduled at home - will resume       DVT - heparin     Disposition: I expect the patient to be discharged home in 1-3 days    CODE STATUS:   Code Status and Medical Interventions:   Ordered at: 06/11/20 1623     Level Of Support Discussed With:    Patient     Code Status:    CPR     Medical Interventions (Level of Support Prior to Arrest):    Full         Electronically signed by Sumi Johnson DO, 06/15/20, 12:48.

## 2020-06-16 ENCOUNTER — TELEPHONE (OUTPATIENT)
Dept: INTERNAL MEDICINE | Facility: CLINIC | Age: 65
End: 2020-06-16

## 2020-06-16 ENCOUNTER — READMISSION MANAGEMENT (OUTPATIENT)
Dept: CALL CENTER | Facility: HOSPITAL | Age: 65
End: 2020-06-16

## 2020-06-16 VITALS
OXYGEN SATURATION: 98 % | BODY MASS INDEX: 29.19 KG/M2 | SYSTOLIC BLOOD PRESSURE: 146 MMHG | HEIGHT: 68 IN | DIASTOLIC BLOOD PRESSURE: 95 MMHG | TEMPERATURE: 97.8 F | HEART RATE: 86 BPM | RESPIRATION RATE: 16 BRPM | WEIGHT: 192.6 LBS

## 2020-06-16 LAB
ANION GAP SERPL CALCULATED.3IONS-SCNC: 9 MMOL/L (ref 5–15)
BASOPHILS # BLD AUTO: 0.05 10*3/MM3 (ref 0–0.2)
BASOPHILS NFR BLD AUTO: 1.2 % (ref 0–1.5)
BUN BLD-MCNC: 7 MG/DL (ref 8–23)
BUN/CREAT SERPL: 9.5 (ref 7–25)
CALCIUM SPEC-SCNC: 9 MG/DL (ref 8.6–10.5)
CHLORIDE SERPL-SCNC: 95 MMOL/L (ref 98–107)
CO2 SERPL-SCNC: 26 MMOL/L (ref 22–29)
CREAT BLD-MCNC: 0.74 MG/DL (ref 0.76–1.27)
DEPRECATED RDW RBC AUTO: 40.8 FL (ref 37–54)
EOSINOPHIL # BLD AUTO: 0.22 10*3/MM3 (ref 0–0.4)
EOSINOPHIL NFR BLD AUTO: 5.2 % (ref 0.3–6.2)
ERYTHROCYTE [DISTWIDTH] IN BLOOD BY AUTOMATED COUNT: 11.9 % (ref 12.3–15.4)
GFR SERPL CREATININE-BSD FRML MDRD: 107 ML/MIN/1.73
GLUCOSE BLD-MCNC: 137 MG/DL (ref 65–99)
GLUCOSE BLDC GLUCOMTR-MCNC: 193 MG/DL (ref 70–130)
GLUCOSE BLDC GLUCOMTR-MCNC: 246 MG/DL (ref 70–130)
HCT VFR BLD AUTO: 40.5 % (ref 37.5–51)
HGB BLD-MCNC: 14.1 G/DL (ref 13–17.7)
IMM GRANULOCYTES # BLD AUTO: 0.01 10*3/MM3 (ref 0–0.05)
IMM GRANULOCYTES NFR BLD AUTO: 0.2 % (ref 0–0.5)
LYMPHOCYTES # BLD AUTO: 1.92 10*3/MM3 (ref 0.7–3.1)
LYMPHOCYTES NFR BLD AUTO: 45.2 % (ref 19.6–45.3)
MAGNESIUM SERPL-MCNC: 1.5 MG/DL (ref 1.6–2.4)
MCH RBC QN AUTO: 32.5 PG (ref 26.6–33)
MCHC RBC AUTO-ENTMCNC: 34.8 G/DL (ref 31.5–35.7)
MCV RBC AUTO: 93.3 FL (ref 79–97)
MONOCYTES # BLD AUTO: 0.62 10*3/MM3 (ref 0.1–0.9)
MONOCYTES NFR BLD AUTO: 14.6 % (ref 5–12)
NEUTROPHILS # BLD AUTO: 1.43 10*3/MM3 (ref 1.7–7)
NEUTROPHILS NFR BLD AUTO: 33.6 % (ref 42.7–76)
NRBC BLD AUTO-RTO: 0 /100 WBC (ref 0–0.2)
PLATELET # BLD AUTO: 166 10*3/MM3 (ref 140–450)
PMV BLD AUTO: 8.5 FL (ref 6–12)
POTASSIUM BLD-SCNC: 4 MMOL/L (ref 3.5–5.2)
RBC # BLD AUTO: 4.34 10*6/MM3 (ref 4.14–5.8)
SODIUM BLD-SCNC: 130 MMOL/L (ref 136–145)
SODIUM BLD-SCNC: 130 MMOL/L (ref 136–145)
WBC NRBC COR # BLD: 4.25 10*3/MM3 (ref 3.4–10.8)

## 2020-06-16 PROCEDURE — 99239 HOSP IP/OBS DSCHRG MGMT >30: CPT | Performed by: NURSE PRACTITIONER

## 2020-06-16 PROCEDURE — 25010000002 HEPARIN (PORCINE) PER 1000 UNITS: Performed by: NURSE PRACTITIONER

## 2020-06-16 PROCEDURE — 82962 GLUCOSE BLOOD TEST: CPT

## 2020-06-16 PROCEDURE — 83735 ASSAY OF MAGNESIUM: CPT | Performed by: NURSE PRACTITIONER

## 2020-06-16 PROCEDURE — 63710000001 INSULIN LISPRO (HUMAN) PER 5 UNITS: Performed by: NURSE PRACTITIONER

## 2020-06-16 PROCEDURE — 63710000001 ONDANSETRON PER 8 MG: Performed by: INTERNAL MEDICINE

## 2020-06-16 PROCEDURE — 25010000003 MAGNESIUM SULFATE 4 GM/100ML SOLUTION: Performed by: NURSE PRACTITIONER

## 2020-06-16 PROCEDURE — 80048 BASIC METABOLIC PNL TOTAL CA: CPT | Performed by: NURSE PRACTITIONER

## 2020-06-16 PROCEDURE — 85025 COMPLETE CBC W/AUTO DIFF WBC: CPT | Performed by: NURSE PRACTITIONER

## 2020-06-16 RX ORDER — MAGNESIUM SULFATE HEPTAHYDRATE 40 MG/ML
2 INJECTION, SOLUTION INTRAVENOUS AS NEEDED
Status: DISCONTINUED | OUTPATIENT
Start: 2020-06-16 | End: 2020-06-16 | Stop reason: HOSPADM

## 2020-06-16 RX ORDER — GABAPENTIN 400 MG/1
400 CAPSULE ORAL 3 TIMES DAILY
Qty: 30 CAPSULE | Refills: 0 | Status: SHIPPED | OUTPATIENT
Start: 2020-06-16 | End: 2020-07-15 | Stop reason: SDUPTHER

## 2020-06-16 RX ORDER — MAGNESIUM SULFATE HEPTAHYDRATE 40 MG/ML
4 INJECTION, SOLUTION INTRAVENOUS AS NEEDED
Status: DISCONTINUED | OUTPATIENT
Start: 2020-06-16 | End: 2020-06-16 | Stop reason: HOSPADM

## 2020-06-16 RX ORDER — CHOLECALCIFEROL (VITAMIN D3) 125 MCG
5 CAPSULE ORAL NIGHTLY PRN
Start: 2020-06-16 | End: 2021-01-04 | Stop reason: SDUPTHER

## 2020-06-16 RX ORDER — ACETAMINOPHEN 325 MG/1
650 TABLET ORAL EVERY 4 HOURS PRN
Start: 2020-06-16 | End: 2021-01-04

## 2020-06-16 RX ADMIN — GABAPENTIN 400 MG: 400 CAPSULE ORAL at 06:11

## 2020-06-16 RX ADMIN — HEPARIN SODIUM 5000 UNITS: 5000 INJECTION, SOLUTION INTRAVENOUS; SUBCUTANEOUS at 06:11

## 2020-06-16 RX ADMIN — NICOTINE 1 PATCH: 21 PATCH TRANSDERMAL at 08:12

## 2020-06-16 RX ADMIN — INSULIN LISPRO 3 UNITS: 100 INJECTION, SOLUTION INTRAVENOUS; SUBCUTANEOUS at 12:16

## 2020-06-16 RX ADMIN — ALPRAZOLAM 0.5 MG: 0.5 TABLET ORAL at 08:12

## 2020-06-16 RX ADMIN — LISINOPRIL 40 MG: 40 TABLET ORAL at 08:12

## 2020-06-16 RX ADMIN — ONDANSETRON HYDROCHLORIDE 4 MG: 4 TABLET, FILM COATED ORAL at 12:16

## 2020-06-16 RX ADMIN — GABAPENTIN 400 MG: 400 CAPSULE ORAL at 13:03

## 2020-06-16 RX ADMIN — OXYCODONE HYDROCHLORIDE AND ACETAMINOPHEN 1 TABLET: 7.5; 325 TABLET ORAL at 08:12

## 2020-06-16 RX ADMIN — MAGNESIUM SULFATE HEPTAHYDRATE 4 G: 40 INJECTION, SOLUTION INTRAVENOUS at 10:13

## 2020-06-16 NOTE — OUTREACH NOTE
Prep Survey      Responses   [facility] patient discharged from?  [place]   Is LACE score < 7 ?  No   Eligibility  [facility]   Date of Admission  06/11/20   Date of Discharge  06/16/20   Discharge Disposition  Home or Self Care   Discharge diagnosis  Hyponatremia    COVID-19 Test Status  Not tested   Does the patient have one of the following disease processes/diagnoses(primary or secondary)?  Other   Does the patient have Home health ordered?  No   Is there a DME ordered?  No   Prep survey completed?  Yes          Laura Graves RN

## 2020-06-16 NOTE — TELEPHONE ENCOUNTER
THE PATIENT'S WIFE PAULA IS REQUESTING A HOSPITAL FOLLOW UP VISIT FOR THE PATIENT     ADMITTED ON 6-11-20 FOR  LOW SODIUM  DISCHARGED ON 6-16-20    PAULA CALL BACK [phone]

## 2020-06-16 NOTE — PLAN OF CARE
VSS. No reports of pain. 100mL intake since 1900. Last Na level 130.    Problem: Patient Care Overview  Goal: Plan of Care Review  Outcome: Ongoing (interventions implemented as appropriate)  Goal: Individualization and Mutuality  Outcome: Ongoing (interventions implemented as appropriate)  Goal: Discharge Needs Assessment  Outcome: Ongoing (interventions implemented as appropriate)  Goal: Interprofessional Rounds/Family Conf  Outcome: Ongoing (interventions implemented as appropriate)     Problem: Pain, Chronic (Adult)  Goal: Identify Related Risk Factors and Signs and Symptoms  Outcome: Ongoing (interventions implemented as appropriate)  Goal: Acceptable Pain/Comfort Level and Functional Ability  Outcome: Ongoing (interventions implemented as appropriate)     Problem: Fluid Volume Deficit (Adult)  Goal: Identify Related Risk Factors and Signs and Symptoms  Outcome: Ongoing (interventions implemented as appropriate)  Goal: Optimal Fluid Balance  Outcome: Ongoing (interventions implemented as appropriate)

## 2020-06-16 NOTE — PROGRESS NOTES
"NAL Progress Note  Yevgeniy Vaughn  [id number]  1955    6/11/2020    Reason for visit:  Hyponatremia    Feels well  Sodium better    ROS:    Pulm:  No SOA  CV:  No CP    I&O:    Intake/Output Summary (Last 24 hours) at 6/16/2020 1123  Last data filed at 6/16/2020 0613  Gross per 24 hour   Intake 250 ml   Output 780 ml   Net -530 ml       PE:   Blood pressure 146/95, pulse 86, temperature 97.8 °F (36.6 °C), temperature source Oral, resp. rate 16, height 171.5 cm (67.5\"), weight 87.4 kg (192 lb 9.6 oz), SpO2 98 %.    GENERAL: Awake and alert, in no acute distress.   HEENT: PER, No conjunctivitis  NECK: Supple, no JVD , no mass   HEART: RRR; No murmur, rubs, gallops. No edema  LUNGS: Clear to auscultation bilaterally without wheezing, rales, rhonchi. Normal respiratory effort. Nonlabored. No dullness.  ABDOMEN: Soft, nontender, nondistended. Positive bowel sounds. No rebound or guarding. NO mass or HSM.  EXT:  No cyanosis, clubbing or edema. No cord.  :  Without Phillips catheter  SKIN: Warm and dry without cutaneous eruptions on Inspection/palpation.    NEURO: Alert and oriented x 3, no focal deficit    Medications:    Current Facility-Administered Medications:   •  acetaminophen (TYLENOL) tablet 650 mg, 650 mg, Oral, Q4H PRN, 650 mg at 06/12/20 0339 **OR** acetaminophen (TYLENOL) 160 MG/5ML solution 650 mg, 650 mg, Oral, Q4H PRN **OR** acetaminophen (TYLENOL) suppository 650 mg, 650 mg, Rectal, Q4H PRN, Waleska Bray APRN  •  ALPRAZolam (XANAX) tablet 0.5 mg, 0.5 mg, Oral, BID, Sumi Johnson, DO, 0.5 mg at 06/16/20 0812  •  atorvastatin (LIPITOR) tablet 20 mg, 20 mg, Oral, Nightly, Waleska Bray APRN, 20 mg at 06/15/20 2206  •  clobetasol (TEMOVATE) 0.05 % cream 1 application, 1 application, Topical, BID, Waleska Bray APRN, 1 application at 06/12/20 2041  •  dextrose (D50W) 25 g/ 50mL Intravenous Solution 25 g, 25 g, Intravenous, Q15 Min PRN, Waleska Bray APRN  •  dextrose (GLUTOSE) oral gel " 15 g, 15 g, Oral, Q15 Min PRN, Waleska Bray APRN  •  docusate sodium (COLACE) capsule 100 mg, 100 mg, Oral, BID PRN, Waleska Bray APRN, 100 mg at 06/15/20 0833  •  gabapentin (NEURONTIN) capsule 400 mg, 400 mg, Oral, Q8H, Waleska Bray APRN, 400 mg at 06/16/20 0611  •  glucagon (human recombinant) (GLUCAGEN DIAGNOSTIC) injection 1 mg, 1 mg, Subcutaneous, Q15 Min PRN, Waleska Bray APRN  •  heparin (porcine) 5000 UNIT/ML injection 5,000 Units, 5,000 Units, Subcutaneous, Q8H, Waleska Bray APRN, 5,000 Units at 06/16/20 0611  •  insulin lispro (humaLOG) injection 0-7 Units, 0-7 Units, Subcutaneous, TID AC, Waleska Bray APRN, 2 Units at 06/15/20 1153  •  lisinopril (PRINIVIL,ZESTRIL) tablet 40 mg, 40 mg, Oral, Q24H, Waleska Bray APRN, 40 mg at 06/16/20 0812  •  Magnesium Sulfate 2 gram Bolus, followed by 8 gram infusion (total Mg dose 10 grams)- Mg less than or equal to 1mg/dL, 2 g, Intravenous, PRN **OR** Magnesium Sulfate 2 gram / 50mL Infusion (GIVE X 3 BAGS TO EQUAL 6GM TOTAL DOSE) - Mg 1.1 - 1.5 mg/dl, 2 g, Intravenous, PRN **OR** Magnesium Sulfate 4 gram infusion- Mg 1.6-1.9 mg/dL, 4 g, Intravenous, PRN, Waleska Bray APRN, Last Rate: 25 mL/hr at 06/16/20 1013, 4 g at 06/16/20 1013  •  melatonin tablet 5 mg, 5 mg, Oral, Nightly PRN, Waleska Bray APRN  •  nicotine (NICODERM CQ) 21 MG/24HR patch 1 patch, 1 patch, Transdermal, Q24H, Waleska Bray APRN, 1 patch at 06/16/20 0812  •  nicotine polacrilex (NICORETTE) gum 2 mg, 2 mg, Mouth/Throat, Q1H PRN, Waleska Bray APRN  •  ondansetron (ZOFRAN) tablet 4 mg, 4 mg, Oral, Q6H PRN, Sumi Johnson DO, 4 mg at 06/15/20 2217  •  oxyCODONE-acetaminophen (PERCOCET) 7.5-325 MG per tablet 1 tablet, 1 tablet, Oral, TID, Sumi Johnson DO, 1 tablet at 06/16/20 0812  •  [COMPLETED] Insert peripheral IV, , , Once **AND** sodium chloride 0.9 % flush 10 mL, 10 mL, Intravenous, PRN, Victorino Saeed MD  •  sodium chloride 0.9 %  flush 10 mL, 10 mL, Intravenous, Q12H, Waleska Bray APRN, 10 mL at 06/15/20 2207  •  sodium chloride 0.9 % flush 10 mL, 10 mL, Intravenous, PRN, Waleska Bray APRN, 10 mL at 06/13/20 0628  •  traZODone (DESYREL) tablet 50 mg, 50 mg, Oral, Nightly, Waleska Bray APRN, 50 mg at 06/15/20 2206    Meds reviewed and doses adjusted for renal function    Labs:  Results from last 7 days   Lab Units 06/16/20  0357 06/15/20  2337 06/15/20  1700 06/15/20  1016 06/15/20  0634 06/15/20  0102 06/14/20  2110  06/13/20  0400  06/12/20  0904 06/12/20  0553  06/11/20  1736 06/11/20  1359 06/10/20  1454 06/10/20  1439   WBC 10*3/mm3 4.25  --   --   --   --   --   --   --   --   --   --  4.20  --   --   --  6.54  --    HEMOGLOBIN g/dL 14.1  --   --   --   --   --   --   --   --   --   --  13.6  --   --   --  15.0  --    HEMATOCRIT % 40.5  --   --   --   --   --   --   --   --   --   --  38.2  --   --   --  41.7  --    PLATELETS 10*3/mm3 166  --   --   --   --   --   --   --   --   --   --  161  --   --   --  184  --    SODIUM mmol/L 130* 130* 127* 127* 129* 129* 128*   < > 126*  127*   < > 123* 124*   < > 123* 119*  --  117*   POTASSIUM mmol/L 4.0  --   --   --   --   --   --   --  4.4  --   --  4.5  --  4.4 5.2  --  5.1   CHLORIDE mmol/L 95*  --   --   --   --   --   --   --  94*  --   --  91*  --   --  85*  --  82*   CO2 mmol/L 26.0  --   --   --   --   --   --   --  22.0  --   --  23.0  --   --  25.0  --  24.2   BUN mg/dL 7*  --   --   --   --   --   --   --  6*  --   --  5*  --   --  5*  --  5*   CREATININE mg/dL 0.74*  --   --   --   --   --   --   --  0.71*  --   --  0.69*  --   --  0.80  --  0.75*   GLUCOSE mg/dL 137*  --   --   --   --   --   --   --  170*  --   --  154*  --   --  211*  --  138*   CALCIUM mg/dL 9.0  --   --   --   --   --   --   --  9.0  --   --  8.8  --   --  9.2  --  9.4   PHOSPHORUS mg/dL  --   --   --   --   --   --   --   --   --   --   --   --   --   --  3.5  --   --    URIC ACID mg/dL  --    --   --   --   --   --   --   --   --   --  4.6  --   --   --   --   --   --     < > = values in this interval not displayed.             Radiology:  Imaging Results (Last 72 Hours)     ** No results found for the last 72 hours. **          Renal Imaging:  No radiology results for the last day        IMPRESSION:  Hyponatremia. Likely due to low salt intake and increase free fluid intake. Recent drop seems to be from loos of salt in sweat and also use of trazodone with SIADH element    RECOMMENDATIONS:  Na better at 130  Cont fluid restriction at home  Discussed in detail with family- they seem to understand the risk of drinking more fluid   He has been eating much better   Cont to check sodium levels  Ok for dc  Will see in clinic in 2-3 weeks      Please note that portions of this note were completed with a voice recognition program efforts were made to add at the dictations but words may be mistranscribed.    Delio Barnes MD  6/16/2020  11:23

## 2020-06-17 ENCOUNTER — TRANSITIONAL CARE MANAGEMENT TELEPHONE ENCOUNTER (OUTPATIENT)
Dept: CALL CENTER | Facility: HOSPITAL | Age: 65
End: 2020-06-17

## 2020-06-17 NOTE — OUTREACH NOTE
Call Center TCM Note      Responses   [facility] patient discharged from?  [place]   COVID-19 Test Status  Not tested   Does the patient have one of the following disease processes/diagnoses(primary or secondary)?  Other   TCM attempt successful?  No   Unsuccessful attempts  Attempt 1          Edgar Beavers RN    6/17/2020, 10:14

## 2020-06-25 ENCOUNTER — READMISSION MANAGEMENT (OUTPATIENT)
Dept: CALL CENTER | Facility: HOSPITAL | Age: 65
End: 2020-06-25

## 2020-06-25 NOTE — OUTREACH NOTE
Medical Week 2 Survey      Responses   [facility] patient discharged from?  [place]   COVID-19 Test Status  Not tested   Does the patient have one of the following disease processes/diagnoses(primary or secondary)?  Other   Week 2 attempt successful?  No   Unsuccessful attempts  Attempt 1          Leonel Blakely RN

## 2020-06-29 ENCOUNTER — READMISSION MANAGEMENT (OUTPATIENT)
Dept: CALL CENTER | Facility: HOSPITAL | Age: 65
End: 2020-06-29

## 2020-06-29 NOTE — OUTREACH NOTE
Medical Week 2 Survey      Responses   [facility] patient discharged from?  [place]   COVID-19 Test Status  Not tested   Does the patient have one of the following disease processes/diagnoses(primary or secondary)?  Other   Week 2 attempt successful?  Yes   Call start time  0749   Discharge diagnosis  Hyponatremia    Call end time  0759   Meds reviewed with patient/caregiver?  Yes   Is the patient having any side effects they believe may be caused by any medication additions or changes?  No   Does the patient have all medications ordered at discharge?  Yes   Is the patient taking all medications as directed (includes completed medication regime)?  Yes   Medication comments  Pain Management appt tomorrow   Has the patient kept scheduled appointments due by today?  N/A   Comments  Pain management appt tomorrow 06/30/2020. PCP appt rescheduled per MD for July 15  Nephrology appt rescheduled per MD office for July 30   Has home health visited the patient within 72 hours of discharge?  N/A   Pulse Ox monitoring  None   Psychosocial issues?  No   Comments  Walking 2 miles/day, doing yoga  Limiting fluids. No vomiting no diarrhea, eating good.   What is the patient's perception of their health status since discharge?  Improving   Week 2 Call Completed?  Yes          Radha Gutierrez RN

## 2020-07-06 ENCOUNTER — READMISSION MANAGEMENT (OUTPATIENT)
Dept: CALL CENTER | Facility: HOSPITAL | Age: 65
End: 2020-07-06

## 2020-07-06 NOTE — OUTREACH NOTE
Medical Week 3 Survey      Responses   [facility] patient discharged from?  [place]   COVID-19 Test Status  Not tested   Does the patient have one of the following disease processes/diagnoses(primary or secondary)?  Other   Week 3 attempt successful?  Yes   Call start time  1559   Call end time  1602   Discharge diagnosis  Hyponatremia    Meds reviewed with patient/caregiver?  Yes   Is the patient having any side effects they believe may be caused by any medication additions or changes?  No   Does the patient have all medications ordered at discharge?  Yes   Is the patient taking all medications as directed (includes completed medication regime)?  Yes   Does the patient have a primary care provider?   Yes   Comments regarding PCP  PCP appt on 7/15/2020   Does the patient have an appointment with their PCP within 7 days of discharge?  Greater than 7 days   Nursing Interventions  Verified appointment date/time/provider   Has the patient kept scheduled appointments due by today?  N/A   Has home health visited the patient within 72 hours of discharge?  N/A   Pulse Ox monitoring  None   Psychosocial issues?  No   Did the patient receive a copy of their discharge instructions?  Yes   Nursing interventions  Reviewed instructions with patient   What is the patient's perception of their health status since discharge?  Improving   Is the patient/caregiver able to teach back signs and symptoms related to disease process for when to call PCP?  Yes   Is the patient/caregiver able to teach back signs and symptoms related to disease process for when to call 911?  Yes   Is the patient/caregiver able to teach back the hierarchy of who to call/visit for symptoms/problems? PCP, Specialist, Home health nurse, Urgent Care, ED, 911  Yes   Week 3 Call Completed?  Yes          Ignacio Joseph RN

## 2020-07-15 ENCOUNTER — OFFICE VISIT (OUTPATIENT)
Dept: INTERNAL MEDICINE | Facility: CLINIC | Age: 65
End: 2020-07-15

## 2020-07-15 VITALS
DIASTOLIC BLOOD PRESSURE: 70 MMHG | TEMPERATURE: 97.6 F | SYSTOLIC BLOOD PRESSURE: 122 MMHG | RESPIRATION RATE: 20 BRPM | HEART RATE: 72 BPM | BODY MASS INDEX: 29.94 KG/M2 | WEIGHT: 194 LBS

## 2020-07-15 DIAGNOSIS — E83.42 HYPOMAGNESEMIA: ICD-10-CM

## 2020-07-15 DIAGNOSIS — E87.1 HYPONATREMIA: ICD-10-CM

## 2020-07-15 DIAGNOSIS — E11.43 TYPE 2 DIABETES MELLITUS WITH DIABETIC AUTONOMIC NEUROPATHY, WITHOUT LONG-TERM CURRENT USE OF INSULIN (HCC): Primary | ICD-10-CM

## 2020-07-15 DIAGNOSIS — F51.01 PRIMARY INSOMNIA: ICD-10-CM

## 2020-07-15 PROCEDURE — 80048 BASIC METABOLIC PNL TOTAL CA: CPT | Performed by: INTERNAL MEDICINE

## 2020-07-15 PROCEDURE — 83735 ASSAY OF MAGNESIUM: CPT | Performed by: INTERNAL MEDICINE

## 2020-07-15 PROCEDURE — 99214 OFFICE O/P EST MOD 30 MIN: CPT | Performed by: INTERNAL MEDICINE

## 2020-07-15 RX ORDER — GABAPENTIN 800 MG/1
800 TABLET ORAL 4 TIMES DAILY
COMMUNITY
Start: 2020-07-03

## 2020-07-15 RX ORDER — ZOLPIDEM TARTRATE 5 MG/1
5 TABLET ORAL NIGHTLY PRN
Qty: 30 TABLET | Refills: 5 | Status: SHIPPED | OUTPATIENT
Start: 2020-07-15 | End: 2020-08-19 | Stop reason: SDUPTHER

## 2020-07-15 NOTE — PROGRESS NOTES
Subjective       Yevgeniy Vaughn is a 64 y.o. male.     Chief Complaint   Patient presents with   • Abnormal Lab (Hyponatremia)     [facility] follow up  6/16/2020       History obtained from the patient and his wife.      History of Present Illness     The patient is here for a hospital follow-up.  Records are received and reviewed.  He was admitted to [facility] on 6/11/2020 and discharged on 6/16/2020.  Admitting diagnosis was Hyponatremia.  The patient was sent to the ED due to an outpatient sodium level of 117 on 6/10/2020.  The patient had a history of Chronic Hyponatremia since 2016, but this was much lower than previous.  The patient did report drinking a lot of fluids throughout the day.  During the hospitalization, his fluids were restricted.  Nephrology was consulted and he was tapered off Trazodone, as they felt this was a possible cause of his Hyponatremia.  His Sodium level was monitored closely and it eventually improved so that he could be discharged.  The patient also had a low Magnesium level and received Magnesium replacement during the hospitalization.  He was discharged on 1000 mL/day fluid restriction.  He has an appointment with Nephrology on 7/30/2020.    Admitting labs were significant for Sodium of 119.  Glucose was 211.  Magnesium was 1.6.  Phosphorus was normal.  Urine Sodium was less than 20 and Urine Creatinine was normal.  Hemoglobin A1c was 7.9.  CBC was normal.  Uric acid was 4.6 (normal).  Discharge BMP was significant for Sodium 134 and Glucose 148.  Magnesium level was slightly low (1.5).      The patient did not have any imaging studies done during the hospitalization.  Of note, MRI of the head on 3/1/2018 was negative.    Since discharge, the patient has felt well.  He is complaining of insomnia since stopping his Trazodone.  He is taking Melatonin without relief.  He reports his anxiety is stable and denies depression.    The patient states his blood sugar at home is been  150-270 fasting.  He self increased his Metformin to 3000 mg daily 1-1/2 weeks ago.  He states his blood sugar did start coming down after doing that.  He is also on Januvia.  The patient denies chest pain, shortness of breath, orthopnea, PND, SALAMANCA, syncope, palpitations, lower extremity edema (resolved), claudication, lightheadedness, and dizziness.  He reports polydipsia, arthralgias, and myalgias.  He denies fatigue, headache, and polyuria.  His memory is overall better.  He states he has been eating healthy and walking 2 miles per day.  He has lost 6 pounds.    Current Outpatient Medications on File Prior to Visit   Medication Sig Dispense Refill   • acetaminophen (TYLENOL) 325 MG tablet Take 2 tablets by mouth Every 4 (Four) Hours As Needed for Mild Pain .     • ALPRAZolam (XANAX) 0.5 MG tablet Take 1 tablet by mouth twice daily as needed for anxiety 60 tablet 0   • atorvastatin (LIPITOR) 20 MG tablet TAKE 1 TABLET BY MOUTH AT BEDTIME 90 tablet 1   • clobetasol (TEMOVATE) 0.05 % cream APPLY CREAM TOPICALLY TO AFFECTED AREA TWICE DAILY 60 g 3   • JANUVIA 100 MG tablet Take 1 tablet by mouth once daily 90 tablet 1   • lisinopril (PRINIVIL,ZESTRIL) 40 MG tablet TAKE 1 TABLET BY MOUTH AT BEDTIME 90 tablet 1   • melatonin 5 MG tablet tablet Take 1 tablet by mouth At Night As Needed (insomnia).     • metFORMIN (GLUCOPHAGE) 1000 MG tablet Take 1 tablet by mouth twice daily 180 tablet 1   • Omega-3 Fatty Acids (FISH OIL) 1000 MG capsule capsule Take 1 capsule by mouth every 12 (twelve) hours     • oxyCODONE-acetaminophen (PERCOCET) 7.5-325 MG per tablet Take 1 tablet by mouth 3 (three) times a day.     • varenicline (Chantix Continuing Month Casa) 1 MG tablet Take 1 tablet by mouth 2 (Two) Times a Day. 60 tablet 4   • gabapentin (NEURONTIN) 800 MG tablet 1 tablet 3 (Three) Times a Day.       No current facility-administered medications on file prior to visit.        Current outpatient and discharge medications have been  reconciled for the patient.  Reviewed by: Christine Rousseau MD        The following portions of the patient's history were reviewed and updated as appropriate: allergies, current medications, past family history, past medical history, past social history, past surgical history and problem list.    Review of Systems   Constitutional: Negative for fatigue and unexpected weight change.   Eyes: Negative for visual disturbance.   Respiratory: Negative for cough, shortness of breath and wheezing.    Cardiovascular: Negative for chest pain, palpitations and leg swelling.        No SALAMANCA, orthopnea, or claudication.   Gastrointestinal: Negative for abdominal pain, blood in stool, constipation, diarrhea, nausea and vomiting.        Denies melena.   Endocrine: Positive for polydipsia. Negative for polyuria.   Musculoskeletal: Positive for arthralgias and myalgias.   Neurological: Negative for dizziness, syncope, light-headedness and headaches.        Improved memory issues.   Psychiatric/Behavioral: Positive for sleep disturbance. Negative for decreased concentration and suicidal ideas. The patient is nervous/anxious.          Objective       Blood pressure 122/70, pulse 72, temperature 97.6 °F (36.4 °C), temperature source Temporal, resp. rate 20, weight 88 kg (194 lb).      Physical Exam   Constitutional:   Overweight.   Neck: Carotid bruit is not present. No thyroid mass and no thyromegaly present.   Cardiovascular: Normal rate, regular rhythm, normal heart sounds and intact distal pulses. Exam reveals no gallop and no friction rub.   No murmur heard.  Pulmonary/Chest: Effort normal and breath sounds normal.   Neurological: He is alert.   Psychiatric: He has a normal mood and affect.   Nursing note and vitals reviewed.      Assessment / Plan:  Yevgeniy was seen today for abnormal lab.    Diagnoses and all orders for this visit:    Type 2 diabetes mellitus with diabetic autonomic neuropathy, without long-term current use of  insulin (CMS/HCC)  -     Basic Metabolic Panel  -     dapagliflozin (Farxiga) 5 MG tablet tablet; Take 1 tablet by mouth Daily (new).   Continue current medication(s) as noted in the history of present illness.   The patient was instructed to decrease his Metformin to 1000 mg BID (maximum dose)    Hyponatremia  -     Basic Metabolic Panel   May need CXR and MRI.    Primary insomnia  -     zolpidem (Ambien) 5 MG tablet; Take 1 tablet by mouth At Night As Needed for Sleep (new).    Hypomagnesemia  -     Magnesium          Return in about 1 month (around 8/15/2020) for Recheck Diabetes, fasting.

## 2020-07-16 ENCOUNTER — READMISSION MANAGEMENT (OUTPATIENT)
Dept: CALL CENTER | Facility: HOSPITAL | Age: 65
End: 2020-07-16

## 2020-07-16 LAB
ANION GAP SERPL CALCULATED.3IONS-SCNC: 11 MMOL/L (ref 5–15)
BUN SERPL-MCNC: 9 MG/DL (ref 8–23)
BUN/CREAT SERPL: 12.3 (ref 7–25)
CALCIUM SPEC-SCNC: 10 MG/DL (ref 8.6–10.5)
CHLORIDE SERPL-SCNC: 97 MMOL/L (ref 98–107)
CO2 SERPL-SCNC: 26 MMOL/L (ref 22–29)
CREAT SERPL-MCNC: 0.73 MG/DL (ref 0.76–1.27)
GFR SERPL CREATININE-BSD FRML MDRD: 109 ML/MIN/1.73
GLUCOSE SERPL-MCNC: 148 MG/DL (ref 65–99)
MAGNESIUM SERPL-MCNC: 1.5 MG/DL (ref 1.6–2.4)
POTASSIUM SERPL-SCNC: 4.6 MMOL/L (ref 3.5–5.2)
SODIUM SERPL-SCNC: 134 MMOL/L (ref 136–145)

## 2020-07-16 NOTE — OUTREACH NOTE
Medical Week 4 Survey      Responses   [facility] patient discharged from?  [place]   COVID-19 Test Status  Not tested   Does the patient have one of the following disease processes/diagnoses(primary or secondary)?  Other   Week 4 attempt successful?  Yes   Call start time  1049   Call end time  1055   Meds reviewed with patient/caregiver?  Yes   Is the patient taking all medications as directed (includes completed medication regime)?  Yes   Has the patient kept scheduled appointments due by today?  Yes   Comments  Pt had blood work yesterday, waiting for sodium results.   Week 4 Call Completed?  Yes   Would the patient like one additional call?  No   Graduated  Yes   Did the patient feel the follow up calls were helpful during their recovery period?  Yes   Was the number of calls appropriate?  Yes   Wrap up additional comments  Pt states he feels better. Had blood work yesterday, waiting on sodium levels to come back. He is resrticitng fluids to 1000ml per day.          Charlotte Frederick RN

## 2020-07-17 ENCOUNTER — TELEPHONE (OUTPATIENT)
Dept: INTERNAL MEDICINE | Facility: CLINIC | Age: 65
End: 2020-07-17

## 2020-07-17 NOTE — TELEPHONE ENCOUNTER
His sodium level is still low, but has come up to 134.  I would continue with the fluid restriction for now.  Magnesium is also still borderline low.    I believe the PA for Farxiga has already been started.

## 2020-07-17 NOTE — TELEPHONE ENCOUNTER
Patients wife, Sue, called and stated that the patient was in to be seen on 07/15/2020 and had some labs done. Sue states they have not heard anything back regarding his results and would like to know if the results are back and if so what his results are. If the results are not back they would like to know when they could expect to be hearing from the office about his results or when they will be uploaded to his my chart. Sue states that the patient was started on dapagliflozin (Farxiga) 5 MG tablet tablet and it was sent into the [facility] pharmacy. The pharmacy had advised the patient a form had been faxed to the office for a PA and Sue would like to know if this has been taken care of as well. Please advise.     Sue call back [phone]

## 2020-07-17 NOTE — TELEPHONE ENCOUNTER
Sue notified of the lab results.  He is taking a centrum silver vitamin 50 mg  of magnesium .  She is wanting to know if he should take additional magnieum.    Notified her that I will try and send the PA today if not will do on Monday

## 2020-07-17 NOTE — TELEPHONE ENCOUNTER
LMOV @ 1:15 for pt to return call. Office number given.    HUB- or front office- Ok to notify pt Dr. Angeles response.

## 2020-07-20 ENCOUNTER — PRIOR AUTHORIZATION (OUTPATIENT)
Dept: INTERNAL MEDICINE | Facility: CLINIC | Age: 65
End: 2020-07-20

## 2020-07-20 NOTE — TELEPHONE ENCOUNTER
[facility] and [facility] from 7/17/2020 to 7/17/2021  PA approval faxed to [facility] notified   Verb understanding given

## 2020-07-23 DIAGNOSIS — F41.1 GENERALIZED ANXIETY DISORDER: ICD-10-CM

## 2020-07-23 RX ORDER — ALPRAZOLAM 0.5 MG/1
0.5 TABLET ORAL 2 TIMES DAILY PRN
Qty: 60 TABLET | Refills: 2 | Status: SHIPPED | OUTPATIENT
Start: 2020-07-23 | End: 2020-11-20

## 2020-07-23 NOTE — TELEPHONE ENCOUNTER
Last OV 07/15/2020  Next OV 08/19/20    Lonnie 06/10/2020  CSA 06/10/20  UDS 06/10/20    Last Rx 05/28/20 #60 refills 0

## 2020-08-19 ENCOUNTER — OFFICE VISIT (OUTPATIENT)
Dept: INTERNAL MEDICINE | Facility: CLINIC | Age: 65
End: 2020-08-19

## 2020-08-19 VITALS
WEIGHT: 192.13 LBS | TEMPERATURE: 98.4 F | SYSTOLIC BLOOD PRESSURE: 120 MMHG | RESPIRATION RATE: 20 BRPM | HEART RATE: 92 BPM | DIASTOLIC BLOOD PRESSURE: 64 MMHG | BODY MASS INDEX: 29.65 KG/M2

## 2020-08-19 DIAGNOSIS — E11.65 TYPE 2 DIABETES MELLITUS WITH HYPERGLYCEMIA, WITHOUT LONG-TERM CURRENT USE OF INSULIN (HCC): Primary | ICD-10-CM

## 2020-08-19 DIAGNOSIS — F51.01 PRIMARY INSOMNIA: ICD-10-CM

## 2020-08-19 DIAGNOSIS — R30.0 DYSURIA: ICD-10-CM

## 2020-08-19 DIAGNOSIS — B37.49 GENITOURINARY INFECTION, CANDIDAL: ICD-10-CM

## 2020-08-19 LAB
ALBUMIN SERPL-MCNC: 4.4 G/DL (ref 3.5–5.2)
ALBUMIN/GLOB SERPL: 1.5 G/DL
ALP SERPL-CCNC: 47 U/L (ref 39–117)
ALT SERPL W P-5'-P-CCNC: 22 U/L (ref 1–41)
ANION GAP SERPL CALCULATED.3IONS-SCNC: 9.5 MMOL/L (ref 5–15)
AST SERPL-CCNC: 23 U/L (ref 1–40)
BILIRUB BLD-MCNC: NEGATIVE MG/DL
BILIRUB SERPL-MCNC: 0.4 MG/DL (ref 0–1.2)
BUN SERPL-MCNC: 11 MG/DL (ref 8–23)
BUN/CREAT SERPL: 15.1 (ref 7–25)
CALCIUM SPEC-SCNC: 9.8 MG/DL (ref 8.6–10.5)
CHLORIDE SERPL-SCNC: 100 MMOL/L (ref 98–107)
CLARITY, POC: CLEAR
CO2 SERPL-SCNC: 25.5 MMOL/L (ref 22–29)
COLOR UR: YELLOW
CREAT SERPL-MCNC: 0.73 MG/DL (ref 0.76–1.27)
EXPIRATION DATE: ABNORMAL
GFR SERPL CREATININE-BSD FRML MDRD: 109 ML/MIN/1.73
GLOBULIN UR ELPH-MCNC: 2.9 GM/DL
GLUCOSE SERPL-MCNC: 164 MG/DL (ref 65–99)
GLUCOSE UR STRIP-MCNC: ABNORMAL MG/DL
KETONES UR QL: NEGATIVE
LEUKOCYTE EST, POC: NEGATIVE
Lab: ABNORMAL
NITRITE UR-MCNC: NEGATIVE MG/ML
PH UR: 6 [PH] (ref 5–8)
POTASSIUM SERPL-SCNC: 4.5 MMOL/L (ref 3.5–5.2)
PROT SERPL-MCNC: 7.3 G/DL (ref 6–8.5)
PROT UR STRIP-MCNC: NEGATIVE MG/DL
RBC # UR STRIP: NEGATIVE /UL
SODIUM SERPL-SCNC: 135 MMOL/L (ref 136–145)
SP GR UR: 1.01 (ref 1–1.03)
UROBILINOGEN UR QL: NORMAL

## 2020-08-19 PROCEDURE — 80053 COMPREHEN METABOLIC PANEL: CPT | Performed by: INTERNAL MEDICINE

## 2020-08-19 PROCEDURE — 87086 URINE CULTURE/COLONY COUNT: CPT | Performed by: INTERNAL MEDICINE

## 2020-08-19 PROCEDURE — 99214 OFFICE O/P EST MOD 30 MIN: CPT | Performed by: INTERNAL MEDICINE

## 2020-08-19 RX ORDER — LANOLIN ALCOHOL/MO/W.PET/CERES
1000 CREAM (GRAM) TOPICAL DAILY
COMMUNITY
End: 2022-08-24

## 2020-08-19 RX ORDER — FLUCONAZOLE 150 MG/1
150 TABLET ORAL ONCE
Qty: 2 TABLET | Refills: 0 | Status: SHIPPED | OUTPATIENT
Start: 2020-08-19 | End: 2020-08-19

## 2020-08-19 RX ORDER — ASPIRIN 81 MG/1
81 TABLET, CHEWABLE ORAL DAILY
COMMUNITY

## 2020-08-19 RX ORDER — ZOLPIDEM TARTRATE 10 MG/1
10 TABLET ORAL NIGHTLY PRN
Qty: 30 TABLET | Refills: 5 | Status: SHIPPED | OUTPATIENT
Start: 2020-08-19 | End: 2021-02-08

## 2020-08-19 NOTE — PROGRESS NOTES
Subjective       Yevgeniy Vaughn is a 64 y.o. male.     Chief Complaint   Patient presents with   • Diabetes     1 month followu  up   • Difficulty Urinating       History obtained from the patient and his wife.      History of Present Illness     Patient is complaining of itching in the genital area for 8 to 9 days.  He has had some urinary frequency, urgency, dysuria, and nocturia.  There is no hematuria, flank pain, nausea, vomiting, fever, or chills.  He thinks this may be related to starting the Farxiga.    Primary Care Cardiac Diagnostic Constellation:      His Diabetes Mellitus type has been unstable.  Medication(s): Metformin, Farxiga, and Januvia.   The patient is adherent with his medication regimen. He denies medication side effects.       Interval Events:  Last HgA1C was 7.5 (on 6/10/2020).  For [employer] was added.  The patient states his blood sugar at home since then has been around 140 fasting, and less than 125 postprandial.  He denies episodes of low blood sugar.   The patient states he checks his feet regularly.  His last Ophthalmology appointment was 9/29/17, normal.    The patient has a nephrology appointment, to evaluate his Hyponatremia, on 9/29/2020.      Symptoms:  Denies chest pain, dyspnea, SALAMANCA, orthopnea, PND, palpitations, syncope, lower extremity edema (except feet), intermittent leg claudication, lightheadedness, and dizziness.  Associated Symptoms:   Weight down 7 pounds since last visit.  Has stable arthralgias, polydipsia, and memory issues. No fatigue, headache, polyuria, myalgias, visual impairment, concentration problems, or focal neurologic deficits.  Stable pain, numbness, and tingling of the feet,  but denies a foot ulcer. On Neurontin.      Lifestyle and Disease Management: Diet: He does have a healthy diet. Weight Issues: He has weight concerns. Exercise: He has been walking or biking 5 miles per day.  Tobacco Use:: Former smoker.  He quit smoking completely 4/1/18. Re-started  Sept 2018, 1 ppd.  Quit completely 6/11/2020.    Insomnia Follow-Up: The patient is being seen for follow-up of Insomnia, which is unstable.  Comorbid Illnesses: Anxiety.   Interval Events: He has been off Trazodone since his hospitalization for hyponatremia on 6/1/2020.  She is 2  Symptoms: Worsened insomnia and stable anxiety.  Medications: Melatonin.  Off Trazodone.  Has had no relief in the past with Ambien 5 mg..        Current Outpatient Medications on File Prior to Visit   Medication Sig Dispense Refill   • acetaminophen (TYLENOL) 325 MG tablet Take 2 tablets by mouth Every 4 (Four) Hours As Needed for Mild Pain .     • ALPRAZolam (XANAX) 0.5 MG tablet Take 1 tablet by mouth 2 (Two) Times a Day As Needed for Anxiety. for anxiety 60 tablet 2   • aspirin 81 MG chewable tablet Chew 81 mg Daily.     • atorvastatin (LIPITOR) 20 MG tablet TAKE 1 TABLET BY MOUTH AT BEDTIME 90 tablet 1   • clobetasol (TEMOVATE) 0.05 % cream APPLY CREAM TOPICALLY TO AFFECTED AREA TWICE DAILY 60 g 3   • dapagliflozin (Farxiga) 5 MG tablet tablet Take 1 tablet by mouth Daily. 30 tablet 5   • gabapentin (NEURONTIN) 800 MG tablet 1 tablet 3 (Three) Times a Day.     • JANUVIA 100 MG tablet Take 1 tablet by mouth once daily 90 tablet 1   • lisinopril (PRINIVIL,ZESTRIL) 40 MG tablet TAKE 1 TABLET BY MOUTH AT BEDTIME 90 tablet 1   • melatonin 5 MG tablet tablet Take 1 tablet by mouth At Night As Needed (insomnia). (Patient taking differently: Take 30 mg by mouth At Night As Needed (insomnia).)     • metFORMIN (GLUCOPHAGE) 1000 MG tablet Take 1 tablet by mouth twice daily 180 tablet 1   • Multiple Vitamins-Minerals (MULTIVITAMIN ADULT PO) Take  by mouth.     • Omega-3 Fatty Acids (FISH OIL) 1000 MG capsule capsule Take 1 capsule by mouth every 12 (twelve) hours     • oxyCODONE-acetaminophen (PERCOCET) 7.5-325 MG per tablet Take 1 tablet by mouth 3 (three) times a day.     • varenicline (Chantix Continuing Month Casa) 1 MG tablet Take 1 tablet  by mouth 2 (Two) Times a Day. 60 tablet 4   • vitamin B-12 (CYANOCOBALAMIN) 1000 MCG tablet Take 1,000 mcg by mouth Daily.       No current facility-administered medications on file prior to visit.        Current outpatient and discharge medications have been reconciled for the patient.  Reviewed by: Christine Rousseau MD        The following portions of the patient's history were reviewed and updated as appropriate: allergies, current medications, past family history, past medical history, past social history, past surgical history and problem list.    Review of Systems   Constitutional: Negative for fatigue and unexpected weight change.   Eyes: Negative for visual disturbance.   Respiratory: Negative for cough, shortness of breath and wheezing.    Cardiovascular: Negative for chest pain, palpitations and leg swelling.        No SALAMANCA, orthopnea, or claudication.   Gastrointestinal: Negative for abdominal pain, blood in stool, constipation, diarrhea, nausea and vomiting.        Denies melena.   Endocrine: Positive for polydipsia. Negative for polyuria.   Genitourinary: Positive for dysuria, frequency and urgency. Negative for flank pain and hematuria.   Musculoskeletal: Positive for arthralgias. Negative for myalgias.   Neurological: Negative for dizziness, syncope, light-headedness and headaches.        Stable memory issues.   Psychiatric/Behavioral: Positive for sleep disturbance. Negative for decreased concentration and suicidal ideas. The patient is nervous/anxious.         Denies depression         Objective       Blood pressure 120/64, pulse 92, temperature 98.4 °F (36.9 °C), temperature source Temporal, resp. rate 20, weight 87.1 kg (192 lb 2 oz).      Physical Exam   Constitutional:   Overweight.   Neck: Carotid bruit is not present. No thyroid mass and no thyromegaly present.   Cardiovascular: Normal rate, regular rhythm, normal heart sounds and intact distal pulses. Exam reveals no gallop and no friction rub.    No murmur heard.  Pulmonary/Chest: Effort normal and breath sounds normal.   Neurological: He is alert.   Psychiatric: He has a normal mood and affect.   Nursing note and vitals reviewed.    Results for orders placed or performed in visit on 08/19/20   POC Urinalysis Dipstick, Automated   Result Value Ref Range    Color Yellow Yellow, Straw, Dark Yellow, Loli    Clarity, UA Clear Clear    Specific Gravity  1.010 1.005 - 1.030    pH, Urine 6.0 5.0 - 8.0    Leukocytes Negative Negative    Nitrite, UA Negative Negative    Protein, POC Negative Negative mg/dL    Glucose, UA >=1000 mg/dL (3+) (A) Negative, 1000 mg/dL (3+) mg/dL    Ketones, UA Negative Negative    Urobilinogen, UA Normal Normal    Bilirubin Negative Negative    Blood, UA Negative Negative    Lot Number [id number]     Expiration Date 2-28-21      Assessment / Plan:  Yevgeniy was seen today for diabetes and difficulty urinating.    Diagnoses and all orders for this visit:    Type 2 diabetes mellitus with hyperglycemia, without long-term current use of insulin (CMS/[facility])  -     Comprehensive Metabolic Panel   Continue current medication(s) as noted in the history of present illness.    Primary insomnia  -     zolpidem (AMBIEN) 10 MG tablet; Take 1 tablet by mouth At Night As Needed for Sleep (new).    The patient was instructed in the side effects of the medication.  Risks of the potential for tolerance, dependence, and addiction were discussed.  The patient was instructed to take the lowest dosage of the medication, at the lowest frequency, and for the shortest period of time possible.  The patient was instructed not to receive controlled substances or narcotics from other doctors, and not to giveaway or sell the medication.    The patient was instructed to abstain from illicit drug use.  The patient was instructed to avoid alcohol while taking these medications.      Narcotics/controlled substance agreement, Lonnie report, and Urine Drug Screen were  updated today if needed.    Dysuria  -     POC Urinalysis Dipstick, Automated  -     Urine Culture - Urine, Urine, Clean Catch    Genitourinary infection, candidal  -     fluconazole (Diflucan) 150 MG tablet; Take 1 tablet by mouth 1 (One) Time for 1 dose. May repeat after 4 days.          Return in about 1 month (around 9/19/2020) for Recheck Diabetes, fasting.

## 2020-08-20 LAB — BACTERIA SPEC AEROBE CULT: NO GROWTH

## 2020-08-23 DIAGNOSIS — F41.1 GENERALIZED ANXIETY DISORDER: ICD-10-CM

## 2020-08-24 RX ORDER — ALPRAZOLAM 0.5 MG/1
0.5 TABLET ORAL 2 TIMES DAILY PRN
Qty: 60 TABLET | Refills: 2 | OUTPATIENT
Start: 2020-08-24

## 2020-09-22 ENCOUNTER — OFFICE VISIT (OUTPATIENT)
Dept: INTERNAL MEDICINE | Facility: CLINIC | Age: 65
End: 2020-09-22

## 2020-09-22 VITALS
WEIGHT: 191.25 LBS | HEART RATE: 72 BPM | BODY MASS INDEX: 29.51 KG/M2 | RESPIRATION RATE: 18 BRPM | TEMPERATURE: 97.5 F | DIASTOLIC BLOOD PRESSURE: 62 MMHG | SYSTOLIC BLOOD PRESSURE: 110 MMHG

## 2020-09-22 DIAGNOSIS — Z12.5 SCREENING FOR PROSTATE CANCER: ICD-10-CM

## 2020-09-22 DIAGNOSIS — F41.1 GENERALIZED ANXIETY DISORDER: ICD-10-CM

## 2020-09-22 DIAGNOSIS — G89.4 CHRONIC PAIN SYNDROME: ICD-10-CM

## 2020-09-22 DIAGNOSIS — E11.65 TYPE 2 DIABETES MELLITUS WITH HYPERGLYCEMIA, WITHOUT LONG-TERM CURRENT USE OF INSULIN (HCC): Primary | ICD-10-CM

## 2020-09-22 DIAGNOSIS — F51.01 PRIMARY INSOMNIA: ICD-10-CM

## 2020-09-22 DIAGNOSIS — I10 ESSENTIAL HYPERTENSION: ICD-10-CM

## 2020-09-22 DIAGNOSIS — E87.1 HYPONATREMIA: ICD-10-CM

## 2020-09-22 DIAGNOSIS — E78.49 OTHER HYPERLIPIDEMIA: ICD-10-CM

## 2020-09-22 LAB
ALBUMIN SERPL-MCNC: 4.4 G/DL (ref 3.5–5.2)
ANION GAP SERPL CALCULATED.3IONS-SCNC: 12.5 MMOL/L (ref 5–15)
BACTERIA UR QL AUTO: NORMAL /HPF
BASOPHILS # BLD AUTO: 0.06 10*3/MM3 (ref 0–0.2)
BASOPHILS NFR BLD AUTO: 0.6 % (ref 0–1.5)
BILIRUB UR QL STRIP: NEGATIVE
BUN SERPL-MCNC: 10 MG/DL (ref 8–23)
BUN/CREAT SERPL: 13.2 (ref 7–25)
CALCIUM SPEC-SCNC: 10.1 MG/DL (ref 8.6–10.5)
CHLORIDE SERPL-SCNC: 97 MMOL/L (ref 98–107)
CLARITY UR: CLEAR
CO2 SERPL-SCNC: 24.5 MMOL/L (ref 22–29)
COLOR UR: ABNORMAL
CREAT SERPL-MCNC: 0.76 MG/DL (ref 0.76–1.27)
CREAT UR-MCNC: 41.2 MG/DL
DEPRECATED RDW RBC AUTO: 40.6 FL (ref 37–54)
EOSINOPHIL # BLD AUTO: 2.17 10*3/MM3 (ref 0–0.4)
EOSINOPHIL NFR BLD AUTO: 22.4 % (ref 0.3–6.2)
ERYTHROCYTE [DISTWIDTH] IN BLOOD BY AUTOMATED COUNT: 12.1 % (ref 12.3–15.4)
EXPIRATION DATE: NORMAL
GFR SERPL CREATININE-BSD FRML MDRD: 104 ML/MIN/1.73
GLUCOSE SERPL-MCNC: 170 MG/DL (ref 65–99)
GLUCOSE UR STRIP-MCNC: ABNORMAL MG/DL
HBA1C MFR BLD: 7.6 %
HCT VFR BLD AUTO: 40.1 % (ref 37.5–51)
HGB BLD-MCNC: 13.9 G/DL (ref 13–17.7)
HGB UR QL STRIP.AUTO: NEGATIVE
HYALINE CASTS UR QL AUTO: NORMAL /LPF
IMM GRANULOCYTES # BLD AUTO: 0.03 10*3/MM3 (ref 0–0.05)
IMM GRANULOCYTES NFR BLD AUTO: 0.3 % (ref 0–0.5)
KETONES UR QL STRIP: NEGATIVE
LEUKOCYTE ESTERASE UR QL STRIP.AUTO: NEGATIVE
LYMPHOCYTES # BLD AUTO: 2.31 10*3/MM3 (ref 0.7–3.1)
LYMPHOCYTES NFR BLD AUTO: 23.9 % (ref 19.6–45.3)
Lab: NORMAL
MCH RBC QN AUTO: 32 PG (ref 26.6–33)
MCHC RBC AUTO-ENTMCNC: 34.7 G/DL (ref 31.5–35.7)
MCV RBC AUTO: 92.2 FL (ref 79–97)
MONOCYTES # BLD AUTO: 0.72 10*3/MM3 (ref 0.1–0.9)
MONOCYTES NFR BLD AUTO: 7.4 % (ref 5–12)
NEUTROPHILS NFR BLD AUTO: 4.39 10*3/MM3 (ref 1.7–7)
NEUTROPHILS NFR BLD AUTO: 45.4 % (ref 42.7–76)
NITRITE UR QL STRIP: NEGATIVE
NRBC BLD AUTO-RTO: 0 /100 WBC (ref 0–0.2)
PH UR STRIP.AUTO: 6 [PH] (ref 5–8)
PHOSPHATE SERPL-MCNC: 4.3 MG/DL (ref 2.5–4.5)
PLATELET # BLD AUTO: 233 10*3/MM3 (ref 140–450)
PMV BLD AUTO: 9.3 FL (ref 6–12)
POTASSIUM SERPL-SCNC: 4.9 MMOL/L (ref 3.5–5.2)
PROT UR QL STRIP: NEGATIVE
PROT UR-MCNC: 5 MG/DL
PSA SERPL-MCNC: 0.23 NG/ML (ref 0–4)
RBC # BLD AUTO: 4.35 10*6/MM3 (ref 4.14–5.8)
RBC # UR: NORMAL /HPF
REF LAB TEST METHOD: NORMAL
SODIUM SERPL-SCNC: 134 MMOL/L (ref 136–145)
SP GR UR STRIP: >=1.03 (ref 1–1.03)
SQUAMOUS #/AREA URNS HPF: NORMAL /HPF
UROBILINOGEN UR QL STRIP: ABNORMAL
WBC # BLD AUTO: 9.68 10*3/MM3 (ref 3.4–10.8)
WBC UR QL AUTO: NORMAL /HPF

## 2020-09-22 PROCEDURE — 83036 HEMOGLOBIN GLYCOSYLATED A1C: CPT | Performed by: INTERNAL MEDICINE

## 2020-09-22 PROCEDURE — 85025 COMPLETE CBC W/AUTO DIFF WBC: CPT | Performed by: INTERNAL MEDICINE

## 2020-09-22 PROCEDURE — 80069 RENAL FUNCTION PANEL: CPT | Performed by: INTERNAL MEDICINE

## 2020-09-22 PROCEDURE — 99214 OFFICE O/P EST MOD 30 MIN: CPT | Performed by: INTERNAL MEDICINE

## 2020-09-22 PROCEDURE — 82570 ASSAY OF URINE CREATININE: CPT | Performed by: INTERNAL MEDICINE

## 2020-09-22 PROCEDURE — G0103 PSA SCREENING: HCPCS | Performed by: INTERNAL MEDICINE

## 2020-09-22 PROCEDURE — 84156 ASSAY OF PROTEIN URINE: CPT | Performed by: INTERNAL MEDICINE

## 2020-09-22 PROCEDURE — 81001 URINALYSIS AUTO W/SCOPE: CPT | Performed by: INTERNAL MEDICINE

## 2020-09-22 RX ORDER — MULTIVIT WITH MINERALS/LUTEIN
250 TABLET ORAL DAILY
COMMUNITY
End: 2022-08-24

## 2020-09-22 RX ORDER — DULOXETIN HYDROCHLORIDE 60 MG/1
60 CAPSULE, DELAYED RELEASE ORAL DAILY
Qty: 30 CAPSULE | Refills: 5 | Status: SHIPPED | OUTPATIENT
Start: 2020-09-22 | End: 2021-01-04 | Stop reason: SINTOL

## 2020-09-22 NOTE — PROGRESS NOTES
Subjective       Yevgeniy Vaughn is a 64 y.o. male.     Chief Complaint   Patient presents with   • Diabetes     1 month follow up  fasting       History obtained from the patient and his wife.      History of Present Illness     Patient has had Hyponatremia. CMP on 8/19/2020 was significant for Sodium 135.  The patient has a nephrology appointment, to evaluate his Hyponatremia, on 9/29/2020.  They have asked for labs to be done prior to the appointment, and he has brought the lab order today.     Primary Care Cardiac Diagnostic Constellation:      His Diabetes Mellitus type has been unstable.  Medication(s): Metformin, Farxiga, and Januvia.   His Hypertension has been stable.   Medication(s): Lisinopril.   His Hyperlipidemia has been stable.   His LDL goal is < 70 mg/dL and last LDL was 28 mg/dL, TG 61.   Medication(s): Atorvastatin and Fish Oil.  The patient is adherent with his medication regimen. He denies medication side effects.       Interval Events:  Last HgA1C was 7.5 (on 6/10/2020). Farxiga was added.  The patient states his blood sugar at home 105-150  fasting, but he has not been checking it postprandial.  He denies episodes of low blood sugar.  The patient states he checks his feet regularly.  His last Ophthalmology appointment was 9/29/17, normal.  The patient states his blood pressure at home has been 120's/70's.        Symptoms:  Denies chest pain, dyspnea, SALAMANCA, orthopnea, PND, palpitations, syncope, lower extremity edema (except feet), intermittent leg claudication, lightheadedness, and dizziness.  Associated Symptoms: No significant weight change since since last visit.  He states memory loss has resolved.  Has stable arthralgias and polydipsia.  No fatigue, headache, polyuria, myalgias, visual impairment, concentration problems, or focal neurologic deficits.  Worsened pain, numbness, and tingling of the feet, but denies a foot ulcer. On Neurontin (maximum dose).      Lifestyle and Disease  Management: Diet: He does have a healthy diet. Weight Issues: He has weight concerns. Exercise: He has been walking or biking 5 miles per week.  Tobacco Use:: Former smoker.  He quit smoking completely 4/1/18. Re-started Sept 2018, 1 ppd.  Quit completely 6/11/2020.    Chronic Pain Follow-Up: The patient is being seen for follow-up of Chronic Neck Pain and Chronic Left Arm and Shoulder Pain, which are stable.   Interval Events:  The patient is seeing Pain Management for his chronic pain.   Symptoms: stable neck pain, back pain, and upper extremity pain, but denies headache, abdominal pain, and lower extremity pain.   Medications:   Neurontin and Percocet per Pain Management.  Off Ibuprofen.  The patient is adherent to his medication regimen, and he denies medication side effects.      Insomnia Follow-Up: The patient is being seen for follow-up of Insomnia, which is unstable.  Comorbid Illnesses: Anxiety.   Interval Events: He has been off Trazodone since his hospitalization for Hyponatremia on 6/1/2020.   On 7/15/2020, Ambien 5 mg daily was started.  This did not seem to help.  On 8/19/2020, Ambien was increased to 10 mg daily.  He has done better with that.    Symptoms:  Improved insomnia and stable anxiety.  Medications:  Ambien and Melatonin.  Off Trazodone.     Anxiety Disorder Follow-Up: The patient is being seen for follow-up of Anxiey, which is stable.  Comorbid Illnesses: Insomnia.  Interval Events: His anxiety is improved with increased exercise.  Symptoms: Improved insomnia and stable anxiety.  Denies depression, panic attacks, anhedonia, memory loss, and difficulty concentrating.    Associated Symptoms: no suicidal ideation.   Medication: Alprazolam BID.  The patient is adherent to his medication regimen, and he denies medication side effects.      Current Outpatient Medications on File Prior to Visit   Medication Sig Dispense Refill   • acetaminophen (TYLENOL) 325 MG tablet Take 2 tablets by mouth Every  4 (Four) Hours As Needed for Mild Pain .     • ALPRAZolam (XANAX) 0.5 MG tablet Take 1 tablet by mouth 2 (Two) Times a Day As Needed for Anxiety. for anxiety 60 tablet 2   • aspirin 81 MG chewable tablet Chew 81 mg Daily.     • atorvastatin (LIPITOR) 20 MG tablet TAKE 1 TABLET BY MOUTH AT BEDTIME 90 tablet 1   • clobetasol (TEMOVATE) 0.05 % cream APPLY CREAM TOPICALLY TO AFFECTED AREA TWICE DAILY 60 g 3   • dapagliflozin (Farxiga) 5 MG tablet tablet Take 1 tablet by mouth Daily. 30 tablet 5   • gabapentin (NEURONTIN) 800 MG tablet 1 tablet 3 (Three) Times a Day.     • JANUVIA 100 MG tablet Take 1 tablet by mouth once daily 90 tablet 1   • lisinopril (PRINIVIL,ZESTRIL) 40 MG tablet TAKE 1 TABLET BY MOUTH AT BEDTIME 90 tablet 1   • melatonin 5 MG tablet tablet Take 1 tablet by mouth At Night As Needed (insomnia). (Patient taking differently: Take 30 mg by mouth At Night As Needed (insomnia).)     • metFORMIN (GLUCOPHAGE) 1000 MG tablet Take 1 tablet by mouth twice daily 180 tablet 1   • Multiple Vitamins-Minerals (MULTIVITAMIN ADULT PO) Take  by mouth.     • Omega-3 Fatty Acids (FISH OIL) 1000 MG capsule capsule Take 1 capsule by mouth every 12 (twelve) hours     • oxyCODONE-acetaminophen (PERCOCET) 7.5-325 MG per tablet Take 1 tablet by mouth 3 (three) times a day.     • varenicline (Chantix Continuing Month Casa) 1 MG tablet Take 1 tablet by mouth 2 (Two) Times a Day. 60 tablet 4   • vitamin B-12 (CYANOCOBALAMIN) 1000 MCG tablet Take 1,000 mcg by mouth Daily.     • vitamin C (ASCORBIC ACID) 250 MG tablet Take 250 mg by mouth Daily.     • zolpidem (AMBIEN) 10 MG tablet Take 1 tablet by mouth At Night As Needed for Sleep. 30 tablet 5     No current facility-administered medications on file prior to visit.        Current outpatient and discharge medications have been reconciled for the patient.  Reviewed by: Christine Rousseau MD        The following portions of the patient's history were reviewed and updated as  appropriate: allergies, current medications, past family history, past medical history, past social history, past surgical history and problem list.    Review of Systems   Constitutional: Negative for fatigue and unexpected weight change.   Eyes: Negative for visual disturbance.   Respiratory: Negative for cough, shortness of breath and wheezing.    Cardiovascular: Negative for chest pain, palpitations and leg swelling.        No SALAMANCA, orthopnea, or claudication.   Gastrointestinal: Negative for abdominal pain, blood in stool, constipation, diarrhea, nausea and vomiting.        Denies melena.   Endocrine: Positive for polydipsia. Negative for polyuria.   Musculoskeletal: Positive for arthralgias, back pain and neck pain. Negative for myalgias.   Neurological: Negative for dizziness, syncope, light-headedness and headaches.        No memory issues.   Psychiatric/Behavioral: Positive for sleep disturbance. Negative for decreased concentration and suicidal ideas. The patient is nervous/anxious.          Objective       Blood pressure 110/62, pulse 72, temperature 97.5 °F (36.4 °C), temperature source Tympanic, resp. rate 18, weight 86.8 kg (191 lb 4 oz).      Physical Exam  Vitals signs and nursing note reviewed.   Constitutional:       Appearance: He is well-developed.      Comments: Overweight.   Neck:      Musculoskeletal: Normal range of motion and neck supple.      Thyroid: No thyroid mass or thyromegaly.      Vascular: No carotid bruit.   Cardiovascular:      Rate and Rhythm: Normal rate and regular rhythm.      Pulses: Normal pulses.      Heart sounds: Normal heart sounds. No murmur. No friction rub. No gallop.    Pulmonary:      Effort: Pulmonary effort is normal.      Breath sounds: Normal breath sounds.   Abdominal:      General: Bowel sounds are normal. There is no distension or abdominal bruit.      Palpations: Abdomen is soft. There is no hepatomegaly, splenomegaly or mass.      Tenderness: There is no  abdominal tenderness.   Musculoskeletal:      Right lower leg: No edema.      Left lower leg: No edema.   Neurological:      Mental Status: He is alert.   Psychiatric:         Mood and Affect: Mood normal.       Results for orders placed or performed in visit on 09/22/20   POC Glycosylated Hemoglobin (Hb A1C)    Specimen: Blood   Result Value Ref Range    Hemoglobin A1C 7.6 %    Lot Number [id number]     Expiration Date 4-22-22        Assessment / Plan:  Yevgeniy was seen today for diabetes.    Diagnoses and all orders for this visit:    Type 2 diabetes mellitus with hyperglycemia, without long-term current use of insulin (CMS/[facility])  -     POC Glycosylated Hemoglobin (Hb A1C)  -     Renal Function Panel  -     CBC & Differential   Continue current medication(s) as noted in the history of present illness.    The patient was given Ophthalmology form today and agrees to schedule an appointment as soon as possible.    Essential hypertension  -    Renal Function Panel   Continue current medication(s) as noted in the history of present illness.    Other hyperlipidemia  -     Renal Function Panel   Continue current medication(s) as noted in the history of present illness.    Hyponatremia  -     Renal Function Panel  -     Urinalysis With Microscopic - Urine, Clean Catch; Future  -     Creatinine, Urine, Random - Urine, Clean Catch  -     Protein, Urine, Random - Urine, Clean Catch  -     Urinalysis With Microscopic - Urine, Clean Catch   The patient agrees to keep his appointment with Nephrology.    Screening for prostate cancer  -     PSA Screen    Chronic pain syndrome  -     DULoxetine (Cymbalta) 60 MG capsule; Take 1 capsule by mouth Daily.   Continue current medication(s) as noted in the history of present illness.    Primary Insomnia   Continue current medication(s) as noted in the history of present illness.    Generalized Anxiety Disorder    Continue current medication(s) as noted in the history of present  illness.    The patient was instructed in the side effects of the medication.  Risks of the potential for tolerance, dependence, and addiction were discussed.  The patient was instructed to take the lowest dosage of the medication, at the lowest frequency, and for the shortest period of time possible.  The patient was instructed not to receive controlled substances or narcotics from other doctors, and not to giveaway or sell the medication.    The patient was instructed to abstain from illicit drug use.  The patient was instructed to avoid alcohol while taking these medications.      Narcotics/controlled substance agreement, Lonnie report, and Urine Drug Screen were updated today if needed.        The patient declined scheduling a Cardiac CT Scan today, but took information again, and will call back if he wants to schedule.        Return in about 3 months (around 12/22/2020) for Recheck Diabetes, fasting.

## 2020-09-25 ENCOUNTER — TELEPHONE (OUTPATIENT)
Dept: INTERNAL MEDICINE | Facility: CLINIC | Age: 65
End: 2020-09-25

## 2020-09-25 NOTE — TELEPHONE ENCOUNTER
Caller: PAULA STONE     Relationship: WIFE     Best call back number: [phone]    What test was performed: SODIUM AND MAGNESIUM      When was the test performed: Tuesday 9/22    Where was the test performed: IN OFFICE

## 2020-09-30 ENCOUNTER — TELEPHONE (OUTPATIENT)
Dept: INTERNAL MEDICINE | Facility: CLINIC | Age: 65
End: 2020-09-30

## 2020-09-30 NOTE — TELEPHONE ENCOUNTER
Spoke with Yevgeniy   He states he has taken with the biggest meal and at night time but he would wake up in the middle of the night and vomit.   He stopped taking it yesterday after taking for 1 week.  He would like a different medication   As when he stopped it the side effects stopped.

## 2020-09-30 NOTE — TELEPHONE ENCOUNTER
Spoke with Yevgeniy   He said he was going to try the Cymbalta by taking it at night again as he said it did help the burning in his feet a little after just one week.  Advised him to take medication every other night for a few day and then every day to allow his body to adjust to the medication .  He did not want to go back to pain management as he felt they would not be able to help him.

## 2020-09-30 NOTE — TELEPHONE ENCOUNTER
There is really no equivalent medication.  Please discontinue Cymbalta on the medication list.  I recommend he talk with pain management to see if he has any other options.

## 2020-09-30 NOTE — TELEPHONE ENCOUNTER
Is he taking the medication with his biggest meal?  If no, have him try that.  If yes, have him take it right before bed instead.

## 2020-09-30 NOTE — TELEPHONE ENCOUNTER
PATIENTS SPOUSE, PAULA CALLED AND SAID THE PATIENTS MEDICATION CYMBALTA--MAKING HIM NAUSEATED,  VOMITTING, IRRITABLE, AND WANTS TO KNOW IF SOMETHING ELSE CAN BE CALLED IN; PLEASE ADVISE    [facility] : [address]    PAULA: [phone]

## 2020-11-12 DIAGNOSIS — E11.65 TYPE 2 DIABETES MELLITUS WITH HYPERGLYCEMIA, WITHOUT LONG-TERM CURRENT USE OF INSULIN (HCC): Primary | ICD-10-CM

## 2020-11-20 DIAGNOSIS — F41.1 GENERALIZED ANXIETY DISORDER: ICD-10-CM

## 2020-11-20 RX ORDER — ALPRAZOLAM 0.5 MG/1
TABLET ORAL
Qty: 60 TABLET | Refills: 0 | Status: SHIPPED | OUTPATIENT
Start: 2020-11-20 | End: 2020-12-17

## 2020-11-28 DIAGNOSIS — E11.65 TYPE 2 DIABETES MELLITUS WITH HYPERGLYCEMIA, WITHOUT LONG-TERM CURRENT USE OF INSULIN (HCC): ICD-10-CM

## 2020-11-28 DIAGNOSIS — E78.49 OTHER HYPERLIPIDEMIA: Primary | ICD-10-CM

## 2020-11-28 DIAGNOSIS — I10 ESSENTIAL HYPERTENSION: ICD-10-CM

## 2020-11-29 RX ORDER — LISINOPRIL 40 MG/1
TABLET ORAL
Qty: 90 TABLET | Refills: 3 | Status: SHIPPED | OUTPATIENT
Start: 2020-11-29 | End: 2021-10-12 | Stop reason: SDUPTHER

## 2020-11-29 RX ORDER — ATORVASTATIN CALCIUM 20 MG/1
TABLET, FILM COATED ORAL
Qty: 90 TABLET | Refills: 3 | Status: SHIPPED | OUTPATIENT
Start: 2020-11-29 | End: 2021-10-12 | Stop reason: SDUPTHER

## 2020-11-29 RX ORDER — SITAGLIPTIN 100 MG/1
TABLET, FILM COATED ORAL
Qty: 90 TABLET | Refills: 3 | Status: SHIPPED | OUTPATIENT
Start: 2020-11-29 | End: 2021-10-12 | Stop reason: SDUPTHER

## 2020-12-14 DIAGNOSIS — R11.2 NAUSEA AND VOMITING, INTRACTABILITY OF VOMITING NOT SPECIFIED, UNSPECIFIED VOMITING TYPE: Primary | ICD-10-CM

## 2020-12-14 NOTE — TELEPHONE ENCOUNTER
9/22/2020 last office visit   12/21/2020 next office visit   6/8/2017 last Rx promethazine   #30  Refill 5

## 2020-12-15 RX ORDER — PROMETHAZINE HYDROCHLORIDE 25 MG/1
TABLET ORAL
Qty: 30 TABLET | Refills: 5 | Status: SHIPPED | OUTPATIENT
Start: 2020-12-15

## 2020-12-17 DIAGNOSIS — F41.1 GENERALIZED ANXIETY DISORDER: ICD-10-CM

## 2020-12-17 RX ORDER — ALPRAZOLAM 0.5 MG/1
TABLET ORAL
Qty: 60 TABLET | Refills: 2 | Status: SHIPPED | OUTPATIENT
Start: 2020-12-17 | End: 2021-03-15 | Stop reason: SDUPTHER

## 2021-01-04 ENCOUNTER — OFFICE VISIT (OUTPATIENT)
Dept: INTERNAL MEDICINE | Facility: CLINIC | Age: 66
End: 2021-01-04

## 2021-01-04 VITALS
BODY MASS INDEX: 30.42 KG/M2 | WEIGHT: 197.13 LBS | HEART RATE: 96 BPM | RESPIRATION RATE: 20 BRPM | DIASTOLIC BLOOD PRESSURE: 80 MMHG | SYSTOLIC BLOOD PRESSURE: 166 MMHG | TEMPERATURE: 97.4 F

## 2021-01-04 DIAGNOSIS — N50.812 PAIN IN BOTH TESTICLES: ICD-10-CM

## 2021-01-04 DIAGNOSIS — E11.65 TYPE 2 DIABETES MELLITUS WITH HYPERGLYCEMIA, WITHOUT LONG-TERM CURRENT USE OF INSULIN (HCC): Primary | ICD-10-CM

## 2021-01-04 DIAGNOSIS — E78.49 OTHER HYPERLIPIDEMIA: ICD-10-CM

## 2021-01-04 DIAGNOSIS — K63.5 BENIGN COLONIC POLYP: ICD-10-CM

## 2021-01-04 DIAGNOSIS — R30.0 DYSURIA: ICD-10-CM

## 2021-01-04 DIAGNOSIS — I10 ESSENTIAL HYPERTENSION: ICD-10-CM

## 2021-01-04 DIAGNOSIS — N50.811 PAIN IN BOTH TESTICLES: ICD-10-CM

## 2021-01-04 DIAGNOSIS — G89.4 CHRONIC PAIN SYNDROME: ICD-10-CM

## 2021-01-04 DIAGNOSIS — F51.01 PRIMARY INSOMNIA: ICD-10-CM

## 2021-01-04 DIAGNOSIS — F41.1 GENERALIZED ANXIETY DISORDER: ICD-10-CM

## 2021-01-04 LAB
ALBUMIN SERPL-MCNC: 4.7 G/DL (ref 3.5–5.2)
ALBUMIN/GLOB SERPL: 1.6 G/DL
ALP SERPL-CCNC: 71 U/L (ref 39–117)
ALT SERPL W P-5'-P-CCNC: 28 U/L (ref 1–41)
ANION GAP SERPL CALCULATED.3IONS-SCNC: 12.6 MMOL/L (ref 5–15)
AST SERPL-CCNC: 20 U/L (ref 1–40)
BILIRUB BLD-MCNC: NEGATIVE MG/DL
BILIRUB SERPL-MCNC: 0.4 MG/DL (ref 0–1.2)
BUN SERPL-MCNC: 8 MG/DL (ref 8–23)
BUN/CREAT SERPL: 10.5 (ref 7–25)
CALCIUM SPEC-SCNC: 9.7 MG/DL (ref 8.6–10.5)
CHLORIDE SERPL-SCNC: 86 MMOL/L (ref 98–107)
CHOLEST SERPL-MCNC: 127 MG/DL (ref 0–200)
CLARITY, POC: ABNORMAL
CO2 SERPL-SCNC: 24.4 MMOL/L (ref 22–29)
COLOR UR: YELLOW
CREAT SERPL-MCNC: 0.76 MG/DL (ref 0.76–1.27)
EXPIRATION DATE: ABNORMAL
EXPIRATION DATE: NORMAL
GFR SERPL CREATININE-BSD FRML MDRD: 103 ML/MIN/1.73
GLOBULIN UR ELPH-MCNC: 3 GM/DL
GLUCOSE SERPL-MCNC: 235 MG/DL (ref 65–99)
GLUCOSE UR STRIP-MCNC: ABNORMAL MG/DL
HBA1C MFR BLD: 10.5 %
HDLC SERPL-MCNC: 41 MG/DL (ref 40–60)
KETONES UR QL: NEGATIVE
LDLC SERPL CALC-MCNC: 51 MG/DL (ref 0–100)
LDLC/HDLC SERPL: 1.04 {RATIO}
LEUKOCYTE EST, POC: NEGATIVE
Lab: ABNORMAL
Lab: NORMAL
NITRITE UR-MCNC: NEGATIVE MG/ML
PH UR: 6 [PH] (ref 5–8)
POTASSIUM SERPL-SCNC: 4.5 MMOL/L (ref 3.5–5.2)
PROT SERPL-MCNC: 7.7 G/DL (ref 6–8.5)
PROT UR STRIP-MCNC: NEGATIVE MG/DL
RBC # UR STRIP: NEGATIVE /UL
SODIUM SERPL-SCNC: 123 MMOL/L (ref 136–145)
SP GR UR: 1.01 (ref 1–1.03)
TRIGL SERPL-MCNC: 216 MG/DL (ref 0–150)
UROBILINOGEN UR QL: NORMAL
VLDLC SERPL-MCNC: 35 MG/DL (ref 5–40)

## 2021-01-04 PROCEDURE — 83036 HEMOGLOBIN GLYCOSYLATED A1C: CPT | Performed by: INTERNAL MEDICINE

## 2021-01-04 PROCEDURE — 80061 LIPID PANEL: CPT | Performed by: INTERNAL MEDICINE

## 2021-01-04 PROCEDURE — 80053 COMPREHEN METABOLIC PANEL: CPT | Performed by: INTERNAL MEDICINE

## 2021-01-04 PROCEDURE — 99214 OFFICE O/P EST MOD 30 MIN: CPT | Performed by: INTERNAL MEDICINE

## 2021-01-04 RX ORDER — PIOGLITAZONEHYDROCHLORIDE 15 MG/1
15 TABLET ORAL DAILY
Qty: 30 TABLET | Refills: 5 | Status: SHIPPED | OUTPATIENT
Start: 2021-01-04 | End: 2021-03-02 | Stop reason: SDUPTHER

## 2021-01-04 RX ORDER — CHOLECALCIFEROL (VITAMIN D3) 125 MCG
15 CAPSULE ORAL NIGHTLY PRN
Start: 2021-01-04 | End: 2021-10-12 | Stop reason: SDUPTHER

## 2021-01-04 NOTE — PROGRESS NOTES
Subjective       Yevgeniy Vaughn is a 65 y.o. male.     Chief Complaint   Patient presents with   • Diabetes     3 month follow up fasting    • Testicle Pain       History obtained from the patient.      History of Present Illness     The patient has a history of Hyponatremia.  Last Sodium on 10/30/2020, done by Pain Management, was normal (135).  CBC on 10/30/2020 was normal.    Primary Care Cardiac Diagnostic Constellation:      His Diabetes Mellitus type has been unstable, but improved.  Medication(s): Metformin and Januvia.   His Hypertension has been stable.   Medication(s): Lisinopril.   His Hyperlipidemia has been stable.   His LDL goal is < 70 mg/dL and last LDL was 28 mg/dL, TG 61.   Medication(s): Atorvastatin and Fish Oil.  The patient is adherent with his medication regimen. He denies medication side effects.       Interval Events:  Last HgA1C was 7.6 (on 9/22/2020). Farxiga was added on 6/10/2020 the patient states this caused bilateral genital pain over the past several months.  He states he stopped the medication 1 week ago and his pain is slightly improved.  The patient states he has not been checking his blood sugar at home.  He denies episodes of low blood sugar.  The patient states he checks his feet regularly.  His last Ophthalmology appointment was 11/4/2020, normal.  The patient states he has not been checking his blood pressure at home.        Symptoms:  Denies chest pain, dyspnea, SALAMANCA, orthopnea, PND, palpitations, syncope, lower extremity edema (except feet), intermittent leg claudication, lightheadedness, and dizziness.  Associated Symptoms: Weight up 6 pounds since since last visit.  Has stable arthralgias and polydipsia.  No fatigue, headache, polyuria, myalgias, visual impairment, memory loss, concentration problems, or focal neurologic deficits.  Stable pain, numbness, and tingling of the feet, but denies a foot ulcer. On Neurontin (maximum dose).      Lifestyle and Disease  Management: Diet: He does have a healthy diet. Weight Issues: He has weight concerns. Exercise: He has not been exercising  Tobacco Use:: Former smoker.  He quit smoking completely 4/1/18. Re-started Sept 2018, 1 ppd.  Quit completely 6/11/2020.  He states he took Chantix for 4 to 5 months and then stopped it.  He started smoking 1/2 pack/day 3 weeks ago.     Chronic Pain Follow-Up: The patient is being seen for follow-up of Chronic Neck Pain and Chronic Left Arm and Shoulder Pain, which are stable.   Interval Events:  The patient is seeing Pain Management for his chronic pain.  He stopped his Cymbalta due to nausea and diarrhea approximately 3 weeks ago.  Symptoms: stable neck pain, back pain, and upper extremity pain, but denies headache, abdominal pain, and lower extremity pain.   Medications:   Neurontin and Percocet per Pain Management.  The patient is adherent to his medication regimen, and he denies medication side effects.     Colonic Polyp Follow-up: The patient is being seen for a routine clinic follow-up of Colon Polyp(s), which is stable.   Interval Events:  Current diagnosis was determined by Colonoscopy and last 1/20/14 was normal, but poor prep.   Symptoms:   No abdominal pain, diarrhea, constipation, hematochezia, melena, decreased stool caliber, or change in bowel habits.  Associated Symptoms: no rectal prolapse.   Medication:  None.       Insomnia Follow-Up: The patient is being seen for follow-up of Insomnia, which is unstable.  Comorbid Illnesses: Anxiety.   Interval Events: He does not feel like the current medications worked as well as Trazodone.  Symptoms: Worsened insomnia and stable anxiety.  Medications:  Ambien and Melatonin.  Off Trazodone due to Hyponatremia.      Anxiety Disorder Follow-Up: The patient is being seen for follow-up of Anxiey, which is stable.  Comorbid Illnesses: Insomnia.  Interval Events: None.  Symptoms: Stable anxiety, but worsened insomnia.  Denies depression, panic  attacks, anhedonia, memory loss, and difficulty concentrating.    Associated Symptoms: no suicidal ideation.   Medication: Alprazolam BID.  The patient is adherent to his medication regimen, and he denies medication side effects.    Current Outpatient Medications on File Prior to Visit   Medication Sig Dispense Refill   • ALPRAZolam (XANAX) 0.5 MG tablet Take 1 tablet by mouth twice daily as needed for anxiety 60 tablet 2   • aspirin 81 MG chewable tablet Chew 81 mg Daily.     • atorvastatin (LIPITOR) 20 MG tablet TAKE 1 TABLET BY MOUTH AT BEDTIME 90 tablet 3   • clobetasol (TEMOVATE) 0.05 % cream APPLY CREAM TOPICALLY TO AFFECTED AREA TWICE DAILY 60 g 3   • gabapentin (NEURONTIN) 800 MG tablet 1 tablet 4 (Four) Times a Day.     • Januvia 100 MG tablet Take 1 tablet by mouth once daily 90 tablet 3   • lisinopril (PRINIVIL,ZESTRIL) 40 MG tablet TAKE 1 TABLET BY MOUTH AT BEDTIME 90 tablet 3   • metFORMIN (GLUCOPHAGE) 1000 MG tablet Take 1 tablet by mouth twice daily 180 tablet 3   • Multiple Vitamins-Minerals (MULTIVITAMIN ADULT PO) Take  by mouth.     • Omega-3 Fatty Acids (FISH OIL) 1000 MG capsule capsule Take 1 capsule by mouth every 12 (twelve) hours     • oxyCODONE-acetaminophen (PERCOCET) 7.5-325 MG per tablet Take 1 tablet by mouth 3 (three) times a day.     • promethazine (PHENERGAN) 25 MG tablet TAKE ONE TABLET BY MOUTH EVERY 4 TO 6 HOURS AS NEEDED FOR NAUSEA AND VOMITING 30 tablet 5   • vitamin B-12 (CYANOCOBALAMIN) 1000 MCG tablet Take 1,000 mcg by mouth Daily.     • vitamin C (ASCORBIC ACID) 250 MG tablet Take 250 mg by mouth Daily.     • zolpidem (AMBIEN) 10 MG tablet Take 1 tablet by mouth At Night As Needed for Sleep. 30 tablet 5     No current facility-administered medications on file prior to visit.        Current outpatient and discharge medications have been reconciled for the patient.  Reviewed by: Christine Rousseau MD        The following portions of the patient's history were reviewed and updated  as appropriate: allergies, current medications, past family history, past medical history, past social history, past surgical history and problem list.    Review of Systems   Constitutional: Negative for fatigue and unexpected weight change.   Eyes: Negative for visual disturbance.   Respiratory: Negative for cough, shortness of breath and wheezing.    Cardiovascular: Negative for chest pain, palpitations and leg swelling.        No SALAMANCA, orthopnea, or claudication.   Gastrointestinal: Negative for abdominal pain, blood in stool, constipation, diarrhea, nausea and vomiting.        Denies melena.   Endocrine: Positive for polydipsia. Negative for polyuria.   Genitourinary: Positive for dysuria and testicular pain. Negative for frequency, hematuria and urgency.        Denies genital rash.   Musculoskeletal: Positive for arthralgias and back pain. Negative for myalgias.   Neurological: Negative for dizziness, syncope, light-headedness and headaches.        No memory issues.   Psychiatric/Behavioral: Positive for sleep disturbance. Negative for decreased concentration and suicidal ideas. The patient is nervous/anxious.          Objective       Blood pressure 166/80, pulse 96, temperature 97.4 °F (36.3 °C), temperature source Temporal, resp. rate 20, weight 89.4 kg (197 lb 2 oz).      Physical Exam  Vitals signs and nursing note reviewed.   Constitutional:       Appearance: He is well-developed. He is obese.   Neck:      Musculoskeletal: Normal range of motion and neck supple.      Thyroid: No thyroid mass or thyromegaly.      Vascular: No carotid bruit.   Cardiovascular:      Rate and Rhythm: Normal rate and regular rhythm.      Pulses: Normal pulses.      Heart sounds: Normal heart sounds. No murmur. No friction rub. No gallop.    Pulmonary:      Effort: Pulmonary effort is normal.      Breath sounds: Normal breath sounds.   Abdominal:      General: Bowel sounds are normal. There is no distension or abdominal bruit.       Palpations: Abdomen is soft. There is no hepatomegaly, splenomegaly or mass.      Tenderness: There is no abdominal tenderness. There is no right CVA tenderness or left CVA tenderness.   Genitourinary:     Penis: Normal.       Scrotum/Testes:         Right: Tenderness (mild) present. Mass or swelling not present.         Left: Mass, tenderness or swelling not present.   Musculoskeletal:      Right lower leg: No edema.      Left lower leg: No edema.   Neurological:      Mental Status: He is alert.   Psychiatric:         Mood and Affect: Mood normal.       Results for orders placed or performed in visit on 01/04/21   POC Urinalysis Dipstick, Automated    Specimen: Urine   Result Value Ref Range    Color Yellow Yellow, Straw, Dark Yellow, Loli    Clarity, UA Cloudy (A) Clear    Specific Gravity  1.015 1.005 - 1.030    pH, Urine 6.0 5.0 - 8.0    Leukocytes Negative Negative    Nitrite, UA Negative Negative    Protein, POC Negative Negative mg/dL    Glucose, UA >=1000 mg/dL (3+) (A) Negative, 1000 mg/dL (3+) mg/dL    Ketones, UA Negative Negative    Urobilinogen, UA Normal Normal    Bilirubin Negative Negative    Blood, UA Negative Negative    Lot Number [id number]     Expiration Date 8-31/21    POC Glycosylated Hemoglobin (Hb A1C)    Specimen: Blood   Result Value Ref Range    Hemoglobin A1C 10.5 %    Lot Number [id number]     Expiration Date 9-14-22        Assessment / Plan:  Diagnoses and all orders for this visit:    1. Type 2 diabetes mellitus with hyperglycemia, without long-term current use of insulin (CMS/[facility]) (Primary)  -     POC Glycosylated Hemoglobin (Hb A1C)  -     Lipid Panel  -     Comprehensive Metabolic Panel  -     pioglitazone (Actos) 15 MG tablet; Take 1 tablet by mouth Daily.  Dispense: 30 tablet; Refill: 5- NEW   Continue current medication(s) as noted in the history of present illness.    2. Essential hypertension  -     Lipid Panel  -     Comprehensive Metabolic Panel   Continue current  medication(s) as noted in the history of present illness.    3. Other hyperlipidemia  -     Lipid Panel  -     Comprehensive Metabolic Panel   Continue current medication(s) as noted in the history of present illness.    4. Chronic pain syndrome   Continue current medication(s) as noted in the history of present illness.   Follow-up per Pain Management.    5. Benign colonic polyp   The patient declined scheduling a Colonoscopy (due January 2021) due to Covid concerns.      6. Primary insomnia  -     melatonin 5 MG tablet tablet; Take 3 tablets by mouth At Night As Needed (insomnia)-increased dose.   Continue current medication(s) as noted in the history of present illness.    7. Generalized anxiety disorder   Continue current medication(s) as noted in the history of present illness.    The patient was instructed in the side effects of the medication.  Risks of the potential for tolerance, dependence, and addiction were discussed.  The patient was instructed to take the lowest dosage of the medication, at the lowest frequency, and for the shortest period of time possible.  The patient was instructed not to receive controlled substances or narcotics from other doctors, and not to giveaway or sell the medication.    The patient was instructed to abstain from illicit drug use.  The patient was instructed to avoid alcohol while taking these medications.      Narcotics/controlled substance agreement, Lonnie report, and Urine Drug Screen were updated today if needed.    8. Dysuria  -     POC Urinalysis Dipstick, Automated    9. Pain in both testicles   The patient declined scheduling a testicular ultrasound today.      The patient declined Chantix refill today.      Recommended Shingrix (new Shingles vaccine) #2 at the pharmacy.      Return in about 6 weeks (around 2/15/2021) for Recheck Diabetes, fasting.

## 2021-01-05 ENCOUNTER — TELEPHONE (OUTPATIENT)
Dept: INTERNAL MEDICINE | Facility: CLINIC | Age: 66
End: 2021-01-05

## 2021-01-05 NOTE — TELEPHONE ENCOUNTER
Caller: Sue Vaughn    Relationship to patient: Emergency Contact    Best call back number: [phone]     Patient is needing: RETURNING DANIEL'S CALL FROM 4:21PM

## 2021-01-05 NOTE — TELEPHONE ENCOUNTER
Call patient please.  His sodium level has dropped back down to 123.  Has he been drinking a lot of fluids again?

## 2021-01-06 NOTE — TELEPHONE ENCOUNTER
Recommend the patient limit his fluid intake and repeat the sodium level in 1 week.  I would also like to schedule a chest x-ray and an MRI to rule out other causes of his recurrent low sodium level.  If he is agreeable, I will enter an order.

## 2021-01-06 NOTE — TELEPHONE ENCOUNTER
Spoke to patient and gave results. He stated that he has been drinking a lot coffee. Patient verb good understanding with results.

## 2021-01-08 DIAGNOSIS — E11.65 TYPE 2 DIABETES MELLITUS WITH HYPERGLYCEMIA, WITHOUT LONG-TERM CURRENT USE OF INSULIN (HCC): Primary | ICD-10-CM

## 2021-01-11 RX ORDER — BLOOD SUGAR DIAGNOSTIC
STRIP MISCELLANEOUS
Qty: 100 EACH | Refills: 3 | Status: SHIPPED | OUTPATIENT
Start: 2021-01-11 | End: 2021-01-22 | Stop reason: CLARIF

## 2021-01-13 ENCOUNTER — TELEPHONE (OUTPATIENT)
Dept: INTERNAL MEDICINE | Facility: CLINIC | Age: 66
End: 2021-01-13

## 2021-01-13 DIAGNOSIS — E11.65 TYPE 2 DIABETES MELLITUS WITH HYPERGLYCEMIA, WITHOUT LONG-TERM CURRENT USE OF INSULIN (HCC): Primary | ICD-10-CM

## 2021-01-13 NOTE — TELEPHONE ENCOUNTER
PATIENT'S WIFE CALLED AND STATED THAT A PRIOR AUTHORIZATION FOR   glucose blood (Accu-Chek Carito Plus) test strip    IS NEEDED    PATIENT IS GETTING LOW     [facility] - [place], [place] - [address] [place] [address] - [phone]

## 2021-01-13 NOTE — TELEPHONE ENCOUNTER
Spoke with Sue advised that she call her new insurance and see what monitor , test stripes and lancets are covered under is plan .  She will call back with the information and then we can order new supplies   Verb understanding given

## 2021-01-15 ENCOUNTER — TELEPHONE (OUTPATIENT)
Dept: INTERNAL MEDICINE | Facility: CLINIC | Age: 66
End: 2021-01-15

## 2021-01-15 NOTE — TELEPHONE ENCOUNTER
Contact plan to follow up on [id number]  Drug:Accu-Chek Carito Plus strips  Next Steps:  The plan will fax you a determination, typically within 1 to 5 business days.

## 2021-01-15 NOTE — TELEPHONE ENCOUNTER
WIFE STATES THAT THE INSURANCE STATES THAT PA FORM NEEDS TO BE FILLED OUT AND RETURNED WITH CLIINCAL NOTES TO WHY HE NEEDS THE TEST STRIPS  AND MAUREEN     WIFE STATES THAT IT NEEDS TO EXPEDITED TO GET IT BACK IN 24 HOURS  IN 24 HOURS        PLEASE ADVISE  [phone]

## 2021-01-21 NOTE — TELEPHONE ENCOUNTER
WIFE PAULA IS CHECKING ON THE STATUS OF THE PRIOR AUTHORIZATION.       PLEASE ADVISE  [phone]

## 2021-01-22 RX ORDER — BLOOD-GLUCOSE METER
1 KIT MISCELLANEOUS 2 TIMES DAILY
Qty: 1 EACH | Refills: 0 | Status: SHIPPED | OUTPATIENT
Start: 2021-01-22 | End: 2022-01-20 | Stop reason: SDUPTHER

## 2021-01-22 RX ORDER — LANCETS 30 GAUGE
EACH MISCELLANEOUS
Qty: 100 EACH | Refills: 3 | Status: SHIPPED | OUTPATIENT
Start: 2021-01-22 | End: 2021-07-07 | Stop reason: SDUPTHER

## 2021-01-22 NOTE — TELEPHONE ENCOUNTER
Per cover my meds the insurance is not going to cover the Accu Chek teddy test strips.  Pharmacist said to send a new rx for a meter, test strips, and lancets.  Notified patient's wife and she verbalized understanding.

## 2021-02-08 DIAGNOSIS — F51.01 PRIMARY INSOMNIA: ICD-10-CM

## 2021-02-08 RX ORDER — ZOLPIDEM TARTRATE 10 MG/1
10 TABLET ORAL NIGHTLY PRN
Qty: 30 TABLET | Refills: 5 | Status: SHIPPED | OUTPATIENT
Start: 2021-02-08 | End: 2021-08-09 | Stop reason: SDUPTHER

## 2021-02-08 NOTE — TELEPHONE ENCOUNTER
Last Office Visit: 1/4/2021  Next Office Visit: 2/16/2021    Labs completed in past 6 months? yes  Labs completed in past year? yes    Medication: Ambien   Last Refill Date:  8/19/2020  Quantity: 30   Refills: 5    Pharmacy: Pharmacy:  [facility] [address] [place] [address] [phone] [facility] [phone] FX

## 2021-03-02 ENCOUNTER — OFFICE VISIT (OUTPATIENT)
Dept: INTERNAL MEDICINE | Facility: CLINIC | Age: 66
End: 2021-03-02

## 2021-03-02 VITALS
OXYGEN SATURATION: 99 % | HEART RATE: 97 BPM | DIASTOLIC BLOOD PRESSURE: 88 MMHG | TEMPERATURE: 97.5 F | HEIGHT: 69 IN | BODY MASS INDEX: 27.99 KG/M2 | SYSTOLIC BLOOD PRESSURE: 148 MMHG | RESPIRATION RATE: 20 BRPM | WEIGHT: 189 LBS

## 2021-03-02 DIAGNOSIS — E87.1 HYPONATREMIA: ICD-10-CM

## 2021-03-02 DIAGNOSIS — E78.49 OTHER HYPERLIPIDEMIA: ICD-10-CM

## 2021-03-02 DIAGNOSIS — E11.65 TYPE 2 DIABETES MELLITUS WITH HYPERGLYCEMIA, WITHOUT LONG-TERM CURRENT USE OF INSULIN (HCC): Primary | ICD-10-CM

## 2021-03-02 DIAGNOSIS — M54.50 ACUTE MIDLINE LOW BACK PAIN WITHOUT SCIATICA: ICD-10-CM

## 2021-03-02 DIAGNOSIS — I10 ESSENTIAL HYPERTENSION: ICD-10-CM

## 2021-03-02 LAB
ALBUMIN SERPL-MCNC: 4.2 G/DL (ref 3.5–5.2)
ALBUMIN/GLOB SERPL: 1.4 G/DL
ALP SERPL-CCNC: 64 U/L (ref 39–117)
ALT SERPL W P-5'-P-CCNC: 16 U/L (ref 1–41)
ANION GAP SERPL CALCULATED.3IONS-SCNC: 10.4 MMOL/L (ref 5–15)
AST SERPL-CCNC: 15 U/L (ref 1–40)
BILIRUB BLD-MCNC: NEGATIVE MG/DL
BILIRUB SERPL-MCNC: 0.2 MG/DL (ref 0–1.2)
BUN SERPL-MCNC: 12 MG/DL (ref 8–23)
BUN/CREAT SERPL: 12.6 (ref 7–25)
CALCIUM SPEC-SCNC: 9.4 MG/DL (ref 8.6–10.5)
CHLORIDE SERPL-SCNC: 97 MMOL/L (ref 98–107)
CLARITY, POC: CLEAR
CO2 SERPL-SCNC: 26.6 MMOL/L (ref 22–29)
COLOR UR: ABNORMAL
CREAT SERPL-MCNC: 0.95 MG/DL (ref 0.76–1.27)
EXPIRATION DATE: ABNORMAL
GFR SERPL CREATININE-BSD FRML MDRD: 80 ML/MIN/1.73
GLOBULIN UR ELPH-MCNC: 3.1 GM/DL
GLUCOSE SERPL-MCNC: 215 MG/DL (ref 65–99)
GLUCOSE UR STRIP-MCNC: ABNORMAL MG/DL
KETONES UR QL: ABNORMAL
LEUKOCYTE EST, POC: NEGATIVE
Lab: ABNORMAL
NITRITE UR-MCNC: NEGATIVE MG/ML
PH UR: 5 [PH] (ref 5–8)
POTASSIUM SERPL-SCNC: 5.2 MMOL/L (ref 3.5–5.2)
PROT SERPL-MCNC: 7.3 G/DL (ref 6–8.5)
PROT UR STRIP-MCNC: ABNORMAL MG/DL
RBC # UR STRIP: NEGATIVE /UL
SODIUM SERPL-SCNC: 134 MMOL/L (ref 136–145)
SP GR UR: 1.02 (ref 1–1.03)
UROBILINOGEN UR QL: ABNORMAL

## 2021-03-02 PROCEDURE — 80053 COMPREHEN METABOLIC PANEL: CPT | Performed by: INTERNAL MEDICINE

## 2021-03-02 PROCEDURE — 99214 OFFICE O/P EST MOD 30 MIN: CPT | Performed by: INTERNAL MEDICINE

## 2021-03-02 RX ORDER — IBUPROFEN 800 MG/1
TABLET ORAL
COMMUNITY
Start: 2021-01-19 | End: 2021-07-09

## 2021-03-02 RX ORDER — BLOOD-GLUCOSE METER
KIT MISCELLANEOUS
COMMUNITY
Start: 2021-01-22 | End: 2022-01-27 | Stop reason: CLARIF

## 2021-03-02 RX ORDER — SODIUM CHLORIDE 1000 MG
1 TABLET, SOLUBLE MISCELLANEOUS 2 TIMES DAILY
COMMUNITY
Start: 2021-01-08 | End: 2022-06-06 | Stop reason: SDUPTHER

## 2021-03-02 RX ORDER — PIOGLITAZONEHYDROCHLORIDE 45 MG/1
45 TABLET ORAL DAILY
Qty: 30 TABLET | Refills: 5 | Status: SHIPPED | OUTPATIENT
Start: 2021-03-02 | End: 2021-08-30

## 2021-03-02 NOTE — PROGRESS NOTES
Subjective       Yevgeniy Vaughn is a 65 y.o. male.     Chief Complaint   Patient presents with   • Diabetes     6 week f/u    • low sodium       History obtained from the patient and his wife.      History of Present Illness     The patient is seen Nephrology for Hyponatremia (note not available).  He states he was given sodium chloride pills to take twice daily.    Primary Care Cardiac Diagnostic Constellation:      His Diabetes Mellitus type has been unstable, but improved.  Medication(s): Metformin, Januvia, Actos, Lisinopril, and Atorvastatin.   His Hypertension has been unstable.   Medication(s): Lisinopril.   His Hyperlipidemia has been unstable.   His LDL goal is < 70 mg/dL and last LDL was 51 mg/dL, TG 216.   Medication(s): Atorvastatin and Fish Oil.  The patient is adherent with his medication regimen. He denies medication side effects.       Interval Events: The patient stopped Farxiga due to general pain.  Last HgA1C was 10.5  (on 1/4/21).  Actos was added.  He states he self increased his dose from 15 mg daily to 50 mg twice daily since his blood sugars were still running 300-325 fasting at home.  He states his blood sugars are currently running 2 45-2 70 fasting.  He denies episodes of low blood sugar.  The patient states he checks his feet regularly.  His last Ophthalmology appointment was 11/4/2020, normal.  The patient his blood pressure at home has been running 120's/80's.   Blood pressure last visit was 148/88.  There were no medication changes made.     Symptoms:  Denies chest pain, dyspnea, SALAMANCA, orthopnea, PND, palpitations, syncope, lower extremity edema, claudication, lightheadedness, and dizziness.  Associated Symptoms: Weight down 8 pounds since since last visit.  Has stable arthralgias and polydipsia.  No fatigue, headache, polyuria, myalgias, visual impairment, memory loss, concentration problems, or focal neurologic deficits.  Stable pain, numbness, and tingling of the feet, but denies  a foot ulcer. On Neurontin (maximum dose).      Lifestyle and Disease Management: Diet: He does have a healthy diet. Weight Issues: He has weight concerns. Exercise: He has  been walking 2 miles per day.   Tobacco Use:: Former smoker.  He quit smoking completely 4/1/18. Re-started Sept 2018, 1 ppd.  Quit completely 6/11/2020.  He states he took Chantix for 4 to 5 months and then stopped it.  He is currently smoking 1/2 pack/day.       Current Outpatient Medications on File Prior to Visit   Medication Sig Dispense Refill   • ALPRAZolam (XANAX) 0.5 MG tablet Take 1 tablet by mouth twice daily as needed for anxiety 60 tablet 2   • aspirin 81 MG chewable tablet Chew 81 mg Daily.     • atorvastatin (LIPITOR) 20 MG tablet TAKE 1 TABLET BY MOUTH AT BEDTIME 90 tablet 3   • clobetasol (TEMOVATE) 0.05 % cream APPLY CREAM TOPICALLY TO AFFECTED AREA TWICE DAILY 60 g 3   • gabapentin (NEURONTIN) 800 MG tablet 1 tablet 4 (Four) Times a Day.     • glucose blood test strip Use as instructed 100 each 3   • glucose monitor monitoring kit 1 each 2 (two) times a day. 1 each 0   • Januvia 100 MG tablet Take 1 tablet by mouth once daily 90 tablet 3   • Lancets misc Test bid 100 each 3   • lisinopril (PRINIVIL,ZESTRIL) 40 MG tablet TAKE 1 TABLET BY MOUTH AT BEDTIME 90 tablet 3   • melatonin 5 MG tablet tablet Take 3 tablets by mouth At Night As Needed (insomnia).     • metFORMIN (GLUCOPHAGE) 1000 MG tablet Take 1 tablet by mouth twice daily 180 tablet 3   • Multiple Vitamins-Minerals (MULTIVITAMIN ADULT PO) Take  by mouth.     • Omega-3 Fatty Acids (FISH OIL) 1000 MG capsule capsule Take 1 capsule by mouth every 12 (twelve) hours     • oxyCODONE-acetaminophen (PERCOCET) 7.5-325 MG per tablet Take 1 tablet by mouth 3 (three) times a day.     • promethazine (PHENERGAN) 25 MG tablet TAKE ONE TABLET BY MOUTH EVERY 4 TO 6 HOURS AS NEEDED FOR NAUSEA AND VOMITING 30 tablet 5   • vitamin B-12 (CYANOCOBALAMIN) 1000 MCG tablet Take 1,000 mcg by  "mouth Daily.     • vitamin C (ASCORBIC ACID) 250 MG tablet Take 250 mg by mouth Daily.     • zolpidem (AMBIEN) 10 MG tablet TAKE 1 TABLET BY MOUTH AT NIGHT AS NEEDED FOR SLEEP 30 tablet 5   • Blood Glucose Monitoring Suppl (FreeStyle Lite) device      • ibuprofen (ADVIL,MOTRIN) 800 MG tablet      • sodium chloride 1 g tablet Take 1 g by mouth 2 (Two) Times a Day.       No current facility-administered medications on file prior to visit.       Current outpatient and discharge medications have been reconciled for the patient.  Reviewed by: Christine Rousseau MD        The following portions of the patient's history were reviewed and updated as appropriate: allergies, current medications, past family history, past medical history, past social history, past surgical history and problem list.    Review of Systems   Constitutional: Negative for fatigue and unexpected weight change.   Eyes: Negative for visual disturbance.   Respiratory: Negative for cough, shortness of breath and wheezing.    Cardiovascular: Negative for chest pain, palpitations and leg swelling.        No SALAMANCA, orthopnea, or claudication.   Gastrointestinal: Negative for abdominal pain, blood in stool, constipation, diarrhea, nausea and vomiting.        Denies melena.   Endocrine: Positive for polydipsia. Negative for polyuria.   Genitourinary: Negative for dysuria, frequency, hematuria and urgency.        Genital pain resolved.   Musculoskeletal: Positive for arthralgias and back pain (stable). Negative for myalgias.   Neurological: Positive for numbness. Negative for dizziness, syncope, light-headedness and headaches.        No memory issues.   Psychiatric/Behavioral: Negative for decreased concentration.         Objective       Blood pressure 148/88, pulse 97, temperature 97.5 °F (36.4 °C), temperature source Temporal, resp. rate 20, height 175.3 cm (69\"), weight 85.7 kg (189 lb), SpO2 99 %.      Physical Exam  Vitals and nursing note reviewed. "   Constitutional:       Appearance: He is well-developed.      Comments: Overweight.   Neck:      Thyroid: No thyroid mass or thyromegaly.      Vascular: No carotid bruit.   Cardiovascular:      Rate and Rhythm: Normal rate and regular rhythm.      Pulses: Normal pulses.      Heart sounds: Normal heart sounds. No murmur. No friction rub. No gallop.    Pulmonary:      Effort: Pulmonary effort is normal.      Breath sounds: Normal breath sounds.   Musculoskeletal:      Right lower leg: No edema.      Left lower leg: No edema.   Neurological:      Mental Status: He is alert.   Psychiatric:         Mood and Affect: Mood normal.       Results for orders placed or performed in visit on 03/02/21   POC Urinalysis Dipstick, Automated    Specimen: Urine   Result Value Ref Range    Color Straw Yellow, Straw, Dark Yellow, Loli    Clarity, UA Clear Clear    Specific Gravity  1.020 1.005 - 1.030    pH, Urine 5.0 5.0 - 8.0    Leukocytes Negative Negative    Nitrite, UA Negative Negative    Protein, POC Trace (A) Negative mg/dL    Glucose, UA >=1000 mg/dL (3+) (A) Negative, 1000 mg/dL (3+) mg/dL    Ketones, UA 15 mg/dL (A) Negative    Urobilinogen, UA 1 E.U./dL  (A) Normal    Bilirubin Negative Negative    Blood, UA Negative Negative    Lot Number [id number]     Expiration Date 8-31-21        Assessment / Plan:  Diagnoses and all orders for this visit:    1. Type 2 diabetes mellitus with hyperglycemia, without long-term current use of insulin (CMS/[facility]) (Primary)  -     Comprehensive Metabolic Panel  -     pioglitazone (ACTOS) 45 MG tablet; Take 1 tablet by mouth Daily.  Dispense: 30 tablet; Refill: 5 (increased dose)    Continue Metformin and Januvia.    2. Essential hypertension   Continue current medication(s) as noted in the history of present illness.    3. Other hyperlipidemia   Continue current medication(s) as noted in the history of present illness.    4. Hyponatremia   Follow-up per Nephrology.    5. Acute midline low  back pain without sciatica  -     POC Urinalysis Dipstick, Automated          Return in about 6 weeks (around 4/13/2021) for Recheck Diabetes, fasting (after 4/4/21).

## 2021-03-15 DIAGNOSIS — F41.1 GENERALIZED ANXIETY DISORDER: ICD-10-CM

## 2021-03-15 RX ORDER — ALPRAZOLAM 0.5 MG/1
0.5 TABLET ORAL 2 TIMES DAILY PRN
Qty: 60 TABLET | Refills: 2 | Status: SHIPPED | OUTPATIENT
Start: 2021-03-15 | End: 2021-06-10 | Stop reason: SDUPTHER

## 2021-03-22 ENCOUNTER — IMMUNIZATION (OUTPATIENT)
Dept: VACCINE CLINIC | Facility: HOSPITAL | Age: 66
End: 2021-03-22

## 2021-03-22 PROCEDURE — 91300 HC SARSCOV02 VAC 30MCG/0.3ML IM: CPT | Performed by: INTERNAL MEDICINE

## 2021-03-22 PROCEDURE — 0001A: CPT | Performed by: INTERNAL MEDICINE

## 2021-04-12 ENCOUNTER — IMMUNIZATION (OUTPATIENT)
Dept: VACCINE CLINIC | Facility: HOSPITAL | Age: 66
End: 2021-04-12

## 2021-04-12 PROCEDURE — 0002A: CPT | Performed by: INTERNAL MEDICINE

## 2021-04-12 PROCEDURE — 91300 HC SARSCOV02 VAC 30MCG/0.3ML IM: CPT | Performed by: INTERNAL MEDICINE

## 2021-04-13 ENCOUNTER — OFFICE VISIT (OUTPATIENT)
Dept: INTERNAL MEDICINE | Facility: CLINIC | Age: 66
End: 2021-04-13

## 2021-04-13 VITALS
DIASTOLIC BLOOD PRESSURE: 78 MMHG | SYSTOLIC BLOOD PRESSURE: 156 MMHG | HEART RATE: 84 BPM | TEMPERATURE: 97.7 F | RESPIRATION RATE: 24 BRPM | WEIGHT: 191.8 LBS | BODY MASS INDEX: 28.32 KG/M2

## 2021-04-13 DIAGNOSIS — K63.5 BENIGN COLONIC POLYP: ICD-10-CM

## 2021-04-13 DIAGNOSIS — E78.49 OTHER HYPERLIPIDEMIA: ICD-10-CM

## 2021-04-13 DIAGNOSIS — F51.01 PRIMARY INSOMNIA: ICD-10-CM

## 2021-04-13 DIAGNOSIS — F17.219 CIGARETTE NICOTINE DEPENDENCE WITH NICOTINE-INDUCED DISORDER: ICD-10-CM

## 2021-04-13 DIAGNOSIS — E11.65 TYPE 2 DIABETES MELLITUS WITH HYPERGLYCEMIA, WITHOUT LONG-TERM CURRENT USE OF INSULIN (HCC): Primary | ICD-10-CM

## 2021-04-13 DIAGNOSIS — I10 ESSENTIAL HYPERTENSION: ICD-10-CM

## 2021-04-13 DIAGNOSIS — G89.4 CHRONIC PAIN SYNDROME: ICD-10-CM

## 2021-04-13 DIAGNOSIS — K40.90 UNILATERAL INGUINAL HERNIA WITHOUT OBSTRUCTION OR GANGRENE, RECURRENCE NOT SPECIFIED: ICD-10-CM

## 2021-04-13 DIAGNOSIS — Z12.11 SCREENING FOR COLON CANCER: ICD-10-CM

## 2021-04-13 DIAGNOSIS — F41.1 GENERALIZED ANXIETY DISORDER: ICD-10-CM

## 2021-04-13 LAB
ALBUMIN SERPL-MCNC: 4.4 G/DL (ref 3.5–5.2)
ALBUMIN/GLOB SERPL: 1.6 G/DL
ALP SERPL-CCNC: 59 U/L (ref 39–117)
ALT SERPL W P-5'-P-CCNC: 10 U/L (ref 1–41)
ANION GAP SERPL CALCULATED.3IONS-SCNC: 11.1 MMOL/L (ref 5–15)
AST SERPL-CCNC: 16 U/L (ref 1–40)
BILIRUB SERPL-MCNC: 0.3 MG/DL (ref 0–1.2)
BUN SERPL-MCNC: 9 MG/DL (ref 8–23)
BUN/CREAT SERPL: 12.7 (ref 7–25)
CALCIUM SPEC-SCNC: 9.8 MG/DL (ref 8.6–10.5)
CHLORIDE SERPL-SCNC: 95 MMOL/L (ref 98–107)
CHOLEST SERPL-MCNC: 146 MG/DL (ref 0–200)
CO2 SERPL-SCNC: 23.9 MMOL/L (ref 22–29)
CREAT SERPL-MCNC: 0.71 MG/DL (ref 0.76–1.27)
EXPIRATION DATE: NORMAL
GFR SERPL CREATININE-BSD FRML MDRD: 112 ML/MIN/1.73
GLOBULIN UR ELPH-MCNC: 2.8 GM/DL
GLUCOSE SERPL-MCNC: 221 MG/DL (ref 65–99)
HBA1C MFR BLD: 10.3 %
HDLC SERPL-MCNC: 36 MG/DL (ref 40–60)
LDLC SERPL CALC-MCNC: 62 MG/DL (ref 0–100)
LDLC/HDLC SERPL: 1.36 {RATIO}
Lab: NORMAL
POTASSIUM SERPL-SCNC: 4.8 MMOL/L (ref 3.5–5.2)
PROT SERPL-MCNC: 7.2 G/DL (ref 6–8.5)
SODIUM SERPL-SCNC: 130 MMOL/L (ref 136–145)
TRIGL SERPL-MCNC: 306 MG/DL (ref 0–150)
VLDLC SERPL-MCNC: 48 MG/DL (ref 5–40)

## 2021-04-13 PROCEDURE — 99214 OFFICE O/P EST MOD 30 MIN: CPT | Performed by: INTERNAL MEDICINE

## 2021-04-13 PROCEDURE — 83036 HEMOGLOBIN GLYCOSYLATED A1C: CPT | Performed by: INTERNAL MEDICINE

## 2021-04-13 PROCEDURE — 80061 LIPID PANEL: CPT | Performed by: INTERNAL MEDICINE

## 2021-04-13 PROCEDURE — 80053 COMPREHEN METABOLIC PANEL: CPT | Performed by: INTERNAL MEDICINE

## 2021-04-13 RX ORDER — METOPROLOL SUCCINATE 50 MG/1
50 TABLET, EXTENDED RELEASE ORAL NIGHTLY
Qty: 30 TABLET | Refills: 5 | Status: SHIPPED | OUTPATIENT
Start: 2021-04-13 | End: 2021-07-09

## 2021-04-13 NOTE — PROGRESS NOTES
Subjective       Yevgeniy Vaughn is a 65 y.o. male.     Chief Complaint   Patient presents with   • Diabetes     Follow up   • Hypertension   • Hyperlipidemia       History obtained from the patient and his wife.      History of Present Illness     Primary Care Cardiac Diagnostic Constellation:      His Diabetes Mellitus type has been unstable,but improved.  Medication(s): Metformin, Januvia, Actos, Lisinopril, and Atorvastatin.   His Hypertension has been unstable.   Medication(s): Lisinopril.   His Hyperlipidemia has been unstable.   His LDL goal is < 70 mg/dL and last LDL was 51 mg/dL, TG 216.   Medication(s): Atorvastatin and Fish Oil.  The patient is adherent with his medication regimen. He denies medication side effects.       Interval Events: The patient stopped Farxiga due to general pain.  Last HgA1C was 10.5  (on 1/4/21).  Actos was added.  He then self increased his dose from 15 mg daily to 15 mg twice daily.  He was seen for follow-up on 3/2/2021 and Actos was increased to 45 mg daily.  He states his blood sugars are still running 240-270 fasting.  He has not been checking postprandial levels.  He denies episodes of low blood sugar.  The patient states he checks his feet regularly.  His last Ophthalmology appointment was 11/4/2020, normal.  The patient his blood pressure at home has been running 130's / 80's.   Blood pressure last visit was 148/88.  There have been no recent blood pressure medication changes.     Symptoms:  Denies chest pain, dyspnea, SALAMANCA, orthopnea, PND, palpitations, syncope, lower extremity edema, claudication, lightheadedness, and dizziness.  Associated Symptoms: Weight down 6 pounds since since 1/4/21.  Has stable arthralgias and polydipsia.  No fatigue, headache, polyuria, myalgias, visual impairment, memory loss, concentration problems, or focal neurologic deficits.  Stable pain, numbness, and tingling of the feet, but denies a foot ulcer. On Neurontin (maximum  "dose).      Lifestyle and Disease Management: Diet: He does have a healthy diet. Weight Issues: He has weight concerns. Exercise: He has been walking on the treadmill 2 miles per day.  He has also been doing yard work.  Tobacco Use:   Former smoker.  He quit smoking completely 4/1/18. Re-started Sept 2018, 1 ppd.  Quit completely 6/11/2020.  He states he took Chantix for 4 to 5 months and then stopped it.  He is currently smoking 1 pack/day.  He would like another prescription for Chantix.    Chronic Pain Follow-Up: The patient is being seen for follow-up of Chronic Neck Pain and Chronic Left Arm and Shoulder Pain, which are stable.   Interval Events:  The patient is seeing Pain Management for his chronic pain.    Symptoms: stable neck pain, back pain, and upper extremity pain, but denies headache, abdominal pain, and lower extremity pain.   Medications:   Neurontin and Percocet per Pain Management.  Off Cymbalta.  The patient is adherent to his medication regimen, and he denies medication side effects.     Colonic Polyp Follow-up: The patient is being seen for a routine clinic follow-up of Colon Polyp(s), which is stable.   Interval Events:  Current diagnosis was determined by Colonoscopy and last 1/20/14 was normal, but poor prep.   Symptoms: He is complaining of right lower quadrant abdominal pain, radiating to the right low back, and a feeling of fullness (\"not\")  for the past 2 months.  This started after he moved some furniture at home.  The pain has not changed.  No diarrhea, constipation, hematochezia, melena, decreased stool caliber, or change in bowel habits.  Associated Symptoms: no rectal prolapse.   Medication:  None.       Insomnia Follow-Up: The patient is being seen for follow-up of Insomnia, which is stable.  Comorbid Illnesses: Anxiety.   Interval Events:  None.  Symptoms: Stable insomnia and anxiety.  Medications:  Ambien and Melatonin.  Off Trazodone due to Hyponatremia.      Anxiety Disorder " Follow-Up: The patient is being seen for follow-up of Anxiey, which is stable.  Comorbid Illnesses: Insomnia.  Interval Events: None.  Symptoms: Stable anxiety and insomnia.  Denies depression, panic attacks, anhedonia, memory loss, and difficulty concentrating.    Associated Symptoms: no suicidal ideation.   Medication: Alprazolam BID.  The patient is adherent to his medication regimen, and he denies medication side effects.    Current Outpatient Medications on File Prior to Visit   Medication Sig Dispense Refill   • ALPRAZolam (XANAX) 0.5 MG tablet Take 1 tablet by mouth 2 (Two) Times a Day As Needed for Anxiety. for anxiety 60 tablet 2   • aspirin 81 MG chewable tablet Chew 81 mg Daily.     • atorvastatin (LIPITOR) 20 MG tablet TAKE 1 TABLET BY MOUTH AT BEDTIME 90 tablet 3   • Blood Glucose Monitoring Suppl (FreeStyle Lite) device      • gabapentin (NEURONTIN) 800 MG tablet 1 tablet 4 (Four) Times a Day.     • glucose blood test strip Use as instructed 100 each 3   • glucose monitor monitoring kit 1 each 2 (two) times a day. 1 each 0   • ibuprofen (ADVIL,MOTRIN) 800 MG tablet      • Januvia 100 MG tablet Take 1 tablet by mouth once daily 90 tablet 3   • Lancets misc Test bid 100 each 3   • lisinopril (PRINIVIL,ZESTRIL) 40 MG tablet TAKE 1 TABLET BY MOUTH AT BEDTIME 90 tablet 3   • melatonin 5 MG tablet tablet Take 3 tablets by mouth At Night As Needed (insomnia).     • metFORMIN (GLUCOPHAGE) 1000 MG tablet Take 1 tablet by mouth twice daily 180 tablet 3   • Multiple Vitamins-Minerals (MULTIVITAMIN ADULT PO) Take  by mouth.     • Omega-3 Fatty Acids (FISH OIL) 1000 MG capsule capsule Take 1 capsule by mouth every 12 (twelve) hours     • oxyCODONE-acetaminophen (PERCOCET) 7.5-325 MG per tablet Take 1 tablet by mouth 3 (three) times a day.     • pioglitazone (ACTOS) 45 MG tablet Take 1 tablet by mouth Daily. 30 tablet 5   • promethazine (PHENERGAN) 25 MG tablet TAKE ONE TABLET BY MOUTH EVERY 4 TO 6 HOURS AS NEEDED  FOR NAUSEA AND VOMITING 30 tablet 5   • sodium chloride 1 g tablet Take 1 g by mouth 2 (Two) Times a Day.     • vitamin B-12 (CYANOCOBALAMIN) 1000 MCG tablet Take 1,000 mcg by mouth Daily.     • vitamin C (ASCORBIC ACID) 250 MG tablet Take 250 mg by mouth Daily.     • zolpidem (AMBIEN) 10 MG tablet TAKE 1 TABLET BY MOUTH AT NIGHT AS NEEDED FOR SLEEP 30 tablet 5   • clobetasol (TEMOVATE) 0.05 % cream APPLY CREAM TOPICALLY TO AFFECTED AREA TWICE DAILY 60 g 3     No current facility-administered medications on file prior to visit.       Current outpatient and discharge medications have been reconciled for the patient.  Reviewed by: Christine Rousseau MD        The following portions of the patient's history were reviewed and updated as appropriate: allergies, current medications, past family history, past medical history, past social history, past surgical history and problem list.    Review of Systems   Constitutional: Negative for fatigue and unexpected weight change.   Eyes: Negative for visual disturbance.   Respiratory: Negative for cough, shortness of breath and wheezing.    Cardiovascular: Negative for chest pain, palpitations and leg swelling.        No SALAMANCA, orthopnea, or claudication.   Gastrointestinal: Positive for abdominal pain. Negative for blood in stool, constipation, diarrhea, nausea and vomiting.        Denies melena.   Endocrine: Positive for polydipsia. Negative for polyuria.   Musculoskeletal: Positive for arthralgias, back pain and neck pain. Negative for myalgias.   Neurological: Positive for numbness. Negative for dizziness, syncope, light-headedness and headaches.        No memory issues.   Psychiatric/Behavioral: Positive for sleep disturbance. Negative for decreased concentration and suicidal ideas. The patient is nervous/anxious.          Objective       Blood pressure 156/78, pulse 84, temperature 97.7 °F (36.5 °C), temperature source Infrared, resp. rate 24, weight 87 kg (191 lb 12.8  oz).  Body mass index is 28.32 kg/m².      Physical Exam  Vitals and nursing note reviewed.   Constitutional:       Appearance: He is well-developed.      Comments: Overweight.   Neck:      Thyroid: No thyroid mass or thyromegaly.      Vascular: No carotid bruit.   Cardiovascular:      Rate and Rhythm: Normal rate and regular rhythm.      Pulses: Normal pulses.      Heart sounds: Normal heart sounds. No murmur heard.   No friction rub. No gallop.    Pulmonary:      Effort: Pulmonary effort is normal.      Breath sounds: Normal breath sounds.   Abdominal:      General: Bowel sounds are normal. There is no distension or abdominal bruit.      Palpations: Abdomen is soft. There is no hepatomegaly, splenomegaly or mass.      Tenderness: There is abdominal tenderness (mild to moderate RLQ pain and fullness to palpation). There is no right CVA tenderness, left CVA tenderness, guarding or rebound.      Hernia: A hernia (ventral and right groin) is present.   Musculoskeletal:      Cervical back: Normal range of motion and neck supple.      Right lower leg: No edema.      Left lower leg: No edema.   Neurological:      Mental Status: He is alert.   Psychiatric:         Mood and Affect: Mood normal.       Results for orders placed or performed in visit on 04/13/21   POC Glycosylated Hemoglobin (Hb A1C)    Specimen: Blood   Result Value Ref Range    Hemoglobin A1C 10.3 %    Lot Number [id number]     Expiration Date 1-14-23        Assessment / Plan:  Diagnoses and all orders for this visit:    1. Type 2 diabetes mellitus with hyperglycemia, without long-term current use of insulin (CMS/[facility]) (Primary)  -     Lipid Panel  -     Comprehensive Metabolic Panel  -     POC Glycosylated Hemoglobin (Hb A1C)   Continue current medication(s) as noted in the history of present illness.\    2. Essential hypertension  -     Lipid Panel  -     Comprehensive Metabolic Panel  -     metoprolol succinate XL (Toprol XL) 50 MG 24 hr tablet; Take 1  tablet by mouth Every Night.  Dispense: 30 tablet; Refill: 5- increased dose   Continue Lisinopril at current dose.    3. Other hyperlipidemia  -     Lipid Panel  -     Comprehensive Metabolic Panel   Continue current medication(s) as noted in the history of present illness.    4. Chronic pain syndrome   Continue current medication(s) as noted in the history of present illness.   Follow-up per Pain Management.    5. Benign colonic polyp  -     Cancel: Ambulatory Referral For Screening Colonoscopy   The patient declined scheduling Colonoscopy today.  He would like to wait until after his General Surgery evaluation.      6. Primary insomnia   Continue current medication(s) as noted in the history of present illness.    7. Generalized anxiety disorder   Continue current medication(s) as noted in the history of present illness.    The patient was instructed in the side effects of the medication.  Risks of the potential for tolerance, dependence, and addiction were discussed.  The patient was instructed to take the lowest dosage of the medication, at the lowest frequency, and for the shortest period of time possible.  The patient was instructed not to receive controlled substances or narcotics from other doctors, and not to giveaway or sell the medication.    The patient was instructed to abstain from illicit drug use.  The patient was instructed to avoid alcohol while taking these medications.      Narcotics/controlled substance agreement, Lonnie report, and Urine Drug Screen were updated today if needed.    8. Cigarette nicotine dependence with nicotine-induced disorder  -     varenicline (Chantix Starting Month Pak) 0.5 MG X 11 & 1 MG X 42 tablet; Take 0.5 mg one daily on days 1-3 and and 0.5 mg twice daily on days 4-7.Then 1 mg twice daily for a total of 12 weeks.  Dispense: 53 tablet; Refill: 0    9. Unilateral inguinal hernia without obstruction or gangrene, recurrence not specified  -     Ambulatory Referral to  General Surgery    10. Screening for colon cancer  -     Cancel: Ambulatory Referral For Screening Colonoscopy   The patient declined scheduling Colonoscopy today.  He would like to wait until after his General Surgery evaluation.      Recommended Shingrix (new Shingles vaccine) #2 at the pharmacy.      Return in about 3 months (around 7/13/2021) for Annual physical, fasting after 7/13/21.

## 2021-04-27 ENCOUNTER — TRANSCRIBE ORDERS (OUTPATIENT)
Dept: ADMINISTRATIVE | Facility: HOSPITAL | Age: 66
End: 2021-04-27

## 2021-04-27 DIAGNOSIS — K40.00 BILATERAL INGUINAL HERNIA WITH OBSTRUCTION AND WITHOUT GANGRENE, RECURRENCE NOT SPECIFIED: Primary | ICD-10-CM

## 2021-05-05 ENCOUNTER — OFFICE VISIT (OUTPATIENT)
Dept: INTERNAL MEDICINE | Facility: CLINIC | Age: 66
End: 2021-05-05

## 2021-05-05 VITALS
TEMPERATURE: 96.9 F | BODY MASS INDEX: 28.62 KG/M2 | DIASTOLIC BLOOD PRESSURE: 56 MMHG | WEIGHT: 193.8 LBS | HEART RATE: 86 BPM | RESPIRATION RATE: 12 BRPM | SYSTOLIC BLOOD PRESSURE: 140 MMHG

## 2021-05-05 DIAGNOSIS — R30.0 DYSURIA: Primary | ICD-10-CM

## 2021-05-05 DIAGNOSIS — N48.9 PENILE LESION: ICD-10-CM

## 2021-05-05 LAB
BILIRUB BLD-MCNC: NEGATIVE MG/DL
CLARITY, POC: CLEAR
COLOR UR: YELLOW
GLUCOSE UR STRIP-MCNC: ABNORMAL MG/DL
KETONES UR QL: NEGATIVE
LEUKOCYTE EST, POC: NEGATIVE
NITRITE UR-MCNC: NEGATIVE MG/ML
PH UR: 5 [PH] (ref 5–8)
PROT UR STRIP-MCNC: NEGATIVE MG/DL
RBC # UR STRIP: NEGATIVE /UL
SP GR UR: 1.02 (ref 1–1.03)
UROBILINOGEN UR QL: NORMAL

## 2021-05-05 PROCEDURE — 87086 URINE CULTURE/COLONY COUNT: CPT | Performed by: INTERNAL MEDICINE

## 2021-05-05 PROCEDURE — 99214 OFFICE O/P EST MOD 30 MIN: CPT | Performed by: INTERNAL MEDICINE

## 2021-05-05 NOTE — PROGRESS NOTES
"Subjective       Yevgeniy Vaughn is a 65 y.o. male.     Chief Complaint   Patient presents with   • Difficulty Urinating       History obtained from the patient and his wife.      Urinary Tract Infection   This is a new problem. The problem occurs every urination. The quality of the pain is described as burning. There has been no fever. He is sexually active. There is no history of pyelonephritis. Pertinent negatives include no chills, discharge, flank pain, frequency, hematuria, nausea, urgency or vomiting. Associated symptoms comments: Has scabs on the end of the penis x 3-4 days, but has had burning and itching in that area x   3-4 weeks. He has tried nothing for the symptoms. There is no history of kidney stones or recurrent UTIs. Has bilateral inguinal hernias, saw General Surgery, has a CT scheduled for 5/7/21        The following portions of the patient's history were reviewed and updated as appropriate: allergies, current medications, past family history, past medical history, past social history, past surgical history and problem list.      Review of Systems   Constitutional: Negative for chills and fever.   Gastrointestinal: Negative for abdominal pain, blood in stool, constipation, diarrhea, nausea and vomiting.        Denies melena   Genitourinary: Positive for difficulty urinating (urinary stream decreased x 3-4 months, and sometimes has to strain to initiate the stream), dysuria and testicular pain (\"tender\"). Negative for discharge, flank pain, frequency, hematuria, scrotal swelling and urgency.        Has some post void dribbling x 3-4 weeks.  Denies nocturia, urinary leakage, and incomplete emtying.   Skin: Negative for rash.   Neurological: Negative for headaches.   Hematological: Negative for adenopathy.           Objective     Blood pressure 140/56, pulse 86, temperature 96.9 °F (36.1 °C), temperature source Infrared, resp. rate 12, weight 87.9 kg (193 lb 12.8 oz).    Physical Exam  Vitals " reviewed.   Constitutional:       Comments: Overweight.   Cardiovascular:      Rate and Rhythm: Normal rate and regular rhythm.      Heart sounds: Normal heart sounds. No murmur heard.     Pulmonary:      Effort: Pulmonary effort is normal.      Breath sounds: Normal breath sounds.   Abdominal:      General: Bowel sounds are normal. There is no distension.      Palpations: Abdomen is soft. There is no hepatomegaly, splenomegaly or mass.      Tenderness: There is abdominal tenderness (mild suprapubic). There is no right CVA tenderness, left CVA tenderness, guarding or rebound.      Hernia: A hernia (bilateral inguinal, and umbilical) is present.   Genitourinary:     Comments: Pearly erythematous lesion on the penile meatus.  Skin:     Findings: No rash.   Neurological:      Mental Status: He is alert.   Psychiatric:         Mood and Affect: Mood normal.         Results for orders placed or performed in visit on 05/05/21   POC Urinalysis Dipstick, Automated    Specimen: Urine   Result Value Ref Range    Color Yellow Yellow, Straw, Dark Yellow, Loli    Clarity, UA Clear Clear    Specific Gravity  1.020 1.005 - 1.030    pH, Urine 5.0 5.0 - 8.0    Leukocytes Negative Negative    Nitrite, UA Negative Negative    Protein, POC Negative Negative mg/dL    Glucose, UA >=1000 mg/dL (3+) (A) Negative, 1000 mg/dL (3+) mg/dL    Ketones, UA Negative Negative    Urobilinogen, UA Normal Normal    Bilirubin Negative Negative    Blood, UA Negative Negative       Assessment/Plan   Diagnoses and all orders for this visit:    1. Dysuria (Primary)  -     POC Urinalysis Dipstick, Automated  -     Urine Culture - Urine, Urine, Clean Catch    2. Penile lesion  -     Ambulatory Referral to Urology  -     mupirocin (Bactroban) 2 % ointment; Apply  topically to the appropriate area as directed 3 (Three) Times a Day for 7 days.  Dispense: 30 g; Refill: 0            Return if symptoms worsen or fail to improve.

## 2021-05-07 ENCOUNTER — HOSPITAL ENCOUNTER (OUTPATIENT)
Dept: CT IMAGING | Facility: HOSPITAL | Age: 66
Discharge: HOME | End: 2021-05-07
Admitting: SURGERY

## 2021-05-07 DIAGNOSIS — K40.00 BILATERAL INGUINAL HERNIA WITH OBSTRUCTION AND WITHOUT GANGRENE, RECURRENCE NOT SPECIFIED: ICD-10-CM

## 2021-05-07 LAB — BACTERIA SPEC AEROBE CULT: NO GROWTH

## 2021-05-07 PROCEDURE — 74176 CT ABD & PELVIS W/O CONTRAST: CPT

## 2021-05-11 DIAGNOSIS — F17.210 CIGARETTE SMOKER: ICD-10-CM

## 2021-05-11 DIAGNOSIS — F17.219 CIGARETTE NICOTINE DEPENDENCE WITH NICOTINE-INDUCED DISORDER: ICD-10-CM

## 2021-05-11 RX ORDER — VARENICLINE TARTRATE 1 MG/1
TABLET, FILM COATED ORAL
Qty: 60 TABLET | Refills: 4 | Status: SHIPPED | OUTPATIENT
Start: 2021-05-11 | End: 2021-10-12

## 2021-05-28 ENCOUNTER — TELEPHONE (OUTPATIENT)
Dept: INTERNAL MEDICINE | Facility: CLINIC | Age: 66
End: 2021-05-28

## 2021-05-28 NOTE — TELEPHONE ENCOUNTER
It looks like he has a noncalcified granuloma, which is most likely the result of a remote histoplasmosis infection.  There is another nodule however that is noncalcified.  In order to fully evaluate this, I would recommend a CT scan of his chest.

## 2021-05-28 NOTE — TELEPHONE ENCOUNTER
Spoke to patient and gave him providers message and recommendations. Patient verb good understanding and stated that he has an upcoming appointment with a pulmonologist and wanted to know if you thought he needed to go there first before getting the CT scan

## 2021-05-28 NOTE — TELEPHONE ENCOUNTER
Caller: Sue Vaughn    Relationship: Emergency Contact    Best call back number: [phone]    Caller requesting test results: SUE    What test was performed: CT SCAN    Additional notes: SUE AND PATIENT WOULD LIKE TO DISCUSS CT SCAN RESULTS BECAUSE THERE WAS SOMETHING ON THERE STATING PATIENT HAS A SPOT ON HIS LUNGS AND THEY DON'T KNOW HOW TO INTERPRET THE READING. PLEASE ADVISE AND CALL SUE BACK.

## 2021-05-28 NOTE — TELEPHONE ENCOUNTER
Spoke with danitza - wife and she said   Dr Montero Urologist was referring him to Pulmonology .  She does not want to go anywhere else but [facility]  Notified her to call Dr Montero office on Tuesday and ask them to send the referral to [facility].   Verbal understanding given    Notified danitza of message from Dr Rousseau

## 2021-06-02 ENCOUNTER — TELEPHONE (OUTPATIENT)
Dept: INTERNAL MEDICINE | Facility: CLINIC | Age: 66
End: 2021-06-02

## 2021-06-02 DIAGNOSIS — R91.1 NODULE OF RIGHT LUNG: Primary | ICD-10-CM

## 2021-06-02 DIAGNOSIS — E27.8 ADRENAL NODULE (HCC): ICD-10-CM

## 2021-06-02 NOTE — TELEPHONE ENCOUNTER
Dr Rousseau please see telephone message from 5/28/2021.  When he got the results fo the CT abd and Pelvis . The surgeon ordered the CT    Sue said no one  has explain the CT results to them . She would like a call back to discuss the CT ABD and Pelvis    They want to be referred to a pulmonologist and Nephrologist at [facility] now because they do not want to go to [facility]  Because they were told due to the results of the CT he needs to see these providers

## 2021-06-02 NOTE — TELEPHONE ENCOUNTER
Caller: Sue Vaughn    Relationship: Emergency Contact    Best call back number: [phone]    What is the medical concern/diagnosis: NA    What specialty or service is being requested: PULMONOLOGY AND NEPHROLOY    What is the provider, practice or medical service name: [facility]    What is the office location: NA    What is the office phone number: NA    Any additional details: SUE TOLD DR. MORENO THE SAME DAY THAT THEY WANTED A PROVIDER THROUGH [facility] AND HE ONLY REFERRED TO [facility].  SO PATIENT NEEDS A PULMONOLOGIST AND A NEPHROLOGIST THROUGH [facility] SO HE CAN GO TO [facility].

## 2021-06-02 NOTE — TELEPHONE ENCOUNTER
Dr. Montero is a urologist.  Do they want a different urologist?  Why does he need a nephrologist and pulmonologist?

## 2021-06-03 DIAGNOSIS — R91.1 NODULE OF RIGHT LUNG: Primary | ICD-10-CM

## 2021-06-03 NOTE — TELEPHONE ENCOUNTER
Order entered for adrenal CT to do along with chest CT.    The granuloma on the CT scan is most likely due to an old infection from histoplasmosis, which most people in this area have been exposed to in the past, and is a very common finding on CT and nothing to worry about.

## 2021-06-03 NOTE — TELEPHONE ENCOUNTER
Spoke with [facility] and notified them Dr Rousseau is ordering a CT of Lung and Adrenal  They were worried about the granuloma seen that was in the CT Abdomen report      Dr Rousseau said the low sodium had improved.     Notified them  Dr Rousseau is ordering a referral to Pulmonology   Dr Rousseau states there is no Nephrologist with [facility] and he could follow up with the Nephrologist he has seen before .  He can call and make his own appointment      Verbal understanding given

## 2021-06-03 NOTE — TELEPHONE ENCOUNTER
Per phone message from 5/28/2021 regarding the results of the CT, the following was relayed to the patient:       It looks like he has a noncalcified granuloma, which is most likely the result of a remote histoplasmosis infection.  There is another nodule however that is noncalcified.  In order to fully evaluate this, I would recommend a CT scan of his chest.        At that time, they had told me Dr. Webster was referring him to a pulmonologist.  I can refer him to pulmonologist at [facility] instead.  Order entered.      Why does he need to see a nephrologist?  Does he mean a different urologist?

## 2021-06-03 NOTE — TELEPHONE ENCOUNTER
Patient has been given providers message and verb good understanding and will wait for central scheduling to call to set those ct scans up

## 2021-06-04 ENCOUNTER — APPOINTMENT (OUTPATIENT)
Dept: PREADMISSION TESTING | Facility: HOSPITAL | Age: 66
End: 2021-06-04

## 2021-06-09 ENCOUNTER — TELEPHONE (OUTPATIENT)
Dept: INTERNAL MEDICINE | Facility: CLINIC | Age: 66
End: 2021-06-09

## 2021-06-09 NOTE — TELEPHONE ENCOUNTER
Caller: Sue Vaughn    Relationship: Emergency Contact    Best call back number: [phone]    What is the best time to reach you: anytime    Who are you requesting to speak with (clinical staff, provider,  specific staff member): Dr. Rousseau    Do you know the name of the person who called: Sue    What was the call regarding: patient is calling about CT scans on his lungs and and kidney, but on mychart it just shows abdomen.  Patient has questions about it    Do you require a callback: YES

## 2021-06-09 NOTE — TELEPHONE ENCOUNTER
Spoke with Sue and confirmed he is having a CT ABD chest and adrenals.    Pulmonology appt will be scheduled after CT   Verbal unmderstanding given

## 2021-06-09 NOTE — TELEPHONE ENCOUNTER
Call please.    He has already had a CT abdomen done, ordered by the surgeon.  I have also entered orders for CT chest and a CT adrenals.      Which is she referring to?  What questions does she have?

## 2021-06-10 DIAGNOSIS — F41.1 GENERALIZED ANXIETY DISORDER: ICD-10-CM

## 2021-06-10 RX ORDER — ALPRAZOLAM 0.5 MG/1
0.5 TABLET ORAL 2 TIMES DAILY PRN
Qty: 60 TABLET | Refills: 2 | Status: SHIPPED | OUTPATIENT
Start: 2021-06-10 | End: 2021-08-23 | Stop reason: SDUPTHER

## 2021-06-10 RX ORDER — ALPRAZOLAM 0.5 MG/1
TABLET ORAL
Qty: 60 TABLET | Refills: 0 | OUTPATIENT
Start: 2021-06-10

## 2021-06-16 DIAGNOSIS — E11.65 TYPE 2 DIABETES MELLITUS WITH HYPERGLYCEMIA, WITHOUT LONG-TERM CURRENT USE OF INSULIN (HCC): ICD-10-CM

## 2021-06-17 DIAGNOSIS — E11.65 TYPE 2 DIABETES MELLITUS WITH HYPERGLYCEMIA, WITHOUT LONG-TERM CURRENT USE OF INSULIN (HCC): ICD-10-CM

## 2021-06-17 NOTE — TELEPHONE ENCOUNTER
E-rx'd   Star Wedge Flap Text: The defect edges were debeveled with a #15 scalpel blade.  Given the location of the defect, shape of the defect and the proximity to free margins a star wedge flap was deemed most appropriate.  Using a sterile surgical marker, an appropriate rotation flap was drawn incorporating the defect and placing the expected incisions within the relaxed skin tension lines where possible. The area thus outlined was incised deep to adipose tissue with a #15 scalpel blade.  The skin margins were undermined to an appropriate distance in all directions utilizing iris scissors.

## 2021-06-17 NOTE — TELEPHONE ENCOUNTER
[facility] called and has a question about this RX. [facility] stated that usually it says to use 2 times a day, however this time it does not say that.     Please call to clarify.

## 2021-06-18 ENCOUNTER — TELEPHONE (OUTPATIENT)
Dept: INTERNAL MEDICINE | Facility: CLINIC | Age: 66
End: 2021-06-18

## 2021-06-18 DIAGNOSIS — E11.65 TYPE 2 DIABETES MELLITUS WITH HYPERGLYCEMIA, WITHOUT LONG-TERM CURRENT USE OF INSULIN (HCC): Primary | ICD-10-CM

## 2021-06-18 RX ORDER — LANCETS 30 GAUGE
EACH MISCELLANEOUS
Qty: 100 EACH | Refills: 3 | Status: SHIPPED | OUTPATIENT
Start: 2021-06-18 | End: 2022-01-20 | Stop reason: SDUPTHER

## 2021-06-18 RX ORDER — BLOOD-GLUCOSE METER
1 EACH MISCELLANEOUS 3 TIMES DAILY
Qty: 1 KIT | Refills: 0 | Status: SHIPPED | OUTPATIENT
Start: 2021-06-18 | End: 2022-01-27 | Stop reason: CLARIF

## 2021-06-18 RX ORDER — BLOOD SUGAR DIAGNOSTIC
STRIP MISCELLANEOUS
Qty: 100 EACH | Refills: 3 | Status: SHIPPED | OUTPATIENT
Start: 2021-06-18 | End: 2021-10-07

## 2021-06-18 NOTE — TELEPHONE ENCOUNTER
Caller: Artur Vaughncy    Relationship: Emergency Contact    Best call back number: [phone]     What is the best time to reach you: ANYTIME    Who are you requesting to speak with (clinical staff, provider,  specific staff member): CLINICAL    Do you know the name of the person who called: STEWART MITCHELL    What was the call regarding: PRIOR AUTHORIZATION.  THE PATIENT'S WIFE REPORTS A PRIOR AUTHORIZATION IS REQUIRED ON THE PATIENT'S TEST STRIPS.    Do you require a callback: PLEASE CALL THE PATIENT'S WIFE SUE AND ADVISE -[phone] .

## 2021-06-18 NOTE — TELEPHONE ENCOUNTER
Spoke to pharmacy they said that if we can send a new rx for generic meter, test strips, and lancets. With the directions it can not say use as directed it needs to say how many times a day he checks sugars with a diagnosis. We don't have to do a PA

## 2021-06-21 ENCOUNTER — HOSPITAL ENCOUNTER (OUTPATIENT)
Dept: CT IMAGING | Facility: HOSPITAL | Age: 66
Discharge: HOME | End: 2021-06-21
Admitting: INTERNAL MEDICINE

## 2021-06-21 DIAGNOSIS — E27.8 ADRENAL NODULE (HCC): ICD-10-CM

## 2021-06-21 DIAGNOSIS — R91.1 NODULE OF RIGHT LUNG: ICD-10-CM

## 2021-06-21 LAB — CREAT BLDA-MCNC: 0.8 MG/DL (ref 0.6–1.3)

## 2021-06-21 PROCEDURE — 71250 CT THORAX DX C-: CPT

## 2021-06-21 PROCEDURE — 82565 ASSAY OF CREATININE: CPT

## 2021-06-21 PROCEDURE — 74170 CT ABD WO CNTRST FLWD CNTRST: CPT

## 2021-06-21 PROCEDURE — 0 IOPAMIDOL PER 1 ML: Performed by: INTERNAL MEDICINE

## 2021-06-21 RX ADMIN — IOPAMIDOL 95 ML: 755 INJECTION, SOLUTION INTRAVENOUS at 12:42

## 2021-06-23 ENCOUNTER — APPOINTMENT (OUTPATIENT)
Dept: CT IMAGING | Facility: HOSPITAL | Age: 66
End: 2021-06-23

## 2021-06-25 ENCOUNTER — OFFICE VISIT (OUTPATIENT)
Dept: PULMONOLOGY | Facility: CLINIC | Age: 66
End: 2021-06-25

## 2021-06-25 VITALS
TEMPERATURE: 98.4 F | DIASTOLIC BLOOD PRESSURE: 90 MMHG | BODY MASS INDEX: 29.77 KG/M2 | WEIGHT: 201 LBS | HEART RATE: 92 BPM | HEIGHT: 69 IN | SYSTOLIC BLOOD PRESSURE: 140 MMHG | OXYGEN SATURATION: 95 %

## 2021-06-25 DIAGNOSIS — F17.211 CIGARETTE NICOTINE DEPENDENCE IN REMISSION: ICD-10-CM

## 2021-06-25 DIAGNOSIS — R91.1 LUNG NODULE: Primary | ICD-10-CM

## 2021-06-25 DIAGNOSIS — J43.2 CENTRILOBULAR EMPHYSEMA (HCC): ICD-10-CM

## 2021-06-25 PROCEDURE — 99204 OFFICE O/P NEW MOD 45 MIN: CPT | Performed by: INTERNAL MEDICINE

## 2021-06-25 NOTE — PROGRESS NOTES
New Pulmonary Patient Office Visit      Patient Name: Yevgeniy Vaughn    Referring Physician: Christine Rousseau MD    Chief Complaint:    Chief Complaint   Patient presents with   • Lung Nodule       History of Present Illness: Yevgeniy Vaughn is a 65 y.o. male who is here today to establish care with Pulmonary.      65-year-old male who is on and off smoker for last many years with over close to 20-pack-year smoking history and recently quit again, history of diabetes, he is referred here for abnormal chest CT scan showing 6 mm the right lower lobe lung nodule.  Patient was undergoing work-up for inguinal and ventral hernia and underwent CT of abdomen and incidentally found to have right lower lobe lung nodule.  Patient is accompanied by his wife today.  Patient states that he is biking 2-1/2 miles a day without any shortness of breath.  He denies any pulmonary symptoms at all.  Denies any cough, sputum production, shortness of air.  Denies any hemoptysis.  Denies any fevers, chills or night sweats.  Denies any change in appetite or weight loss.  Denies any headaches or vision problems.  Denies any leg edema.  No history of frequent pneumonias or infections requiring hospitalization.  No recent travel anywhere.    Patient smoked off and on for a number of years but now quit again and states that he is not planning to start smoking again.  Denies any alcohol use.  Denies any illicit drug use.    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  He also worked as a  in [employer] where he got exposed to a lot of paint dust and brake dust over the years.  He states that he was exposed to asbestos during those times as well.  Denies any black mold exposure.  No other environmental exposures currently.    Patient is up-to-date on immunization including pneumonia vaccines as well as COVID-19 immunization.        Subjective      Review of Systems:   Review of Systems   Constitutional:  Negative.    HENT: Negative.    Respiratory: Negative.    Cardiovascular: Negative.    Gastrointestinal: Negative.    Endocrine: Negative.    Musculoskeletal: Positive for arthralgias.   Skin: Negative.    Neurological: Negative.    Hematological: Negative.    Psychiatric/Behavioral: Negative.    All other systems reviewed and are negative.      Past Medical History:   Past Medical History:   Diagnosis Date   • Acute recurrent pancreatitis     Description: s/p muple hospitalizations   • Anxiety disorder     Description: and panic disorder dx 711.   • Benign colonic polyp     Description: dx 12/12   • Chronic neck pain    • Chronic pain syndrome    • Cigarette nicotine dependence 6/10/2020   • Erectile dysfunction    • Hyperlipidemia     Description: Diagnosed 2000   • Hypertension    • Insomnia    • Low sodium levels    • Obesity     h/o phen-fen use   • Psoriasis    • Ptosis of eyelid    • Shoulder joint pain     Description: Bilateral   • Type 2 diabetes mellitus (CMS/HCC)     Description: Diagnosed 1992 (Normal Stress Test 1/99)   • Unknown varicella vaccination status        Past Surgical History:   Past Surgical History:   Procedure Laterality Date   • CHOLECYSTECTOMY  1998   • HUMERUS SURGERY Right     Repair of Humerus / Arm Right  Description: 1999- repair of biceps tendon rupture. 10/7/15- right biceps tendon repair.  12/7/15- debridement of wound and re-repair of right biceps tendon.   • ROTATOR CUFF REPAIR  12/2010    s/p Biceps muscle repair   • SHOULDER SURGERY Right 05/01/2005    repair of labrum and biceps tendon       Family History:   Family History   Problem Relation Age of Onset   • Rheum arthritis Mother    • Diabetes type II Mother    • Heart failure Sister 48   • Diabetes type II Sister    • Coronary artery disease Brother 55        Coronary artery bypass grafting (CABG)   • Hyperlipidemia Brother    • Diabetes type II Brother    • Diabetes Brother    • Diverticulosis Brother        Social  History:   Social History     Socioeconomic History   • Marital status:      Spouse name: Not on file   • Number of children: 4   • Years of education: Not on file   • Highest education level: Not on file   Tobacco Use   • Smoking status: Current Every Day Smoker     Packs/day: 0.50     Types: Cigarettes     Start date:      Last attempt to quit: 2020     Years since quittin.0   • Smokeless tobacco: Former User     Types: Chew     Quit date:    • Tobacco comment: - present ,1  ppd. Quit 15. Re-started approx 2015, 3/ ppd, then 1 ppd. Quit 19, re-started 2018, 1 ppd. Quit 21   Substance and Sexual Activity   • Alcohol use: No   • Drug use: No   • Sexual activity: Yes     Partners: Female     Birth control/protection: Post-menopausal       Medications:     Current Outpatient Medications:   •  ALPRAZolam (XANAX) 0.5 MG tablet, Take 1 tablet by mouth 2 (Two) Times a Day As Needed for Anxiety. for anxiety, Disp: 60 tablet, Rfl: 2  •  aspirin 81 MG chewable tablet, Chew 81 mg Daily., Disp: , Rfl:   •  atorvastatin (LIPITOR) 20 MG tablet, TAKE 1 TABLET BY MOUTH AT BEDTIME, Disp: 90 tablet, Rfl: 3  •  Blood Glucose Monitoring Suppl ([employer] Blood Glucose Meter) w/Device kit, 1 kit 3 (Three) Times a Day. Check sugars three times a day Dx: E11.19, Disp: 1 kit, Rfl: 0  •  Blood Glucose Monitoring Suppl (FreeStyle Lite) device, , Disp: , Rfl:   •  Chantix Continuing Month Casa 1 MG tablet, Take 1 tablet by mouth twice daily, Disp: 60 tablet, Rfl: 4  •  clobetasol (TEMOVATE) 0.05 % cream, APPLY CREAM TOPICALLY TO AFFECTED AREA TWICE DAILY, Disp: 60 g, Rfl: 3  •  gabapentin (NEURONTIN) 800 MG tablet, 1 tablet 4 (Four) Times a Day., Disp: , Rfl:   •  glucose blood (Glucose Meter Test) test strip, Check sugars three times a day. Dx: E11.19, Disp: 100 each, Rfl: 3  •  glucose blood test strip, Use as instructed   Dx E 11.9, Disp: 100 each, Rfl: 3  •  glucose monitor monitoring  kit, 1 each 2 (two) times a day., Disp: 1 each, Rfl: 0  •  ibuprofen (ADVIL,MOTRIN) 800 MG tablet, , Disp: , Rfl:   •  Januvia 100 MG tablet, Take 1 tablet by mouth once daily, Disp: 90 tablet, Rfl: 3  •  Lancets misc, Test bid, Disp: 100 each, Rfl: 3  •  Lancets misc, Check sugars three times a day. Dx: E11.19, Disp: 100 each, Rfl: 3  •  lisinopril (PRINIVIL,ZESTRIL) 40 MG tablet, TAKE 1 TABLET BY MOUTH AT BEDTIME, Disp: 90 tablet, Rfl: 3  •  melatonin 5 MG tablet tablet, Take 3 tablets by mouth At Night As Needed (insomnia)., Disp: , Rfl:   •  metFORMIN (GLUCOPHAGE) 1000 MG tablet, Take 1 tablet by mouth twice daily, Disp: 180 tablet, Rfl: 3  •  metoprolol succinate XL (Toprol XL) 50 MG 24 hr tablet, Take 1 tablet by mouth Every Night., Disp: 30 tablet, Rfl: 5  •  Multiple Vitamins-Minerals (MULTIVITAMIN ADULT PO), Take  by mouth., Disp: , Rfl:   •  Omega-3 Fatty Acids (FISH OIL) 1000 MG capsule capsule, Take 1 capsule by mouth every 12 (twelve) hours, Disp: , Rfl:   •  oxyCODONE-acetaminophen (PERCOCET) 7.5-325 MG per tablet, Take 1 tablet by mouth 3 (three) times a day., Disp: , Rfl:   •  pioglitazone (ACTOS) 45 MG tablet, Take 1 tablet by mouth Daily., Disp: 30 tablet, Rfl: 5  •  promethazine (PHENERGAN) 25 MG tablet, TAKE ONE TABLET BY MOUTH EVERY 4 TO 6 HOURS AS NEEDED FOR NAUSEA AND VOMITING, Disp: 30 tablet, Rfl: 5  •  sodium chloride 1 g tablet, Take 1 g by mouth 2 (Two) Times a Day., Disp: , Rfl:   •  vitamin B-12 (CYANOCOBALAMIN) 1000 MCG tablet, Take 1,000 mcg by mouth Daily., Disp: , Rfl:   •  vitamin C (ASCORBIC ACID) 250 MG tablet, Take 250 mg by mouth Daily., Disp: , Rfl:   •  zolpidem (AMBIEN) 10 MG tablet, TAKE 1 TABLET BY MOUTH AT NIGHT AS NEEDED FOR SLEEP, Disp: 30 tablet, Rfl: 5    Allergies:   Allergies   Allergen Reactions   • Lovaza [Omega-3-Acid Ethyl Esters] Other (See Comments)     Other reaction(s): CRAWLING FEELING   • Trazodone Other (See Comments)     Depletes sodium    • Farxiga  "[Dapagliflozin] Other (See Comments)     Pain in testicle   Low back pain    • Steglatro [Ertugliflozin] Nausea Only and Rash       Objective     Physical Exam:  Vital Signs:   Vitals:    06/25/21 1254   BP: 140/90   Pulse: 92   Temp: 98.4 °F (36.9 °C)   SpO2: 95%  Comment: resting at room air   Weight: 91.2 kg (201 lb)   Height: 175.3 cm (69\")       Physical Exam  Vitals and nursing note reviewed.   Constitutional:       General: He is not in acute distress.     Appearance: He is well-developed. He is not diaphoretic.   HENT:      Head: Normocephalic and atraumatic.      Comments: Mallampati 3 airway     Nose: Nose normal.      Mouth/Throat:      Pharynx: No oropharyngeal exudate.   Eyes:      General: No scleral icterus.        Right eye: No discharge.         Left eye: No discharge.      Pupils: Pupils are equal, round, and reactive to light.   Neck:      Thyroid: No thyromegaly.      Trachea: No tracheal deviation.   Cardiovascular:      Rate and Rhythm: Normal rate and regular rhythm.      Heart sounds: Normal heart sounds. No murmur heard.   No friction rub. No gallop.    Pulmonary:      Effort: Pulmonary effort is normal. No respiratory distress.      Breath sounds: No stridor. No wheezing or rales.      Comments: Slightly prolonged exp phase  Abdominal:      General: There is no distension.      Palpations: Abdomen is soft.      Tenderness: There is no abdominal tenderness.   Musculoskeletal:         General: No tenderness.      Cervical back: Neck supple.      Right lower leg: No edema.      Left lower leg: No edema.      Comments: Clubbing: none   Lymphadenopathy:      Cervical: No cervical adenopathy.   Neurological:      Mental Status: He is alert and oriented to person, place, and time.      Cranial Nerves: No cranial nerve deficit.      Coordination: Coordination normal.   Psychiatric:         Behavior: Behavior normal.         Thought Content: Thought content normal.         Judgment: Judgment normal. "         Results Review:   I reviewed the patient's new clinical results.    CT scan of the chest done recently reviewed and showed calcified right hilar lymphadenopathy along with the right lower lobe calcified granuloma.  There is 6 mm right lower lobe nodule which is pretty rounded opacity near costo diaphragmatic junction.  No other pathological mediastinal adenopathy noted.  No effusions noted.  No other suspicious lung nodules or masses noted.  There is biapical bullous emphysematous changes along with paraseptal emphysema noted in the left anterior as well.    PFT not available.      Assessment / Plan      Assessment:   Problem List Items Addressed This Visit        Tobacco    Cigarette nicotine dependence    Relevant Medications    Chantix Continuing Month Casa 1 MG tablet      Other Visit Diagnoses     Lung nodule    -  Primary    Centrilobular emphysema (CMS/HCC)              Plan:   1.  Patient with incidentally found right lower lobe lung nodule which is about 6 mm rounded opacity.  Discussed at length with patient he has other calcified granuloma suggesting old granulomatous inflammation which are benign findings but this new nodule is noncalcified.  It could be still part of the same disease process.  Discussed that given his extensive smoking history concern for malignancy is always there.  We talked about further work-up.  This nodule is pretty small for us to do any biopsy at this point.  I do not see any changes concerning for malignant process at this point.  However, given high risk situation with him I would recommend close follow-up in 4 to 5 months timeframe with repeat CT scan.  If it will be stable then we will proceed with follow-up up to 2 years.  If it changes in shape or size we will consider further evaluation including CT FNA versus resection.  He verbalized understanding and is comfortable with our plan of care.    2.  Patient is asymptomatic from his pulmonary disease process  standpoint.  I did show him that he does have emphysema.  Counseled strongly to stay off of smoking.  He seems amenable to that recommendation.    3.  Continue with activity to prevent further muscle loss and worsening dyspnea.    4.  Patient denies any snoring.  States that he sleeps well without any respiratory issues.  He does have a prior abnormalities and being overweight put him at high risk of sleep apnea.  Will monitor for now.    5.  Patient is not much symptomatic from pulmonary standpoint so should be able to undergo proposed surgery with mild increased risk of perioperative pulmonary complications.  Would recommend early extubation and ambulation.   not on any inhaler therapy either.    Thank you for allowing me to participate in the care of this patient.    Follow Up:   5 months with chest CT    Discussed plan of care in detail with patient today. Patient verbally understands and agrees. I spent 45 minutes on this date of service. This time includes time spent by me in the following activities:preparing for the visit, reviewing tests, obtaining and/or reviewing a separately obtained history, performing a medically appropriate examination, counseling the patient/family, ordering tests, or procedures, and/or documenting information in the medical record.     Scott Villeda MD  Pulmonary Critical Care and Sleep Medicine      Please note that portions of this note may have been completed with a voice recognition program. Efforts were made to edit the dictations, but occasionally words are mistranscribed.

## 2021-06-28 DIAGNOSIS — R91.1 PULMONARY NODULE: Primary | ICD-10-CM

## 2021-07-07 ENCOUNTER — OFFICE VISIT (OUTPATIENT)
Dept: INTERNAL MEDICINE | Facility: CLINIC | Age: 66
End: 2021-07-07

## 2021-07-07 VITALS
DIASTOLIC BLOOD PRESSURE: 74 MMHG | TEMPERATURE: 97.3 F | SYSTOLIC BLOOD PRESSURE: 145 MMHG | HEART RATE: 74 BPM | BODY MASS INDEX: 30.84 KG/M2 | WEIGHT: 203.5 LBS | RESPIRATION RATE: 20 BRPM | HEIGHT: 68 IN

## 2021-07-07 DIAGNOSIS — K63.5 BENIGN COLONIC POLYP: ICD-10-CM

## 2021-07-07 DIAGNOSIS — F41.1 GENERALIZED ANXIETY DISORDER: ICD-10-CM

## 2021-07-07 DIAGNOSIS — I10 ESSENTIAL HYPERTENSION: ICD-10-CM

## 2021-07-07 DIAGNOSIS — Z79.899 HIGH RISK MEDICATION USE: ICD-10-CM

## 2021-07-07 DIAGNOSIS — E11.65 TYPE 2 DIABETES MELLITUS WITH HYPERGLYCEMIA, WITHOUT LONG-TERM CURRENT USE OF INSULIN (HCC): ICD-10-CM

## 2021-07-07 DIAGNOSIS — D35.02 ADENOMA OF LEFT ADRENAL GLAND: ICD-10-CM

## 2021-07-07 DIAGNOSIS — R91.1 LUNG NODULE: ICD-10-CM

## 2021-07-07 DIAGNOSIS — G89.4 CHRONIC PAIN SYNDROME: ICD-10-CM

## 2021-07-07 DIAGNOSIS — F51.01 PRIMARY INSOMNIA: ICD-10-CM

## 2021-07-07 DIAGNOSIS — E78.49 OTHER HYPERLIPIDEMIA: ICD-10-CM

## 2021-07-07 DIAGNOSIS — Z00.00 ENCOUNTER FOR HEALTH MAINTENANCE EXAMINATION IN ADULT: Primary | ICD-10-CM

## 2021-07-07 LAB
ALBUMIN/CREATININE RATIO, URINE: <30
BASOPHILS # BLD AUTO: 0.05 10*3/MM3 (ref 0–0.2)
BASOPHILS NFR BLD AUTO: 0.8 % (ref 0–1.5)
BILIRUB BLD-MCNC: NEGATIVE MG/DL
CLARITY, POC: CLEAR
COLOR UR: YELLOW
DEPRECATED RDW RBC AUTO: 44.1 FL (ref 37–54)
EOSINOPHIL # BLD AUTO: 0.63 10*3/MM3 (ref 0–0.4)
EOSINOPHIL NFR BLD AUTO: 10.2 % (ref 0.3–6.2)
ERYTHROCYTE [DISTWIDTH] IN BLOOD BY AUTOMATED COUNT: 12.8 % (ref 12.3–15.4)
EXPIRATION DATE: ABNORMAL
EXPIRATION DATE: NORMAL
GLUCOSE UR STRIP-MCNC: ABNORMAL MG/DL
HCT VFR BLD AUTO: 40.5 % (ref 37.5–51)
HGB BLD-MCNC: 13.8 G/DL (ref 13–17.7)
IMM GRANULOCYTES # BLD AUTO: 0.03 10*3/MM3 (ref 0–0.05)
IMM GRANULOCYTES NFR BLD AUTO: 0.5 % (ref 0–0.5)
KETONES UR QL: NEGATIVE
LEUKOCYTE EST, POC: NEGATIVE
LYMPHOCYTES # BLD AUTO: 1.86 10*3/MM3 (ref 0.7–3.1)
LYMPHOCYTES NFR BLD AUTO: 30.1 % (ref 19.6–45.3)
Lab: ABNORMAL
Lab: NORMAL
MCH RBC QN AUTO: 31.7 PG (ref 26.6–33)
MCHC RBC AUTO-ENTMCNC: 34.1 G/DL (ref 31.5–35.7)
MCV RBC AUTO: 93.1 FL (ref 79–97)
MONOCYTES # BLD AUTO: 0.6 10*3/MM3 (ref 0.1–0.9)
MONOCYTES NFR BLD AUTO: 9.7 % (ref 5–12)
NEUTROPHILS NFR BLD AUTO: 3 10*3/MM3 (ref 1.7–7)
NEUTROPHILS NFR BLD AUTO: 48.7 % (ref 42.7–76)
NITRITE UR-MCNC: NEGATIVE MG/ML
NRBC BLD AUTO-RTO: 0 /100 WBC (ref 0–0.2)
PH UR: 5 [PH] (ref 5–8)
PLATELET # BLD AUTO: 230 10*3/MM3 (ref 140–450)
PMV BLD AUTO: 9.7 FL (ref 6–12)
POC CREATININE URINE: 300
POC MICROALBUMIN URINE: 30
PROT UR STRIP-MCNC: ABNORMAL MG/DL
RBC # BLD AUTO: 4.35 10*6/MM3 (ref 4.14–5.8)
RBC # UR STRIP: NEGATIVE /UL
SP GR UR: 1.02 (ref 1–1.03)
UROBILINOGEN UR QL: NORMAL
WBC # BLD AUTO: 6.17 10*3/MM3 (ref 3.4–10.8)

## 2021-07-07 PROCEDURE — 82306 VITAMIN D 25 HYDROXY: CPT | Performed by: INTERNAL MEDICINE

## 2021-07-07 PROCEDURE — 82044 UR ALBUMIN SEMIQUANTITATIVE: CPT | Performed by: INTERNAL MEDICINE

## 2021-07-07 PROCEDURE — 81003 URINALYSIS AUTO W/O SCOPE: CPT | Performed by: INTERNAL MEDICINE

## 2021-07-07 PROCEDURE — 84443 ASSAY THYROID STIM HORMONE: CPT | Performed by: INTERNAL MEDICINE

## 2021-07-07 PROCEDURE — 85025 COMPLETE CBC W/AUTO DIFF WBC: CPT | Performed by: INTERNAL MEDICINE

## 2021-07-07 PROCEDURE — 80053 COMPREHEN METABOLIC PANEL: CPT | Performed by: INTERNAL MEDICINE

## 2021-07-07 PROCEDURE — 99396 PREV VISIT EST AGE 40-64: CPT | Performed by: INTERNAL MEDICINE

## 2021-07-07 PROCEDURE — 80061 LIPID PANEL: CPT | Performed by: INTERNAL MEDICINE

## 2021-07-07 NOTE — PATIENT INSTRUCTIONS
Health Maintenance, Male  Adopting a healthy lifestyle and getting preventive care are important in promoting health and wellness. Ask your health care provider about:  · The right schedule for you to have regular tests and exams.  · Things you can do on your own to prevent diseases and keep yourself healthy.  What should I know about diet, weight, and exercise?  Eat a healthy diet    · Eat a diet that includes plenty of vegetables, fruits, low-fat dairy products, and lean protein.  · Do not eat a lot of foods that are high in solid fats, added sugars, or sodium.  Maintain a healthy weight  Body mass index (BMI) is a measurement that can be used to identify possible weight problems. It estimates body fat based on height and weight. Your health care provider can help determine your BMI and help you achieve or maintain a healthy weight.  Get regular exercise  Get regular exercise. This is one of the most important things you can do for your health. Most adults should:  · Exercise for at least 150 minutes each week. The exercise should increase your heart rate and make you sweat (moderate-intensity exercise).  · Do strengthening exercises at least twice a week. This is in addition to the moderate-intensity exercise.  · Spend less time sitting. Even light physical activity can be beneficial.  Watch cholesterol and blood lipids  Have your blood tested for lipids and cholesterol at 20 years of age, then have this test every 5 years.  You may need to have your cholesterol levels checked more often if:  · Your lipid or cholesterol levels are high.  · You are older than 40 years of age.  · You are at high risk for heart disease.  What should I know about cancer screening?  Many types of cancers can be detected early and may often be prevented. Depending on your health history and family history, you may need to have cancer screening at various ages. This may include screening for:  · Colorectal cancer.  · Prostate  cancer.  · Skin cancer.  · Lung cancer.  What should I know about heart disease, diabetes, and high blood pressure?  Blood pressure and heart disease  · High blood pressure causes heart disease and increases the risk of stroke. This is more likely to develop in people who have high blood pressure readings, are of  descent, or are overweight.  · Talk with your health care provider about your target blood pressure readings.  · Have your blood pressure checked:  ? Every 3-5 years if you are 18-39 years of age.  ? Every year if you are 40 years old or older.  · If you are between the ages of 65 and 75 and are a current or former smoker, ask your health care provider if you should have a one-time screening for abdominal aortic aneurysm (AAA).  Diabetes  Have regular diabetes screenings. This checks your fasting blood sugar level. Have the screening done:  · Once every three years after age 45 if you are at a normal weight and have a low risk for diabetes.  · More often and at a younger age if you are overweight or have a high risk for diabetes.  What should I know about preventing infection?  Hepatitis B  If you have a higher risk for hepatitis B, you should be screened for this virus. Talk with your health care provider to find out if you are at risk for hepatitis B infection.  Hepatitis C  Blood testing is recommended for:  · Everyone born from 1945 through 1965.  · Anyone with known risk factors for hepatitis C.  Sexually transmitted infections (STIs)  · You should be screened each year for STIs, including gonorrhea and chlamydia, if:  ? You are sexually active and are younger than 24 years of age.  ? You are older than 24 years of age and your health care provider tells you that you are at risk for this type of infection.  ? Your sexual activity has changed since you were last screened, and you are at increased risk for chlamydia or gonorrhea. Ask your health care provider if you are at risk.  · Ask your  health care provider about whether you are at high risk for HIV. Your health care provider may recommend a prescription medicine to help prevent HIV infection. If you choose to take medicine to prevent HIV, you should first get tested for HIV. You should then be tested every 3 months for as long as you are taking the medicine.  Follow these instructions at home:  Lifestyle  · Do not use any products that contain nicotine or tobacco, such as cigarettes, e-cigarettes, and chewing tobacco. If you need help quitting, ask your health care provider.  · Do not use street drugs.  · Do not share needles.  · Ask your health care provider for help if you need support or information about quitting drugs.  Alcohol use  · Do not drink alcohol if your health care provider tells you not to drink.  · If you drink alcohol:  ? Limit how much you have to 0-2 drinks a day.  ? Be aware of how much alcohol is in your drink. In the [place] one drink equals one 12 oz bottle of beer (355 mL), one 5 oz glass of wine (148 mL), or one 1½ oz glass of hard liquor (44 mL).  General instructions  · Schedule regular health, dental, and eye exams.  · Stay current with your vaccines.  · Tell your health care provider if:  ? You often feel depressed.  ? You have ever been abused or do not feel safe at home.  Summary  · Adopting a healthy lifestyle and getting preventive care are important in promoting health and wellness.  · Follow your health care provider's instructions about healthy diet, exercising, and getting tested or screened for diseases.  · Follow your health care provider's instructions on monitoring your cholesterol and blood pressure.  This information is not intended to replace advice given to you by your health care provider. Make sure you discuss any questions you have with your health care provider.  Document Revised: 12/11/2019 Document Reviewed: 12/11/2019  [email or web address] Patient Education © 2021 [employer]      Obesity, Adult  Obesity is  the condition of having too much total body fat. Being overweight or obese means that your weight is greater than what is considered healthy for your body size. Obesity is determined by a measurement called BMI. BMI is an estimate of body fat and is calculated from height and weight. For adults, a BMI of 30 or higher is considered obese.  Obesity can lead to other health concerns and major illnesses, including:  · Stroke.  · Coronary artery disease (CAD).  · Type 2 diabetes.  · Some types of cancer, including cancers of the colon, breast, uterus, and gallbladder.  · Osteoarthritis.  · High blood pressure (hypertension).  · High cholesterol.  · Sleep apnea.  · Gallbladder stones.  · Infertility problems.  What are the causes?  Common causes of this condition include:  · Eating daily meals that are high in calories, sugar, and fat.  · Being born with genes that may make you more likely to become obese.  · Having a medical condition that causes obesity, including:  ? Hypothyroidism.  ? Polycystic ovarian syndrome (PCOS).  ? Binge-eating disorder.  ? Cushing syndrome.  · Taking certain medicines, such as steroids, antidepressants, and seizure medicines.  · Not being physically active (sedentary lifestyle).  · Not getting enough sleep.  · Drinking high amounts of sugar-sweetened beverages, such as soft drinks.  What increases the risk?  The following factors may make you more likely to develop this condition:  · Having a family history of obesity.  · Being a woman of  descent.  · Being a man of  descent.  · Living in an area with limited access to:  ? [place], recreation centers, or sidewalks.  ? Healthy food choices, such as grocery stores and farmers' markets.  What are the signs or symptoms?  The main sign of this condition is having too much body fat.  How is this diagnosed?  This condition is diagnosed based on:  · Your BMI. If you are an adult with a BMI of 30 or higher, you are considered  obese.  · Your waist circumference. This measures the distance around your waistline.  · Your skinfold thickness. Your health care provider may gently pinch a fold of your skin and measure it.  You may have other tests to check for underlying conditions.  How is this treated?  Treatment for this condition often includes changing your lifestyle. Treatment may include some or all of the following:  · Dietary changes. This may include developing a healthy meal plan.  · Regular physical activity. This may include activity that causes your heart to beat faster (aerobic exercise) and strength training. Work with your health care provider to design an exercise program that works for you.  · Medicine to help you lose weight if you are unable to lose 1 pound a week after 6 weeks of healthy eating and more physical activity.  · Treating conditions that cause the obesity (underlying conditions).  · Surgery. Surgical options may include gastric banding and gastric bypass. Surgery may be done if:  ? Other treatments have not helped to improve your condition.  ? You have a BMI of 40 or higher.  ? You have life-threatening health problems related to obesity.  Follow these instructions at home:  Eating and drinking    · Follow recommendations from your health care provider about what you eat and drink. Your health care provider may advise you to:  ? Limit fast food, sweets, and processed snack foods.  ? Choose low-fat options, such as low-fat milk instead of whole milk.  ? Eat 5 or more servings of fruits or vegetables every day.  ? Eat at home more often. This gives you more control over what you eat.  ? Choose healthy foods when you eat out.  ? Learn to read food labels. This will help you understand how much food is considered 1 serving.  ? Learn what a healthy serving size is.  ? Keep low-fat snacks available.  ? Limit sugary drinks, such as soda, fruit juice, sweetened iced tea, and flavored milk.  · Drink enough water to keep  your urine pale yellow.  · Do not follow a fad diet. Fad diets can be unhealthy and even dangerous.  Physical activity  · Exercise regularly, as told by your health care provider.  ? Most adults should get up to 150 minutes of moderate-intensity exercise every week.  ? Ask your health care provider what types of exercise are safe for you and how often you should exercise.  · Warm up and stretch before being active.  · Cool down and stretch after being active.  · Rest between periods of activity.  Lifestyle  · Work with your health care provider and a dietitian to set a weight-loss goal that is healthy and reasonable for you.  · Limit your screen time.  · Find ways to reward yourself that do not involve food.  · Do not drink alcohol if:  ? Your health care provider tells you not to drink.  ? You are pregnant, may be pregnant, or are planning to become pregnant.  · If you drink alcohol:  ? Limit how much you use to:  § 0-1 drink a day for women.  § 0-2 drinks a day for men.  ? Be aware of how much alcohol is in your drink. In the [place] one drink equals one 12 oz bottle of beer (355 mL), one 5 oz glass of wine (148 mL), or one 1½ oz glass of hard liquor (44 mL).  General instructions  · Keep a weight-loss journal to keep track of the food you eat and how much exercise you get.  · Take over-the-counter and prescription medicines only as told by your health care provider.  · Take vitamins and supplements only as told by your health care provider.  · Consider joining a support group. Your health care provider may be able to recommend a support group.  · Keep all follow-up visits as told by your health care provider. This is important.  Contact a health care provider if:  · You are unable to meet your weight loss goal after 6 weeks of dietary and lifestyle changes.  Get help right away if you are having:  · Trouble breathing.  · Suicidal thoughts or behaviors.  Summary  · Obesity is the condition of having too much total  body fat.  · Being overweight or obese means that your weight is greater than what is considered healthy for your body size.  · Work with your health care provider and a dietitian to set a weight-loss goal that is healthy and reasonable for you.  · Exercise regularly, as told by your health care provider. Ask your health care provider what types of exercise are safe for you and how often you should exercise.  This information is not intended to replace advice given to you by your health care provider. Make sure you discuss any questions you have with your health care provider.  Document Revised: 08/22/2019 Document Reviewed: 08/22/2019  [employer] Patient Education © 2021 [employer]      MyPlate from [employer]    MyPlate is an outline of a general healthy diet based on the 2010 Dietary Guidelines for Americans, from the [place] Department of Agriculture (USDA). It sets guidelines for how much food you should eat from each food group based on your age, sex, and level of physical activity.  What are tips for following MyPlate?  To follow MyPlate recommendations:  · Eat a wide variety of fruits and vegetables, grains, and protein foods.  · Serve smaller portions and eat less food throughout the day.  · Limit portion sizes to avoid overeating.  · Enjoy your food.  · Get at least 150 minutes of exercise every week. This is about 30 minutes each day, 5 or more days per week.  It can be difficult to have every meal look like MyPlate. Think about MyPlate as eating guidelines for an entire day, rather than each individual meal.  Fruits and vegetables  · Make half of your plate fruits and vegetables.  · Eat many different colors of fruits and vegetables each day.  · For a 2,000 calorie daily food plan, eat:  ? 2½ cups of vegetables every day.  ? 2 cups of fruit every day.  · 1 cup is equal to:  ? 1 cup raw or cooked vegetables.  ? 1 cup raw fruit.  ? 1 medium-sized orange, apple, or banana.  ? 1 cup 100% fruit or vegetable  juice.  ? 2 cups raw leafy greens, such as lettuce, spinach, or kale.  ? ½ cup dried fruit.  Grains  · One fourth of your plate should be grains.  · Make at least half of the grains you eat each day whole grains.  · For a 2,000 calorie daily food plan, eat 6 oz of grains every day.  · 1 oz is equal to:  ? 1 slice bread.  ? 1 cup cereal.  ? ½ cup cooked rice, cereal, or pasta.  Protein  · One fourth of your plate should be protein.  · Eat a wide variety of protein foods, including meat, poultry, fish, eggs, beans, nuts, and tofu.  · For a 2,000 calorie daily food plan, eat 5½ oz of protein every day.  · 1 oz is equal to:  ? 1 oz meat, poultry, or fish.  ? ¼ cup cooked beans.  ? 1 egg.  ? ½ oz nuts or seeds.  ? 1 Tbsp peanut butter.  Dairy  · Drink fat-free or low-fat (1%) milk.  · Eat or drink dairy as a side to meals.  · For a 2,000 calorie daily food plan, eat or drink 3 cups of dairy every day.  · 1 cup is equal to:  ? 1 cup milk, yogurt, cottage cheese, or soy milk (soy beverage).  ? 2 oz processed cheese.  ? 1½ oz natural cheese.  Fats, oils, salt, and sugars  · Only small amounts of oils are recommended.  · Avoid foods that are high in calories and low in nutritional value (empty calories), like foods high in fat or added sugars.  · Choose foods that are low in salt (sodium). Choose foods that have less than 140 milligrams (mg) of sodium per serving.  · Drink water instead of sugary drinks. Drink enough water each day to keep your urine pale yellow.  Where to find support  · Work with your health care provider or a nutrition specialist (dietitian) to develop a customized eating plan that is right for you.  · Download an raquel (mobile application) to help you track your daily food intake.  Where to find more information  · Go to ChooseMyPlate.gov for more information.  Summary  · MyPlate is a general guideline for healthy eating from the USDA. It is based on the 2010 Dietary Guidelines for Americans.  · In  general, fruits and vegetables should take up ½ of your plate, grains should take up ¼ of your plate, and protein should take up ¼ of your plate.  This information is not intended to replace advice given to you by your health care provider. Make sure you discuss any questions you have with your health care provider.  Document Revised: 05/21/2020 Document Reviewed: 03/19/2018  [employer] Patient Education © 2021 [employer]      Exercising to Lose Weight  Exercise is structured, repetitive physical activity to improve fitness and health. Getting regular exercise is important for everyone. It is especially important if you are overweight. Being overweight increases your risk of heart disease, stroke, diabetes, high blood pressure, and several types of cancer. Reducing your calorie intake and exercising can help you lose weight.  Exercise is usually categorized as moderate or vigorous intensity. To lose weight, most people need to do a certain amount of moderate-intensity or vigorous-intensity exercise each week.  Moderate-intensity exercise    Moderate-intensity exercise is any activity that gets you moving enough to burn at least three times more energy (calories) than if you were sitting.  Examples of moderate exercise include:  · Walking a mile in 15 minutes.  · Doing light yard work.  · Biking at an easy pace.  Most people should get at least 150 minutes (2 hours and 30 minutes) a week of moderate-intensity exercise to maintain their body weight.  Vigorous-intensity exercise  Vigorous-intensity exercise is any activity that gets you moving enough to burn at least six times more calories than if you were sitting. When you exercise at this intensity, you should be working hard enough that you are not able to carry on a conversation.  Examples of vigorous exercise include:  · Running.  · Playing a team sport, such as football, basketball, and soccer.  · Jumping rope.  Most people should get at least 75 minutes (1  hour and 15 minutes) a week of vigorous-intensity exercise to maintain their body weight.  How can exercise affect me?  When you exercise enough to burn more calories than you eat, you lose weight. Exercise also reduces body fat and builds muscle. The more muscle you have, the more calories you burn. Exercise also:  · Improves mood.  · Reduces stress and tension.  · Improves your overall fitness, flexibility, and endurance.  · Increases bone strength.  The amount of exercise you need to lose weight depends on:  · Your age.  · The type of exercise.  · Any health conditions you have.  · Your overall physical ability.  Talk to your health care provider about how much exercise you need and what types of activities are safe for you.  What actions can I take to lose weight?  Nutrition    · Make changes to your diet as told by your health care provider or diet and nutrition specialist (dietitian). This may include:  ? Eating fewer calories.  ? Eating more protein.  ? Eating less unhealthy fats.  ? Eating a diet that includes fresh fruits and vegetables, whole grains, low-fat dairy products, and lean protein.  ? Avoiding foods with added fat, salt, and sugar.  · Drink plenty of water while you exercise to prevent dehydration or heat stroke.  Activity  · Choose an activity that you enjoy and set realistic goals. Your health care provider can help you make an exercise plan that works for you.  · Exercise at a moderate or vigorous intensity most days of the week.  ? The intensity of exercise may vary from person to person. You can tell how intense a workout is for you by paying attention to your breathing and heartbeat. Most people will notice their breathing and heartbeat get faster with more intense exercise.  · Do resistance training twice each week, such as:  ? Push-ups.  ? Sit-ups.  ? Lifting weights.  ? Using resistance bands.  · Getting short amounts of exercise can be just as helpful as long structured periods of  exercise. If you have trouble finding time to exercise, try to include exercise in your daily routine.  ? Get up, stretch, and walk around every 30 minutes throughout the day.  ? Go for a walk during your lunch break.  ? Park your car farther away from your destination.  ? If you take public transportation, get off one stop early and walk the rest of the way.  ? Make phone calls while standing up and walking around.  ? Take the stairs instead of elevators or escalators.  · Wear comfortable clothes and shoes with good support.  · Do not exercise so much that you hurt yourself, feel dizzy, or get very short of breath.  Where to find more information  · [place] Department of Health and Human Services: www.hhs.gov  · Centers for Disease Control and Prevention (CDC): www.cdc.gov  Contact a health care provider:  · Before starting a new exercise program.  · If you have questions or concerns about your weight.  · If you have a medical problem that keeps you from exercising.  Get help right away if you have any of the following while exercising:  · Injury.  · Dizziness.  · Difficulty breathing or shortness of breath that does not go away when you stop exercising.  · Chest pain.  · Rapid heartbeat.  Summary  · Being overweight increases your risk of heart disease, stroke, diabetes, high blood pressure, and several types of cancer.  · Losing weight happens when you burn more calories than you eat.  · Reducing the amount of calories you eat in addition to getting regular moderate or vigorous exercise each week helps you lose weight.  This information is not intended to replace advice given to you by your health care provider. Make sure you discuss any questions you have with your health care provider.  Document Revised: 04/15/2021 Document Reviewed: 04/15/2021  [employer] Patient Education © 2021 [employer]

## 2021-07-07 NOTE — PROGRESS NOTES
Subjective     Chief Complaint:  Physical Exam.    History of Present Illness    History obtained from the patient.    He has surgery (Bilateral Inguinal Hernia Repair) scheduled for 7/12/21 (Dr. Alba).    CT abdomen and pelvis on 5/7/21 to evaluate the hernias also showed a 6 mm RLL lung nodule and la left adrenal nodule. CT chest and Ct adrenals done on 6/21/21 showed the RLL Lung nodule and a 2.5 cm left adrenal adenoma, otherwise normal.  He saw Pulmonary, Dr. Zendejas, on 6/25/21. 4-5 month follow up was recommended.    Hyponatremia has been stable (last Na on 4/13/21 was 130).    He states he saw Urology in May 2021 and had a Prostate and Testicular exam PSA was normal 9/22/20 (0.233)    Primary Care Cardiac Diagnostic Constellation:      His Diabetes Mellitus type has been unstable,but improved.  Medication(s): Metformin, Januvia, Actos, Lisinopril, and Atorvastatin.   His Hypertension has been stable.   Medication(s): Lisinopril.   His Hyperlipidemia has been unstable.   His LDL goal is < 70 and last LDL was 62, TG 306.   Medication(s): Atorvastatin and Fish Oil.  The patient is adherent with his medication regimen. He denies medication side effects.       Interval Events:  Last HgA1C was 10.3 (on 4/13/21), down from 10.5. There were no medication changes. He states his blood sugars at home are 250-325 fasting, and 250-300 postprandial.  He denies episodes of low blood sugar.  The patient states he checks his feet regularly.  His last Ophthalmology appointment was 11/4/2020, no retinopathy, but trace cataract left eye not affecting vision.       Symptoms:  Denies chest pain, dyspnea, SALAMANCA, orthopnea, PND, palpitations, syncope, lower extremity edema, claudication, lightheadedness, and dizziness.  Associated Symptoms: Weight up 8 pounds in the past 3 months.  Has stable arthralgias, myalgias,  and polydipsia.  No fatigue, headache, polyuria, visual impairment, memory loss, concentration  problems, or focal neurologic deficits.  Stable pain, numbness, and tingling of the feet, but denies a foot ulcer. On Neurontin (maximum dose).      Lifestyle and Disease Management: Diet: He does have a healthy diet. Weight Issues: He has weight concerns. Exercise: He has not been exercising due to the hernias.  Tobacco Use:   Former smoker. He quit smoking completely 4/1/18. Re-started Sept 2018, 1 ppd.  Quit completely 6/11/2020. Re-sarted 8/2021 1 ppd, Quit 5/1/21.     Chronic Pain Follow-Up: The patient is being seen for follow-up of Chronic Neck Pain and Chronic Left Arm and Shoulder Pain, which are stable.   Interval Events:  The patient is seeing Pain Management for his chronic pain.    Symptoms: stable neck pain, back pain, and upper extremity pain, but denies headache, abdominal pain, and lower extremity pain.   Medications:   Neurontin and Percocet per Pain Management.  Off Cymbalta.  The patient is adherent to his medication regimen, and he denies medication side effects.      Colonic Polyp Follow-up: The patient is being seen for a routine clinic follow-up of Colon Polyp(s), which is stable.   Interval Events:  Current diagnosis was determined by Colonoscopy and last 1/20/14 was normal, but poor prep.   Symptoms: Bilateral lower quadrant abdominal pain, due to hernias No diarrhea, constipation, hematochezia, melena, decreased stool caliber, or change in bowel habits.  Associated Symptoms: no rectal prolapse.   Medication:  None.       Insomnia Follow-Up: The patient is being seen for follow-up of Insomnia, which is stable.  Comorbid Illnesses: Anxiety.   Interval Events:  None.  Symptoms: Stable insomnia and anxiety.  Medications:  Ambien and Melatonin.  Off Trazodone due to Hyponatremia.      Anxiety Disorder Follow-Up: The patient is being seen for follow-up of Anxiey, which is stable.  Comorbid Illnesses: Insomnia.  Interval Events: None.  Symptoms: Stable anxiety and insomnia.  Denies depression,  panic attacks, anhedonia, memory loss, and difficulty concentrating.    Associated Symptoms: no suicidal ideation.   Medication: Alprazolam BID.  The patient is adherent to his medication regimen, and he denies medication side effects.      Yevgeniy Vaughn is a 65 y.o. male who presents for an Annual Physical.      PMH, PSH, SocHx, FamHx, Allergies, and Medications: Reviewed and updated.    Outpatient Medications Prior to Visit   Medication Sig Dispense Refill   • ALPRAZolam (XANAX) 0.5 MG tablet Take 1 tablet by mouth 2 (Two) Times a Day As Needed for Anxiety. for anxiety 60 tablet 2   • aspirin 81 MG chewable tablet Chew 81 mg Daily.     • atorvastatin (LIPITOR) 20 MG tablet TAKE 1 TABLET BY MOUTH AT BEDTIME (Patient taking differently: Take 20 mg by mouth Every Night.) 90 tablet 3   • Blood Glucose Monitoring Suppl ([employer] Blood Glucose Meter) w/Device kit 1 kit 3 (Three) Times a Day. Check sugars three times a day Dx: E11.19 1 kit 0   • Blood Glucose Monitoring Suppl (FreeStyle Lite) device      • Chantix Continuing Month Casa 1 MG tablet Take 1 tablet by mouth twice daily (Patient taking differently: Take 1 mg by mouth 2 (Two) Times a Day.) 60 tablet 4   • gabapentin (NEURONTIN) 800 MG tablet 800 mg 4 (Four) Times a Day.     • glucose blood (Glucose Meter Test) test strip Check sugars three times a day. Dx: E11.19 100 each 3   • glucose blood test strip Use as instructed   Dx E 11.9 100 each 3   • glucose monitor monitoring kit 1 each 2 (two) times a day. 1 each 0   • Januvia 100 MG tablet Take 1 tablet by mouth once daily (Patient taking differently: Take 100 mg by mouth Daily.) 90 tablet 3   • Lancets misc Check sugars three times a day. Dx: E11.19 100 each 3   • lisinopril (PRINIVIL,ZESTRIL) 40 MG tablet TAKE 1 TABLET BY MOUTH AT BEDTIME (Patient taking differently: Take 40 mg by mouth Daily.) 90 tablet 3   • melatonin 5 MG tablet tablet Take 3 tablets by mouth At Night As Needed (insomnia). (Patient taking  differently: Take 10 mg by mouth At Night As Needed (insomnia).)     • metFORMIN (GLUCOPHAGE) 1000 MG tablet Take 1 tablet by mouth twice daily (Patient taking differently: Take 1,000 mg by mouth 2 (Two) Times a Day With Meals.) 180 tablet 3   • Omega-3 Fatty Acids (FISH OIL) 1000 MG capsule capsule Take 1 capsule by mouth Daily With Breakfast.     • oxyCODONE-acetaminophen (PERCOCET) 7.5-325 MG per tablet Take 1 tablet by mouth 3 (three) times a day.     • pioglitazone (ACTOS) 45 MG tablet Take 1 tablet by mouth Daily. 30 tablet 5   • promethazine (PHENERGAN) 25 MG tablet TAKE ONE TABLET BY MOUTH EVERY 4 TO 6 HOURS AS NEEDED FOR NAUSEA AND VOMITING 30 tablet 5   • sodium chloride 1 g tablet Take 1 g by mouth 2 (Two) Times a Day.     • vitamin B-12 (CYANOCOBALAMIN) 1000 MCG tablet Take 1,000 mcg by mouth Daily.     • vitamin C (ASCORBIC ACID) 250 MG tablet Take 250 mg by mouth Daily.     • zolpidem (AMBIEN) 10 MG tablet TAKE 1 TABLET BY MOUTH AT NIGHT AS NEEDED FOR SLEEP 30 tablet 5   • ibuprofen (ADVIL,MOTRIN) 800 MG tablet      • metoprolol succinate XL (Toprol XL) 50 MG 24 hr tablet Take 1 tablet by mouth Every Night. 30 tablet 5   • clobetasol (TEMOVATE) 0.05 % cream APPLY CREAM TOPICALLY TO AFFECTED AREA TWICE DAILY (Patient taking differently: As Needed.) 60 g 3   • Lancets misc Test bid 100 each 3   • Multiple Vitamins-Minerals (MULTIVITAMIN ADULT PO) Take 1 tablet by mouth.       No facility-administered medications prior to visit.       Immunization History   Administered Date(s) Administered   • COVID-19 (PFIZER) 03/22/2021, 04/12/2021   • Flu Mist 12/22/2011, 10/21/2014, 11/04/2015   • Flu Vaccine Quad PF >18YRS 09/14/2016   • Flu Vaccine Quad PF >36MO 10/09/2018, 11/01/2020   • Flublock Quad =>18yrs 10/18/2020   • Flulaval/Fluarix/Fluzone Quad 12/11/2019   • Hepatitis A 04/29/2018, 11/07/2018   • Influenza Quad Vaccine (Inpatient) 10/09/2017, 10/09/2017   • Pneumococcal Conjugate 13-Valent (PCV13)  2016   • Pneumococcal Polysaccharide (PPSV23) 2008   • Shingrix 2021, 05/15/2021   • Td, Not Adsorbed 2016   • Tdap 2008         Patient Active Problem List   Diagnosis   • Acute recurrent pancreatitis   • Anxiety disorder   • Benign colonic polyp   • Chronic neck pain   • Chronic pain syndrome   • Erectile dysfunction   • Hyperlipidemia   • Hypertension   • Insomnia   • Shoulder joint pain   • Psoriasis   • Ptosis of eyelid   • Type 2 diabetes mellitus (CMS/HCC)   • Cigarette nicotine dependence   • Hyponatremia   • Adenoma of left adrenal gland   • Lung nodule       Health Habits:  Dental Exam. not up to date - has dentures  Eye Exam. up to date  Hearing Loss:  Yes, stable  Exercise: 0 times/week.  Current exercise activities include: none  Diet: Healthy  Multivitamin: Yes    Safe Driving:  Yes  Seat Belt:  Yes  Bike Helmet:  No  Skin Screening:  Yes  Sunscreen: Yes  SBE / DEVIN: Yes  Sexual Activity:  Yes  Birth Control:  Menopause  STD Prevention:  N/A    Last Pap: N/A  Last Mammogram:  N/A  Last DEXA Scan: N/A  Last Colonoscopy: 15 was normal, but poor prep.  Last PSA:  20 (0.233)    Social:    Social History     Socioeconomic History   • Marital status:      Spouse name: Not on file   • Number of children: 4   • Years of education: Not on file   • Highest education level: Not on file   Tobacco Use   • Smoking status: Former Smoker     Packs/day: 0.50     Types: Cigarettes     Start date:      Quit date: 2021     Years since quittin.2   • Smokeless tobacco: Former User     Types: Chew     Quit date:    • Tobacco comment: - present ,1 1/ ppd. Quit 14. Re-started approx 2015, 3/4 ppd, then 1 ppd. Quit 19, re-started 2018, 1 ppd. Quit 20. Re-sarted 2021 1 ppd, Quit 21   Vaping Use   • Vaping Use: Never used   Substance and Sexual Activity   • Alcohol use: No   • Drug use: No   • Sexual activity: Defer     Partners:  Female         Current Medical Providers:    Christine Rousseau MD (Internal Medicine / Pediatrics)    The [facility] providers who are involved in the care of this patient are listed above.         Review of Systems   Constitutional: Positive for unexpected weight change. Negative for appetite change, chills, fatigue and fever.         No night sweats.     HENT: Positive for hearing loss (chronic). Negative for congestion, ear pain, facial swelling, nosebleeds, postnasal drip, rhinorrhea, sinus pressure, sinus pain, sneezing, sore throat, tinnitus, trouble swallowing and voice change.         Denies snoring.   Eyes: Negative for photophobia, pain, discharge, redness, itching and visual disturbance.   Respiratory: Negative for cough, chest tightness, shortness of breath and wheezing.         No chest congestion.  No hemoptysis.    Cardiovascular: Negative for chest pain, palpitations and leg swelling.        No orthopnea, SALAMANCA, or PND.  No claudication or syncope.   Gastrointestinal: Positive for abdominal pain. Negative for abdominal distention, blood in stool, constipation, diarrhea, nausea and vomiting.        No melena, heartburn, odynophagia, dysphagia, early satiety, belching, or bloating.   Endocrine: Positive for polydipsia. Negative for cold intolerance, heat intolerance, polyphagia and polyuria.        No hair loss, but has dry skin.    Genitourinary: Negative for decreased urine volume, difficulty urinating, discharge, dysuria, flank pain, frequency, hematuria, scrotal swelling, testicular pain and urgency.        Has bilateral pelvic pain due to hernia. No nocturia, incomplete emptying, or incontinence.  No erectile dysfunction.   Musculoskeletal: Positive for arthralgias, back pain, myalgias, neck pain and neck stiffness. Negative for gait problem and joint swelling.        Has  joint stiffness.   Skin: Negative for rash.        No new skin lesions or changes in skin lesions.     Neurological: Positive  "for numbness. Negative for dizziness, tremors, speech difficulty, weakness, light-headedness and headaches.        Has tingling. No memory loss. No decreased concentration.   Hematological: Negative for adenopathy. Does not bruise/bleed easily.   Psychiatric/Behavioral: Positive for sleep disturbance. Negative for confusion and suicidal ideas. The patient is nervous/anxious.         No depression.           Objective     Vitals:    07/07/21 1050   BP: 145/74   BP Location: Left arm   Pulse: 74   Resp: 20   Temp: 97.3 °F (36.3 °C)   TempSrc: Temporal   Weight: 92.3 kg (203 lb 8 oz)   Height: 173.4 cm (68.25\")       Body mass index is 30.72 kg/m².    Physical Exam  Vitals and nursing note reviewed.   Constitutional:       Appearance: Normal appearance. He is well-developed.      Comments: Overweight.   HENT:      Head: Normocephalic and atraumatic.      Right Ear: Tympanic membrane, ear canal and external ear normal.      Left Ear: Tympanic membrane, ear canal and external ear normal.      Mouth/Throat:      Mouth: Mucous membranes are moist. No oral lesions.      Pharynx: Oropharynx is clear.      Comments: Tonsils normal.  Eyes:      Extraocular Movements: Extraocular movements intact.      Conjunctiva/sclera: Conjunctivae normal.      Pupils: Pupils are equal, round, and reactive to light.   Neck:      Thyroid: No thyroid mass or thyromegaly.      Vascular: No carotid bruit.   Cardiovascular:      Rate and Rhythm: Normal rate and regular rhythm.      Pulses: Normal pulses.      Heart sounds: No murmur heard.   No friction rub. No gallop.    Pulmonary:      Effort: Pulmonary effort is normal.      Breath sounds: Normal breath sounds.   Abdominal:      General: Bowel sounds are normal. There is no distension or abdominal bruit.      Palpations: Abdomen is soft. There is no hepatomegaly, splenomegaly or mass.      Tenderness: There is no abdominal tenderness.   Genitourinary:     Testes:         Right: Mass, " tenderness or swelling not present.         Left: Mass, tenderness or swelling not present.      Comments: /rectal exam declined by patient due to recent exam by Urology.  Musculoskeletal:         General: Normal range of motion.      Cervical back: Normal range of motion and neck supple.      Right lower leg: No edema.      Left lower leg: No edema.   Feet:      Right foot:      Skin integrity: Dry skin present. No ulcer, blister, skin breakdown or erythema.      Left foot:      Skin integrity: Dry skin present. No ulcer, blister, skin breakdown or erythema.   Lymphadenopathy:      Cervical: No cervical adenopathy.      Upper Body:      Right upper body: No supraclavicular adenopathy.      Left upper body: No supraclavicular adenopathy.      Lower Body: No right inguinal adenopathy. No left inguinal adenopathy.   Skin:     Findings: No lesion or rash.      Comments: No atypical skin lesions.   Neurological:      Mental Status: He is alert.      Cranial Nerves: Cranial nerves are intact.      Motor: Motor function is intact. No abnormal muscle tone.      Coordination: Coordination normal.      Gait: Gait normal.      Deep Tendon Reflexes: Reflexes are normal and symmetric.   Psychiatric:         Mood and Affect: Mood normal.       Diabetic Foot Exam Performed  Monofilament Test Performed      PHQ-2 Depression Screening  Little interest or pleasure in doing things? 0   Feeling down, depressed, or hopeless? 0   PHQ-2 Total Score 0         Counseling was given to patient for the following topics:  appropriate exercise, healthy eating habits, disease prevention, risk factors for cancer, importance of self testicular exam, sun safety, seatbelt use and safe driving. Also discussed the importance of regular dental and vision care, as well recommendation for a yearly screening skin exam after age 40.  Written information provided to patient on these topics and other health maintenance issues.    Results for orders placed  or performed in visit on 07/07/21   POC Microalbumin    Specimen: Urine   Result Value Ref Range    Microalbumin, Urine 30     Creatinine, Urine 300     Albumin/Creatinine Ratio, Urine <30     Lot Number [id number]     Expiration Date 08/31/2022    POC Urinalysis Dipstick, Automated    Specimen: Urine   Result Value Ref Range    Color Yellow Yellow, Straw, Dark Yellow, Loli    Clarity, UA Clear Clear    Specific Gravity  1.020 1.005 - 1.030    pH, Urine 5.0 5.0 - 8.0    Leukocytes Negative Negative    Nitrite, UA Negative Negative    Protein, POC Trace (A) Negative mg/dL    Glucose, UA 1000 mg/dL (A) Negative, 1000 mg/dL (3+) mg/dL    Ketones, UA Negative Negative    Urobilinogen, UA Normal Normal    Bilirubin Negative Negative    Blood, UA Negative Negative    Lot Number [id number]     Expiration Date 11/30/2021        Assessment/Plan       Diagnoses and all orders for this visit:    1. Encounter for health maintenance examination in adult (Primary)  -     POC Microalbumin  -     POC Urinalysis Dipstick, Automated  -     Lipid Panel  -     Comprehensive Metabolic Panel  -     TSH  -     Vitamin D 25 Hydroxy  -     CBC & Differential    2. Type 2 diabetes mellitus with hyperglycemia, without long-term current use of insulin (CMS/[facility])  -     POC Microalbumin  -     POC Urinalysis Dipstick, Automated  -     Lipid Panel  -     Comprehensive Metabolic Panel  -     TSH  -     CBC & Differential   Too early for HgA1C.  Will do with pre-op labs for hernia surgery.   Continue current medication(s) as noted in the history of present illness.    3. Essential hypertension  -     POC Microalbumin  -     POC Urinalysis Dipstick, Automated  -     Lipid Panel  -     Comprehensive Metabolic Panel  -     CBC & Differential   Continue current medication(s) as noted in the history of present illness.    4. Other hyperlipidemia  -     POC Microalbumin  -     POC Urinalysis Dipstick, Automated  -     Lipid Panel  -     Comprehensive  Metabolic Panel  -     TSH  -     CBC & Differential   Continue current medication(s) as noted in the history of present illness.    5. Chronic pain syndrome   Continue current medication(s) as noted in the history of present illness.   Follow up per Pain Management.    6. Benign colonic polyp   Declined scheduling a Colonoscopy, ants to wait until after the hernia surgery.    7. Lung nodule   Follow up per Pulmonary.    8. Adenoma of left adrenal gland   Monitor.    9. Primary insomnia   Continue current medication(s) as noted in the history of present illness.    The patient was instructed in the side effects of the medication.  Risks of the potential for tolerance, dependence, and addiction were discussed.  The patient was instructed to take the lowest dosage of the medication, at the lowest frequency, and for the shortest period of time possible.  The patient was instructed not to receive controlled substances or narcotics from other doctors, and not to giveaway or sell the medication.     The patient was instructed to abstain from illicit drug use.  The patient was instructed to avoid alcohol while taking these medications.       Narcotics/controlled substance agreement, Lonnie report, and Urine Drug Screen were updated today if needed.    10. Generalized anxiety disorder  -     Vitamin D 25 Hydroxy    11. High risk medication use  -     Compliance Drug Analysis, Ur - Urine, Clean Catch; Future  -     Compliance Drug Analysis, Ur - Urine, Clean Catch        Patient's Body mass index is 30.72 kg/m². indicating that he is overweight (BMI 25-29.9). Obesity-related health conditions include the following: hypertension, diabetes mellitus and dyslipidemias. Obesity is unchanged. BMI is is above average; BMI management plan is completed. We discussed portion control and increasing exercise..          Return in about 3 months (around 10/7/2021) for Recheck Anxiety medication, non-fasting.

## 2021-07-08 LAB
25(OH)D3 SERPL-MCNC: 24.4 NG/ML
ALBUMIN SERPL-MCNC: 4.5 G/DL (ref 3.5–5.2)
ALBUMIN/GLOB SERPL: 1.6 G/DL
ALP SERPL-CCNC: 51 U/L (ref 39–117)
ALT SERPL W P-5'-P-CCNC: 17 U/L (ref 1–41)
ANION GAP SERPL CALCULATED.3IONS-SCNC: 13.6 MMOL/L (ref 5–15)
AST SERPL-CCNC: 18 U/L (ref 1–40)
BILIRUB SERPL-MCNC: 0.3 MG/DL (ref 0–1.2)
BUN SERPL-MCNC: 17 MG/DL (ref 8–23)
BUN/CREAT SERPL: 21.5 (ref 7–25)
CALCIUM SPEC-SCNC: 9.8 MG/DL (ref 8.6–10.5)
CHLORIDE SERPL-SCNC: 96 MMOL/L (ref 98–107)
CHOLEST SERPL-MCNC: 160 MG/DL (ref 0–200)
CO2 SERPL-SCNC: 24.4 MMOL/L (ref 22–29)
CREAT SERPL-MCNC: 0.79 MG/DL (ref 0.76–1.27)
GFR SERPL CREATININE-BSD FRML MDRD: 99 ML/MIN/1.73
GLOBULIN UR ELPH-MCNC: 2.9 GM/DL
GLUCOSE SERPL-MCNC: 232 MG/DL (ref 65–99)
HDLC SERPL-MCNC: 38 MG/DL (ref 40–60)
LDLC SERPL CALC-MCNC: 80 MG/DL (ref 0–100)
LDLC/HDLC SERPL: 1.86 {RATIO}
POTASSIUM SERPL-SCNC: 4.6 MMOL/L (ref 3.5–5.2)
PROT SERPL-MCNC: 7.4 G/DL (ref 6–8.5)
SODIUM SERPL-SCNC: 134 MMOL/L (ref 136–145)
TRIGL SERPL-MCNC: 256 MG/DL (ref 0–150)
TSH SERPL DL<=0.05 MIU/L-ACNC: 0.73 UIU/ML (ref 0.27–4.2)
VLDLC SERPL-MCNC: 42 MG/DL (ref 5–40)

## 2021-07-09 ENCOUNTER — PRE-ADMISSION TESTING (OUTPATIENT)
Dept: PREADMISSION TESTING | Facility: HOSPITAL | Age: 66
End: 2021-07-09

## 2021-07-09 VITALS — BODY MASS INDEX: 29.88 KG/M2 | HEIGHT: 69 IN | WEIGHT: 201.72 LBS

## 2021-07-09 LAB
HBA1C MFR BLD: 11.2 % (ref 4.8–5.6)
INR PPP: 0.94 (ref 0.85–1.16)
PROTHROMBIN TIME: 12.4 SECONDS (ref 11.4–14.4)
QT INTERVAL: 346 MS
QTC INTERVAL: 394 MS
SARS-COV-2 RNA NOSE QL NAA+PROBE: NOT DETECTED

## 2021-07-09 PROCEDURE — U0004 COV-19 TEST NON-CDC HGH THRU: HCPCS

## 2021-07-09 PROCEDURE — 36415 COLL VENOUS BLD VENIPUNCTURE: CPT

## 2021-07-09 PROCEDURE — 85610 PROTHROMBIN TIME: CPT

## 2021-07-09 PROCEDURE — 83036 HEMOGLOBIN GLYCOSYLATED A1C: CPT

## 2021-07-09 PROCEDURE — C9803 HOPD COVID-19 SPEC COLLECT: HCPCS

## 2021-07-09 PROCEDURE — 93010 ELECTROCARDIOGRAM REPORT: CPT | Performed by: INTERNAL MEDICINE

## 2021-07-09 PROCEDURE — 93005 ELECTROCARDIOGRAM TRACING: CPT

## 2021-07-09 NOTE — PAT
Patient to apply Chlorhexadine wipes  to surgical area (as instructed) the night before procedure and the AM of procedure. Wipes provided.    Per Anesthesia Request, patient instructed not to take their ACE/ARB medications on the AM of surgery.    CBC & CMP from 7/7/21

## 2021-07-10 ENCOUNTER — ANESTHESIA EVENT (OUTPATIENT)
Dept: PERIOP | Facility: HOSPITAL | Age: 66
End: 2021-07-10

## 2021-07-10 RX ORDER — FAMOTIDINE 10 MG/ML
20 INJECTION, SOLUTION INTRAVENOUS ONCE
Status: CANCELLED | OUTPATIENT
Start: 2021-07-10 | End: 2021-07-10

## 2021-07-11 LAB — DRUGS UR: NORMAL

## 2021-07-12 ENCOUNTER — ANESTHESIA (OUTPATIENT)
Dept: PERIOP | Facility: HOSPITAL | Age: 66
End: 2021-07-12

## 2021-07-12 ENCOUNTER — HOSPITAL ENCOUNTER (OUTPATIENT)
Facility: HOSPITAL | Age: 66
Setting detail: HOSPITAL OUTPATIENT SURGERY
Discharge: HOME | End: 2021-07-12
Attending: SURGERY | Admitting: SURGERY

## 2021-07-12 ENCOUNTER — ANESTHESIA EVENT CONVERTED (OUTPATIENT)
Dept: ANESTHESIOLOGY | Facility: HOSPITAL | Age: 66
End: 2021-07-12

## 2021-07-12 VITALS
DIASTOLIC BLOOD PRESSURE: 78 MMHG | RESPIRATION RATE: 20 BRPM | SYSTOLIC BLOOD PRESSURE: 152 MMHG | HEART RATE: 102 BPM | OXYGEN SATURATION: 97 % | TEMPERATURE: 97.7 F

## 2021-07-12 DIAGNOSIS — K42.9 UMBILICAL HERNIA: ICD-10-CM

## 2021-07-12 LAB
GLUCOSE BLDC GLUCOMTR-MCNC: 275 MG/DL (ref 70–130)
GLUCOSE BLDC GLUCOMTR-MCNC: 332 MG/DL (ref 70–130)

## 2021-07-12 PROCEDURE — 25010000002 PROPOFOL 10 MG/ML EMULSION: Performed by: NURSE ANESTHETIST, CERTIFIED REGISTERED

## 2021-07-12 PROCEDURE — 82962 GLUCOSE BLOOD TEST: CPT

## 2021-07-12 PROCEDURE — 25010000002 ONDANSETRON PER 1 MG: Performed by: NURSE ANESTHETIST, CERTIFIED REGISTERED

## 2021-07-12 PROCEDURE — 25010000002 FENTANYL CITRATE (PF) 50 MCG/ML SOLUTION: Performed by: NURSE ANESTHETIST, CERTIFIED REGISTERED

## 2021-07-12 PROCEDURE — 25010000003 CEFAZOLIN IN DEXTROSE 2-4 GM/100ML-% SOLUTION: Performed by: SURGERY

## 2021-07-12 PROCEDURE — 88302 TISSUE EXAM BY PATHOLOGIST: CPT | Performed by: SURGERY

## 2021-07-12 PROCEDURE — C1781 MESH (IMPLANTABLE): HCPCS | Performed by: SURGERY

## 2021-07-12 PROCEDURE — 25010000002 NEOSTIGMINE 10 MG/10ML SOLUTION: Performed by: NURSE ANESTHETIST, CERTIFIED REGISTERED

## 2021-07-12 PROCEDURE — 25010000002 DEXAMETHASONE PER 1 MG: Performed by: NURSE ANESTHETIST, CERTIFIED REGISTERED

## 2021-07-12 PROCEDURE — 25010000002 DEXAMETHASONE SODIUM PHOSPHATE 10 MG/ML SOLUTION: Performed by: NURSE ANESTHETIST, CERTIFIED REGISTERED

## 2021-07-12 PROCEDURE — C1889 IMPLANT/INSERT DEVICE, NOC: HCPCS | Performed by: SURGERY

## 2021-07-12 PROCEDURE — 25010000002 HYDROMORPHONE PER 4 MG: Performed by: NURSE ANESTHETIST, CERTIFIED REGISTERED

## 2021-07-12 DEVICE — VENTRALEX ST HERNIA PATCH
Type: IMPLANTABLE DEVICE | Site: UMBILICAL | Status: FUNCTIONAL
Brand: VENTRALEX ST HERNIA PATCH

## 2021-07-12 DEVICE — BARD 3DMAX MESH LEFT LARGE
Type: IMPLANTABLE DEVICE | Site: GROIN | Status: FUNCTIONAL
Brand: BARD 3DMAX MESH

## 2021-07-12 DEVICE — BARD 3DMAX MESH RIGHT LARGE
Type: IMPLANTABLE DEVICE | Site: GROIN | Status: FUNCTIONAL
Brand: BARD 3DMAX MESH

## 2021-07-12 DEVICE — ABSORBABLE WOUND CLOSURE DEVICE
Type: IMPLANTABLE DEVICE | Site: GROIN | Status: FUNCTIONAL
Brand: V-LOC 180

## 2021-07-12 RX ORDER — DEXAMETHASONE SODIUM PHOSPHATE 4 MG/ML
INJECTION, SOLUTION INTRA-ARTICULAR; INTRALESIONAL; INTRAMUSCULAR; INTRAVENOUS; SOFT TISSUE AS NEEDED
Status: DISCONTINUED | OUTPATIENT
Start: 2021-07-12 | End: 2021-07-12 | Stop reason: SURG

## 2021-07-12 RX ORDER — MIDAZOLAM HYDROCHLORIDE 1 MG/ML
1 INJECTION INTRAMUSCULAR; INTRAVENOUS
Status: DISCONTINUED | OUTPATIENT
Start: 2021-07-12 | End: 2021-07-12 | Stop reason: HOSPADM

## 2021-07-12 RX ORDER — SODIUM CHLORIDE 0.9 % (FLUSH) 0.9 %
10 SYRINGE (ML) INJECTION EVERY 12 HOURS SCHEDULED
Status: DISCONTINUED | OUTPATIENT
Start: 2021-07-12 | End: 2021-07-12 | Stop reason: HOSPADM

## 2021-07-12 RX ORDER — KETAMINE HCL IN NACL, ISO-OSM 100MG/10ML
SYRINGE (ML) INJECTION AS NEEDED
Status: DISCONTINUED | OUTPATIENT
Start: 2021-07-12 | End: 2021-07-12 | Stop reason: SURG

## 2021-07-12 RX ORDER — HYDROMORPHONE HYDROCHLORIDE 1 MG/ML
0.5 INJECTION, SOLUTION INTRAMUSCULAR; INTRAVENOUS; SUBCUTANEOUS
Status: DISCONTINUED | OUTPATIENT
Start: 2021-07-12 | End: 2021-07-12 | Stop reason: HOSPADM

## 2021-07-12 RX ORDER — DOCUSATE SODIUM 100 MG/1
100 CAPSULE, LIQUID FILLED ORAL 2 TIMES DAILY
Qty: 30 CAPSULE | Refills: 1 | Status: SHIPPED | OUTPATIENT
Start: 2021-07-12 | End: 2022-07-12

## 2021-07-12 RX ORDER — FAMOTIDINE 20 MG/1
20 TABLET, FILM COATED ORAL ONCE
Status: COMPLETED | OUTPATIENT
Start: 2021-07-12 | End: 2021-07-12

## 2021-07-12 RX ORDER — PROMETHAZINE HYDROCHLORIDE 25 MG/1
25 SUPPOSITORY RECTAL ONCE AS NEEDED
Status: DISCONTINUED | OUTPATIENT
Start: 2021-07-12 | End: 2021-07-12 | Stop reason: HOSPADM

## 2021-07-12 RX ORDER — DROPERIDOL 2.5 MG/ML
0.62 INJECTION, SOLUTION INTRAMUSCULAR; INTRAVENOUS ONCE AS NEEDED
Status: DISCONTINUED | OUTPATIENT
Start: 2021-07-12 | End: 2021-07-12 | Stop reason: HOSPADM

## 2021-07-12 RX ORDER — LIDOCAINE HYDROCHLORIDE 10 MG/ML
0.5 INJECTION, SOLUTION EPIDURAL; INFILTRATION; INTRACAUDAL; PERINEURAL ONCE AS NEEDED
Status: COMPLETED | OUTPATIENT
Start: 2021-07-12 | End: 2021-07-12

## 2021-07-12 RX ORDER — MAGNESIUM HYDROXIDE 1200 MG/15ML
LIQUID ORAL AS NEEDED
Status: DISCONTINUED | OUTPATIENT
Start: 2021-07-12 | End: 2021-07-12 | Stop reason: HOSPADM

## 2021-07-12 RX ORDER — EPHEDRINE SULFATE 50 MG/ML
INJECTION, SOLUTION INTRAVENOUS AS NEEDED
Status: DISCONTINUED | OUTPATIENT
Start: 2021-07-12 | End: 2021-07-12 | Stop reason: SURG

## 2021-07-12 RX ORDER — GLYCOPYRROLATE 0.2 MG/ML
INJECTION INTRAMUSCULAR; INTRAVENOUS AS NEEDED
Status: DISCONTINUED | OUTPATIENT
Start: 2021-07-12 | End: 2021-07-12 | Stop reason: SURG

## 2021-07-12 RX ORDER — OXYCODONE HYDROCHLORIDE AND ACETAMINOPHEN 5; 325 MG/1; MG/1
TABLET ORAL
Qty: 14 TABLET | Refills: 0 | Status: SHIPPED | OUTPATIENT
Start: 2021-07-12 | End: 2022-01-27

## 2021-07-12 RX ORDER — FENTANYL CITRATE 50 UG/ML
INJECTION, SOLUTION INTRAMUSCULAR; INTRAVENOUS AS NEEDED
Status: DISCONTINUED | OUTPATIENT
Start: 2021-07-12 | End: 2021-07-12 | Stop reason: SURG

## 2021-07-12 RX ORDER — LIDOCAINE HYDROCHLORIDE 10 MG/ML
INJECTION, SOLUTION EPIDURAL; INFILTRATION; INTRACAUDAL; PERINEURAL AS NEEDED
Status: DISCONTINUED | OUTPATIENT
Start: 2021-07-12 | End: 2021-07-12 | Stop reason: SURG

## 2021-07-12 RX ORDER — DEXAMETHASONE SODIUM PHOSPHATE 10 MG/ML
INJECTION, SOLUTION INTRAMUSCULAR; INTRAVENOUS
Status: COMPLETED | OUTPATIENT
Start: 2021-07-12 | End: 2021-07-12

## 2021-07-12 RX ORDER — SODIUM CHLORIDE 0.9 % (FLUSH) 0.9 %
10 SYRINGE (ML) INJECTION AS NEEDED
Status: DISCONTINUED | OUTPATIENT
Start: 2021-07-12 | End: 2021-07-12 | Stop reason: HOSPADM

## 2021-07-12 RX ORDER — FENTANYL CITRATE 50 UG/ML
50 INJECTION, SOLUTION INTRAMUSCULAR; INTRAVENOUS
Status: DISCONTINUED | OUTPATIENT
Start: 2021-07-12 | End: 2021-07-12 | Stop reason: HOSPADM

## 2021-07-12 RX ORDER — ONDANSETRON 2 MG/ML
4 INJECTION INTRAMUSCULAR; INTRAVENOUS ONCE AS NEEDED
Status: COMPLETED | OUTPATIENT
Start: 2021-07-12 | End: 2021-07-12

## 2021-07-12 RX ORDER — IPRATROPIUM BROMIDE AND ALBUTEROL SULFATE 2.5; .5 MG/3ML; MG/3ML
3 SOLUTION RESPIRATORY (INHALATION) ONCE AS NEEDED
Status: DISCONTINUED | OUTPATIENT
Start: 2021-07-12 | End: 2021-07-12 | Stop reason: HOSPADM

## 2021-07-12 RX ORDER — SODIUM CHLORIDE 9 MG/ML
INJECTION, SOLUTION INTRAVENOUS AS NEEDED
Status: DISCONTINUED | OUTPATIENT
Start: 2021-07-12 | End: 2021-07-12 | Stop reason: HOSPADM

## 2021-07-12 RX ORDER — LABETALOL HYDROCHLORIDE 5 MG/ML
INJECTION, SOLUTION INTRAVENOUS AS NEEDED
Status: DISCONTINUED | OUTPATIENT
Start: 2021-07-12 | End: 2021-07-12 | Stop reason: SURG

## 2021-07-12 RX ORDER — LABETALOL HYDROCHLORIDE 5 MG/ML
5 INJECTION, SOLUTION INTRAVENOUS
Status: DISCONTINUED | OUTPATIENT
Start: 2021-07-12 | End: 2021-07-12 | Stop reason: HOSPADM

## 2021-07-12 RX ORDER — SODIUM CHLORIDE, SODIUM LACTATE, POTASSIUM CHLORIDE, CALCIUM CHLORIDE 600; 310; 30; 20 MG/100ML; MG/100ML; MG/100ML; MG/100ML
9 INJECTION, SOLUTION INTRAVENOUS CONTINUOUS
Status: DISCONTINUED | OUTPATIENT
Start: 2021-07-12 | End: 2021-07-12 | Stop reason: HOSPADM

## 2021-07-12 RX ORDER — ONDANSETRON 2 MG/ML
INJECTION INTRAMUSCULAR; INTRAVENOUS AS NEEDED
Status: DISCONTINUED | OUTPATIENT
Start: 2021-07-12 | End: 2021-07-12 | Stop reason: SURG

## 2021-07-12 RX ORDER — BUPIVACAINE HYDROCHLORIDE 2.5 MG/ML
INJECTION, SOLUTION EPIDURAL; INFILTRATION; INTRACAUDAL
Status: COMPLETED | OUTPATIENT
Start: 2021-07-12 | End: 2021-07-12

## 2021-07-12 RX ORDER — CEFAZOLIN SODIUM 2 G/100ML
2 INJECTION, SOLUTION INTRAVENOUS ONCE
Status: COMPLETED | OUTPATIENT
Start: 2021-07-12 | End: 2021-07-12

## 2021-07-12 RX ORDER — NEOSTIGMINE METHYLSULFATE 1 MG/ML
INJECTION, SOLUTION INTRAVENOUS AS NEEDED
Status: DISCONTINUED | OUTPATIENT
Start: 2021-07-12 | End: 2021-07-12 | Stop reason: SURG

## 2021-07-12 RX ORDER — PROPOFOL 10 MG/ML
VIAL (ML) INTRAVENOUS AS NEEDED
Status: DISCONTINUED | OUTPATIENT
Start: 2021-07-12 | End: 2021-07-12 | Stop reason: SURG

## 2021-07-12 RX ORDER — ROCURONIUM BROMIDE 10 MG/ML
INJECTION, SOLUTION INTRAVENOUS AS NEEDED
Status: DISCONTINUED | OUTPATIENT
Start: 2021-07-12 | End: 2021-07-12 | Stop reason: SURG

## 2021-07-12 RX ORDER — PROMETHAZINE HYDROCHLORIDE 25 MG/1
25 TABLET ORAL ONCE AS NEEDED
Status: DISCONTINUED | OUTPATIENT
Start: 2021-07-12 | End: 2021-07-12 | Stop reason: HOSPADM

## 2021-07-12 RX ADMIN — ROCURONIUM BROMIDE 50 MG: 10 INJECTION, SOLUTION INTRAVENOUS at 10:30

## 2021-07-12 RX ADMIN — DEXAMETHASONE SODIUM PHOSPHATE 4 MG: 10 INJECTION, SOLUTION INTRAMUSCULAR; INTRAVENOUS at 10:35

## 2021-07-12 RX ADMIN — DEXAMETHASONE SODIUM PHOSPHATE 8 MG: 4 INJECTION, SOLUTION INTRA-ARTICULAR; INTRALESIONAL; INTRAMUSCULAR; INTRAVENOUS; SOFT TISSUE at 10:37

## 2021-07-12 RX ADMIN — ONDANSETRON 4 MG: 2 INJECTION INTRAMUSCULAR; INTRAVENOUS at 13:04

## 2021-07-12 RX ADMIN — CEFAZOLIN SODIUM 2 G: 10 INJECTION, POWDER, FOR SOLUTION INTRAVENOUS at 10:37

## 2021-07-12 RX ADMIN — SODIUM CHLORIDE, POTASSIUM CHLORIDE, SODIUM LACTATE AND CALCIUM CHLORIDE: 600; 310; 30; 20 INJECTION, SOLUTION INTRAVENOUS at 11:31

## 2021-07-12 RX ADMIN — PROPOFOL 25 MCG/KG/MIN: 10 INJECTION, EMULSION INTRAVENOUS at 10:37

## 2021-07-12 RX ADMIN — LABETALOL HYDROCHLORIDE 5 MG: 5 INJECTION, SOLUTION INTRAVENOUS at 11:58

## 2021-07-12 RX ADMIN — EPHEDRINE SULFATE 10 MG: 50 INJECTION INTRAVENOUS at 10:46

## 2021-07-12 RX ADMIN — BUPIVACAINE HYDROCHLORIDE 60 ML: 2.5 INJECTION, SOLUTION EPIDURAL; INFILTRATION; INTRACAUDAL; PERINEURAL at 10:35

## 2021-07-12 RX ADMIN — LIDOCAINE HYDROCHLORIDE 0.2 ML: 10 INJECTION, SOLUTION EPIDURAL; INFILTRATION; INTRACAUDAL; PERINEURAL at 09:35

## 2021-07-12 RX ADMIN — NEOSTIGMINE 3 MG: 1 INJECTION INTRAVENOUS at 13:10

## 2021-07-12 RX ADMIN — FENTANYL CITRATE 50 MCG: 50 INJECTION, SOLUTION INTRAMUSCULAR; INTRAVENOUS at 13:19

## 2021-07-12 RX ADMIN — FENTANYL CITRATE 50 MCG: 50 INJECTION INTRAMUSCULAR; INTRAVENOUS at 14:00

## 2021-07-12 RX ADMIN — HYDROMORPHONE HYDROCHLORIDE 0.5 MG: 1 INJECTION, SOLUTION INTRAMUSCULAR; INTRAVENOUS; SUBCUTANEOUS at 14:16

## 2021-07-12 RX ADMIN — FENTANYL CITRATE 50 MCG: 50 INJECTION, SOLUTION INTRAMUSCULAR; INTRAVENOUS at 13:00

## 2021-07-12 RX ADMIN — FENTANYL CITRATE 100 MCG: 50 INJECTION, SOLUTION INTRAMUSCULAR; INTRAVENOUS at 10:30

## 2021-07-12 RX ADMIN — ONDANSETRON 4 MG: 2 INJECTION INTRAMUSCULAR; INTRAVENOUS at 13:53

## 2021-07-12 RX ADMIN — FAMOTIDINE 20 MG: 20 TABLET ORAL at 09:51

## 2021-07-12 RX ADMIN — LIDOCAINE HYDROCHLORIDE 50 MG: 10 INJECTION, SOLUTION EPIDURAL; INFILTRATION; INTRACAUDAL; PERINEURAL at 10:30

## 2021-07-12 RX ADMIN — Medication 30 MG: at 10:30

## 2021-07-12 RX ADMIN — SODIUM CHLORIDE, POTASSIUM CHLORIDE, SODIUM LACTATE AND CALCIUM CHLORIDE 9 ML/HR: 600; 310; 30; 20 INJECTION, SOLUTION INTRAVENOUS at 09:35

## 2021-07-12 RX ADMIN — GLYCOPYRROLATE 0.4 MG: 0.4 INJECTION INTRAMUSCULAR; INTRAVENOUS at 13:10

## 2021-07-12 RX ADMIN — FENTANYL CITRATE 50 MCG: 50 INJECTION INTRAMUSCULAR; INTRAVENOUS at 13:54

## 2021-07-12 RX ADMIN — HYDROMORPHONE HYDROCHLORIDE 0.5 MG: 1 INJECTION, SOLUTION INTRAMUSCULAR; INTRAVENOUS; SUBCUTANEOUS at 13:48

## 2021-07-12 RX ADMIN — PROPOFOL 200 MG: 10 INJECTION, EMULSION INTRAVENOUS at 10:30

## 2021-07-12 NOTE — NURSING NOTE
"Pt finger stick blood sugar 332 Dr Foote called made aware of level clarification sought if needed to cover and recheck within a certain time frame due to patient being a discharge home. Dr Pace came to bedside talked to patient about need to have better contorl over blood sugar levels and need to meet with prescriber. Verbal order per Dr. Foote ANS \"do not cover with sliding scale, I told patient to resume oral medication as soon as possible at home.\" patient verbalized would take medications as soon as possible. Pt verbalized usual blood sugar is 250-350 when checked at home with medications. VSS continuing to monitor.  "

## 2021-07-12 NOTE — H&P
Pre-Op H&P  Yevgeniy Vaughn  [id number]  1955      Chief complaint: Groin pain      Subjective:  Patient is a 65 y.o.male presents for scheduled surgery by Dr. Johnson. He anticipates a ROBOT ASSISTED LAPAROSCOPIC BILATERAL INGUINAL HERNIA REPAIR WITH MESH; OPEN UMBILICAL HERNIA REPAIR W/MESH today. He has had groin pain since October when he developed a hernia after trying to lift a couch. The bulge worsening with activity and is present throughout the day. He denies changes in bowel habits.  Of note, A1c 11.2.    Review of Systems:  Constitutional-- No fever, chills or sweats. No fatigue.  CV-- No chest pain, palpitation or syncope. +HTN  Resp-- No cough, hemoptysis. +SOB  Skin--No rashes or lesions      Allergies:   Allergies   Allergen Reactions   • Lovaza [Omega-3-Acid Ethyl Esters] Other (See Comments)     Other reaction(s): CRAWLING FEELING   • Trazodone Other (See Comments)     Depletes sodium    • Farxiga [Dapagliflozin] Other (See Comments)     Pain in testicle   Low back pain    • Steglatro [Ertugliflozin] Nausea Only and Rash         Home Meds:  Medications Prior to Admission   Medication Sig Dispense Refill Last Dose   • ALPRAZolam (XANAX) 0.5 MG tablet Take 1 tablet by mouth 2 (Two) Times a Day As Needed for Anxiety. for anxiety 60 tablet 2    • aspirin 81 MG chewable tablet Chew 81 mg Daily.      • atorvastatin (LIPITOR) 20 MG tablet TAKE 1 TABLET BY MOUTH AT BEDTIME (Patient taking differently: Take 20 mg by mouth Every Night.) 90 tablet 3    • Blood Glucose Monitoring Suppl ([employer] Blood Glucose Meter) w/Device kit 1 kit 3 (Three) Times a Day. Check sugars three times a day Dx: E11.19 1 kit 0    • Blood Glucose Monitoring Suppl (FreeStyle Lite) device       • Chantix Continuing Month Casa 1 MG tablet Take 1 tablet by mouth twice daily (Patient taking differently: Take 1 mg by mouth 2 (Two) Times a Day.) 60 tablet 4    • clobetasol (TEMOVATE) 0.05 % cream APPLY CREAM TOPICALLY TO AFFECTED AREA  TWICE DAILY (Patient taking differently: As Needed.) 60 g 3    • gabapentin (NEURONTIN) 800 MG tablet 800 mg 4 (Four) Times a Day.      • glucose blood (Glucose Meter Test) test strip Check sugars three times a day. Dx: E11.19 100 each 3    • glucose blood test strip Use as instructed   Dx E 11.9 100 each 3    • glucose monitor monitoring kit 1 each 2 (two) times a day. 1 each 0    • Januvia 100 MG tablet Take 1 tablet by mouth once daily (Patient taking differently: Take 100 mg by mouth Daily.) 90 tablet 3    • Lancets misc Check sugars three times a day. Dx: E11.19 100 each 3    • lisinopril (PRINIVIL,ZESTRIL) 40 MG tablet TAKE 1 TABLET BY MOUTH AT BEDTIME (Patient taking differently: Take 40 mg by mouth Daily.) 90 tablet 3    • melatonin 5 MG tablet tablet Take 3 tablets by mouth At Night As Needed (insomnia). (Patient taking differently: Take 10 mg by mouth At Night As Needed (insomnia).)      • metFORMIN (GLUCOPHAGE) 1000 MG tablet Take 1 tablet by mouth twice daily (Patient taking differently: Take 1,000 mg by mouth 2 (Two) Times a Day With Meals.) 180 tablet 3    • Multiple Vitamins-Minerals (MULTIVITAMIN ADULT PO) Take 1 tablet by mouth.      • Omega-3 Fatty Acids (FISH OIL) 1000 MG capsule capsule Take 1 capsule by mouth Daily With Breakfast.      • oxyCODONE-acetaminophen (PERCOCET) 7.5-325 MG per tablet Take 1 tablet by mouth 3 (three) times a day.      • pioglitazone (ACTOS) 45 MG tablet Take 1 tablet by mouth Daily. 30 tablet 5    • promethazine (PHENERGAN) 25 MG tablet TAKE ONE TABLET BY MOUTH EVERY 4 TO 6 HOURS AS NEEDED FOR NAUSEA AND VOMITING 30 tablet 5    • sodium chloride 1 g tablet Take 1 g by mouth 2 (Two) Times a Day.      • vitamin B-12 (CYANOCOBALAMIN) 1000 MCG tablet Take 1,000 mcg by mouth Daily.      • vitamin C (ASCORBIC ACID) 250 MG tablet Take 250 mg by mouth Daily.      • zolpidem (AMBIEN) 10 MG tablet TAKE 1 TABLET BY MOUTH AT NIGHT AS NEEDED FOR SLEEP 30 tablet 5          PMH:    Past Medical History:   Diagnosis Date   • Acute recurrent pancreatitis     Description: s/p muple hospitalizations   • Anxiety disorder     Description: and panic disorder dx 711.   • Benign colonic polyp     Description: dx    • Chronic neck pain    • Chronic pain syndrome    • Cigarette nicotine dependence 06/10/2020    quit    • Degenerative cervical disc    • Emphysema lung (CMS/HCC)    • Erectile dysfunction    • Hyperlipidemia     Description: Diagnosed    • Hypertension    • Insomnia    • Low sodium levels    • Obesity     h/o phen-fen use   • PONV (postoperative nausea and vomiting)    • Psoriasis    • Ptosis of eyelid    • Shoulder joint pain     Description: Bilateral   • Type 2 diabetes mellitus (CMS/HCC)     Description: Diagnosed  (Normal Stress Test )   • Unknown varicella vaccination status    • Wears dentures    • Wears reading eyeglasses      PSH:    Past Surgical History:   Procedure Laterality Date   • CHOLECYSTECTOMY     • COLONOSCOPY      up to date    • HUMERUS SURGERY Right     Repair of Humerus / Arm Right  Description: - repair of biceps tendon rupture. 10/7/15- right biceps tendon repair.  12/7/15- debridement of wound and re-repair of right biceps tendon.   • ROTATOR CUFF REPAIR Left 2010    s/p Biceps muscle repair   • SHOULDER SURGERY Right 2005    repair of labrum and biceps tendon   • SKIN BIOPSY         Immunization History:  Influenza: UTD  Pneumococcal: UTD  Tetanus: UTD  Covid x2:     Social History:   Tobacco:   Social History     Tobacco Use   Smoking Status Former Smoker   • Packs/day: 0.50   • Types: Cigarettes   • Start date:    • Quit date: 2021   • Years since quittin.1   Smokeless Tobacco Former User   • Types: Chew   • Quit date:    Tobacco Comment    - present ,1 1/2 ppd. Quit 14. Re-started approx 2015, 3/4 ppd, then 1 ppd. Quit 18, re-started 2018, 1 ppd. Quit 20. Re-sarted 2021 1  ppd, Quit 5/1/21      Alcohol:     Social History     Substance and Sexual Activity   Alcohol Use No         Physical Exam: VS: /84  HR 79  RR 16  T 96.8  Sat 99%RA      General Appearance:    Alert, cooperative, no distress, appears stated age   Head:    Normocephalic, without obvious abnormality, atraumatic   Lungs:     Clear to auscultation bilaterally, respirations unlabored    Heart:   Regular rate and rhythm, S1 and S2 normal    Abdomen:    Soft without tenderness   Extremities:   Extremities normal, atraumatic, no cyanosis or edema   Skin:   Skin color, texture, turgor normal, no rashes or lesions   Neurologic:   Grossly intact     Results Review:     LABS:  Lab Results   Component Value Date    WBC 6.17 07/07/2021    HGB 13.8 07/07/2021    HCT 40.5 07/07/2021    MCV 93.1 07/07/2021     07/07/2021    NEUTROABS 3.00 07/07/2021    GLUCOSE 232 (H) 07/07/2021    BUN 17 07/07/2021    CREATININE 0.79 07/07/2021    EGFRIFNONA 99 07/07/2021     (L) 07/07/2021    K 4.6 07/07/2021    CL 96 (L) 07/07/2021    CO2 24.4 07/07/2021    MG 1.5 (L) 07/15/2020    PHOS 4.3 09/22/2020    CALCIUM 9.8 07/07/2021    ALBUMIN 4.50 07/07/2021    AST 18 07/07/2021    ALT 17 07/07/2021    BILITOT 0.3 07/07/2021     Results for RAI STONE (MRN [id number]) as of 7/12/2021 09:55   Ref. Range 7/9/2021 10:09   Hemoglobin A1C Latest Ref Range: 4.80 - 5.60 % 11.20 (H)     RADIOLOGY:  Imaging Results (Last 72 Hours)     ** No results found for the last 72 hours. **          I reviewed the patient's new clinical results.    Cancer Staging (if applicable)  Cancer Patient: __ yes __no __unknown; If yes, clinical stage T:__ N:__M:__, stage group or __N/A      Impression: Bilateral inguinal hernia without obstruction or gangrene; umbilical hernia without obstruction       Plan: ROBOT ASSISTED LAPAROSCOPIC BILATERAL INGUINAL HERNIA REPAIR WITH MESH; OPEN UMBILICAL HERNIA REPAIR W/MESH      Reyna Fletcher, APRN   7/12/2021    09:30 EDT

## 2021-07-12 NOTE — ANESTHESIA PROCEDURE NOTES
Peripheral Block      Patient reassessed immediately prior to procedure    Patient location during procedure: OR  Reason for block: at surgeon's request and post-op pain management  Performed by  Anesthesiologist: Denton Willingham MD  Preanesthetic Checklist  Completed: patient identified, IV checked, site marked, risks and benefits discussed, surgical consent, monitors and equipment checked, pre-op evaluation and timeout performed  Prep:  Pt Position: supine  Sterile barriers:cap, gloves, sterile barriers and mask  Prep: ChloraPrep  Patient monitoring: blood pressure monitoring, continuous pulse oximetry and EKG  Procedure  Sedation:yes  Performed under: general  Guidance:ultrasound guided  Images:still images not obtained    Laterality:Bilateral  Block Type:TAP  Injection Technique:single-shot  Needle Type:short-bevel and echogenic  Needle Gauge:20 G  Resistance on Injection: none    Medications Used: dexamethasone sodium phosphate injection, 4 mg  bupivacaine PF (MARCAINE) 0.25 % injection, 60 mL      Medications  Comment:Block Injection:  LA dose divided between Right and Left block        Post Assessment  Injection Assessment: negative aspiration for heme, incremental injection and no paresthesia on injection  Patient Tolerance:comfortable throughout block  Complications:no  Additional Notes      Under Ultrasound guidance, a BBraun 4inch 360 degree needle was advanced with Normal Saline hydro dissection of tissue.  The Internal Oblique and Transversus Abdominus muscles where visualized.  At or before the aponeurosis of Internal Oblique, local anesthetic spread was visualized in the Transversus Abdominus Plane. Injection was made incrementally with aspiration every 5 mls.  There was no  intravascular injection,  injection pressure was normal, there was no neural injection, and the procedure was completed without difficulty.  Thank You.

## 2021-07-12 NOTE — ANESTHESIA POSTPROCEDURE EVALUATION
Patient: Yevgeniy Vaughn    Procedure Summary     Date: 07/12/21 Room / Location:  [facility]    Anesthesia Start: 1026 Anesthesia Stop:     Procedures:       ROBOT ASSISTED LAPAROSCOPIC BILATERAL INGUINAL HERNIA REPAIR WITH MESH (Bilateral Abdomen)      OPEN UMBILICAL HERNIA REPAIR W/MESH (N/A Abdomen) Diagnosis:     Surgeons: Abraham Johnson MD Provider: Denton Willingham MD    Anesthesia Type: general ASA Status: 3          Anesthesia Type: general    Vitals  No vitals data found for the desired time range.          Post Anesthesia Care and Evaluation    Patient location during evaluation: PACU  Patient participation: complete - patient participated  Level of consciousness: awake and alert  Pain management: adequate  Airway patency: patent  Anesthetic complications: No anesthetic complications  PONV Status: none  Cardiovascular status: hemodynamically stable and acceptable  Respiratory status: nonlabored ventilation, acceptable and nasal cannula  Hydration status: acceptable

## 2021-07-12 NOTE — ANESTHESIA PREPROCEDURE EVALUATION
Anesthesia Evaluation     Patient summary reviewed and Nursing notes reviewed   history of anesthetic complications: PONV  NPO Solid Status: > 8 hours  NPO Liquid Status: > 8 hours           Airway   Mallampati: II  TM distance: >3 FB  Neck ROM: full  No difficulty expected  Dental      Pulmonary - normal exam   (+) COPD,   Cardiovascular - normal exam    (+) hypertension, hyperlipidemia,       Neuro/Psych  (+) psychiatric history,     GI/Hepatic/Renal/Endo    (+) morbid obesity,  diabetes mellitus,     Musculoskeletal     (+) neck pain,   Abdominal    Substance History      OB/GYN          Other   arthritis,                      Anesthesia Plan    ASA 3     general     intravenous induction     Anesthetic plan, all risks, benefits, and alternatives have been provided, discussed and informed consent has been obtained with: patient.    Plan discussed with CRNA.

## 2021-07-12 NOTE — DISCHARGE INSTRUCTIONS
Okay for discharge when appropriate per PACU protocol.  Regular diet and activity as tolerated.  Please remind patient no lifting greater than 10 lbs for 6 weeks.   Rx provided for Percocet. Please instruct patient to transition to over the counter Tylenol and Ibuprofen as able.   Please remind patient to continue with daily stool softener. Rx provided for Docusate BID.   Ok to shower in 24 hrs. Let warm soapy water run over wounds. Pat dry do not scrub. Do not soak in tub bathe for 2 weeks.   Please instruction patient to call the [facility] [phone] if fever greater than 101.5, increased redness or drainage from wound sites, or worsening nausea or vomiting.

## 2021-07-12 NOTE — OP NOTE
Operative Report    Patient Name:  Yevgeniy Vaughn  YOB: 1955  [id number]    7/12/2021      PREOPERATIVE DIAGNOSIS: Reducible Bilateral Inguinal Hernias, Umbilical Hernia      POSTOPERATIVE DIAGNOSIS: Same        PROCEDURE PERFORMED:     1. Robotic Assisted Laparoscopic Transabdominal Bilateral Inguinal Hernia Repair with Mesh   2.  Open umbilical hernia repair with mesh       SURGEON: Abraham Johnson MD      ASSISTANT: Danita Garcia MD PGY-4 General Surgery Resident        SPECIMENS: Hernia sac and contents       ANESTHESIA: General with block       FINDINGS:  1.  Bilateral moderate direct defects were identified, these were each repaired with a large Bard 3D max mesh in the preperitoneal space  2.  An umbilical defect just under 2 cm was encountered.  This was repaired with a 6.4 cm circular VentraLex ST Patch placed in an intraperitoneal location       INDICATIONS:      This patient is a 65 y.o. male who presented to my clinic with complaints of painful reducible right inguinal hernia.. A history and physical exam was performed and found to be consistent with a reducible bilateral inguinal hernias as well as an umbilical hernia.. Pre-operative imaging including CT scan confirmed the diagnosis. The risks, benefits, and alternatives of the procedure were discussed with the patient and their family preoperatively and they agreed to proceed.          DESCRIPTION OF PROCEDURE:      After obtaining informed consent, the patient was taken to the operating room and placed in the supine position on the operating room table. General anesthesia was initiated. A Phillips catheter was placed. The abdomen was prepped and draped in the usual sterile fashion. A time-out was properly performed. Antibiotics were given preoperatively. SCDs were properly placed on the patient and turned on.     A block was performed by the anesthesia staff after induction of anesthesia.. Using an 11 blade scalpel, a  supraumbilical skin incision was made in the midline.  Dissection was carried down bluntly to the level of the anterior abdominal wall fascia.  The anterior abdominal wall fascia was incised sharply, and stay sutures of 0 Vicryl were placed on either side of this fascial incision.  The peritoneal cavity was then entered under direct visualization.  A 12 mm Lanie trocar was then inserted into the abdominal cavity and pneumoperitoneum was established at 15 mmHg. The abdomen was then surveyed with a 5 mm 30 degree laparoscope with special attention to the viscera underlying the entrance site which was found to be free of injury. Next two additional 8 mm robotic trocars were placed laterally to the periumbilical trocar, one on the left and one on the right side.  An 8 mm robotic trocar was then advanced into the 12 mm Lanie trocar. The abdomen was surveyed and laparoscopic exam demonstrated evidence of moderate direct inguinal defects on both sides..  A large Bard 3D Max right and left sided mesh was then inserted into the peritoneal cavity.  The da Camilla Xi robotic system was then docked at the patient's bedside and targeted in the pelvis.    There was a small amount of adhesions between the greater omentum and the umbilical site.  These were taken down without difficulty using cautery scissors.  The abdomen was surveyed and laparoscopic exam demonstrated evidence of a moderate direct defect on the right side and moderate direct defect on the left side. A peritoneal incision was then created using cautery on the under-surface of the abdominal wall starting at the midline and proceeding laterally towards the ASIS on the right side. The peritoneum was then retracted downwards and the rectus muscle was swept upwards to develop the preperitoneal space.  The right preperitoneal space was dissected from the ASIS laterally to the pubic tubercle medially. The spermatic cord was retracted laterally which revealed no obvious  indirect sac.  The peritoneum was bluntly  from the cord structures and delivered posteriorly allowing the peritoneum to lie flat.  A moderate sized direct defect was encountered, this contained quite a bit of preperitoneal fat which was reduced.  No femoral hernia defect was identified.  With the dissection complete, the large Bard 3D max right-sided mesh was delivered into the preperitoneal space. The inferior aspect of the mesh was placed inferior to Tenzin's ligament and covering the sites of direct, indirect, and femoral potential defects.  The mesh was secured medially to Tenzin's ligament and laterally to the abdominal wall using interrupted 2-0 Vicryl suture.  With this completed, the peritoneal flap was then elevated with great care to ensure proper apposition of the mesh between the peritoneum and rectus muscle without folding. The peritoneal flap was then closed using an absorbable 2-0 V-Loc suture.       We then turned our attention to the left side.  A peritoneal incision was then created using cautery on the under-surface of the abdominal wall starting at the midline and proceeding laterally towards the ASIS on the left side. The peritoneum was then retracted downwards and the rectus muscle was swept upwards to develop the preperitoneal space.  The left preperitoneal space was dissected from the ASIS laterally to the pubic tubercle medially. The spermatic cord was retracted laterally which revealed no obvious indirect sac.  The peritoneum was bluntly  from the cord structures and delivered posteriorly allowing the peritoneum to lie flat.  A moderate sized direct defect was similarly encountered on the side, this contained preperitoneal fat which was reduced.  No femoral hernia defect was identified.  With the dissection complete, the large Bard 3D max left-sided mesh was delivered into the preperitoneal space. The inferior aspect of the mesh was placed inferior to Tenzin's ligament  and covering the sites of direct, indirect, and femoral potential defects.  The mesh was secured medially to Tenzin's ligament and laterally to the abdominal wall using interrupted 2-0 Vicryl suture.  With this completed, the peritoneal flap was then elevated with great care to ensure proper apposition of the mesh between the peritoneum and rectus muscle without folding. The peritoneal flap was then closed using an absorbable 2-0 V-Loc suture.       The da Camilla XI robotic system was then undocked from the patient's bedside. The abdomen was then carefully surveyed and hemostasis confirmed. The 8 mm trocars were removed under direct laparoscopic visualization. The 12 mm trocar was removed and pneumoperitoneum was released. The fascia of the 12mm trocar site was closed with a figure-of-eight 0 Vicryl suture. Hemostasis of all wound sites was confirmed and each wound site was irrigated.        Attention was then turned to the umbilical hernia site. An infraumbilical incision was made with a 15 blade scalpel. Bovie electrocautery was then used to dissect down through the skin and subcutaneous tissues to the level of the anterior abdominal wall fascia. Using a tonsil clamp, a plane was developed superior to the umbilical stalk. The umbilicus was then transected with cautery deep to the dermis. With the umbilical stalk divided, the hernia sac was visible through the anterior abdominal wall defect. The hernia sac was excised, it contained only preperitoneal fat.. The fascial defect measured just under 2 cm.. A 6.4 cm VentraLex ST Patch mesh was then brought onto the field. The peritoneal space was confirmed to be circumferentially cleared surrounding the fascial defect site and the mesh was then delivered into the intraperitoneal space.. The mesh was anchored at 4 sites to the anterior abdominal wall fascia using 0 Nurolon suture. The fascial defect was then closed overlying the mesh using interrupted 0 Nurolon suture.  Hemostasis of the wound site was confirmed. The site was then copiously irrigated with warmed saline solution. The umbilical stalk was then reapproximated to the anterior abdominal wall fascia using a single interrupted 2-0 vicryl suture. The skin was then closed using 3-0 vicryl in the deep dermal layer and 4-0 monocryl in the subcuticular layer.     All skin incisions were than closed with 4-0 Monocryl in the subcuticular layer.  Mastisol and Steri-Strips were applied to each wound site with a clean dry dressing overlying this.     After the procedure, the patient was awakened, extubated, and taken to the postoperative anesthesia care unit for recovery. All needle, instrument, and lap counts were correct. I was personally present and performed all portions of the procedure. There were no immediate complications         Abraham Johnson MD  7/12/2021  13:18 EDT

## 2021-07-12 NOTE — ANESTHESIA PROCEDURE NOTES
Airway  Urgency: elective    Date/Time: 7/12/2021 10:32 AM  Airway not difficult    General Information and Staff    Patient location during procedure: OR  CRNA: Kenyon Green CRNA    Indications and Patient Condition  Indications for airway management: airway protection    Preoxygenated: yes  MILS not maintained throughout  Mask difficulty assessment: 2 - vent by mask + OA or adjuvant +/- NMBA    Final Airway Details  Final airway type: endotracheal airway      Successful airway: ETT  Cuffed: yes   Successful intubation technique: direct laryngoscopy  Endotracheal tube insertion site: oral  Blade: العلي  Blade size: 2  ETT size (mm): 7.5  Cormack-Lehane Classification: grade I - full view of glottis  Placement verified by: chest auscultation and capnometry   Cuff volume (mL): 8  Measured from: lips  ETT/EBT  to lips (cm): 22  Number of attempts at approach: 1  Assessment: lips, teeth, and gum same as pre-op and atraumatic intubation    Additional Comments  Negative epigastric sounds, Breath sound equal bilaterally with symmetric chest rise and fall

## 2021-07-13 LAB
CYTO UR: NORMAL
LAB AP CASE REPORT: NORMAL
LAB AP CLINICAL INFORMATION: NORMAL
PATH REPORT.FINAL DX SPEC: NORMAL
PATH REPORT.GROSS SPEC: NORMAL

## 2021-07-16 ENCOUNTER — PRIOR AUTHORIZATION (OUTPATIENT)
Dept: INTERNAL MEDICINE | Facility: CLINIC | Age: 66
End: 2021-07-16

## 2021-07-16 NOTE — TELEPHONE ENCOUNTER
Yevgeniy Vaughn (Key: [id number])  Drug:ALPRAZolam 0.5MG tablets  Your information has been sent to [facility].

## 2021-07-17 PROBLEM — R91.1 LUNG NODULE: Status: ACTIVE | Noted: 2021-07-17

## 2021-07-17 PROBLEM — D35.02 ADENOMA OF LEFT ADRENAL GLAND: Status: ACTIVE | Noted: 2021-07-17

## 2021-07-23 NOTE — TELEPHONE ENCOUNTER
PA approved from 7/16/2021 to 1/15/2022  PA approval faxed to [facility] [address]    ID [id number]

## 2021-08-07 DIAGNOSIS — F51.01 PRIMARY INSOMNIA: ICD-10-CM

## 2021-08-09 ENCOUNTER — TELEPHONE (OUTPATIENT)
Dept: INTERNAL MEDICINE | Facility: CLINIC | Age: 66
End: 2021-08-09

## 2021-08-09 DIAGNOSIS — F51.01 PRIMARY INSOMNIA: ICD-10-CM

## 2021-08-09 RX ORDER — ZOLPIDEM TARTRATE 10 MG/1
10 TABLET ORAL NIGHTLY PRN
Qty: 30 TABLET | Refills: 5 | Status: SHIPPED | OUTPATIENT
Start: 2021-08-09 | End: 2022-01-27 | Stop reason: SDUPTHER

## 2021-08-09 RX ORDER — ZOLPIDEM TARTRATE 10 MG/1
10 TABLET ORAL NIGHTLY PRN
Qty: 30 TABLET | Refills: 0 | OUTPATIENT
Start: 2021-08-09

## 2021-08-09 NOTE — TELEPHONE ENCOUNTER
Pt's wife called in to see if a PA can be done on Pt's Xanax to get it approved through insurance. Pt has 1 dose left at this time.

## 2021-08-11 NOTE — TELEPHONE ENCOUNTER
Notified patient's wife that his medication had been approved.  She stated she picked up the medication last night.

## 2021-08-19 NOTE — TELEPHONE ENCOUNTER
Spoke with pharmacist and she said Yevgeniy paid for medication out of pocket not thru insurance as they would not pay

## 2021-08-23 DIAGNOSIS — F41.1 GENERALIZED ANXIETY DISORDER: ICD-10-CM

## 2021-08-23 RX ORDER — ALPRAZOLAM 0.5 MG/1
0.5 TABLET ORAL 2 TIMES DAILY PRN
Qty: 60 TABLET | Refills: 2 | Status: SHIPPED | OUTPATIENT
Start: 2021-08-23 | End: 2021-08-24 | Stop reason: SDUPTHER

## 2021-08-24 DIAGNOSIS — F41.1 GENERALIZED ANXIETY DISORDER: ICD-10-CM

## 2021-08-24 RX ORDER — ALPRAZOLAM 0.5 MG/1
0.5 TABLET ORAL 2 TIMES DAILY PRN
Qty: 60 TABLET | Refills: 2 | Status: SHIPPED | OUTPATIENT
Start: 2021-08-24 | End: 2021-12-07

## 2021-08-29 DIAGNOSIS — E11.65 TYPE 2 DIABETES MELLITUS WITH HYPERGLYCEMIA, WITHOUT LONG-TERM CURRENT USE OF INSULIN (HCC): ICD-10-CM

## 2021-08-30 RX ORDER — PIOGLITAZONEHYDROCHLORIDE 45 MG/1
TABLET ORAL
Qty: 30 TABLET | Refills: 5 | Status: SHIPPED | OUTPATIENT
Start: 2021-08-30 | End: 2021-12-30 | Stop reason: SDUPTHER

## 2021-09-15 ENCOUNTER — TELEPHONE (OUTPATIENT)
Dept: INTERNAL MEDICINE | Facility: CLINIC | Age: 66
End: 2021-09-15

## 2021-09-15 NOTE — TELEPHONE ENCOUNTER
Spoke to patient he stated that he used to live there years ago and he is going to talk to [employer] to see why it has that address on there. He also said don't worry about the PA he already picked up the medication and paid cash.

## 2021-09-15 NOTE — TELEPHONE ENCOUNTER
Call patient please.  I received a prior authorization request for his Alprazolam.  It shows the patient's address as [facility].  Has he moved?

## 2021-10-07 ENCOUNTER — OFFICE VISIT (OUTPATIENT)
Dept: INTERNAL MEDICINE | Facility: CLINIC | Age: 66
End: 2021-10-07

## 2021-10-07 VITALS
BODY MASS INDEX: 30.07 KG/M2 | WEIGHT: 203 LBS | HEART RATE: 84 BPM | SYSTOLIC BLOOD PRESSURE: 152 MMHG | RESPIRATION RATE: 16 BRPM | TEMPERATURE: 98 F | DIASTOLIC BLOOD PRESSURE: 84 MMHG | HEIGHT: 69 IN | OXYGEN SATURATION: 99 %

## 2021-10-07 DIAGNOSIS — E11.65 TYPE 2 DIABETES MELLITUS WITH HYPERGLYCEMIA, WITHOUT LONG-TERM CURRENT USE OF INSULIN (HCC): Primary | ICD-10-CM

## 2021-10-07 DIAGNOSIS — G89.4 CHRONIC PAIN SYNDROME: ICD-10-CM

## 2021-10-07 DIAGNOSIS — I10 PRIMARY HYPERTENSION: ICD-10-CM

## 2021-10-07 DIAGNOSIS — E78.49 OTHER HYPERLIPIDEMIA: ICD-10-CM

## 2021-10-07 DIAGNOSIS — F41.1 GENERALIZED ANXIETY DISORDER: ICD-10-CM

## 2021-10-07 LAB
ALBUMIN SERPL-MCNC: 4.7 G/DL (ref 3.5–5.2)
ALBUMIN/GLOB SERPL: 1.6 G/DL
ALP SERPL-CCNC: 50 U/L (ref 39–117)
ALT SERPL W P-5'-P-CCNC: 24 U/L (ref 1–41)
ANION GAP SERPL CALCULATED.3IONS-SCNC: 11.1 MMOL/L (ref 5–15)
AST SERPL-CCNC: 17 U/L (ref 1–40)
BILIRUB SERPL-MCNC: 0.2 MG/DL (ref 0–1.2)
BUN SERPL-MCNC: 15 MG/DL (ref 8–23)
BUN/CREAT SERPL: 16 (ref 7–25)
CALCIUM SPEC-SCNC: 10.1 MG/DL (ref 8.6–10.5)
CHLORIDE SERPL-SCNC: 98 MMOL/L (ref 98–107)
CHOLEST SERPL-MCNC: 181 MG/DL (ref 0–200)
CO2 SERPL-SCNC: 26.9 MMOL/L (ref 22–29)
CREAT SERPL-MCNC: 0.94 MG/DL (ref 0.76–1.27)
EXPIRATION DATE: NORMAL
GFR SERPL CREATININE-BSD FRML MDRD: 81 ML/MIN/1.73
GLOBULIN UR ELPH-MCNC: 3 GM/DL
GLUCOSE SERPL-MCNC: 198 MG/DL (ref 65–99)
HBA1C MFR BLD: 8.9 %
HDLC SERPL-MCNC: 41 MG/DL (ref 40–60)
LDLC SERPL CALC-MCNC: 82 MG/DL (ref 0–100)
LDLC/HDLC SERPL: 1.68 {RATIO}
Lab: NORMAL
POTASSIUM SERPL-SCNC: 4.9 MMOL/L (ref 3.5–5.2)
PROT SERPL-MCNC: 7.7 G/DL (ref 6–8.5)
SODIUM SERPL-SCNC: 136 MMOL/L (ref 136–145)
TRIGL SERPL-MCNC: 356 MG/DL (ref 0–150)
VLDLC SERPL-MCNC: 58 MG/DL (ref 5–40)

## 2021-10-07 PROCEDURE — 83036 HEMOGLOBIN GLYCOSYLATED A1C: CPT | Performed by: INTERNAL MEDICINE

## 2021-10-07 PROCEDURE — 99214 OFFICE O/P EST MOD 30 MIN: CPT | Performed by: INTERNAL MEDICINE

## 2021-10-07 PROCEDURE — 80053 COMPREHEN METABOLIC PANEL: CPT | Performed by: INTERNAL MEDICINE

## 2021-10-07 PROCEDURE — 80061 LIPID PANEL: CPT | Performed by: INTERNAL MEDICINE

## 2021-10-07 RX ORDER — BLOOD-GLUCOSE METER
KIT MISCELLANEOUS
Qty: 100 EACH | Refills: 3 | Status: SHIPPED | OUTPATIENT
Start: 2021-10-07 | End: 2021-11-18 | Stop reason: SDUPTHER

## 2021-10-07 NOTE — PROGRESS NOTES
Subjective       Yevgeniy Vaughn is a 65 y.o. male.     Chief Complaint   Patient presents with   • Diabetes     3 month follow up, fasting   • Anxiety       History obtained from the patient and his wife.      History of Present Illness     Primary Care Cardiac Diagnostic Constellation:      His Diabetes Mellitus type has been unstable.  Medication(s): Metformin, Januvia, Actos, Lisinopril, and Atorvastatin.   His Hypertension has been stable.   Medication(s): Lisinopril.   His Hyperlipidemia has been unstable.   His LDL goal is < 70 and last LDL was 80, TG 256.   Medication(s): Atorvastatin and Fish Oil.  The patient is adherent with his medication regimen. He denies medication side effects.       Interval Events:  Last HgA1C was 11.2 on 7/9/21 (pre-op).  It had been 10.3 (on 4/13/21), down from 10.5.  He states his blood sugars at home are 200-300 fasting, and 200 at bedtime.  He denies episodes of low blood sugar. The patient states he self increase his Metformin to 2500 mg daily by adding a 500 mg dose at lunchtime. The patient states he checks his feet regularly.  His last Ophthalmology appointment was 11/4/2020, no retinopathy, but trace cataract left eye not affecting vision. He states his blood pressure at home has been 45-1 50/75.     Symptoms:  Denies chest pain, dyspnea, SALAMANCA, orthopnea, PND, palpitations, syncope, lower extremity edema, claudication, lightheadedness, and dizziness.  Associated Symptoms: No significant weight change since last visit. Has stable arthralgias, myalgias, and polydipsia.  No fatigue, headache, polyuria, visual impairment, memory loss, concentration problems, or focal neurologic deficits.  Stable pain, numbness, and tingling of the feet, but denies a foot ulcer. On Neurontin (maximum dose).      Lifestyle and Disease Management: Diet: He does have a healthy diet. Weight Issues: He has weight concerns. Exercise: He has not been exercising due to recent hernia surgery.  Tobacco  Use:   Former smoker. He quit smoking completely 4/1/18. Re-started Sept 2018, 1 ppd.  Quit completely 6/11/2020. Re-sarted 8/2021 1 ppd, Quit 5/25/21. He is now off Chantix for 1.5 months due to the recall     Chronic Pain Follow-Up: The patient is being seen for follow-up of Chronic Neck Pain and Chronic Left Arm and Shoulder Pain, which are stable.   Interval Events:  The patient is seeing Pain Management for his chronic pain.    Symptoms: stable neck pain, back pain, and upper extremity pain, but denies headache and lower extremity pain.   Medications:   Neurontin and Percocet per Pain Management.    The patient is adherent to his medication regimen, and he denies medication side effects.       Anxiety Disorder Follow-Up: The patient is being seen for follow-up of Anxiey, which is overall stable.  Comorbid Illnesses: Insomnia.  Interval Events: None.  Symptoms: Slightly worsened anxiety due to persistent pain from recent surgery, and stable insomnia.  Denies depression, panic attacks, anhedonia, memory loss, and difficulty concentrating.    Associated Symptoms: no suicidal ideation.   Medication: Alprazolam BID.  The patient is adherent to his medication regimen, and he denies medication side effects.    Current Outpatient Medications on File Prior to Visit   Medication Sig Dispense Refill   • ALPRAZolam (XANAX) 0.5 MG tablet Take 1 tablet by mouth 2 (Two) Times a Day As Needed for Anxiety. for anxiety 60 tablet 2   • aspirin 81 MG chewable tablet Chew 81 mg Daily.     • atorvastatin (LIPITOR) 20 MG tablet TAKE 1 TABLET BY MOUTH AT BEDTIME (Patient taking differently: Take 20 mg by mouth Every Night.) 90 tablet 3   • Blood Glucose Monitoring Suppl ([employer] Blood Glucose Meter) w/Device kit 1 kit 3 (Three) Times a Day. Check sugars three times a day Dx: E11.19 1 kit 0   • Blood Glucose Monitoring Suppl (FreeStyle Lite) device      • Chantix Continuing Month Casa 1 MG tablet Take 1 tablet by mouth twice daily (Patient  taking differently: Take 1 mg by mouth 2 (Two) Times a Day.) 60 tablet 4   • clobetasol (TEMOVATE) 0.05 % cream APPLY CREAM TOPICALLY TO AFFECTED AREA TWICE DAILY (Patient taking differently: As Needed.) 60 g 3   • docusate sodium (Colace) 100 MG capsule Take 1 capsule by mouth 2 (Two) Times a Day. 30 capsule 1   • gabapentin (NEURONTIN) 800 MG tablet 800 mg 4 (Four) Times a Day.     • glucose blood test strip Use as instructed   Dx E 11.9 100 each 3   • glucose monitor monitoring kit 1 each 2 (two) times a day. 1 each 0   • Januvia 100 MG tablet Take 1 tablet by mouth once daily (Patient taking differently: Take 100 mg by mouth Daily.) 90 tablet 3   • Lancets misc Check sugars three times a day. Dx: E11.19 100 each 3   • lisinopril (PRINIVIL,ZESTRIL) 40 MG tablet TAKE 1 TABLET BY MOUTH AT BEDTIME (Patient taking differently: Take 40 mg by mouth Daily.) 90 tablet 3   • melatonin 5 MG tablet tablet Take 3 tablets by mouth At Night As Needed (insomnia). (Patient taking differently: Take 10 mg by mouth At Night As Needed (insomnia).)     • metFORMIN (GLUCOPHAGE) 1000 MG tablet Take 1 tablet by mouth twice daily (Patient taking differently: Take 1,000 mg by mouth 2 (Two) Times a Day With Meals.) 180 tablet 3   • Omega-3 Fatty Acids (FISH OIL) 1000 MG capsule capsule Take 1 capsule by mouth Daily With Breakfast.     • oxyCODONE-acetaminophen (Percocet) 5-325 MG per tablet Take 1 to 2 tabs by mouth every 6 hours as needed for pain 14 tablet 0   • oxyCODONE-acetaminophen (PERCOCET) 7.5-325 MG per tablet Take 1 tablet by mouth 3 (three) times a day.     • pioglitazone (ACTOS) 45 MG tablet Take 1 tablet by mouth once daily 30 tablet 5   • promethazine (PHENERGAN) 25 MG tablet TAKE ONE TABLET BY MOUTH EVERY 4 TO 6 HOURS AS NEEDED FOR NAUSEA AND VOMITING 30 tablet 5   • sodium chloride 1 g tablet Take 1 g by mouth 2 (Two) Times a Day.     • vitamin B-12 (CYANOCOBALAMIN) 1000 MCG tablet Take 1,000 mcg by mouth Daily.     •  "vitamin C (ASCORBIC ACID) 250 MG tablet Take 250 mg by mouth Daily.     • zolpidem (AMBIEN) 10 MG tablet Take 1 tablet by mouth At Night As Needed for Sleep. 30 tablet 5     No current facility-administered medications on file prior to visit.       Current outpatient and discharge medications have been reconciled for the patient.  Reviewed by: Christine Rousseau MD        The following portions of the patient's history were reviewed and updated as appropriate: allergies, current medications, past family history, past medical history, past social history, past surgical history and problem list.    Review of Systems   Constitutional: Negative for fatigue and unexpected weight change.   Eyes: Negative for visual disturbance.   Respiratory: Negative for cough, shortness of breath and wheezing.    Cardiovascular: Negative for chest pain, palpitations and leg swelling.        No SALAMANCA, orthopnea, or claudication.   Gastrointestinal: Positive for abdominal pain (bilateral inguinal). Negative for blood in stool, constipation, diarrhea, nausea and vomiting.        Denies melena.   Endocrine: Positive for polydipsia. Negative for polyuria.   Musculoskeletal: Positive for arthralgias, back pain, myalgias and neck pain.   Neurological: Negative for dizziness, syncope, light-headedness and headaches.        No memory issues.   Psychiatric/Behavioral: Negative for decreased concentration.         Objective       Blood pressure 152/84, pulse 84, temperature 98 °F (36.7 °C), temperature source Infrared, resp. rate 16, height 175.3 cm (69\"), weight 92.1 kg (203 lb), SpO2 99 %.  Body mass index is 29.98 kg/m².      Physical Exam  Vitals and nursing note reviewed.   Constitutional:       Appearance: He is well-developed.      Comments: Overweight.   Neck:      Thyroid: No thyroid mass or thyromegaly.      Vascular: No carotid bruit.   Cardiovascular:      Rate and Rhythm: Normal rate and regular rhythm.      Pulses: Normal pulses.      " Heart sounds: Normal heart sounds. No murmur heard.  No friction rub. No gallop.    Pulmonary:      Effort: Pulmonary effort is normal.      Breath sounds: Normal breath sounds.   Musculoskeletal:      Right lower leg: No edema.      Left lower leg: No edema.   Neurological:      Mental Status: He is alert.   Psychiatric:         Mood and Affect: Mood normal.       Results for orders placed or performed in visit on 10/07/21   POC Glycosylated Hemoglobin (Hb A1C)    Specimen: Blood   Result Value Ref Range    Hemoglobin A1C 8.9 %    Lot Number [id number]     Expiration Date 05/11/2023        Assessment / Plan:  Diagnoses and all orders for this visit:    1. Type 2 diabetes mellitus with hyperglycemia, without long-term current use of insulin (HCC) (Primary)  -     glucose blood (FREESTYLE LITE) test strip; Use as directed once daily  Dispense: 100 each; Refill: 3- REFILL  -     Lipid Panel; Future  -     Comprehensive Metabolic Panel; Future  -     POC Glycosylated Hemoglobin (Hb A1C)    2. Other hyperlipidemia  -     Lipid Panel; Future  -     Comprehensive Metabolic Panel; Future    3. Primary hypertension  -     Lipid Panel; Future  -     Comprehensive Metabolic Panel; Future    4. Chronic pain syndrome   Continue current medication(s) as noted in the history of present illness.   Follow-up per Pain Management.    5. Generalized anxiety disorder   Continue current medication(s) as noted in the history of present illness.    The patient was instructed in the side effects of the medication.  Risks of the potential for tolerance, dependence, and addiction were discussed.  The patient was instructed to take the lowest dosage of the medication, at the lowest frequency, and for the shortest period of time possible.  The patient was instructed not to receive controlled substances or narcotics from other doctors, and not to giveaway or sell the medication.    The patient was instructed to abstain from illicit drug use.   The patient was instructed to avoid alcohol while taking these medications.      Narcotics/controlled substance agreement, [place] report, and Urine Drug Screen were updated today if needed.            Return in about 3 months (around 1/7/2022) for Recheck Diabetes, fasting.

## 2021-10-12 RX ORDER — CLOBETASOL PROPIONATE 0.5 MG/G
CREAM TOPICAL 2 TIMES DAILY PRN
Qty: 60 G | Refills: 3
Start: 2021-10-12 | End: 2023-02-27 | Stop reason: SDUPTHER

## 2021-10-12 RX ORDER — CHOLECALCIFEROL (VITAMIN D3) 125 MCG
10 CAPSULE ORAL NIGHTLY PRN
Start: 2021-10-12

## 2021-10-12 RX ORDER — LISINOPRIL 40 MG/1
40 TABLET ORAL
Qty: 90 TABLET | Refills: 3
Start: 2021-10-12 | End: 2022-03-14

## 2021-10-12 RX ORDER — ATORVASTATIN CALCIUM 20 MG/1
20 TABLET, FILM COATED ORAL NIGHTLY
Start: 2021-10-12 | End: 2022-02-14

## 2021-10-25 DIAGNOSIS — E11.65 TYPE 2 DIABETES MELLITUS WITH HYPERGLYCEMIA, WITHOUT LONG-TERM CURRENT USE OF INSULIN (HCC): ICD-10-CM

## 2021-10-27 ENCOUNTER — TELEPHONE (OUTPATIENT)
Dept: INTERNAL MEDICINE | Facility: CLINIC | Age: 66
End: 2021-10-27

## 2021-10-27 RX ORDER — IBUPROFEN 800 MG/1
800 TABLET ORAL 3 TIMES DAILY PRN
Qty: 180 TABLET | Refills: 0 | Status: SHIPPED | OUTPATIENT
Start: 2021-10-27 | End: 2022-01-27 | Stop reason: SDUPTHER

## 2021-11-14 DIAGNOSIS — E11.65 TYPE 2 DIABETES MELLITUS WITH HYPERGLYCEMIA, WITHOUT LONG-TERM CURRENT USE OF INSULIN (HCC): ICD-10-CM

## 2021-11-15 RX ORDER — SITAGLIPTIN 100 MG/1
TABLET, FILM COATED ORAL
Qty: 90 TABLET | Refills: 2 | Status: SHIPPED | OUTPATIENT
Start: 2021-11-15 | End: 2022-07-05

## 2021-11-18 ENCOUNTER — TELEPHONE (OUTPATIENT)
Dept: INTERNAL MEDICINE | Facility: CLINIC | Age: 66
End: 2021-11-18

## 2021-11-18 DIAGNOSIS — E11.65 TYPE 2 DIABETES MELLITUS WITH HYPERGLYCEMIA, WITHOUT LONG-TERM CURRENT USE OF INSULIN (HCC): ICD-10-CM

## 2021-11-18 RX ORDER — BLOOD-GLUCOSE METER
KIT MISCELLANEOUS
Qty: 100 EACH | Refills: 4 | Status: SHIPPED | OUTPATIENT
Start: 2021-11-18 | End: 2021-11-19 | Stop reason: SDUPTHER

## 2021-11-18 NOTE — TELEPHONE ENCOUNTER
Caller: Sue Vaughn    Relationship: Emergency Contact    Best call back number: [phone]    Requested Prescriptions:   Requested Prescriptions     Pending Prescriptions Disp Refills   • glucose blood (FREESTYLE LITE) test strip 100 each 3     Sig: Use as directed once daily        Pharmacy where request should be sent: [facility] [address] [place] [address] [phone] [facility] [phone] FX     Additional details provided by patient: PATIENT WIFE STATES THAT HE USES THESE TWICE A DAY AND HE RECEIVED A ONCE A DAY SUPPLY.    Does the patient have less than a 3 day supply:  [x] Yes  [] No    Francesca Heard Rep   11/18/21 09:17 EST

## 2021-11-19 ENCOUNTER — MEDICATION THERAPY MANAGEMENT (OUTPATIENT)
Dept: INTERNAL MEDICINE | Facility: CLINIC | Age: 66
End: 2021-11-19

## 2021-11-19 DIAGNOSIS — E11.65 TYPE 2 DIABETES MELLITUS WITH HYPERGLYCEMIA, WITHOUT LONG-TERM CURRENT USE OF INSULIN (HCC): ICD-10-CM

## 2021-11-19 RX ORDER — BLOOD-GLUCOSE METER
KIT MISCELLANEOUS
Qty: 100 EACH | Refills: 4 | Status: SHIPPED | OUTPATIENT
Start: 2021-11-19 | End: 2021-11-19

## 2021-11-19 RX ORDER — BLOOD-GLUCOSE METER
KIT MISCELLANEOUS
Qty: 100 EACH | Refills: 4 | Status: SHIPPED | OUTPATIENT
Start: 2021-11-19 | End: 2021-12-30

## 2021-11-19 NOTE — TELEPHONE ENCOUNTER
Patient called in regards to his test strips and states that the directions needs to state that he is testing twice daily instead of once daily. The patient states that is the only way his insurance company will cover the medication.    Please advise and if there is any questions or concerns the patient can be reached at [phone].

## 2021-11-29 ENCOUNTER — HOSPITAL ENCOUNTER (OUTPATIENT)
Dept: CT IMAGING | Facility: HOSPITAL | Age: 66
Discharge: HOME | End: 2021-11-29
Admitting: NURSE PRACTITIONER

## 2021-11-29 DIAGNOSIS — R91.1 PULMONARY NODULE: ICD-10-CM

## 2021-11-29 PROCEDURE — 71250 CT THORAX DX C-: CPT

## 2021-12-03 DIAGNOSIS — F41.1 GENERALIZED ANXIETY DISORDER: ICD-10-CM

## 2021-12-07 RX ORDER — ALPRAZOLAM 0.5 MG/1
TABLET ORAL
Qty: 60 TABLET | Refills: 2 | Status: SHIPPED | OUTPATIENT
Start: 2021-12-07 | End: 2022-01-27 | Stop reason: SDUPTHER

## 2021-12-30 ENCOUNTER — TELEPHONE (OUTPATIENT)
Dept: INTERNAL MEDICINE | Facility: CLINIC | Age: 66
End: 2021-12-30

## 2021-12-30 DIAGNOSIS — E11.65 TYPE 2 DIABETES MELLITUS WITH HYPERGLYCEMIA, WITHOUT LONG-TERM CURRENT USE OF INSULIN (HCC): ICD-10-CM

## 2021-12-30 DIAGNOSIS — E11.65 TYPE 2 DIABETES MELLITUS WITH HYPERGLYCEMIA, WITHOUT LONG-TERM CURRENT USE OF INSULIN (HCC): Primary | ICD-10-CM

## 2021-12-30 RX ORDER — PIOGLITAZONEHYDROCHLORIDE 45 MG/1
45 TABLET ORAL DAILY
Qty: 90 TABLET | Refills: 1 | Status: SHIPPED | OUTPATIENT
Start: 2021-12-30 | End: 2022-04-19 | Stop reason: SDUPTHER

## 2021-12-30 NOTE — TELEPHONE ENCOUNTER
Caller: [facility] ([facility])    Relationship: PHARMACY    Best call back number: [phone]    What medications are you currently taking:   Current Outpatient Medications on File Prior to Visit   Medication Sig Dispense Refill   • ALPRAZolam (XANAX) 0.5 MG tablet Take 1 tablet by mouth twice daily as needed for anxiety 60 tablet 2   • aspirin 81 MG chewable tablet Chew 81 mg Daily.     • atorvastatin (LIPITOR) 20 MG tablet Take 1 tablet by mouth Every Night.     • Blood Glucose Monitoring Suppl ([employer] Blood Glucose Meter) w/Device kit 1 kit 3 (Three) Times a Day. Check sugars three times a day Dx: E11.19 1 kit 0   • Blood Glucose Monitoring Suppl (FreeStyle Lite) device      • clobetasol (TEMOVATE) 0.05 % cream Apply  topically to the appropriate area as directed 2 (Two) Times a Day As Needed (rash). 60 g 3   • docusate sodium (Colace) 100 MG capsule Take 1 capsule by mouth 2 (Two) Times a Day. 30 capsule 1   • gabapentin (NEURONTIN) 800 MG tablet 800 mg 4 (Four) Times a Day.     • glucose blood (FREESTYLE LITE) test strip Test sugars twice daily. Dx Code E11.9 100 each 4   • glucose blood test strip Use as instructed   Dx E 11.9 100 each 3   • glucose monitor monitoring kit 1 each 2 (two) times a day. 1 each 0   • ibuprofen (ADVIL,MOTRIN) 800 MG tablet Take 1 tablet by mouth 3 (Three) Times a Day As Needed (prn). 180 tablet 0   • Januvia 100 MG tablet Take 1 tablet by mouth once daily 90 tablet 2   • Lancets misc Check sugars three times a day. Dx: E11.19 100 each 3   • lisinopril (PRINIVIL,ZESTRIL) 40 MG tablet Take 1 tablet by mouth every night at bedtime. 90 tablet 3   • melatonin 5 MG tablet tablet Take 2 tablets by mouth At Night As Needed (insomnia).     • metFORMIN (GLUCOPHAGE) 1000 MG tablet Take 1 tablet by mouth twice daily 180 tablet 1   • Omega-3 Fatty Acids (FISH OIL) 1000 MG capsule capsule Take 1 capsule by mouth Daily With Breakfast.     • oxyCODONE-acetaminophen (Percocet) 5-325 MG per  tablet Take 1 to 2 tabs by mouth every 6 hours as needed for pain 14 tablet 0   • oxyCODONE-acetaminophen (PERCOCET) 7.5-325 MG per tablet Take 1 tablet by mouth 3 (three) times a day.     • pioglitazone (ACTOS) 45 MG tablet Take 1 tablet by mouth Daily. 90 tablet 1   • promethazine (PHENERGAN) 25 MG tablet TAKE ONE TABLET BY MOUTH EVERY 4 TO 6 HOURS AS NEEDED FOR NAUSEA AND VOMITING 30 tablet 5   • sodium chloride 1 g tablet Take 1 g by mouth 2 (Two) Times a Day.     • vitamin B-12 (CYANOCOBALAMIN) 1000 MCG tablet Take 1,000 mcg by mouth Daily.     • vitamin C (ASCORBIC ACID) 250 MG tablet Take 250 mg by mouth Daily.     • zolpidem (AMBIEN) 10 MG tablet Take 1 tablet by mouth At Night As Needed for Sleep. 30 tablet 5   • [DISCONTINUED] pioglitazone (ACTOS) 45 MG tablet Take 1 tablet by mouth once daily 30 tablet 5     No current facility-administered medications on file prior to visit.          When did you start taking these medications: NA    Which medication are you concerned about: ASKING TO CHANGE FREESTYLE TO ONE TOUCH STRIPS DUE TO INSURANCE PURPOSES    Who prescribed you this medication: DR LOVE

## 2022-01-19 ENCOUNTER — TELEMEDICINE (OUTPATIENT)
Dept: PULMONOLOGY | Facility: CLINIC | Age: 67
End: 2022-01-19

## 2022-01-19 DIAGNOSIS — F17.211 CIGARETTE NICOTINE DEPENDENCE IN REMISSION: ICD-10-CM

## 2022-01-19 DIAGNOSIS — R91.1 LUNG NODULE: Primary | ICD-10-CM

## 2022-01-19 DIAGNOSIS — D35.02 ADENOMA OF LEFT ADRENAL GLAND: ICD-10-CM

## 2022-01-19 PROCEDURE — 99214 OFFICE O/P EST MOD 30 MIN: CPT | Performed by: INTERNAL MEDICINE

## 2022-01-19 NOTE — PROGRESS NOTES
New Pulmonary Patient Office Visit      Patient Name: Yevgeniy Vaughn    You have chosen to receive care through a telehealth visit.  Do you consent to use a video/audio connection for your medical care today? Yes      Chief Complaint:    Chief Complaint   Patient presents with   • Lung Nodule       History of Present Illness: Yevgeniy Vaughn is a 66 y.o. male who is here today for follow up after chest CT.    65-year-old male who is on and off smoker for last many years with over close to 20-pack-year smoking history and recently quit again, history of diabetes, he was referred here for abnormal chest CT scan showing 6 mm the right lower lobe lung nodule.  Patient was undergoing work-up for inguinal and ventral hernia and underwent CT of abdomen and incidentally found to have right lower lobe lung nodule. Pt had hernia repair with mesh recently and is still recovering from it. States that he did Ok with surgery and no significant perioperative pulmonary complications. No recent change in respiratory symptoms. Denies any chest pain or shortness of breath. Denies any cough or sputum production. No hemoptysis. No recent pneumonias or hospitalizations with resp problems. Denies any fevers, chills or night sweats.  Denies any change in appetite or weight loss.      Patient smoked off and on for a number of years but now quit again and states that he is not planning to start smoking again.  Denies any alcohol use.  Denies any illicit drug use.      Subjective      Review of Systems:   Review of Systems   Constitutional: Negative.    HENT: Negative.    Respiratory: Negative.    Cardiovascular: Negative.    Gastrointestinal:        Soreness at surgical site   Endocrine: Negative.    Musculoskeletal: Positive for arthralgias.   Skin: Negative.    Neurological: Negative.    Hematological: Negative.    Psychiatric/Behavioral: Negative.    All other systems reviewed and are negative.      Past Medical History:   Past  Medical History:   Diagnosis Date   • Acute recurrent pancreatitis     Description: s/p muple hospitalizations   • Adenoma of left adrenal gland     Dx 6/21 by CT   • Anxiety disorder     Description: and panic disorder dx 711.   • Benign colonic polyp     Description: dx 12/12   • Chronic neck pain    • Chronic pain syndrome    • Cigarette nicotine dependence 06/10/2020    quit    • Degenerative cervical disc    • Emphysema lung (HCC)    • Erectile dysfunction    • Hyperlipidemia     Description: Diagnosed 2000   • Hypertension    • Insomnia    • Low sodium levels    • Lung nodule     RLL 6 mm   • Obesity     h/o phen-fen use   • PONV (postoperative nausea and vomiting)    • Psoriasis    • Ptosis of eyelid    • Shoulder joint pain     Description: Bilateral   • Type 2 diabetes mellitus (HCC)     Description: Diagnosed 1992 (Normal Stress Test 1/99)   • Unknown varicella vaccination status    • Wears dentures    • Wears reading eyeglasses        Past Surgical History:   Past Surgical History:   Procedure Laterality Date   • CHOLECYSTECTOMY  1998   • COLONOSCOPY      up to date    • HUMERUS SURGERY Right     Repair of Humerus / Arm Right  Description: 1999- repair of biceps tendon rupture. 10/7/15- right biceps tendon repair.  12/7/15- debridement of wound and re-repair of right biceps tendon.   • INGUINAL HERNIA REPAIR Bilateral 7/12/2021    Procedure: ROBOT ASSISTED LAPAROSCOPIC BILATERAL INGUINAL HERNIA REPAIR WITH MESH;  Surgeon: Abraham Johnson MD;  Location:  [facility];  Service: [facility];  Laterality: Bilateral;   • ROTATOR CUFF REPAIR Left 12/2010    s/p Biceps muscle repair   • SHOULDER SURGERY Right 05/01/2005    repair of labrum and biceps tendon   • SKIN BIOPSY     • UMBILICAL HERNIA REPAIR N/A 7/12/2021    Procedure: OPEN UMBILICAL HERNIA REPAIR W/MESH;  Surgeon: Abraham Johnson MD;  Location:  [facility];  Service: General;  Laterality: N/A;       Family History:   Family History   Problem  Relation Age of Onset   • Rheum arthritis Mother    • Diabetes type II Mother    • Heart failure Sister 48   • Diabetes type II Sister    • Coronary artery disease Brother 55        Coronary artery bypass grafting (CABG)   • Hyperlipidemia Brother    • Diabetes type II Brother    • Diabetes Brother    • Diverticulosis Brother        Social History:   Social History     Socioeconomic History   • Marital status:    • Number of children: 4   Tobacco Use   • Smoking status: Former Smoker     Packs/day: 0.50     Types: Cigarettes     Start date:      Quit date: 2021     Years since quittin.7   • Smokeless tobacco: Former User     Types: Chew     Quit date:    • Tobacco comment: - present ,1  ppd. Quit 15. Re-started approx 2015, 3/4 ppd, then 1 ppd. Quit 19, re-started 2018, 1 ppd. Quit 20. Re-sarted 2021 1 ppd, Quit 21   Vaping Use   • Vaping Use: Never used   Substance and Sexual Activity   • Alcohol use: No   • Drug use: No   • Sexual activity: Defer     Partners: Female       Medications:     Current Outpatient Medications:   •  ALPRAZolam (XANAX) 0.5 MG tablet, Take 1 tablet by mouth twice daily as needed for anxiety, Disp: 60 tablet, Rfl: 2  •  aspirin 81 MG chewable tablet, Chew 81 mg Daily., Disp: , Rfl:   •  atorvastatin (LIPITOR) 20 MG tablet, Take 1 tablet by mouth Every Night., Disp: , Rfl:   •  Blood Glucose Monitoring Suppl ([employer] Blood Glucose Meter) w/Device kit, 1 kit 3 (Three) Times a Day. Check sugars three times a day Dx: E11.19, Disp: 1 kit, Rfl: 0  •  Blood Glucose Monitoring Suppl (FreeStyle Lite) device, , Disp: , Rfl:   •  clobetasol (TEMOVATE) 0.05 % cream, Apply  topically to the appropriate area as directed 2 (Two) Times a Day As Needed (rash)., Disp: 60 g, Rfl: 3  •  docusate sodium (Colace) 100 MG capsule, Take 1 capsule by mouth 2 (Two) Times a Day., Disp: 30 capsule, Rfl: 1  •  gabapentin (NEURONTIN) 800 MG tablet, 800 mg 4 (Four)  Times a Day., Disp: , Rfl:   •  glucose blood test strip, One Touch test strips, daily use as instructed, Disp: 100 each, Rfl: 3  •  glucose monitor monitoring kit, 1 each 2 (two) times a day., Disp: 1 each, Rfl: 0  •  ibuprofen (ADVIL,MOTRIN) 800 MG tablet, Take 1 tablet by mouth 3 (Three) Times a Day As Needed (prn)., Disp: 180 tablet, Rfl: 0  •  Januvia 100 MG tablet, Take 1 tablet by mouth once daily, Disp: 90 tablet, Rfl: 2  •  Lancets misc, Check sugars three times a day. Dx: E11.19, Disp: 100 each, Rfl: 3  •  lisinopril (PRINIVIL,ZESTRIL) 40 MG tablet, Take 1 tablet by mouth every night at bedtime., Disp: 90 tablet, Rfl: 3  •  melatonin 5 MG tablet tablet, Take 2 tablets by mouth At Night As Needed (insomnia)., Disp: , Rfl:   •  metFORMIN (GLUCOPHAGE) 1000 MG tablet, Take 1 tablet by mouth twice daily, Disp: 180 tablet, Rfl: 1  •  Omega-3 Fatty Acids (FISH OIL) 1000 MG capsule capsule, Take 1 capsule by mouth Daily With Breakfast., Disp: , Rfl:   •  oxyCODONE-acetaminophen (Percocet) 5-325 MG per tablet, Take 1 to 2 tabs by mouth every 6 hours as needed for pain, Disp: 14 tablet, Rfl: 0  •  oxyCODONE-acetaminophen (PERCOCET) 7.5-325 MG per tablet, Take 1 tablet by mouth 3 (three) times a day., Disp: , Rfl:   •  pioglitazone (ACTOS) 45 MG tablet, Take 1 tablet by mouth Daily., Disp: 90 tablet, Rfl: 1  •  promethazine (PHENERGAN) 25 MG tablet, TAKE ONE TABLET BY MOUTH EVERY 4 TO 6 HOURS AS NEEDED FOR NAUSEA AND VOMITING, Disp: 30 tablet, Rfl: 5  •  sodium chloride 1 g tablet, Take 1 g by mouth 2 (Two) Times a Day., Disp: , Rfl:   •  vitamin B-12 (CYANOCOBALAMIN) 1000 MCG tablet, Take 1,000 mcg by mouth Daily., Disp: , Rfl:   •  vitamin C (ASCORBIC ACID) 250 MG tablet, Take 250 mg by mouth Daily., Disp: , Rfl:   •  zolpidem (AMBIEN) 10 MG tablet, Take 1 tablet by mouth At Night As Needed for Sleep., Disp: 30 tablet, Rfl: 5    Allergies:   Allergies   Allergen Reactions   • Lovaza [Omega-3-Acid Ethyl Esters]  Other (See Comments)     Other reaction(s): CRAWLING FEELING   • Trazodone Other (See Comments)     Depletes sodium    • Farxiga [Dapagliflozin] Other (See Comments)     Pain in testicle   Low back pain    • Steglatro [Ertugliflozin] Nausea Only and Rash       Objective     Physical Exam:  Vital Signs:   There were no vitals filed for this visit.    Physical Exam  Constitutional:       General: He is not in acute distress.     Appearance: He is well-developed. He is not diaphoretic.   HENT:      Head: Normocephalic and atraumatic.      Comments: Mallampati 3 airway  Neck:      Thyroid: No thyromegaly.      Trachea: No tracheal deviation.   Pulmonary:      Effort: Pulmonary effort is normal. No respiratory distress.      Breath sounds: No stridor.   Musculoskeletal:      Cervical back: Neck supple.   Lymphadenopathy:      Cervical: No cervical adenopathy.   Neurological:      Mental Status: He is alert and oriented to person, place, and time.   Psychiatric:         Behavior: Behavior normal.         Thought Content: Thought content normal.         Judgment: Judgment normal.         Results Review:   I reviewed the patient's new clinical results.    CT scan of the chest done in Nov 2021 reviewed and showed calcified right hilar lymphadenopathy along with the right lower lobe calcified granuloma.  There is 6 mm right lower lobe nodule which is rounded opacity near costodiaphragmatic junction which is non calcified and unchanged from before.  No other pathological mediastinal adenopathy noted.  No effusions noted.  No other suspicious lung nodules or masses noted. Left 2.5 cm adrenal adenoma noted which is also unchanged. There is biapical bullous emphysematous changes along with paraseptal emphysema noted in the left anterior as well.    PFT not available.      Assessment / Plan      Assessment:   Problem List Items Addressed This Visit        Hematology and Neoplasia    Adenoma of left adrenal gland    Overview     Dx  6/21 by CT            Pulmonary and Pneumonias    Lung nodule - Primary    Overview     RLL 6 mm            Tobacco    Cigarette nicotine dependence in remission          Plan:   1.  Patient with incidentally found right lower lobe lung nodule which is 6 mm rounded opacity.  Discussed at length with patient he has other calcified granuloma suggesting old granulomatous inflammation which are benign findings but this new nodule is noncalcified.  It could be still part of the same disease process.  Discussed that given his extensive smoking history concern for malignancy is there.  Recent Ct scan is showing stability of the lung nodule which is reassuring. I was able to show the patient CT scan with screen sharing. Left adrenal adenoma is stable as well. All this is reassuring. Discussed the need for repeat CT in 6 months to follow up. If that CT will be stable then will consider 1 year CT after.    2.  Patient is asymptomatic from his pulmonary disease process standpoint. Counseled strongly to stay off of smoking.  He seems amenable to that recommendation.    3.  Continue with activity to prevent further muscle loss and worsening dyspnea.      Follow Up:    6 months with chest CT    Discussed plan of care in detail with patient today. Patient verbally understands and agrees.     Scott Villeda MD  Pulmonary Critical Care and Sleep Medicine

## 2022-01-20 DIAGNOSIS — E11.65 TYPE 2 DIABETES MELLITUS WITH HYPERGLYCEMIA, WITHOUT LONG-TERM CURRENT USE OF INSULIN: ICD-10-CM

## 2022-01-20 RX ORDER — LANCETS 30 GAUGE
EACH MISCELLANEOUS
Qty: 100 EACH | Refills: 3 | Status: SHIPPED | OUTPATIENT
Start: 2022-01-20 | End: 2022-08-24 | Stop reason: SDUPTHER

## 2022-01-20 RX ORDER — BLOOD-GLUCOSE METER
1 KIT MISCELLANEOUS DAILY
Qty: 1 EACH | Refills: 0 | Status: SHIPPED | OUTPATIENT
Start: 2022-01-20

## 2022-01-27 ENCOUNTER — LAB (OUTPATIENT)
Dept: LAB | Facility: HOSPITAL | Age: 67
End: 2022-01-27

## 2022-01-27 ENCOUNTER — TELEPHONE (OUTPATIENT)
Dept: INTERNAL MEDICINE | Facility: CLINIC | Age: 67
End: 2022-01-27

## 2022-01-27 ENCOUNTER — OFFICE VISIT (OUTPATIENT)
Dept: INTERNAL MEDICINE | Facility: CLINIC | Age: 67
End: 2022-01-27

## 2022-01-27 VITALS
DIASTOLIC BLOOD PRESSURE: 72 MMHG | HEART RATE: 84 BPM | WEIGHT: 201.25 LBS | BODY MASS INDEX: 29.72 KG/M2 | SYSTOLIC BLOOD PRESSURE: 130 MMHG | TEMPERATURE: 98 F | RESPIRATION RATE: 20 BRPM

## 2022-01-27 DIAGNOSIS — F51.01 PRIMARY INSOMNIA: ICD-10-CM

## 2022-01-27 DIAGNOSIS — G89.4 CHRONIC PAIN SYNDROME: ICD-10-CM

## 2022-01-27 DIAGNOSIS — E78.49 OTHER HYPERLIPIDEMIA: ICD-10-CM

## 2022-01-27 DIAGNOSIS — Z23 NEED FOR PNEUMOCOCCAL VACCINATION: ICD-10-CM

## 2022-01-27 DIAGNOSIS — Z12.5 SCREENING FOR PROSTATE CANCER: ICD-10-CM

## 2022-01-27 DIAGNOSIS — K63.5 BENIGN COLONIC POLYP: ICD-10-CM

## 2022-01-27 DIAGNOSIS — I10 PRIMARY HYPERTENSION: ICD-10-CM

## 2022-01-27 DIAGNOSIS — R30.0 DYSURIA: ICD-10-CM

## 2022-01-27 DIAGNOSIS — R91.1 LUNG NODULE: ICD-10-CM

## 2022-01-27 DIAGNOSIS — E11.65 TYPE 2 DIABETES MELLITUS WITH HYPERGLYCEMIA, WITHOUT LONG-TERM CURRENT USE OF INSULIN: Primary | ICD-10-CM

## 2022-01-27 DIAGNOSIS — F41.1 GENERALIZED ANXIETY DISORDER: ICD-10-CM

## 2022-01-27 DIAGNOSIS — Z23 INFLUENZA VACCINE NEEDED: ICD-10-CM

## 2022-01-27 DIAGNOSIS — Z12.11 SCREENING FOR COLON CANCER: ICD-10-CM

## 2022-01-27 DIAGNOSIS — E11.65 TYPE 2 DIABETES MELLITUS WITH HYPERGLYCEMIA, WITHOUT LONG-TERM CURRENT USE OF INSULIN: ICD-10-CM

## 2022-01-27 LAB
BASOPHILS # BLD AUTO: 0.04 10*3/MM3 (ref 0–0.2)
BASOPHILS NFR BLD AUTO: 0.6 % (ref 0–1.5)
BILIRUB BLD-MCNC: NEGATIVE MG/DL
CLARITY, POC: CLEAR
COLOR UR: YELLOW
DEPRECATED RDW RBC AUTO: 45 FL (ref 37–54)
EOSINOPHIL # BLD AUTO: 0.19 10*3/MM3 (ref 0–0.4)
EOSINOPHIL NFR BLD AUTO: 2.7 % (ref 0.3–6.2)
ERYTHROCYTE [DISTWIDTH] IN BLOOD BY AUTOMATED COUNT: 13.4 % (ref 12.3–15.4)
EXPIRATION DATE: ABNORMAL
EXPIRATION DATE: ABNORMAL
GLUCOSE UR STRIP-MCNC: ABNORMAL MG/DL
HBA1C MFR BLD: 9.6 %
HCT VFR BLD AUTO: 45.1 % (ref 37.5–51)
HGB BLD-MCNC: 15.2 G/DL (ref 13–17.7)
IMM GRANULOCYTES # BLD AUTO: 0.04 10*3/MM3 (ref 0–0.05)
IMM GRANULOCYTES NFR BLD AUTO: 0.6 % (ref 0–0.5)
KETONES UR QL: NEGATIVE
LEUKOCYTE EST, POC: NEGATIVE
LYMPHOCYTES # BLD AUTO: 1.89 10*3/MM3 (ref 0.7–3.1)
LYMPHOCYTES NFR BLD AUTO: 27 % (ref 19.6–45.3)
Lab: ABNORMAL
Lab: ABNORMAL
MCH RBC QN AUTO: 31.3 PG (ref 26.6–33)
MCHC RBC AUTO-ENTMCNC: 33.7 G/DL (ref 31.5–35.7)
MCV RBC AUTO: 92.8 FL (ref 79–97)
MONOCYTES # BLD AUTO: 0.63 10*3/MM3 (ref 0.1–0.9)
MONOCYTES NFR BLD AUTO: 9 % (ref 5–12)
NEUTROPHILS NFR BLD AUTO: 4.21 10*3/MM3 (ref 1.7–7)
NEUTROPHILS NFR BLD AUTO: 60.1 % (ref 42.7–76)
NITRITE UR-MCNC: NEGATIVE MG/ML
NRBC BLD AUTO-RTO: 0 /100 WBC (ref 0–0.2)
PH UR: 5 [PH] (ref 5–8)
PLATELET # BLD AUTO: 307 10*3/MM3 (ref 140–450)
PMV BLD AUTO: 8.9 FL (ref 6–12)
PROT UR STRIP-MCNC: ABNORMAL MG/DL
RBC # BLD AUTO: 4.86 10*6/MM3 (ref 4.14–5.8)
RBC # UR STRIP: NEGATIVE /UL
SP GR UR: 1.02 (ref 1–1.03)
UROBILINOGEN UR QL: NORMAL
WBC NRBC COR # BLD: 7 10*3/MM3 (ref 3.4–10.8)

## 2022-01-27 PROCEDURE — G0009 ADMIN PNEUMOCOCCAL VACCINE: HCPCS | Performed by: INTERNAL MEDICINE

## 2022-01-27 PROCEDURE — 81003 URINALYSIS AUTO W/O SCOPE: CPT | Performed by: INTERNAL MEDICINE

## 2022-01-27 PROCEDURE — G0103 PSA SCREENING: HCPCS

## 2022-01-27 PROCEDURE — 85025 COMPLETE CBC W/AUTO DIFF WBC: CPT

## 2022-01-27 PROCEDURE — 83036 HEMOGLOBIN GLYCOSYLATED A1C: CPT | Performed by: INTERNAL MEDICINE

## 2022-01-27 PROCEDURE — 80053 COMPREHEN METABOLIC PANEL: CPT

## 2022-01-27 PROCEDURE — 3046F HEMOGLOBIN A1C LEVEL >9.0%: CPT | Performed by: INTERNAL MEDICINE

## 2022-01-27 PROCEDURE — 90662 IIV NO PRSV INCREASED AG IM: CPT | Performed by: INTERNAL MEDICINE

## 2022-01-27 PROCEDURE — 90732 PPSV23 VACC 2 YRS+ SUBQ/IM: CPT | Performed by: INTERNAL MEDICINE

## 2022-01-27 PROCEDURE — G0008 ADMIN INFLUENZA VIRUS VAC: HCPCS | Performed by: INTERNAL MEDICINE

## 2022-01-27 PROCEDURE — 80061 LIPID PANEL: CPT

## 2022-01-27 PROCEDURE — 99214 OFFICE O/P EST MOD 30 MIN: CPT | Performed by: INTERNAL MEDICINE

## 2022-01-27 RX ORDER — ZOLPIDEM TARTRATE 10 MG/1
10 TABLET ORAL NIGHTLY PRN
Qty: 30 TABLET | Refills: 5 | Status: SHIPPED | OUTPATIENT
Start: 2022-01-27 | End: 2022-02-02

## 2022-01-27 RX ORDER — ALPRAZOLAM 0.5 MG/1
0.5 TABLET ORAL 2 TIMES DAILY PRN
Qty: 60 TABLET | Refills: 5 | Status: SHIPPED | OUTPATIENT
Start: 2022-01-27 | End: 2022-07-28

## 2022-01-27 RX ORDER — IBUPROFEN 800 MG/1
800 TABLET ORAL 3 TIMES DAILY PRN
Qty: 180 TABLET | Refills: 3 | Status: SHIPPED | OUTPATIENT
Start: 2022-01-27 | End: 2022-09-13

## 2022-01-27 NOTE — TELEPHONE ENCOUNTER
Pharmacy Name: [facility] [id number] - [phone]  - [phone]      Pharmacy representative name: FELIBERTO     Pharmacy representative phone number: [phone]    What medication are you calling in regards to: metFORMIN (Glucophage) 500 MG tablet    What question does the pharmacy have: FELIBERTO STATES THAT HE GOT 2 PRESCRIPTIONS FOR METFORMIN WITH TWO DIFFERENT DIRECTIONS.  ONE STATES TO TAKE TWICE DAILY WITH MEALS AND THE OTHER STATES TO TAKE ONCE DAILY IN THE MORNING.  BOTH HAD 90 MPILLS    Who is the provider that prescribed the medication: KATE     Additional notes:

## 2022-01-27 NOTE — TELEPHONE ENCOUNTER
Dr Onelia Vuong told me he was taking both the Metformin 1000 mg BID and then Metformin 500mg daily was added to his med list

## 2022-01-27 NOTE — PROGRESS NOTES
Subjective       Yevgeniy aVughn is a 66 y.o. male.     Chief Complaint   Patient presents with   • Diabetes       History obtained from the patient and his wife.      History of Present Illness       CT abdomen and pelvis on 5/7/21 to evaluate inguinal hernias,  prior to repair,  also showed a 6 mm RLL lung nodule and la left adrenal nodule. CT chest and Ct adrenals done on 6/21/21 showed the RLL Lung nodule and a 2.5 cm left adrenal adenoma, otherwise normal.  He saw Pulmonary, Dr. Zendejas, on 6/25/21.  Last visit was on 1/19/2022.  CT scan of the chest done in November 2021 showed  the lung nodule to be stable.  6-month follow-up with chest CT was recommended.     Hyponatremia has been stable (last Na on 10/7/21 was 136).     He states he saw Urology in May 2021 and had a Prostate and Testicular exam.  PSA was normal 9/22/20 (0.233).  He reports recent incomplete emptying of the bladder, with occasional dribbling.  He also has some dysuria and urinary frequency.  He denies nocturia, hematuria, and urinary urgency.    Primary Care Cardiac Diagnostic Constellation:      His Diabetes Mellitus type has been unstable.  Medication(s): Metformin, Januvia, Actos, Lisinopril, and Atorvastatin.   His Hypertension has been stable.   Medication(s): Lisinopril.   His Hyperlipidemia has been unstable.   His LDL goal is < 70 and last LDL was 89, TG 356.   Medication(s): Atorvastatin and Fish Oil.  The patient is adherent with his medication regimen. He denies medication side effects.       Interval Events:  Last HgA1C was 8.9 on 10/7/2021 (had been 11.2 on 7/9/21).  He states his blood sugars at home are 130-157 fasting, and 250-300 postprandial, but he has not checked recently due to lack of test strips..  He denies episodes of low blood sugar. The patient states he checks his feet regularly.  His last Ophthalmology appointment was 11/4/2020, no retinopathy, but trace cataract left eye not affecting vision. He states his  blood pressure at home has been 130's / 80's..     Symptoms:  Denies chest pain, dyspnea, SALAMANCA, orthopnea, PND, palpitations, syncope, lower extremity edema, claudication, lightheadedness, and dizziness.  Associated Symptoms: Weight down 2 pounds in the past 3 months.  Has stable arthralgias, myalgias, and polydipsia.  No fatigue, headache, polyuria, visual impairment, memory loss, concentration problems, or focal neurologic deficits.  Stable pain, numbness, and tingling of the feet, but denies a foot ulcer. On Neurontin (maximum dose).      Lifestyle and Disease Management: Diet: He reports he does have a healthy diet. Weight Issues: He has weight concerns. Exercise: He had not been exercising due to recent hernia surgery.  He started walking again last week (went twice).  Tobacco Use:   Former smoker. He quit smoking completely 4/1/18. Re-started Sept 2018, 1 ppd.  Quit completely 6/11/2020. Re-sarted 8/2021 1 ppd, Quit 5/25/21. He is now off Chantix for 1.5 months due to the recall     Chronic Pain Follow-Up: The patient is being seen for follow-up of Chronic Neck Pain and Chronic Left Arm and Shoulder Pain, which are stable.   Interval Events:  The patient is seeing Pain Management for his chronic pain.    Symptoms: stable neck pain, back pain, and upper extremity pain, but denies headache and lower extremity pain.   Medications:   Neurontin and Percocet per Pain Management.    The patient is adherent to his medication regimen, and he denies medication side effects.     Colonic Polyp Follow-up: The patient is being seen for a routine clinic follow-up of Colon Polyp(s), which is stable.   Interval Events:  Current diagnosis was determined by Colonoscopy and last 1/20/14 was normal, but poor prep.   Symptoms: Intermittent residual abdominal pain due to recent surgery.  No diarrhea, constipation, hematochezia, melena, decreased stool caliber, or change in bowel habits.  Associated Symptoms: no rectal prolapse.    Medication:  None.       Insomnia Follow-Up: The patient is being seen for follow-up of Insomnia, which is worsened.    Comorbid Illnesses: Anxiety.   Interval Events:  None.  Symptoms: Worsened insomnia.  He has been waking up with panic attacks at times.  Medications:  Ambien and Melatonin. Off Trazodone due to Hyponatremia.       Anxiety Disorder Follow-Up: The patient is being seen for follow-up of Anxiey, which is  worsened.  Comorbid Illnesses: Insomnia.  Interval Events:  The patient's sister  suddenly recently.  Symptoms:  worsened anxiety and insomnia.   He has been waking up with panic attacks at times.   Denies depression,  anhedonia, memory loss, and difficulty concentrating.    Associated Symptoms: no suicidal ideation.   Medication: Alprazolam BID.  The patient is adherent to his medication regimen, and he denies medication side effects.     Current Outpatient Medications on File Prior to Visit   Medication Sig Dispense Refill   • aspirin 81 MG chewable tablet Chew 81 mg Daily.     • atorvastatin (LIPITOR) 20 MG tablet Take 1 tablet by mouth Every Night.     • clobetasol (TEMOVATE) 0.05 % cream Apply  topically to the appropriate area as directed 2 (Two) Times a Day As Needed (rash). 60 g 3   • gabapentin (NEURONTIN) 800 MG tablet 800 mg 4 (Four) Times a Day.     • glucose blood test strip One Touch test strips, daily use as instructed 100 each 3   • glucose monitor monitoring kit 1 each Daily. 1 each 0   • Januvia 100 MG tablet Take 1 tablet by mouth once daily 90 tablet 2   • Lancets misc Check sugars daily as directed 100 each 3   • lisinopril (PRINIVIL,ZESTRIL) 40 MG tablet Take 1 tablet by mouth every night at bedtime. 90 tablet 3   • melatonin 5 MG tablet tablet Take 2 tablets by mouth At Night As Needed (insomnia).     • metFORMIN (GLUCOPHAGE) 1000 MG tablet Take 1 tablet by mouth twice daily 180 tablet 1   • Omega-3 Fatty Acids (FISH OIL) 1000 MG capsule capsule Take 1 capsule by mouth  Daily With Breakfast.     • oxyCODONE-acetaminophen (PERCOCET) 7.5-325 MG per tablet Take 1 tablet by mouth 3 (three) times a day.     • pioglitazone (ACTOS) 45 MG tablet Take 1 tablet by mouth Daily. 90 tablet 1   • promethazine (PHENERGAN) 25 MG tablet TAKE ONE TABLET BY MOUTH EVERY 4 TO 6 HOURS AS NEEDED FOR NAUSEA AND VOMITING 30 tablet 5   • sodium chloride 1 g tablet Take 1 g by mouth 2 (Two) Times a Day.     • vitamin B-12 (CYANOCOBALAMIN) 1000 MCG tablet Take 1,000 mcg by mouth Daily.     • vitamin C (ASCORBIC ACID) 250 MG tablet Take 250 mg by mouth Daily.     • [DISCONTINUED] ALPRAZolam (XANAX) 0.5 MG tablet Take 1 tablet by mouth twice daily as needed for anxiety 60 tablet 2   • [DISCONTINUED] ibuprofen (ADVIL,MOTRIN) 800 MG tablet Take 1 tablet by mouth 3 (Three) Times a Day As Needed (prn). 180 tablet 0   • [DISCONTINUED] zolpidem (AMBIEN) 10 MG tablet Take 1 tablet by mouth At Night As Needed for Sleep. 30 tablet 5   • docusate sodium (Colace) 100 MG capsule Take 1 capsule by mouth 2 (Two) Times a Day. 30 capsule 1   • [DISCONTINUED] Blood Glucose Monitoring Suppl ([employer] Blood Glucose Meter) w/Device kit 1 kit 3 (Three) Times a Day. Check sugars three times a day Dx: E11.19 1 kit 0   • [DISCONTINUED] Blood Glucose Monitoring Suppl (FreeStyle Lite) device      • [DISCONTINUED] oxyCODONE-acetaminophen (Percocet) 5-325 MG per tablet Take 1 to 2 tabs by mouth every 6 hours as needed for pain 14 tablet 0     No current facility-administered medications on file prior to visit.       Current outpatient and discharge medications have been reconciled for the patient.  Reviewed by: Christine Rousseau MD        The following portions of the patient's history were reviewed and updated as appropriate: allergies, current medications, past family history, past medical history, past social history, past surgical history and problem list.    Review of Systems   Constitutional: Negative for fatigue and unexpected weight  change.   Eyes: Negative for visual disturbance.   Respiratory: Negative for cough, shortness of breath and wheezing.    Cardiovascular: Negative for chest pain, palpitations and leg swelling.        No SALAMANCA, orthopnea, or claudication.   Gastrointestinal: Positive for abdominal pain (residual from surgery) and constipation (occasional). Negative for blood in stool, diarrhea, nausea and vomiting.        Denies melena.   Endocrine: Positive for polydipsia. Negative for polyuria.   Genitourinary: Positive for difficulty urinating, dysuria and frequency. Negative for hematuria and urgency.   Musculoskeletal: Positive for arthralgias, back pain, myalgias and neck pain.   Neurological: Positive for numbness. Negative for dizziness, syncope, light-headedness and headaches.        No memory issues.   Psychiatric/Behavioral: Positive for sleep disturbance. Negative for decreased concentration, self-injury and suicidal ideas. The patient is nervous/anxious.          Objective       Blood pressure 130/72, pulse 84, temperature 98 °F (36.7 °C), temperature source Temporal, resp. rate 20, weight 91.3 kg (201 lb 4 oz).  Body mass index is 29.72 kg/m².      Physical Exam  Vitals and nursing note reviewed.   Constitutional:       Appearance: He is well-developed.      Comments: Overweight.   Neck:      Thyroid: No thyroid mass or thyromegaly.      Vascular: No carotid bruit.   Cardiovascular:      Rate and Rhythm: Normal rate and regular rhythm.      Pulses: Normal pulses.      Heart sounds: Normal heart sounds. No murmur heard.  No friction rub. No gallop.    Pulmonary:      Effort: Pulmonary effort is normal.      Breath sounds: Normal breath sounds.   Abdominal:      General: Bowel sounds are normal. There is no distension or abdominal bruit.      Palpations: Abdomen is soft. There is no hepatomegaly, splenomegaly or mass.      Tenderness: There is no abdominal tenderness.   Musculoskeletal:      Cervical back: Normal range of  motion and neck supple.      Right lower leg: No edema.      Left lower leg: No edema.   Neurological:      Mental Status: He is alert.   Psychiatric:         Mood and Affect: Mood normal.       Results for orders placed or performed in visit on 01/27/22   POC Urinalysis Dipstick, Automated    Specimen: Urine   Result Value Ref Range    Color Yellow Yellow, Straw, Dark Yellow, Loli    Clarity, UA Clear Clear    Specific Gravity  1.020 1.005 - 1.030    pH, Urine 5.0 5.0 - 8.0    Leukocytes Negative Negative    Nitrite, UA Negative Negative    Protein, POC Trace (A) Negative mg/dL    Glucose,  mg/dL (A) Negative, 1000 mg/dL (3+) mg/dL    Ketones, UA Negative Negative    Urobilinogen, UA Normal Normal    Bilirubin Negative Negative    Blood, UA Negative Negative    Lot Number [id number]     Expiration Date 12/31/2022    POC Glycosylated Hemoglobin (Hb A1C)    Specimen: Blood   Result Value Ref Range    Hemoglobin A1C 9.6 %    Lot Number [id number]     Expiration Date 10/19/2023        Assessment / Plan:  Diagnoses and all orders for this visit:    1. Type 2 diabetes mellitus with hyperglycemia, without long-term current use of insulin (HCC) (Primary)  -     POC Glycosylated Hemoglobin (Hb A1C)  -     Lipid Panel; Future  -     Comprehensive Metabolic Panel; Future  -     CBC & Differential; Future  -     empagliflozin (Jardiance) 10 MG tablet tablet; Take 1 tablet by mouth Daily.  Dispense: 30 tablet; Refill: 5- NEW   Continue current medication(s) as noted in the history of present illness.    2. Primary hypertension  -     Lipid Panel; Future  -     Comprehensive Metabolic Panel; Future  -     CBC & Differential; Future   Continue current medication(s) as noted in the history of present illness.    3. Other hyperlipidemia  -     Lipid Panel; Future  -     Comprehensive Metabolic Panel; Future  -     CBC & Differential; Future   Continue current medication(s) as noted in the history of present illness.    4.  Lung nodule   Follow up per Pulmonary.    5. Chronic pain syndrome   Continue current medication(s) as noted in the history of present illness.   Follow up per Pain Management.    6. Benign colonic polyp  -     Ambulatory Referral For Screening Colonoscopy    7. Primary insomnia  -     zolpidem (AMBIEN) 10 MG tablet; Take 1 tablet by mouth At Night As Needed for Sleep.  Dispense: 30 tablet; Refill: 5- REFILL   Continue current medication(s) as noted in the history of present illness.    8. Generalized anxiety disorder  -     ALPRAZolam (XANAX) 0.5 MG tablet; Take 1 tablet by mouth 2 (Two) Times a Day As Needed for Anxiety. for anxiety  Dispense: 60 tablet; Refill: 5- REFILL   Continue current medication(s) as noted in the history of present illness.    The patient was instructed in the side effects of the medication.  Risks of the potential for tolerance, dependence, and addiction were discussed.  The patient was instructed to take the lowest dosage of the medication, at the lowest frequency, and for the shortest period of time possible.  The patient was instructed not to receive controlled substances or narcotics from other doctors, and not to giveaway or sell the medication.    The patient was instructed to abstain from illicit drug use.  The patient was instructed to avoid alcohol while taking these medications.      Narcotics/controlled substance agreement, Lonnie report, and Urine Drug Screen were updated today if needed.    9. Dysuria  -     POC Urinalysis Dipstick, Automated    10. Screening for prostate cancer  -     PSA Screen; Future    11. Screening for colon cancer  -     Ambulatory Referral For Screening Colonoscopy    12. Need for pneumococcal vaccination  -     Pneumococcal Polysaccharide Vaccine 23-Valent Greater Than or Equal To 3yo Subcutaneous / IM    13. Influenza vaccine needed  -     Fluzone High-Dose 65+yrs              Return in about 1 month (around 2/27/2022) for Recheck Diabetes,  fasting.

## 2022-01-28 LAB
ALBUMIN SERPL-MCNC: 4.5 G/DL (ref 3.5–5.2)
ALBUMIN/GLOB SERPL: 1.4 G/DL
ALP SERPL-CCNC: 52 U/L (ref 39–117)
ALT SERPL W P-5'-P-CCNC: 24 U/L (ref 1–41)
ANION GAP SERPL CALCULATED.3IONS-SCNC: 11 MMOL/L (ref 5–15)
AST SERPL-CCNC: 19 U/L (ref 1–40)
BILIRUB SERPL-MCNC: 0.2 MG/DL (ref 0–1.2)
BUN SERPL-MCNC: 9 MG/DL (ref 8–23)
BUN/CREAT SERPL: 10.2 (ref 7–25)
CALCIUM SPEC-SCNC: 9.6 MG/DL (ref 8.6–10.5)
CHLORIDE SERPL-SCNC: 95 MMOL/L (ref 98–107)
CHOLEST SERPL-MCNC: 181 MG/DL (ref 0–200)
CO2 SERPL-SCNC: 24 MMOL/L (ref 22–29)
CREAT SERPL-MCNC: 0.88 MG/DL (ref 0.76–1.27)
GFR SERPL CREATININE-BSD FRML MDRD: 87 ML/MIN/1.73
GLOBULIN UR ELPH-MCNC: 3.3 GM/DL
GLUCOSE SERPL-MCNC: 203 MG/DL (ref 65–99)
HDLC SERPL-MCNC: 40 MG/DL (ref 40–60)
LDLC SERPL CALC-MCNC: 82 MG/DL (ref 0–100)
LDLC/HDLC SERPL: 1.72 {RATIO}
POTASSIUM SERPL-SCNC: 4.4 MMOL/L (ref 3.5–5.2)
PROT SERPL-MCNC: 7.8 G/DL (ref 6–8.5)
PSA SERPL-MCNC: 0.24 NG/ML (ref 0–4)
SODIUM SERPL-SCNC: 130 MMOL/L (ref 136–145)
TRIGL SERPL-MCNC: 361 MG/DL (ref 0–150)
VLDLC SERPL-MCNC: 59 MG/DL (ref 5–40)

## 2022-02-02 DIAGNOSIS — F51.01 PRIMARY INSOMNIA: ICD-10-CM

## 2022-02-02 RX ORDER — ZOLPIDEM TARTRATE 10 MG/1
10 TABLET ORAL NIGHTLY PRN
Qty: 30 TABLET | Refills: 5 | Status: SHIPPED | OUTPATIENT
Start: 2022-02-02 | End: 2022-08-19 | Stop reason: ALTCHOICE

## 2022-02-13 DIAGNOSIS — E78.49 OTHER HYPERLIPIDEMIA: ICD-10-CM

## 2022-02-14 RX ORDER — ATORVASTATIN CALCIUM 20 MG/1
TABLET, FILM COATED ORAL
Qty: 90 TABLET | Refills: 0 | Status: SHIPPED | OUTPATIENT
Start: 2022-02-14 | End: 2022-05-09

## 2022-02-14 RX ORDER — ATORVASTATIN CALCIUM 20 MG/1
20 TABLET, FILM COATED ORAL NIGHTLY
Start: 2022-02-14

## 2022-02-22 NOTE — OUTREACH NOTE
Call Center TCM Note      Responses   [facility] patient discharged from?  [facility]   COVID-19 Test Status  Not tested   Does the patient have one of the following disease processes/diagnoses(primary or secondary)?  Other   TCM attempt successful?  Yes   Call start time  1059   Call end time  1103   Discharge diagnosis  Hyponatremia    Meds reviewed with patient/caregiver?  Yes   Is the patient having any side effects they believe may be caused by any medication additions or changes?  No   Does the patient have all medications ordered at discharge?  Yes   Is the patient taking all medications as directed (includes completed medication regime)?  Yes   Does the patient have a primary care provider?   Yes   Does the patient have an appointment with their PCP within 7 days of discharge?  Greater than 7 days   Comments regarding PCP  PCP:  Christine Rousseau MD- has an appt with Dr Rousseau on 6/29/2020   Nursing Interventions  Verified appointment date/time/provider   Has the patient kept scheduled appointments due by today?  N/A   Has home health visited the patient within 72 hours of discharge?  N/A   Psychosocial issues?  No   Comments  Patient reports he is doing well.    Did the patient receive a copy of their discharge instructions?  Yes   Nursing interventions  Reviewed instructions with patient   What is the patient's perception of their health status since discharge?  Improving   Is the patient/caregiver able to teach back signs and symptoms related to disease process for when to call PCP?  Yes   Is the patient/caregiver able to teach back signs and symptoms related to disease process for when to call 911?  Yes   Is the patient/caregiver able to teach back the hierarchy of who to call/visit for symptoms/problems? PCP, Specialist, Home health nurse, Urgent Care, ED, 911  Yes   TCM call completed?  Yes          Edgar Beavers RN    6/17/2020, 11:04       February 22, 2022        [facility]  [address]  [place] [address]  Phone: [phone]  Fax: [phone]   Patient: Sherice Haji   MR Number: [id number]   YOB: 1931   Date of Visit: 2/22/2022       Dear Dr. Castillo:    Thank you for referring Sherice Haji to me for evaluation. Attached you will find relevant portions of my assessment and plan of care.    If you have questions, please do not hesitate to call me. I look forward to following Sherice Haji along with you.    Sincerely,      Hoda Josue NP            CC  Adalid Valera MD    [facility]

## 2022-03-09 DIAGNOSIS — E11.65 TYPE 2 DIABETES MELLITUS WITH HYPERGLYCEMIA, WITHOUT LONG-TERM CURRENT USE OF INSULIN: ICD-10-CM

## 2022-03-11 ENCOUNTER — TELEPHONE (OUTPATIENT)
Dept: INTERNAL MEDICINE | Facility: CLINIC | Age: 67
End: 2022-03-11

## 2022-03-11 NOTE — TELEPHONE ENCOUNTER
Tried to call the patient and inform them that the RX they requested was sent to the pharmacy on 3/9/22. No answer and left voicemail with office number provided for any further questions.

## 2022-03-11 NOTE — TELEPHONE ENCOUNTER
Patient called and stated that they need to test blood sugar at least 2 times a day. Patient requested enough strips to test 2 times a day and allow for possible errors. They stated that they have not been able to test the last few days due to running out of strips. They have a One Touch Berio Reflect monitoring machine.     Please advise?

## 2022-03-14 DIAGNOSIS — I10 PRIMARY HYPERTENSION: ICD-10-CM

## 2022-03-14 RX ORDER — LISINOPRIL 40 MG/1
TABLET ORAL
Qty: 90 TABLET | Refills: 0 | Status: SHIPPED | OUTPATIENT
Start: 2022-03-14 | End: 2022-06-01

## 2022-04-19 ENCOUNTER — OFFICE VISIT (OUTPATIENT)
Dept: INTERNAL MEDICINE | Facility: CLINIC | Age: 67
End: 2022-04-19

## 2022-04-19 ENCOUNTER — TELEPHONE (OUTPATIENT)
Dept: INTERNAL MEDICINE | Facility: CLINIC | Age: 67
End: 2022-04-19

## 2022-04-19 VITALS
DIASTOLIC BLOOD PRESSURE: 76 MMHG | BODY MASS INDEX: 29.02 KG/M2 | SYSTOLIC BLOOD PRESSURE: 132 MMHG | TEMPERATURE: 98 F | HEART RATE: 83 BPM | RESPIRATION RATE: 20 BRPM | WEIGHT: 196.5 LBS

## 2022-04-19 DIAGNOSIS — F41.1 GENERALIZED ANXIETY DISORDER: ICD-10-CM

## 2022-04-19 DIAGNOSIS — E11.65 TYPE 2 DIABETES MELLITUS WITH HYPERGLYCEMIA, WITHOUT LONG-TERM CURRENT USE OF INSULIN: Primary | ICD-10-CM

## 2022-04-19 DIAGNOSIS — F17.210 CIGARETTE NICOTINE DEPENDENCE WITHOUT COMPLICATION: ICD-10-CM

## 2022-04-19 DIAGNOSIS — I10 PRIMARY HYPERTENSION: ICD-10-CM

## 2022-04-19 DIAGNOSIS — E78.49 OTHER HYPERLIPIDEMIA: ICD-10-CM

## 2022-04-19 DIAGNOSIS — G89.4 CHRONIC PAIN SYNDROME: ICD-10-CM

## 2022-04-19 PROCEDURE — 99214 OFFICE O/P EST MOD 30 MIN: CPT | Performed by: INTERNAL MEDICINE

## 2022-04-19 RX ORDER — PIOGLITAZONEHYDROCHLORIDE 45 MG/1
45 TABLET ORAL DAILY
Qty: 90 TABLET | Refills: 2 | Status: SHIPPED | OUTPATIENT
Start: 2022-04-19 | End: 2023-01-09

## 2022-04-19 NOTE — TELEPHONE ENCOUNTER
Caller: [facility] [address] [place] [address] [address] [place] [address] - [phone]  - [phone] FX    Relationship to patient: Pharmacy    Best call back number: [phone]    Patient is needing: PHARMACY STATED THAT THEY DO NOT HAVE THE          varenicline (Chantix Starting Month Pak) 0.5 MG X 11 & 1 MG X 42 tablet        BUT COULD GET THE APO-VARENICLINE AND WOULD HAVE TO HAVE TWO DIFFERENT PRESCRIPTIONS ONE FOR 5 MG AND ONE FOR THE 1 MG    PLEASE ADVISE

## 2022-04-19 NOTE — PROGRESS NOTES
Subjective       Yevgeniy Vaughn is a 66 y.o. male.     Chief Complaint   Patient presents with   • Diabetes     1 month follow up fasting        History obtained from the patient.      History of Present Illness     Primary Care Cardiac Diagnostic Constellation:      His Diabetes Mellitus type has been unstable.  Medication(s): Metformin, Januvia, Actos, Lisinopril, and Atorvastatin.   His Hypertension has been stable.   Medication(s): Lisinopril.   His Hyperlipidemia has been unstable.   His LDL goal is < 70 and last LDL was 82, TG 361.   Medication(s): Atorvastatin and Fish Oil.  The patient is adherent with his medication regimen. He denies medication side effects.       Interval Events:  Last HgA1C was 9.6 on 1/27/22.  Jardiance was added to his medications, but he did not start it due to fear of side effects.  He preferred to try improvement in his diet and exercise plan.  He states his blood sugars at home are 105-150 fasting, and < 200 postprandial. He denies episodes of low blood sugar. The patient states he checks his feet regularly.  His last Ophthalmology appointment was 11/4/2020, no retinopathy, but trace cataract left eye not affecting vision.      Symptoms:  Denies chest pain, dyspnea, SALAMANCA, orthopnea, PND, palpitations, syncope, lower extremity edema, claudication, lightheadedness, and dizziness.  Associated Symptoms: Weight down 5 pounds intentionally in the past 2-3 months.  Has stable arthralgias, myalgias, and polydipsia.  No fatigue, headache, polyuria, visual impairment, memory loss, concentration problems, or focal neurologic deficits.  Stable pain, numbness, and tingling of the feet, but denies a foot ulcer. On Neurontin (maximum dose).      Lifestyle and Disease Management: Diet: He reports he does have a healthy diet.  Exercise: He has been working on his house 6 days/week and walks occasionally.    Tobacco Use:   Current smoker. He quit smoking completely 4/1/18. Re-started Sept 2018, 1  ppd.  Quit completely 6/11/2020. Re-sarted 8/2021 1 ppd, Quit 5/25/21.  He stopped Chantix due to the recall.  He started smoking 1 ppd in December 2021.    Chronic Pain Follow-Up: The patient is being seen for follow-up of Chronic Neck Pain and Chronic Left Arm and Shoulder Pain, which are stable.   Interval Events:  The patient is seeing Pain Management for his chronic pain.    Symptoms: stable neck pain, back pain, and upper extremity pain, but denies headache and lower extremity pain.   Medications:   Neurontin and Percocet per Pain Management.    The patient is adherent to his medication regimen, and he denies medication side effects.         Insomnia Follow-Up: The patient is being seen for follow-up of Insomnia, which is stable.    Comorbid Illnesses: Anxiety.   Interval Events:  None.  Symptoms: Stable insomnia.  Medications:  Ambien and Melatonin. Off Trazodone due to Hyponatremia.       Anxiety Disorder Follow-Up: The patient is being seen for follow-up of Anxiey, which is stable.    Comorbid Illnesses: Insomnia.  Interval Events:   None.  Symptoms: Stable anxiety and insomnia. Denies depression,  anhedonia, memory loss, and difficulty concentrating.    Associated Symptoms: no suicidal ideation and thoughts of self-harm.   Medication: Alprazolam BID.  The patient is adherent to his medication regimen, and he denies medication side effects.          Current Outpatient Medications on File Prior to Visit   Medication Sig Dispense Refill   • ALPRAZolam (XANAX) 0.5 MG tablet Take 1 tablet by mouth 2 (Two) Times a Day As Needed for Anxiety. for anxiety 60 tablet 5   • aspirin 81 MG chewable tablet Chew 81 mg Daily.     • atorvastatin (LIPITOR) 20 MG tablet TAKE 1 TABLET BY MOUTH AT BEDTIME 90 tablet 0   • gabapentin (NEURONTIN) 800 MG tablet 800 mg 4 (Four) Times a Day.     • glucose blood test strip One Touch test strips, daily use as instructed 100 each 5   • glucose monitor monitoring kit 1 each Daily. 1 each  0   • ibuprofen (ADVIL,MOTRIN) 800 MG tablet Take 1 tablet by mouth 3 (Three) Times a Day As Needed (prn). 180 tablet 3   • Januvia 100 MG tablet Take 1 tablet by mouth once daily 90 tablet 2   • Lancets misc Check sugars daily as directed 100 each 3   • lisinopril (PRINIVIL,ZESTRIL) 40 MG tablet TAKE 1 TABLET BY MOUTH AT BEDTIME 90 tablet 0   • melatonin 5 MG tablet tablet Take 2 tablets by mouth At Night As Needed (insomnia).     • metFORMIN (GLUCOPHAGE) 1000 MG tablet Take 1 tablet by mouth twice daily 180 tablet 1   • metFORMIN (GLUCOPHAGE) 500 MG tablet TAKE 1 TABLET BY MOUTH TWICE DAILY WITH MEALS (Patient taking differently: Daily With Lunch.) 180 tablet 1   • Omega-3 Fatty Acids (FISH OIL) 1000 MG capsule capsule Take 500 mg by mouth Daily With Breakfast.     • oxyCODONE-acetaminophen (PERCOCET) 7.5-325 MG per tablet Take 1 tablet by mouth 3 (three) times a day.     • promethazine (PHENERGAN) 25 MG tablet TAKE ONE TABLET BY MOUTH EVERY 4 TO 6 HOURS AS NEEDED FOR NAUSEA AND VOMITING 30 tablet 5   • sodium chloride 1 g tablet Take 1 g by mouth 2 (Two) Times a Day.     • vitamin B-12 (CYANOCOBALAMIN) 1000 MCG tablet Take 1,000 mcg by mouth Daily.     • vitamin C (ASCORBIC ACID) 250 MG tablet Take 250 mg by mouth Daily.     • zolpidem (AMBIEN) 10 MG tablet TAKE 1 TABLET BY MOUTH AT NIGHT AS NEEDED FOR SLEEP 30 tablet 5   • [DISCONTINUED] pioglitazone (ACTOS) 45 MG tablet Take 1 tablet by mouth Daily. 90 tablet 1   • clobetasol (TEMOVATE) 0.05 % cream Apply  topically to the appropriate area as directed 2 (Two) Times a Day As Needed (rash). 60 g 3   • docusate sodium (Colace) 100 MG capsule Take 1 capsule by mouth 2 (Two) Times a Day. 30 capsule 1   • [DISCONTINUED] empagliflozin (Jardiance) 10 MG tablet tablet Take 1 tablet by mouth Daily. 30 tablet 5     No current facility-administered medications on file prior to visit.       Current outpatient and discharge medications have been reconciled for the  patient.  Reviewed by: Christine Rousseau MD        The following portions of the patient's history were reviewed and updated as appropriate: allergies, current medications, past family history, past medical history, past social history, past surgical history and problem list.    Review of Systems   Constitutional: Negative for fatigue and unexpected weight change.   Eyes: Negative for visual disturbance.   Respiratory: Negative for cough, shortness of breath and wheezing.    Cardiovascular: Negative for chest pain, palpitations and leg swelling.        No SALAMANCA, orthopnea, or claudication.   Gastrointestinal: Negative for abdominal pain, blood in stool, constipation, diarrhea, nausea and vomiting.        Denies melena.   Endocrine: Positive for polydipsia. Negative for polyuria.   Musculoskeletal: Positive for arthralgias, back pain, myalgias and neck pain.   Neurological: Positive for numbness. Negative for dizziness, syncope, light-headedness and headaches.        No memory issues.   Psychiatric/Behavioral: Positive for sleep disturbance. Negative for decreased concentration, self-injury and suicidal ideas. The patient is nervous/anxious.          Objective       Blood pressure 132/76, pulse 83, temperature 98 °F (36.7 °C), temperature source Temporal, resp. rate 20, weight 89.1 kg (196 lb 8 oz).  Body mass index is 29.02 kg/m².      Physical Exam  Vitals and nursing note reviewed.   Constitutional:       Comments: Overweight.   Neck:      Vascular: No carotid bruit.   Cardiovascular:      Rate and Rhythm: Regular rhythm.      Heart sounds: Normal heart sounds. No murmur heard.  Pulmonary:      Effort: Pulmonary effort is normal.      Breath sounds: Normal breath sounds.   Neurological:      Mental Status: He is alert.   Psychiatric:         Mood and Affect: Mood normal.         Assessment / Plan:  Diagnoses and all orders for this visit:    1. Type 2 diabetes mellitus with hyperglycemia, without long-term current use  of insulin (HCC) (Primary)  -     pioglitazone (ACTOS) 45 MG tablet; Take 1 tablet by mouth Daily.  Dispense: 90 tablet; Refill: 2- REFILL  -     Hemoglobin A1c; Future  -     Comprehensive Metabolic Panel; Future   Continue current medication(s) as noted in the history of present illness.    2. Primary hypertension   Continue current medication(s) as noted in the history of present illness.    3. Other hyperlipidemia   Continue current medication(s) as noted in the history of present illness.    4. Chronic pain syndrome   Continue current medication(s) as noted in the history of present illness.   Follow-up for Pain Management.    5. Generalized anxiety disorder   Continue current medication(s) as noted in the history of present illness.    The patient was instructed in the side effects of the medication.  Risks of the potential for tolerance, dependence, and addiction were discussed.  The patient was instructed to take the lowest dosage of the medication, at the lowest frequency, and for the shortest period of time possible.  The patient was instructed not to receive controlled substances or narcotics from other doctors, and not to giveaway or sell the medication.    The patient was instructed to abstain from illicit drug use.  The patient was instructed to avoid alcohol while taking these medications.      Narcotics/controlled substance agreement, Lonnie report, and Urine Drug Screen were updated today if needed.    6. Cigarette nicotine dependence without complication  -     varenicline (Chantix Starting Month Pak) 0.5 MG X 11 & 1 MG X 42 tablet; Take 0.5 mg one daily on days 1-3 and and 0.5 mg twice daily on days 4-7.Then 1 mg twice daily for a total of 12 weeks.  Dispense: 53 tablet; Refill: 0      The patient agrees to return for fasting labs after 4/27/2022.      Return in about 4 months (around 8/19/2022) for Annual physical, fasting, and schedule initial Medicare Wellness Exam same apointment.

## 2022-04-20 NOTE — TELEPHONE ENCOUNTER
Spoke wit Anisa  [facility]   She states the Chantix is on a backorder and do not know when they will get it stocked  She states the medication in the message is a Medication Alternate from [facility] and the pharmacy has authority to fill.   Spoke with Dr Rousseau and she gave verbal approval to fill  Anisa notified  Verbal understanding given

## 2022-05-04 ENCOUNTER — LAB (OUTPATIENT)
Dept: INTERNAL MEDICINE | Facility: CLINIC | Age: 67
End: 2022-05-04

## 2022-05-04 DIAGNOSIS — E11.65 TYPE 2 DIABETES MELLITUS WITH HYPERGLYCEMIA, WITHOUT LONG-TERM CURRENT USE OF INSULIN: ICD-10-CM

## 2022-05-04 LAB
ALBUMIN SERPL-MCNC: 4.3 G/DL (ref 3.5–5.2)
ALBUMIN/GLOB SERPL: 1.5 G/DL
ALP SERPL-CCNC: 59 U/L (ref 39–117)
ALT SERPL W P-5'-P-CCNC: 17 U/L (ref 1–41)
ANION GAP SERPL CALCULATED.3IONS-SCNC: 12.3 MMOL/L (ref 5–15)
AST SERPL-CCNC: 17 U/L (ref 1–40)
BILIRUB SERPL-MCNC: 0.2 MG/DL (ref 0–1.2)
BUN SERPL-MCNC: 8 MG/DL (ref 8–23)
BUN/CREAT SERPL: 9.6 (ref 7–25)
CALCIUM SPEC-SCNC: 9.7 MG/DL (ref 8.6–10.5)
CHLORIDE SERPL-SCNC: 93 MMOL/L (ref 98–107)
CO2 SERPL-SCNC: 23.7 MMOL/L (ref 22–29)
CREAT SERPL-MCNC: 0.83 MG/DL (ref 0.76–1.27)
EGFRCR SERPLBLD CKD-EPI 2021: 97.1 ML/MIN/1.73
GLOBULIN UR ELPH-MCNC: 2.8 GM/DL
GLUCOSE SERPL-MCNC: 175 MG/DL (ref 65–99)
HBA1C MFR BLD: 8.6 % (ref 4.8–5.6)
POTASSIUM SERPL-SCNC: 5 MMOL/L (ref 3.5–5.2)
PROT SERPL-MCNC: 7.1 G/DL (ref 6–8.5)
SODIUM SERPL-SCNC: 129 MMOL/L (ref 136–145)

## 2022-05-04 PROCEDURE — 36415 COLL VENOUS BLD VENIPUNCTURE: CPT | Performed by: INTERNAL MEDICINE

## 2022-05-04 PROCEDURE — 80053 COMPREHEN METABOLIC PANEL: CPT | Performed by: INTERNAL MEDICINE

## 2022-05-04 PROCEDURE — 83036 HEMOGLOBIN GLYCOSYLATED A1C: CPT | Performed by: INTERNAL MEDICINE

## 2022-05-08 DIAGNOSIS — E78.49 OTHER HYPERLIPIDEMIA: ICD-10-CM

## 2022-05-09 RX ORDER — ATORVASTATIN CALCIUM 20 MG/1
TABLET, FILM COATED ORAL
Qty: 90 TABLET | Refills: 1 | Status: SHIPPED | OUTPATIENT
Start: 2022-05-09 | End: 2022-10-11

## 2022-05-15 DIAGNOSIS — E11.65 TYPE 2 DIABETES MELLITUS WITH HYPERGLYCEMIA, WITHOUT LONG-TERM CURRENT USE OF INSULIN: ICD-10-CM

## 2022-05-18 ENCOUNTER — TELEPHONE (OUTPATIENT)
Dept: INTERNAL MEDICINE | Facility: CLINIC | Age: 67
End: 2022-05-18

## 2022-05-18 NOTE — TELEPHONE ENCOUNTER
Caller: [facility] [address] [place] [address] [address] - [phone] [facility] [phone] FX    Relationship: Pharmacy    Best call back number: [phone]    Requested Prescriptions:   NARCAN SPRAY 2MG PER ML     Pharmacy where request should be sent:  [facility] [address] [place] [address] [address] - [phone] [facility] [phone] FX    Additional details provided by patient:   [facility] STATED THAT A PRESCRIPTION FOR NARCAN SPRAY WAS SENT OVER 05/12/2022 WITH INCORRECT INSTRUCTIONS     Does the patient have less than a 3 day supply:  [x] Yes  [] No    Francesca Barrett Rep   05/18/22 12:14 EDT

## 2022-05-18 NOTE — TELEPHONE ENCOUNTER
Spoke with Karine Cohen and she states she spoke with the pharmacist.  And does not see a rx for Narcan for Yvegeniy.  She states it must have been an error and to disregard message

## 2022-05-19 ENCOUNTER — TELEPHONE (OUTPATIENT)
Dept: INTERNAL MEDICINE | Facility: CLINIC | Age: 67
End: 2022-05-19

## 2022-05-19 DIAGNOSIS — Z79.899 HIGH RISK MEDICATION USE: Primary | ICD-10-CM

## 2022-05-19 NOTE — TELEPHONE ENCOUNTER
Pharmacy Name:  [facility]    Pharmacy representative name: SANTOSH    Pharmacy representative phone number: [phone]    What medication are you calling in regards to: NALOXONE    What question does the pharmacy have: SANTOSH CALLED TO ASK IF THE PRESCRIPTION FOR NALOXONE STRENGTH COULD BE CHANGES.  SHE STATED THAT THE STRENGTH WAS SENT IN FOR THE INJECTION INSTEAD OF THE NASAL SPRAY.    Who is the provider that prescribed the medication: DR LOVE    Additional notes: FAX# [phone]

## 2022-05-19 NOTE — TELEPHONE ENCOUNTER
[facility] has been notified that provider was ok with switching from injection to nasal spray.

## 2022-05-20 RX ORDER — NALOXONE HYDROCHLORIDE 4 MG/.1ML
1 SPRAY NASAL AS NEEDED
Qty: 1 EACH | Refills: 5 | Status: SHIPPED | OUTPATIENT
Start: 2022-05-20

## 2022-05-20 NOTE — TELEPHONE ENCOUNTER
[facility] called and states they need a new script for the Naloxone Spray. The script that was sent in has the strength for injection instead of Nasal Spray.

## 2022-05-24 NOTE — TELEPHONE ENCOUNTER
[facility] called back and states they need clarification on directions for the Narcan. Call back [phone] Fax [phone]

## 2022-05-25 NOTE — TELEPHONE ENCOUNTER
NIR Vuong states he did not request this medication.   He states that [employer] had called him to go over his medication and they told him he should get rx filled

## 2022-05-25 NOTE — TELEPHONE ENCOUNTER
The form they sent indicated the asked him and he did want it, so I sent the prescription in.  I think it is a good idea for him to have it on hand in case of an accidental overdose.

## 2022-06-01 DIAGNOSIS — I10 PRIMARY HYPERTENSION: ICD-10-CM

## 2022-06-01 RX ORDER — LISINOPRIL 40 MG/1
TABLET ORAL
Qty: 90 TABLET | Refills: 0 | Status: SHIPPED | OUTPATIENT
Start: 2022-06-01 | End: 2022-06-06

## 2022-06-02 ENCOUNTER — TELEPHONE (OUTPATIENT)
Dept: INTERNAL MEDICINE | Facility: CLINIC | Age: 67
End: 2022-06-02

## 2022-06-02 DIAGNOSIS — E87.1 HYPONATREMIA: Primary | ICD-10-CM

## 2022-06-02 NOTE — TELEPHONE ENCOUNTER
Caller: Sue Vaughn    Relationship to patient: Emergency Contact    Best call back number: [phone]    Patient is needing: SUE STATED THAT PATIENT WAS WANTING TO TALK WITH QUE ABOUT  sodium chloride 1 g tablet    PLEASE ADVISE

## 2022-06-02 NOTE — TELEPHONE ENCOUNTER
LOV for chronic condition 4/19/2022  NOV 8/19/2022    Spoke with Sue and she said Dr Rousseau had agreed to fill rx instead of the original provider that filled it.  Notified her Dr Rousseau is out of the office until next week .  Sue said it is fine to wait until Dr Rousseau is back in office. She states Yevgeniy has enough rx left until then

## 2022-06-06 DIAGNOSIS — I10 PRIMARY HYPERTENSION: ICD-10-CM

## 2022-06-06 RX ORDER — SODIUM CHLORIDE 1000 MG
1 TABLET, SOLUBLE MISCELLANEOUS 2 TIMES DAILY
Qty: 60 TABLET | Refills: 5 | Status: SHIPPED | OUTPATIENT
Start: 2022-06-06

## 2022-06-06 RX ORDER — LISINOPRIL 40 MG/1
TABLET ORAL
Qty: 35 TABLET | Refills: 5 | Status: SHIPPED | OUTPATIENT
Start: 2022-06-06 | End: 2023-01-13 | Stop reason: SDUPTHER

## 2022-06-06 NOTE — TELEPHONE ENCOUNTER
CHELSIE please ask Who initially prescribed Sodium Chloride 1 gm  this for him?    Left message on voicemail to return call

## 2022-07-03 DIAGNOSIS — E11.65 TYPE 2 DIABETES MELLITUS WITH HYPERGLYCEMIA, WITHOUT LONG-TERM CURRENT USE OF INSULIN: ICD-10-CM

## 2022-07-05 RX ORDER — SITAGLIPTIN 100 MG/1
TABLET, FILM COATED ORAL
Qty: 90 TABLET | Refills: 1 | Status: SHIPPED | OUTPATIENT
Start: 2022-07-05 | End: 2023-01-09

## 2022-07-17 DIAGNOSIS — F17.210 CIGARETTE NICOTINE DEPENDENCE WITHOUT COMPLICATION: ICD-10-CM

## 2022-07-18 ENCOUNTER — HOSPITAL ENCOUNTER (OUTPATIENT)
Dept: CT IMAGING | Facility: HOSPITAL | Age: 67
Discharge: HOME | End: 2022-07-18
Admitting: INTERNAL MEDICINE

## 2022-07-18 DIAGNOSIS — D35.02 ADENOMA OF LEFT ADRENAL GLAND: ICD-10-CM

## 2022-07-18 DIAGNOSIS — R91.1 LUNG NODULE: ICD-10-CM

## 2022-07-18 PROCEDURE — 71250 CT THORAX DX C-: CPT

## 2022-07-27 DIAGNOSIS — F41.1 GENERALIZED ANXIETY DISORDER: ICD-10-CM

## 2022-07-28 RX ORDER — ALPRAZOLAM 0.5 MG/1
TABLET ORAL
Qty: 60 TABLET | Refills: 2 | Status: SHIPPED | OUTPATIENT
Start: 2022-07-28 | End: 2022-10-28 | Stop reason: SDUPTHER

## 2022-08-19 ENCOUNTER — LAB (OUTPATIENT)
Dept: LAB | Facility: HOSPITAL | Age: 67
End: 2022-08-19

## 2022-08-19 ENCOUNTER — OFFICE VISIT (OUTPATIENT)
Dept: INTERNAL MEDICINE | Facility: CLINIC | Age: 67
End: 2022-08-19

## 2022-08-19 VITALS
BODY MASS INDEX: 31.29 KG/M2 | RESPIRATION RATE: 20 BRPM | HEIGHT: 69 IN | SYSTOLIC BLOOD PRESSURE: 132 MMHG | WEIGHT: 211.25 LBS | HEART RATE: 72 BPM | TEMPERATURE: 97.1 F | DIASTOLIC BLOOD PRESSURE: 66 MMHG

## 2022-08-19 DIAGNOSIS — I10 PRIMARY HYPERTENSION: ICD-10-CM

## 2022-08-19 DIAGNOSIS — Z13.6 SCREENING FOR CARDIOVASCULAR CONDITION: ICD-10-CM

## 2022-08-19 DIAGNOSIS — F41.1 GENERALIZED ANXIETY DISORDER: ICD-10-CM

## 2022-08-19 DIAGNOSIS — E11.65 TYPE 2 DIABETES MELLITUS WITH HYPERGLYCEMIA, WITHOUT LONG-TERM CURRENT USE OF INSULIN: ICD-10-CM

## 2022-08-19 DIAGNOSIS — Z00.00 MEDICARE ANNUAL WELLNESS VISIT, INITIAL: Primary | ICD-10-CM

## 2022-08-19 DIAGNOSIS — G89.4 CHRONIC PAIN SYNDROME: ICD-10-CM

## 2022-08-19 DIAGNOSIS — Z79.899 HIGH RISK MEDICATION USE: ICD-10-CM

## 2022-08-19 DIAGNOSIS — L98.9 SKIN LESION: ICD-10-CM

## 2022-08-19 DIAGNOSIS — Z00.00 ENCOUNTER FOR HEALTH MAINTENANCE EXAMINATION IN ADULT: ICD-10-CM

## 2022-08-19 DIAGNOSIS — K63.5 BENIGN COLONIC POLYP: ICD-10-CM

## 2022-08-19 DIAGNOSIS — N52.9 ERECTILE DYSFUNCTION, UNSPECIFIED ERECTILE DYSFUNCTION TYPE: ICD-10-CM

## 2022-08-19 DIAGNOSIS — E78.49 OTHER HYPERLIPIDEMIA: ICD-10-CM

## 2022-08-19 DIAGNOSIS — R91.1 LUNG NODULE: ICD-10-CM

## 2022-08-19 DIAGNOSIS — F51.01 PRIMARY INSOMNIA: ICD-10-CM

## 2022-08-19 DIAGNOSIS — E87.1 HYPONATREMIA: ICD-10-CM

## 2022-08-19 DIAGNOSIS — E55.9 VITAMIN D INSUFFICIENCY: ICD-10-CM

## 2022-08-19 LAB
25(OH)D3 SERPL-MCNC: 37.7 NG/ML (ref 30–100)
A/C: ABNORMAL
ALBUMIN SERPL-MCNC: 4.8 G/DL (ref 3.5–5.2)
ALBUMIN/GLOB SERPL: 1.7 G/DL
ALP SERPL-CCNC: 66 U/L (ref 39–117)
ALT SERPL W P-5'-P-CCNC: 22 U/L (ref 1–41)
ANION GAP SERPL CALCULATED.3IONS-SCNC: 9.8 MMOL/L (ref 5–15)
AST SERPL-CCNC: 26 U/L (ref 1–40)
BASOPHILS # BLD AUTO: 0.04 10*3/MM3 (ref 0–0.2)
BASOPHILS NFR BLD AUTO: 0.7 % (ref 0–1.5)
BILIRUB BLD-MCNC: NEGATIVE MG/DL
BILIRUB SERPL-MCNC: 0.3 MG/DL (ref 0–1.2)
BUN SERPL-MCNC: 12 MG/DL (ref 8–23)
BUN/CREAT SERPL: 13.5 (ref 7–25)
CALCIUM SPEC-SCNC: 9.8 MG/DL (ref 8.6–10.5)
CHLORIDE SERPL-SCNC: 98 MMOL/L (ref 98–107)
CHOLEST SERPL-MCNC: 173 MG/DL (ref 0–200)
CLARITY, POC: CLEAR
CO2 SERPL-SCNC: 25.2 MMOL/L (ref 22–29)
COLOR UR: YELLOW
CREAT SERPL-MCNC: 0.89 MG/DL (ref 0.76–1.27)
DEPRECATED RDW RBC AUTO: 42.1 FL (ref 37–54)
EGFRCR SERPLBLD CKD-EPI 2021: 95.1 ML/MIN/1.73
EOSINOPHIL # BLD AUTO: 0.46 10*3/MM3 (ref 0–0.4)
EOSINOPHIL NFR BLD AUTO: 7.6 % (ref 0.3–6.2)
ERYTHROCYTE [DISTWIDTH] IN BLOOD BY AUTOMATED COUNT: 12.9 % (ref 12.3–15.4)
EXPIRATION DATE: ABNORMAL
GLOBULIN UR ELPH-MCNC: 2.8 GM/DL
GLUCOSE SERPL-MCNC: 160 MG/DL (ref 65–99)
GLUCOSE UR STRIP-MCNC: ABNORMAL MG/DL
HBA1C MFR BLD: 9.5 %
HCT VFR BLD AUTO: 37.8 % (ref 37.5–51)
HDLC SERPL-MCNC: 41 MG/DL (ref 40–60)
HGB BLD-MCNC: 12.9 G/DL (ref 13–17.7)
IMM GRANULOCYTES # BLD AUTO: 0.02 10*3/MM3 (ref 0–0.05)
IMM GRANULOCYTES NFR BLD AUTO: 0.3 % (ref 0–0.5)
KETONES UR QL: NEGATIVE
LDLC SERPL CALC-MCNC: 86 MG/DL (ref 0–100)
LDLC/HDLC SERPL: 1.87 {RATIO}
LEUKOCYTE EST, POC: NEGATIVE
LYMPHOCYTES # BLD AUTO: 2.01 10*3/MM3 (ref 0.7–3.1)
LYMPHOCYTES NFR BLD AUTO: 33.2 % (ref 19.6–45.3)
Lab: ABNORMAL
MCH RBC QN AUTO: 31.1 PG (ref 26.6–33)
MCHC RBC AUTO-ENTMCNC: 34.1 G/DL (ref 31.5–35.7)
MCV RBC AUTO: 91.1 FL (ref 79–97)
MONOCYTES # BLD AUTO: 0.6 10*3/MM3 (ref 0.1–0.9)
MONOCYTES NFR BLD AUTO: 9.9 % (ref 5–12)
NEUTROPHILS NFR BLD AUTO: 2.93 10*3/MM3 (ref 1.7–7)
NEUTROPHILS NFR BLD AUTO: 48.3 % (ref 42.7–76)
NITRITE UR-MCNC: NEGATIVE MG/ML
NRBC BLD AUTO-RTO: 0 /100 WBC (ref 0–0.2)
PH UR: 5 [PH] (ref 5–8)
PLATELET # BLD AUTO: 262 10*3/MM3 (ref 140–450)
PMV BLD AUTO: 8.5 FL (ref 6–12)
POC CREATININE URINE: ABNORMAL
POC MICROALBUMIN URINE: ABNORMAL
POTASSIUM SERPL-SCNC: 4.5 MMOL/L (ref 3.5–5.2)
PROT SERPL-MCNC: 7.6 G/DL (ref 6–8.5)
PROT UR STRIP-MCNC: NEGATIVE MG/DL
RBC # BLD AUTO: 4.15 10*6/MM3 (ref 4.14–5.8)
RBC # UR STRIP: NEGATIVE /UL
SODIUM SERPL-SCNC: 133 MMOL/L (ref 136–145)
SP GR UR: 1.01 (ref 1–1.03)
TRIGL SERPL-MCNC: 276 MG/DL (ref 0–150)
TSH SERPL DL<=0.05 MIU/L-ACNC: 1.04 UIU/ML (ref 0.27–4.2)
UROBILINOGEN UR QL: NORMAL
VLDLC SERPL-MCNC: 46 MG/DL (ref 5–40)
WBC NRBC COR # BLD: 6.06 10*3/MM3 (ref 3.4–10.8)

## 2022-08-19 PROCEDURE — 3062F POS MACROALBUMINURIA REV: CPT | Performed by: INTERNAL MEDICINE

## 2022-08-19 PROCEDURE — 3046F HEMOGLOBIN A1C LEVEL >9.0%: CPT | Performed by: INTERNAL MEDICINE

## 2022-08-19 PROCEDURE — G0439 PPPS, SUBSEQ VISIT: HCPCS | Performed by: INTERNAL MEDICINE

## 2022-08-19 PROCEDURE — 80061 LIPID PANEL: CPT | Performed by: INTERNAL MEDICINE

## 2022-08-19 PROCEDURE — 84443 ASSAY THYROID STIM HORMONE: CPT | Performed by: INTERNAL MEDICINE

## 2022-08-19 PROCEDURE — 1159F MED LIST DOCD IN RCRD: CPT | Performed by: INTERNAL MEDICINE

## 2022-08-19 PROCEDURE — 1170F FXNL STATUS ASSESSED: CPT | Performed by: INTERNAL MEDICINE

## 2022-08-19 PROCEDURE — 99397 PER PM REEVAL EST PAT 65+ YR: CPT | Performed by: INTERNAL MEDICINE

## 2022-08-19 PROCEDURE — 1125F AMNT PAIN NOTED PAIN PRSNT: CPT | Performed by: INTERNAL MEDICINE

## 2022-08-19 PROCEDURE — 83036 HEMOGLOBIN GLYCOSYLATED A1C: CPT | Performed by: INTERNAL MEDICINE

## 2022-08-19 PROCEDURE — 85025 COMPLETE CBC W/AUTO DIFF WBC: CPT | Performed by: INTERNAL MEDICINE

## 2022-08-19 PROCEDURE — 82044 UR ALBUMIN SEMIQUANTITATIVE: CPT | Performed by: INTERNAL MEDICINE

## 2022-08-19 PROCEDURE — 81003 URINALYSIS AUTO W/O SCOPE: CPT | Performed by: INTERNAL MEDICINE

## 2022-08-19 PROCEDURE — 82306 VITAMIN D 25 HYDROXY: CPT | Performed by: INTERNAL MEDICINE

## 2022-08-19 PROCEDURE — 80053 COMPREHEN METABOLIC PANEL: CPT | Performed by: INTERNAL MEDICINE

## 2022-08-19 RX ORDER — TIZANIDINE 2 MG/1
1 TABLET ORAL 2 TIMES DAILY
COMMUNITY
Start: 2022-08-18

## 2022-08-19 RX ORDER — SILDENAFIL 100 MG/1
100 TABLET, FILM COATED ORAL DAILY PRN
Qty: 10 TABLET | Refills: 5 | Status: SHIPPED | OUTPATIENT
Start: 2022-08-19

## 2022-08-19 NOTE — PROGRESS NOTES
Subjective     Chief Complaint:  Physical Exam.    History of Present Illness    History obtained from the patient.    CT abdomen and pelvis on 5/7/21 to evaluate inguinal hernias,  prior to repair,  also showed a 6 mm RLL Lung Nodule and Left Adrenal Nodule. CT chest and CT adrenals done on 6/21/21 showed the RLL Lung Nodule and a 2.5 cm left Adrenal Adenoma, otherwise normal.   Last  CT scan of the chest done 7/18/2022 showed the lung nodule and adrenal adenoma to be stable. He has an appointment scheduled with Pulmonary, Dr. Zendejas, on 9/2/22.  He denies chest pain, dyspnea, SALAMANCA, cough, and hemoptysis.    Hyponatremia has been stable overall (last Na on 5/4/22 was 129).  He takes Sodium Chloride 1 g twice daily.    Primary Care Cardiac Diagnostic Constellation:      His Diabetes Mellitus has been unstable.  Medication(s): Metformin, Januvia, Actos, Lisinopril, and Atorvastatin.   His Hypertension has been stable.   Medication(s): Lisinopril.   His Hyperlipidemia has been unstable.   His LDL goal is < 70 and last LDL was 82, TG 361.   Medication(s): Atorvastatin and Fish Oil.  The patient is adherent with his medication regimen. He denies medication side effects.       Interval Events:  Last HgA1C was 8.6 on 5/4/2022 (was 9.6 on 1/27/22).  Jardiance was added to his medications after the visit on 1/27/2022, but he did not start it due to fear of side effects.  He preferred to try improvement in his diet and exercise plan.  He states his blood sugars at home are 170's fasting, but he has not been checking postprandial. He denies episodes of low blood sugar. The patient states he checks his feet regularly.  His last Ophthalmology appointment was 5/24/2022, trace bilateral cataracts but no retinopathy.       Symptoms:  Denies chest pain, dyspnea, SALAMANCA, orthopnea, PND, palpitations, syncope, lower extremity edema, claudication, lightheadedness, and dizziness.  Associated Symptoms: Weight up15 pounds in the  past 4 months.  Has stable arthralgias, myalgias, and polydipsia.  No fatigue, headache, polyuria, visual impairment, memory loss, concentration problems, or focal neurologic deficits.  Stable pain, numbness, and tingling of the feet, but denies a foot ulcer. On Neurontin (maximum dose).      Lifestyle: He reports he does have a healthy diet, but has been eating a lot of strawberries.  He has been working on his house 6 days/week and walks occasionally.    Tobacco Use:   Current smoker. He quit smoking completely 4/1/18. Re-started Sept 2018, 1 ppd.  Quit completely 6/11/2020. Re-sarted 8/2021 1 ppd, Quit 5/25/21.  He stopped Chantix due to the recall.  He started smoking 1 ppd in December 2021.  Quit 5/22/2022.     Chronic Pain Follow-Up: The patient is being seen for follow-up of Chronic Neck Pain and Chronic Left Arm and Shoulder Pain, which are stable.   Interval Events:  The patient is seeing Pain Management for his chronic pain.    Symptoms: stable neck pain, back pain, and upper extremity pain, but denies headache and lower extremity pain.   Medications:   Neurontin and Percocet per Pain Management.    The patient is adherent to his medication regimen, and he denies medication side effects.        Colonic Polyp Follow-up: The patient is being seen for a routine clinic follow-up of Colon Polyp(s), which is stable.   Interval Events:  Current diagnosis was determined by Colonoscopy and last 3/2/2022 showed 2 sessile (HP) polyps in the cecum and rectum.  Symptoms: No abdominal pain, diarrhea, constipation, hematochezia, melena, decreased stool caliber, or change in bowel habits.  Associated Symptoms: no rectal prolapse.   Medication:  None.     Vitamin D Insufficiency Follow-Up: The patient is here for follow-up of Vitamin D Insufficiency, diagnosed in July 2021.    Interval Events: Vitamin D level on 7/7/2021 was 24.4.    Symptoms: Stable myalgias, arthralgias, and paresthesias.  Denies fatigue, loss of  balance, and gait instability.    Medication:  Multivitamin daily, but no additional Vitamin D3.    Insomnia Follow-Up: The patient is being seen for follow-up of Insomnia, which is stable.    Comorbid Illnesses: Anxiety.   Interval Events:  None.  Symptoms: Stable insomnia.  Medications:  Melatonin. Off Trazodone due to Hyponatremia.       Anxiety Disorder Follow-Up: The patient is being seen for follow-up of Anxiey, which is stable.    Comorbid Illnesses: Insomnia.  Interval Events:   None.  Symptoms: Stable anxiety and insomnia. Denies depression, anhedonia, memory loss, and difficulty concentrating.    Associated Symptoms: no suicidal ideation and thoughts of self-harm.   Medication: Alprazolam BID.  The patient is adherent to his medication regimen, and he denies medication side effects.       Yevgeniy Vaughn is a 66 y.o. male who presents for an Annual Physical.      PMH, PSH, SocHx, FamHx, Allergies, and Medications: Reviewed and updated.    Outpatient Medications Prior to Visit   Medication Sig Dispense Refill   • ALPRAZolam (XANAX) 0.5 MG tablet Take 1 tablet by mouth twice daily as needed for anxiety 60 tablet 2   • APO-Varenicline 1 MG tablet TAKE 1 TABLET BY MOUTH TWICE DAILY FOR  12  WEEKS 168 tablet 0   • aspirin 81 MG chewable tablet Chew 81 mg Daily.     • atorvastatin (LIPITOR) 20 MG tablet TAKE 1 TABLET BY MOUTH AT BEDTIME 90 tablet 1   • B Complex-C (SUPER B COMPLEX/VITAMIN C PO) Take  by mouth.     • clobetasol (TEMOVATE) 0.05 % cream Apply  topically to the appropriate area as directed 2 (Two) Times a Day As Needed (rash). 60 g 3   • gabapentin (NEURONTIN) 800 MG tablet 800 mg 4 (Four) Times a Day.     • glucose monitor monitoring kit 1 each Daily. 1 each 0   • ibuprofen (ADVIL,MOTRIN) 800 MG tablet Take 1 tablet by mouth 3 (Three) Times a Day As Needed (prn). 180 tablet 3   • Januvia 100 MG tablet Take 1 tablet by mouth once daily 90 tablet 1   • lisinopril (PRINIVIL,ZESTRIL) 40 MG tablet  TAKE 1 TABLET BY MOUTH AT BEDTIME 35 tablet 5   • melatonin 5 MG tablet tablet Take 2 tablets by mouth At Night As Needed (insomnia).     • metFORMIN (GLUCOPHAGE) 1000 MG tablet Take 1 tablet by mouth twice daily 180 tablet 1   • metFORMIN (GLUCOPHAGE) 500 MG tablet TAKE 1 TABLET BY MOUTH TWICE DAILY WITH MEALS (Patient taking differently: 500 mg Daily. Mid day) 180 tablet 0   • naloxone (NARCAN) 4 MG/0.1ML nasal spray 1 spray into the nostril(s) as directed by provider As Needed (medication overdose). 1 each 5   • Omega-3 Fatty Acids (FISH OIL) 1000 MG capsule capsule Take 500 mg by mouth Daily With Breakfast.     • oxyCODONE-acetaminophen (PERCOCET) 7.5-325 MG per tablet Take 1 tablet by mouth 3 (three) times a day.     • pioglitazone (ACTOS) 45 MG tablet Take 1 tablet by mouth Daily. 90 tablet 2   • promethazine (PHENERGAN) 25 MG tablet TAKE ONE TABLET BY MOUTH EVERY 4 TO 6 HOURS AS NEEDED FOR NAUSEA AND VOMITING 30 tablet 5   • sodium chloride 1 g tablet Take 1 tablet by mouth 2 (Two) Times a Day. 60 tablet 5   • tiZANidine (ZANAFLEX) 2 MG tablet 1 tablet 2 (Two) Times a Day.     • glucose blood test strip One Touch test strips, daily use as instructed 100 each 5   • Lancets misc Check sugars daily as directed 100 each 3   • vitamin B-12 (CYANOCOBALAMIN) 1000 MCG tablet Take 1,000 mcg by mouth Daily.     • vitamin C (ASCORBIC ACID) 250 MG tablet Take 250 mg by mouth Daily.     • zolpidem (AMBIEN) 10 MG tablet TAKE 1 TABLET BY MOUTH AT NIGHT AS NEEDED FOR SLEEP 30 tablet 5     No facility-administered medications prior to visit.       Immunization History   Administered Date(s) Administered   • COVID-19 (PFIZER) PURPLE CAP 03/22/2021, 04/12/2021, 11/20/2021   • Covid-19 (Pfizer) Gray Cap 06/04/2022   • Flu Vaccine Quad PF >36MO 10/09/2018, 11/01/2020   • FluLaval/Fluzone >6mos 12/11/2019   • FluMist 2-49yrs 12/22/2011, 10/21/2014, 11/04/2015   • Flublock Quad =>18yrs 10/18/2020   • Fluzone High-Dose 65+yrs  2022   • Fluzone Quad >6mos (Multi-dose) 2016   • Hepatitis A 2018, 2018   • Influenza Quad Vaccine (Inpatient) 10/09/2017, 10/09/2017   • Pneumococcal Conjugate 13-Valent (PCV13) 2016   • Pneumococcal Polysaccharide (PPSV23) 2008, 2022   • Shingrix 2021, 05/15/2021   • Td, Not Adsorbed 2016   • Tdap 2008         Patient Active Problem List   Diagnosis   • Acute recurrent pancreatitis   • Anxiety disorder   • Benign colonic polyp   • Chronic neck pain   • Chronic pain syndrome   • Erectile dysfunction   • Hyperlipidemia   • Hypertension   • Insomnia   • Shoulder joint pain   • Psoriasis   • Ptosis of eyelid   • Type 2 diabetes mellitus (HCC)   • Cigarette nicotine dependence without complication   • Hyponatremia   • Adenoma of left adrenal gland   • Lung nodule   • Vitamin D insufficiency       Health Habits:  Dental Exam. up to date  Eye Exam. up to date  Hearing Loss:  Yes  Exercise: 7 times/week.  Current exercise activities include: bicycling outdoors, walking and yard work  Diet: Healthy  Multivitamin: Yes    Safe Driving:  Yes  Seat Belt:  Yes  Bike Helmet:  No  Skin Screening:  Yes  Sunscreen: Yes  SBE / DEVIN: Yes  Sexual Activity:  Yes  Birth Control:  Wife's BTL  STD Prevention:  N/A    Last Pap: N/A  Last Mammogram:  N/A  Last DEXA Scan: N/A  Last Colonoscopy:  3/2/2022 showed 2 sessile (HP) polyps in the cecum and rectum.  Last PSA: 2022 (0.239).    Social:    Social History     Socioeconomic History   • Marital status:    • Number of children: 4   Tobacco Use   • Smoking status: Former Smoker     Packs/day: 1.00     Types: Cigarettes     Start date: 1977     Quit date: 2022     Years since quittin.2   • Smokeless tobacco: Former User     Types: Chew     Quit date:    • Tobacco comment: - present ,1 1/2 ppd. Quit 15. Re-started approx 2015, 3/4 ppd, then 1 ppd. Quit 19, re-started 2018, 1 ppd.  Quit 6/11/20. Re-started 8/2021 1 ppd, Quit 5/1/22   Vaping Use   • Vaping Use: Never used   Substance and Sexual Activity   • Alcohol use: No   • Drug use: No   • Sexual activity: Defer     Partners: Female         Current Medical Providers:    Christine Rousseau MD (Internal Medicine / Pediatrics)    The [facility] providers who are involved in the care of this patient are listed above.         Review of Systems   Constitutional: Positive for unexpected weight change. Negative for appetite change, chills, fatigue and fever.         No night sweats.     HENT: Positive for hearing loss (complete loss on right, decreased on left). Negative for congestion, ear discharge, ear pain, facial swelling, nosebleeds, postnasal drip, rhinorrhea, sinus pressure, sinus pain, sneezing, sore throat, tinnitus, trouble swallowing and voice change.         Denies snoring.   Eyes: Negative for photophobia, pain, discharge, redness, itching and visual disturbance.   Respiratory: Negative for cough, chest tightness, shortness of breath and wheezing.         No chest congestion.  No hemoptysis.    Cardiovascular: Negative for chest pain, palpitations and leg swelling.        No orthopnea, SALAMANCA, or PND.  No claudication or syncope.   Gastrointestinal: Negative for abdominal distention, abdominal pain, blood in stool, constipation, diarrhea, nausea and vomiting.        No melena, heartburn, odynophagia, dysphagia, early satiety, belching, or bloating.   Endocrine: Positive for polydipsia. Negative for cold intolerance, heat intolerance, polyphagia and polyuria.        No hair loss, but has dry skin.    Genitourinary: Negative for decreased urine volume, difficulty urinating, dysuria, flank pain, frequency, hematuria, penile discharge, scrotal swelling, testicular pain and urgency.        No nocturia, incomplete emptying, or incontinence.  Reports erectile dysfunction.   Musculoskeletal: Positive for arthralgias, back pain, myalgias and neck  "pain. Negative for gait problem, joint swelling and neck stiffness.        No joint stiffness.   Skin: Negative for rash.        Reports a brown spot on the right side of his head for the past 1 year.  No additional new skin lesions or changes in skin lesions.     Neurological: Positive for numbness. Negative for dizziness, tremors, speech difficulty, weakness, light-headedness and headaches.        Reports tingling. No memory loss. No decreased concentration.   Hematological: Negative for adenopathy. Does not bruise/bleed easily.   Psychiatric/Behavioral: Positive for sleep disturbance. Negative for confusion, self-injury and suicidal ideas. The patient is nervous/anxious.         No depression.           Objective     Vitals:    08/19/22 0907   BP: 132/66   BP Location: Right arm   Pulse: 72   Resp: 20   Temp: 97.1 °F (36.2 °C)   TempSrc: Temporal   Weight: 95.8 kg (211 lb 4 oz)   Height: 174 cm (68.5\")   PainSc:   6       Body mass index is 31.65 kg/m².    Physical Exam  Vitals and nursing note reviewed.   Constitutional:       Appearance: Normal appearance. He is well-developed. He is obese.   HENT:      Head: Normocephalic and atraumatic.      Right Ear: Tympanic membrane, ear canal and external ear normal.      Left Ear: Tympanic membrane, ear canal and external ear normal.      Mouth/Throat:      Mouth: Mucous membranes are moist. No oral lesions.      Pharynx: Oropharynx is clear.      Comments: Tonsils normal.  Eyes:      Extraocular Movements: Extraocular movements intact.      Conjunctiva/sclera: Conjunctivae normal.      Pupils: Pupils are equal, round, and reactive to light.   Neck:      Thyroid: No thyroid mass or thyromegaly.      Vascular: No carotid bruit.   Cardiovascular:      Rate and Rhythm: Normal rate and regular rhythm.      Pulses: Normal pulses.      Heart sounds: No murmur heard.    No friction rub. No gallop.   Pulmonary:      Effort: Pulmonary effort is normal.      Breath sounds: Normal " breath sounds.   Chest:   Breasts:      Right: No supraclavicular adenopathy.      Left: No supraclavicular adenopathy.       Abdominal:      General: Bowel sounds are normal. There is no distension or abdominal bruit.      Palpations: Abdomen is soft. There is no hepatomegaly, splenomegaly or mass.      Tenderness: There is no abdominal tenderness.   Genitourinary:     Penis: Normal.       Testes:         Right: Mass, tenderness or swelling not present.         Left: Mass, tenderness or swelling not present.      Prostate: Normal.      Rectum: Normal.   Musculoskeletal:         General: Normal range of motion.      Cervical back: Normal range of motion and neck supple.      Right lower leg: No edema.      Left lower leg: No edema.   Feet:      Right foot:      Skin integrity: Dry skin present. No ulcer, blister, skin breakdown or callus.      Left foot:      Skin integrity: Dry skin present. No ulcer, blister, skin breakdown or callus.   Lymphadenopathy:      Cervical: No cervical adenopathy.      Upper Body:      Right upper body: No supraclavicular adenopathy.      Left upper body: No supraclavicular adenopathy.      Lower Body: No right inguinal adenopathy. No left inguinal adenopathy.   Skin:     Findings: Lesion (flat mostly circular brown lesion right temporal area) present. No rash.      Comments: No atypical skin lesions.   Neurological:      Mental Status: He is alert.      Cranial Nerves: Cranial nerves are intact.      Motor: Motor function is intact. No abnormal muscle tone.      Coordination: Coordination normal.      Gait: Gait normal.      Deep Tendon Reflexes: Reflexes are normal and symmetric.   Psychiatric:         Mood and Affect: Mood normal.     Diabetic Foot Exam Performed  Monofilament Test Performed      PHQ-2 Depression Screening  Little interest or pleasure in doing things? 0-->not at all   Feeling down, depressed, or hopeless? 0-->not at all   PHQ-2 Total Score 0         Counseling was  given to patient and wife for the following topics: appropriate exercise, healthy eating habits, disease prevention, risk factors for cancer, importance of self testicular exam, importance of immunizations, including risks and benefits, sun safety, seatbelt use and safe driving. Also discussed the importance of regular dental and vision care, as well recommendation for a yearly screening skin exam after age 40.  Written information provided to patient on these topics and other health maintenance issues.    Results for orders placed or performed in visit on 08/19/22   POC Glycosylated Hemoglobin (Hb A1C)    Specimen: Blood   Result Value Ref Range    Hemoglobin A1C 9.5 %    Lot Number [id number]     Expiration Date 04/06/2024    POC Microalbumin    Specimen: Urine   Result Value Ref Range    Microalbumin, Urine 10 mg/L     Creatinine, Urine 50 mg/dL     A/C  mg/g     Lot Number [id number]     Expiration Date 06/30/2023    POC Urinalysis Dipstick, Automated    Specimen: Urine   Result Value Ref Range    Color Yellow Yellow, Straw, Dark Yellow, Loli    Clarity, UA Clear Clear    Specific Gravity  1.010 1.005 - 1.030    pH, Urine 5.0 5.0 - 8.0    Leukocytes Negative Negative    Nitrite, UA Negative Negative    Protein, POC Negative Negative mg/dL    Glucose,  mg/dL (A) Negative mg/dL    Ketones, UA Negative Negative    Urobilinogen, UA Normal Normal, 0.2 E.U./dL    Bilirubin Negative Negative    Blood, UA Negative Negative    Lot Number [id number]     Expiration Date 08/31/2023          Diagnoses and all orders for this visit:    1. Medicare annual wellness visit, initial (Primary)    2. Encounter for health maintenance examination in adult  -     POC Urinalysis Dipstick, Automated  -     Lipid Panel  -     Comprehensive Metabolic Panel  -     TSH  -     CBC & Differential    3. Type 2 diabetes mellitus with hyperglycemia, without long-term current use of insulin (HCC)  -     POC Glycosylated Hemoglobin (Hb  A1C)  -     POC Microalbumin  -     POC Urinalysis Dipstick, Automated  -     CBC & Differential; Future  -     Lipid Panel  -     Comprehensive Metabolic Panel  -     TSH  -     CBC & Differential  -     Lancets misc; Check sugars twice daily as directed  Dispense: 180 each; Refill: 3- REFILL  -     glucose blood test strip; One Touch test strips, twice daily use as instructed  Dispense: 180 each; Refill: 3- REFILL   Continue current medication(s) as noted in the history of present illness.    4. Primary hypertension  -     POC Urinalysis Dipstick, Automated  -     Lipid Panel  -     Comprehensive Metabolic Panel  -     TSH  -     CBC & Differential   Continue current medication(s) as noted in the history of present illness.    5. Other hyperlipidemia  -     Lipid Panel  -     Comprehensive Metabolic Panel  -     TSH  -     CBC & Differential   Continue current medication(s) as noted in the history of present illness.    6. Chronic pain syndrome   Continue current medication(s) as noted in the history of present illness.   Follow-up for Pain Management.    7. Benign colonic polyp   Colonoscopy up-to-date.    8. Vitamin D insufficiency  -     Vitamin D 25 Hydroxy    9. Lung nodule   Follow-up per Pulmonary.    10. Hyponatremia   Continue current medication(s) as noted in the history of present illness.    11. Primary insomnia   Continue Melatonin.    12. Generalized anxiety disorder  -     Vitamin D 25 Hydroxy   Continue current medication(s) as noted in the history of present illness.    The patient was instructed in the side effects of the medication.  Risks of the potential for tolerance, dependence, and addiction were discussed.  The patient was instructed to take the lowest dosage of the medication, at the lowest frequency, and for the shortest period of time possible.  The patient was instructed not to receive controlled substances or narcotics from other doctors, and not to giveaway or sell the  medication.    The patient was instructed to abstain from illicit drug use.  The patient was instructed to avoid alcohol while taking these medications.      Narcotics/controlled substance agreement, Lonnie report, and Urine Drug Screen were updated today if needed.    13. Erectile dysfunction, unspecified erectile dysfunction type  -     sildenafil (Viagra) 100 MG tablet; Take 1 tablet by mouth Daily As Needed for Erectile Dysfunction.  Dispense: 10 tablet; Refill: 5- REFILL    14. Skin lesion  -     Ambulatory Referral to Dermatology    15. High risk medication use  -     Compliance Drug Analysis, Ur - Urine, Clean Catch    16. Screening for cardiovascular condition  -     CT Cardiac Calcium Score Without Dye; Future      Class 3 Severe Obesity (BMI >=40). Obesity-related health conditions include the following: hypertension, diabetes mellitus and dyslipidemias. Obesity is worsening. BMI is is above average; BMI management plan is completed. We discussed portion control and increasing exercise.            Return in about 3 months (around 11/19/2022) for Recheck Diabetes, fasting (after 11/19/2022).

## 2022-08-19 NOTE — PROGRESS NOTES
The ABCs of the Annual Wellness Visit  Subsequent Medicare Wellness Visit    Chief Complaint   Patient presents with   • Medicare Wellness-Initial Visit   • Annual Exam      Subjective    History of Present Illness:  Yevgeniy Vaughn is a 66 y.o. male who presents for a Subsequent Medicare Wellness Visit.    The following portions of the patient's history were reviewed and   updated as appropriate: allergies, current medications, past family history, past medical history, past social history, past surgical history and problem list.    Compared to one year ago, the patient feels his physical   health is better.    Compared to one year ago, the patient feels his mental   health is the same.    Recent Hospitalizations:  He was not admitted to the hospital during the last year.       Current Medical Providers:  Patient Care Team:  Christine Rousseau MD as PCP - General  Abraham Johnson MD as Surgeon (General Surgery)  Scott Villeda MD as Consulting Physician (Pulmonary Disease)  Ignacio Montero MD (Urology)  Hugh Patino MD as Consulting Physician (Gastroenterology)    Outpatient Medications Prior to Visit   Medication Sig Dispense Refill   • ALPRAZolam (XANAX) 0.5 MG tablet Take 1 tablet by mouth twice daily as needed for anxiety 60 tablet 2   • APO-Varenicline 1 MG tablet TAKE 1 TABLET BY MOUTH TWICE DAILY FOR  12  WEEKS 168 tablet 0   • aspirin 81 MG chewable tablet Chew 81 mg Daily.     • atorvastatin (LIPITOR) 20 MG tablet TAKE 1 TABLET BY MOUTH AT BEDTIME 90 tablet 1   • B Complex-C (SUPER B COMPLEX/VITAMIN C PO) Take  by mouth.     • clobetasol (TEMOVATE) 0.05 % cream Apply  topically to the appropriate area as directed 2 (Two) Times a Day As Needed (rash). 60 g 3   • gabapentin (NEURONTIN) 800 MG tablet 800 mg 4 (Four) Times a Day.     • glucose monitor monitoring kit 1 each Daily. 1 each 0   • ibuprofen (ADVIL,MOTRIN) 800 MG tablet Take 1 tablet by mouth 3 (Three) Times a Day As Needed (prn).  180 tablet 3   • Januvia 100 MG tablet Take 1 tablet by mouth once daily 90 tablet 1   • lisinopril (PRINIVIL,ZESTRIL) 40 MG tablet TAKE 1 TABLET BY MOUTH AT BEDTIME 35 tablet 5   • melatonin 5 MG tablet tablet Take 2 tablets by mouth At Night As Needed (insomnia).     • metFORMIN (GLUCOPHAGE) 1000 MG tablet Take 1 tablet by mouth twice daily 180 tablet 1   • metFORMIN (GLUCOPHAGE) 500 MG tablet TAKE 1 TABLET BY MOUTH TWICE DAILY WITH MEALS (Patient taking differently: 500 mg Daily. Mid day) 180 tablet 0   • naloxone (NARCAN) 4 MG/0.1ML nasal spray 1 spray into the nostril(s) as directed by provider As Needed (medication overdose). 1 each 5   • Omega-3 Fatty Acids (FISH OIL) 1000 MG capsule capsule Take 500 mg by mouth Daily With Breakfast.     • oxyCODONE-acetaminophen (PERCOCET) 7.5-325 MG per tablet Take 1 tablet by mouth 3 (three) times a day.     • pioglitazone (ACTOS) 45 MG tablet Take 1 tablet by mouth Daily. 90 tablet 2   • promethazine (PHENERGAN) 25 MG tablet TAKE ONE TABLET BY MOUTH EVERY 4 TO 6 HOURS AS NEEDED FOR NAUSEA AND VOMITING 30 tablet 5   • sodium chloride 1 g tablet Take 1 tablet by mouth 2 (Two) Times a Day. 60 tablet 5   • tiZANidine (ZANAFLEX) 2 MG tablet 1 tablet 2 (Two) Times a Day.     • glucose blood test strip One Touch test strips, daily use as instructed 100 each 5   • Lancets misc Check sugars daily as directed 100 each 3   • vitamin B-12 (CYANOCOBALAMIN) 1000 MCG tablet Take 1,000 mcg by mouth Daily.     • vitamin C (ASCORBIC ACID) 250 MG tablet Take 250 mg by mouth Daily.     • zolpidem (AMBIEN) 10 MG tablet TAKE 1 TABLET BY MOUTH AT NIGHT AS NEEDED FOR SLEEP 30 tablet 5     No facility-administered medications prior to visit.       Opioid medication/s are on active medication list.  and I have evaluated his active treatment plan and pain score trends (see table).  Vitals:    08/19/22 0907   PainSc:   6     I have reviewed the chart for potential of high risk medication and harmful  "drug interactions in the elderly.            Aspirin is on active medication list. Aspirin use is not indicated based on review of current medical condition/s. Risk of harm outweighs potential benefits. Patient instructed to discontinue this medication.  .      Patient Active Problem List   Diagnosis   • Acute recurrent pancreatitis   • Anxiety disorder   • Benign colonic polyp   • Chronic neck pain   • Chronic pain syndrome   • Erectile dysfunction   • Hyperlipidemia   • Hypertension   • Insomnia   • Shoulder joint pain   • Psoriasis   • Ptosis of eyelid   • Type 2 diabetes mellitus (HCC)   • Cigarette nicotine dependence without complication   • Hyponatremia   • Adenoma of left adrenal gland   • Lung nodule   • Vitamin D insufficiency     Advance Care Planning  Advance Directive is not on file.  ACP discussion was held with the patient during this visit. Patient has an advance directive (not in EMR), copy requested.  ACP information pamphlet declined by the patient.  ACP information provided on the AVS.          Objective    Vitals:    08/19/22 0907   BP: 132/66   BP Location: Right arm   Pulse: 72   Resp: 20   Temp: 97.1 °F (36.2 °C)   TempSrc: Temporal   Weight: 95.8 kg (211 lb 4 oz)   Height: 174 cm (68.5\")   PainSc:   6     Estimated body mass index is 31.65 kg/m² as calculated from the following:    Height as of this encounter: 174 cm (68.5\").    Weight as of this encounter: 95.8 kg (211 lb 4 oz).    Class 3 Severe Obesity (BMI >=40). Obesity-related health conditions include the following: hypertension, diabetes mellitus, dyslipidemias and osteoarthritis. Obesity is worsening. BMI is is above average; BMI management plan is completed. We discussed portion control and increasing exercise.     Finger Rub Hearing{Test (right ear):failed   Finger Rub Hearing{Test (left ear):passed (did not bring hearing aids)        Does the patient have evidence of cognitive impairment? No    Physical Exam  Lab Results "   Component Value Date    TRIG 276 (H) 2022    HDL 41 2022    LDL 86 2022    VLDL 46 (H) 2022    HGBA1C 9.5 2022            HEALTH RISK ASSESSMENT    Smoking Status:  Social History     Tobacco Use   Smoking Status Former Smoker   • Packs/day: 1.00   • Types: Cigarettes   • Start date: 1977   • Quit date: 2022   • Years since quittin.2   Smokeless Tobacco Former User   • Types: Chew   • Quit date:    Tobacco Comment    - present ,1  ppd. Quit 14. Re-started approx 2015, 3/4 ppd, then 1 ppd. Quit 18, re-started 2018, 1 ppd. Quit 20. Re-started 2021 1 ppd, Quit 22     Alcohol Consumption:  Social History     Substance and Sexual Activity   Alcohol Use No     Fall Risk Screen:    DEVINADI Fall Risk Assessment was completed, and patient is at LOW risk for falls.Assessment completed on:2022    Depression Screening:  PHQ-2/PHQ-9 Depression Screening 2022   Retired PHQ-9 Total Score -   Retired Total Score -   Little Interest or Pleasure in Doing Things 0-->not at all   Feeling Down, Depressed or Hopeless 0-->not at all   PHQ-9: Brief Depression Severity Measure Score 0       Health Habits and Functional and Cognitive Screening:  Functional & Cognitive Status 2022   Do you have difficulty preparing food and eating? No   Do you have difficulty bathing yourself, getting dressed or grooming yourself? No   Do you have difficulty using the toilet? No   Do you have difficulty moving around from place to place? No   Do you have trouble with steps or getting out of a bed or a chair? No   Current Diet Low Carb Diet   Dental Exam Up to date   Eye Exam Up to date   Exercise (times per week) 6 times per week   Current Exercises Include Walking   Do you need help using the phone?  No   Are you deaf or do you have serious difficulty hearing?  Yes   Do you need help with transportation? No   Do you need help shopping? No   Do you need help  preparing meals?  No   Do you need help with housework?  No   Do you need help with laundry? No   Do you need help taking your medications? No   Do you need help managing money? No   Do you ever drive or ride in a car without wearing a seat belt? No   Have you felt unusual stress, anger or loneliness in the last month? No   Who do you live with? Spouse   If you need help, do you have trouble finding someone available to you? No   Have you been bothered in the last four weeks by sexual problems? Yes   Do you have difficulty concentrating, remembering or making decisions? No       Age-appropriate Screening Schedule:  Refer to the list below for future screening recommendations based on patient's age, sex and/or medical conditions. Orders for these recommended tests are listed in the plan section. The patient has been provided with a written plan.    Health Maintenance   Topic Date Due   • URINE MICROALBUMIN  07/07/2022   • DIABETIC FOOT EXAM  07/17/2022   • INFLUENZA VACCINE  10/01/2022   • HEMOGLOBIN A1C  11/04/2022   • PROSTATE CANCER SCREENING  01/27/2023   • LIPID PANEL  01/27/2023   • DIABETIC EYE EXAM  05/24/2023   • TDAP/TD VACCINES (3 - Td or Tdap) 07/01/2026   • ZOSTER VACCINE  Completed              Assessment & Plan   CMS Preventative Services Quick Reference  Risk Factors Identified During Encounter  Cardiovascular Disease  Chronic Pain   Hearing Problem  Immunizations Discussed/Encouraged (specific Immunizations; Prevnar 20 (Pneumococcal 20-valent conjugate)-due after 1/27/2023.  Obesity/Overweight   The above risks/problems have been discussed with the patient.  Follow up actions/plans if indicated are seen below in the Assessment/Plan Section.  Pertinent information has been shared with the patient in the After Visit Summary.    Diagnoses and all orders for this visit:    1. Medicare annual wellness visit, initial (Primary)    2. Encounter for health maintenance examination in adult  -     POC  Urinalysis Dipstick, Automated  -     Lipid Panel; Future  -     Comprehensive Metabolic Panel; Future  -     TSH; Future  -     CBC & Differential; Future  -     Lipid Panel  -     Comprehensive Metabolic Panel  -     TSH  -     CBC & Differential    3. Type 2 diabetes mellitus with hyperglycemia, without long-term current use of insulin (HCC)  -     POC Glycosylated Hemoglobin (Hb A1C)  -     POC Microalbumin  -     POC Urinalysis Dipstick, Automated  -     Lipid Panel; Future  -     Comprehensive Metabolic Panel; Future  -     TSH; Future  -     CBC & Differential; Future  -     Lipid Panel  -     Comprehensive Metabolic Panel  -     TSH  -     CBC & Differential  -     Lancets misc; Check sugars twice daily as directed  Dispense: 180 each; Refill: 3  -     glucose blood test strip; One Touch test strips, twice daily use as instructed  Dispense: 180 each; Refill: 3    4. Primary hypertension  -     POC Urinalysis Dipstick, Automated  -     Lipid Panel; Future  -     Comprehensive Metabolic Panel; Future  -     TSH; Future  -     CBC & Differential; Future  -     Lipid Panel  -     Comprehensive Metabolic Panel  -     TSH  -     CBC & Differential    5. Other hyperlipidemia  -     Lipid Panel; Future  -     Comprehensive Metabolic Panel; Future  -     TSH; Future  -     CBC & Differential; Future  -     Lipid Panel  -     Comprehensive Metabolic Panel  -     TSH  -     CBC & Differential    6. Chronic pain syndrome    7. Benign colonic polyp    8. Vitamin D insufficiency  -     Vitamin D 25 Hydroxy; Future  -     Vitamin D 25 Hydroxy    9. Lung nodule    10. Hyponatremia    11. Primary insomnia    12. Generalized anxiety disorder  -     Vitamin D 25 Hydroxy; Future  -     Vitamin D 25 Hydroxy    13. Erectile dysfunction, unspecified erectile dysfunction type  -     sildenafil (Viagra) 100 MG tablet; Take 1 tablet by mouth Daily As Needed for Erectile Dysfunction.  Dispense: 10 tablet; Refill: 5    14. Skin  lesion  -     Ambulatory Referral to Dermatology    15. High risk medication use  -     Compliance Drug Analysis, Ur - Urine, Clean Catch; Future  -     Compliance Drug Analysis, Ur - Urine, Clean Catch    16. Screening for cardiovascular condition  -     CT Cardiac Calcium Score Without Dye; Future        Follow Up:   Return in about 3 months (around 11/19/2022) for Recheck Diabetes, fasting (after 11/19/2022).     An After Visit Summary and PPPS were made available to the patient.

## 2022-08-19 NOTE — PATIENT INSTRUCTIONS
Health Maintenance, Male  Adopting a healthy lifestyle and getting preventive care are important in promoting health and wellness. Ask your health care provider about:  The right schedule for you to have regular tests and exams.  Things you can do on your own to prevent diseases and keep yourself healthy.  What should I know about diet, weight, and exercise?  Eat a healthy diet    Eat a diet that includes plenty of vegetables, fruits, low-fat dairy products, and lean protein.  Do not eat a lot of foods that are high in solid fats, added sugars, or sodium.    Maintain a healthy weight  Body mass index (BMI) is a measurement that can be used to identify possible weight problems. It estimates body fat based on height and weight. Your health care provider can help determine your BMI and help you achieve or maintain a healthy weight.  Get regular exercise  Get regular exercise. This is one of the most important things you can do for your health. Most adults should:  Exercise for at least 150 minutes each week. The exercise should increase your heart rate and make you sweat (moderate-intensity exercise).  Do strengthening exercises at least twice a week. This is in addition to the moderate-intensity exercise.  Spend less time sitting. Even light physical activity can be beneficial.  Watch cholesterol and blood lipids  Have your blood tested for lipids and cholesterol at 20 years of age, then have this test every 5 years.  You may need to have your cholesterol levels checked more often if:  Your lipid or cholesterol levels are high.  You are older than 40 years of age.  You are at high risk for heart disease.  What should I know about cancer screening?  Many types of cancers can be detected early and may often be prevented. Depending on your health history and family history, you may need to have cancer screening at various ages. This may include screening for:  Colorectal cancer.  Prostate cancer.  Skin cancer.  Lung  cancer.  What should I know about heart disease, diabetes, and high blood pressure?  Blood pressure and heart disease  High blood pressure causes heart disease and increases the risk of stroke. This is more likely to develop in people who have high blood pressure readings, are of  descent, or are overweight.  Talk with your health care provider about your target blood pressure readings.  Have your blood pressure checked:  Every 3-5 years if you are 18-39 years of age.  Every year if you are 40 years old or older.  If you are between the ages of 65 and 75 and are a current or former smoker, ask your health care provider if you should have a one-time screening for abdominal aortic aneurysm (AAA).  Diabetes  Have regular diabetes screenings. This checks your fasting blood sugar level. Have the screening done:  Once every three years after age 45 if you are at a normal weight and have a low risk for diabetes.  More often and at a younger age if you are overweight or have a high risk for diabetes.  What should I know about preventing infection?  Hepatitis B  If you have a higher risk for hepatitis B, you should be screened for this virus. Talk with your health care provider to find out if you are at risk for hepatitis B infection.  Hepatitis C  Blood testing is recommended for:  Everyone born from 1945 through 1965.  Anyone with known risk factors for hepatitis C.  Sexually transmitted infections (STIs)  You should be screened each year for STIs, including gonorrhea and chlamydia, if:  You are sexually active and are younger than 24 years of age.  You are older than 24 years of age and your health care provider tells you that you are at risk for this type of infection.  Your sexual activity has changed since you were last screened, and you are at increased risk for chlamydia or gonorrhea. Ask your health care provider if you are at risk.  Ask your health care provider about whether you are at high risk for HIV.  Your health care provider may recommend a prescription medicine to help prevent HIV infection. If you choose to take medicine to prevent HIV, you should first get tested for HIV. You should then be tested every 3 months for as long as you are taking the medicine.  Follow these instructions at home:  Lifestyle  Do not use any products that contain nicotine or tobacco, such as cigarettes, e-cigarettes, and chewing tobacco. If you need help quitting, ask your health care provider.  Do not use street drugs.  Do not share needles.  Ask your health care provider for help if you need support or information about quitting drugs.  Alcohol use  Do not drink alcohol if your health care provider tells you not to drink.  If you drink alcohol:  Limit how much you have to 0-2 drinks a day.  Be aware of how much alcohol is in your drink. In the [place] one drink equals one 12 oz bottle of beer (355 mL), one 5 oz glass of wine (148 mL), or one 1½ oz glass of hard liquor (44 mL).  General instructions  Schedule regular health, dental, and eye exams.  Stay current with your vaccines.  Tell your health care provider if:  You often feel depressed.  You have ever been abused or do not feel safe at home.  Summary  Adopting a healthy lifestyle and getting preventive care are important in promoting health and wellness.  Follow your health care provider's instructions about healthy diet, exercising, and getting tested or screened for diseases.  Follow your health care provider's instructions on monitoring your cholesterol and blood pressure.  This information is not intended to replace advice given to you by your health care provider. Make sure you discuss any questions you have with your health care provider.  Document Revised: 12/11/2019 Document Reviewed: 12/11/2019  [employer] Patient Education © 2021 [employer]  MyPlate from [employer]    MyPlate is an outline of a general healthy diet based on the 2010 Dietary Guidelines for Americans, from  the [place] Department of Agriculture (USDA). It sets guidelines for how To follow MyPlate recommendations:  Eat a wide variety of fruits and vegetables, grains, and protein foods.  Serve smaller portions and eat less food throughout the day.  Limit portion sizes to avoid overeating.  Enjoy your food.  Get at least 150 minutes of exercise every week. This is about 30 minutes each day, 5 or more days per week.  It can be difficult to have every meal look like MyPlate. Think about MyPlate as eating guidelines for an entire day, rather than each individual meal.  Fruits and vegetables  Make half of your plate fruits and vegetables.  Eat many different colors of fruits and vegetables each day.  For a 2,000 calorie daily food plan, eat:  2½ cups of vegetables every day.  2 cups of fruit every day.  1 cup is equal to:  1 cup raw or cooked vegetables.  1 cup raw fruit.  1 medium-sized orange, apple, or banana.  1 cup 100% fruit or vegetable juice.  2 cups raw leafy greens, such as lettuce, spinach, or kale.  ½ cup dried fruit.  Grains  One fourth of your plate should be grains.  Make at least half of the grains you eat each day whole grains.  For a 2,000 calorie daily food plan, eat 6 oz of grains every day.  1 oz is equal to:  1 slice bread.  1 cup cereal.  ½ cup cooked rice, cereal, or pasta.  Protein  One fourth of your plate should be protein.  Eat a wide variety of protein foods, including meat, poultry, fish, eggs, beans, nuts, and tofu.  For a 2,000 calorie daily food plan, eat 5½ oz of protein every day.  1 oz is equal to:  1 oz meat, poultry, or fish.  ¼ cup cooked beans.  1 egg.  ½ oz nuts or seeds.  1 Tbsp peanut butter.  Dairy  Drink fat-free or low-fat (1%) milk.  Eat or drink dairy as a side to meals.  For a 2,000 calorie daily food plan, eat or drink 3 cups of dairy every day.  1 cup is equal to:  1 cup milk, yogurt, cottage cheese, or soy milk (soy beverage).  2 oz processed cheese.  1½ oz natural  cheese.  Fats, oils, salt, and sugars  Only small amounts of oils are recommended.  Avoid foods that are high in calories and low in nutritional value (empty calories), like foods high in fat or added sugars.  Choose foods that are low in salt (sodium). Choose foods that have less than 140 milligrams (mg) of sodium per serving.  Drink water instead of sugary drinks. Drink enough water each day to keep your urine pale yellow.  Where to find support  Work with your health care provider or a nutrition specialist (dietitian) to develop a customized eating plan that is right for you.  Download an raquel (mobile application) to help you track your daily food intake.  Where to find more information  Go to ChooseMyPlate.gov for more information.  Summary  MyPlate is a general guideline for healthy eating from the USDA. It is based on the 2010 Dietary Guidelines for Americans.  In general, fruits and vegetables should take up ½ of your plate, grains should take up ¼ of your plate, and protein should take up ¼ of your plate.  This information is not intended to replace advice given to you by your health care provider. Make sure you discuss any questions you have with your health care provider.  Document Revised: 05/21/2020 Document Reviewed: 03/19/2018  [employer] Patient Education © 2021 [employer]  Calorie Counting for Weight Loss  Calories are units of energy. Your body needs a certain number of calories from food to keep going throughout the day. When you eat or drink more calories than your body needs, your body stores the extra calories mostly as fat. When you eat or drink fewer calories than your body needs, your body burns fat to get the energy it needs.  Calorie counting means keeping track of how many calories you eat and drink each day. Calorie counting can be helpful if you need to lose weight. If you eat fewer calories than your body needs, you should lose weight. Ask your health care provider what a healthy weight  is for you.  For calorie counting to work, you will need to eat the right number of calories each day to lose a healthy amount of weight per week. A dietitian can help you figure out how many calories you need in a day and will suggest ways to reach your calorie goal.  A healthy amount of weight to lose each week is usually 1-2 lb (0.5-0.9 kg). This usually means that your daily calorie intake should be reduced by 500-750 calories.  Eating 1,200-1,500 calories a day can help most women lose weight.  Eating 1,500-1,800 calories a day can help most men lose weight.  What do I need to know about calorie counting?  Work with your health care provider or dietitian to determine how many calories you should get each day. To meet your daily calorie goal, you will need to:  Find out how many calories are in each food that you would like to eat. Try to do this before you eat.  Decide how much of the food you plan to eat.  Keep a food log. Do this by writing down what you ate and how many calories it had.  To successfully lose weight, it is important to balance calorie counting with a healthy lifestyle that includes regular activity.  Where do I find calorie information?    The number of calories in a food can be found on a Nutrition Facts label. If a food does not have a Nutrition Facts label, try to look up the calories online or ask your dietitian for help.  Remember that calories are listed per serving. If you choose to have more than one serving of a food, you will have to multiply the calories per serving by the number of servings you plan to eat. For example, the label on a package of bread might say that a serving size is 1 slice and that there are 90 calories in a serving. If you eat 1 slice, you will have eaten 90 calories. If you eat 2 slices, you will have eaten 180 calories.  How do I keep a food log?  After each time that you eat, record the following in your food log as soon as possible:  What you ate. Be sure  to include toppings, sauces, and other extras on the food.  How much you ate. This can be measured in cups, ounces, or number of items.  How many calories were in each food and drink.  The total number of calories in the food you ate.  Keep your food log near you, such as in a pocket-sized notebook or on an raquel or website on your mobile phone. Some programs will calculate calories for you and show you how many calories you have left to meet your daily goal.  What are some portion-control tips?  Know how many calories are in a serving. This will help you know how many servings you can have of a certain food.  Use a measuring cup to measure serving sizes. You could also try weighing out portions on a kitchen scale. With time, you will be able to estimate serving sizes for some foods.  Take time to put servings of different foods on your favorite plates or in your favorite bowls and cups so you know what a serving looks like.  Try not to eat straight from a food's packaging, such as from a bag or box. Eating straight from the package makes it hard to see how much you are eating and can lead to overeating. Put the amount you would like to eat in a cup or on a plate to make sure you are eating the right portion.  Use smaller plates, glasses, and bowls for smaller portions and to prevent overeating.  Try not to multitask. For example, avoid watching TV or using your computer while eating. If it is time to eat, sit down at a table and enjoy your food. This will help you recognize when you are full. It will also help you be more mindful of what and how much you are eating.  What are tips for following this plan?  Reading food labels  Check the calorie count compared with the serving size. The serving size may be smaller than what you are used to eating.  Check the source of the calories. Try to choose foods that are high in protein, fiber, and vitamins, and low in saturated fat, trans fat, and sodium.  Shopping  Read  nutrition labels while you shop. This will help you make healthy decisions about which foods to buy.  Pay attention to nutrition labels for low-fat or fat-free foods. These foods sometimes have the same number of calories or more calories than the full-fat versions. They also often have added sugar, starch, or salt to make up for flavor that was removed with the fat.  Make a grocery list of lower-calorie foods and stick to it.  Cooking  Try to cook your favorite foods in a healthier way. For example, try baking instead of frying.  Use low-fat dairy products.  Meal planning  Use more fruits and vegetables. One-half of your plate should be fruits and vegetables.  Include lean proteins, such as chicken, turkey, and fish.  Lifestyle  Each week, aim to do one of the followin minutes of moderate exercise, such as walking.  75 minutes of vigorous exercise, such as running.  General information  Know how many calories are in the foods you eat most often. This will help you calculate calorie counts faster.  Find a way of tracking calories that works for you. Get creative. Try different apps or programs if writing down calories does not work for you.  What foods should I eat?    Eat nutritious foods. It is better to have a nutritious, high-calorie food, such as an avocado, than a food with few nutrients, such as a bag of potato chips.  Use your calories on foods and drinks that will fill you up and will not leave you hungry soon after eating.  Examples of foods that fill you up are nuts and nut butters, vegetables, lean proteins, and high-fiber foods such as whole grains. High-fiber foods are foods with more than 5 g of fiber per serving.  Pay attention to calories in drinks. Low-calorie drinks include water and unsweetened drinks.  The items listed above may not be a complete list of foods and beverages you can eat. Contact a dietitian for more information.  What foods should I limit?  Limit foods or drinks that are  not good sources of vitamins, minerals, or protein or that are high in unhealthy fats. These include:  Candy.  Other sweets.  Sodas, specialty coffee drinks, alcohol, and juice.  The items listed above may not be a complete list of foods and beverages you should avoid. Contact a dietitian for more information.  How do I count calories when eating out?  Pay attention to portions. Often, portions are much larger when eating out. Try these tips to keep portions smaller:  Consider sharing a meal instead of getting your own.  If you get your own meal, eat only half of it. Before you start eating, ask for a container and put half of your meal into it.  When available, consider ordering smaller portions from the menu instead of full portions.  Pay attention to your food and drink choices. Knowing the way food is cooked and what is included with the meal can help you eat fewer calories.  If calories are listed on the menu, choose the lower-calorie options.  Choose dishes that include vegetables, fruits, whole grains, low-fat dairy products, and lean proteins.  Choose items that are boiled, broiled, grilled, or steamed. Avoid items that are buttered, battered, fried, or served with cream sauce. Items labeled as crispy are usually fried, unless stated otherwise.  Choose water, low-fat milk, unsweetened iced tea, or other drinks without added sugar. If you want an alcoholic beverage, choose a lower-calorie option, such as a glass of wine or light beer.  Ask for dressings, sauces, and syrups on the side. These are usually high in calories, so you should limit the amount you eat.  If you want a salad, choose a garden salad and ask for grilled meats. Avoid extra toppings such as fields, cheese, or fried items. Ask for the dressing on the side, or ask for olive oil and vinegar or lemon to use as dressing.  Estimate how many servings of a food you are given. Knowing serving sizes will help you be aware of how much food you are  eating at restaurants.  Where to find more information  Centers for Disease Control and Prevention: www.cdc.gov  [place] Department of Agriculture: myplate.gov  Summary  Calorie counting means keeping track of how many calories you eat and drink each day. If you eat fewer calories than your body needs, you should lose weight.  A healthy amount of weight to lose per week is usually 1-2 lb (0.5-0.9 kg). This usually means reducing your daily calorie intake by 500-750 calories.  The number of calories in a food can be found on a Nutrition Facts label. If a food does not have a Nutrition Facts label, try to look up the calories online or ask your dietitian for help.  Use smaller plates, glasses, and bowls for smaller portions and to prevent overeating.  Use your calories on foods and drinks that will fill you up and not leave you hungry shortly after a meal.  This information is not intended to replace advice given to you by your health care provider. Make sure you discuss any questions you have with your health care provider.  Document Revised: 01/28/2021 Document Reviewed: 01/28/2021  [employer] Patient Education © 2021 [employer]  Exercising to Stay Healthy  To become healthy and stay healthy, it is recommended that you do moderate-intensity and vigorous-intensity exercise. You can tell that you are exercising at a moderate intensity if your heart starts beating faster and you start breathing faster but can still hold a conversation. You can tell that you are exercising at a vigorous intensity if you are breathing much harder and faster and cannot hold a conversation while exercising.  Exercising regularly is important. It has many health benefits, such as:  Improving overall fitness, flexibility, and endurance.  Increasing bone density.  Helping with weight control.  Decreasing body fat.  Increasing muscle strength.  Reducing stress and tension.  Improving overall health.  How often should I exercise?  Choose an  activity that you enjoy, and set realistic goals. Your health care provider can help you make an activity plan that works for you.  Exercise regularly as told by your health care provider. This may include:  Doing strength training two times a week, such as:  Lifting weights.  Using resistance bands.  Push-ups.  Sit-ups.  Yoga.  Doing a certain intensity of exercise for a given amount of time. Choose from these options:  A total of 150 minutes of moderate-intensity exercise every week.  A total of 75 minutes of vigorous-intensity exercise every week.  A mix of moderate-intensity and vigorous-intensity exercise every week.  Children, pregnant women, people who have not exercised regularly, people who are overweight, and older adults may need to talk with a health care provider about what activities are safe to do. If you have a medical condition, be sure to talk with your health care provider before you start a new exercise program.  What are some exercise ideas?  Moderate-intensity exercise ideas include:  Walking 1 mile (1.6 km) in about 15 minutes.  Biking.  Hiking.  Golfing.  Dancing.  Water aerobics.  Vigorous-intensity exercise ideas include:  Walking 4.5 miles (7.2 km) or more in about 1 hour.  Jogging or running 5 miles (8 km) in about 1 hour.  Biking 10 miles (16.1 km) or more in about 1 hour.  Lap swimming.  Roller-skating or in-line skating.  Cross-country skiing.  Vigorous competitive sports, such as football, basketball, and soccer.  Jumping rope.  Aerobic dancing.  What are some everyday activities that can help me to get exercise?  Yard work, such as:  Pushing a .  Raking and bagging leaves.  Washing your car.  Pushing a stroller.  Shoveling snow.  Gardening.  Washing windows or floors.  How can I be more active in my day-to-day activities?  Use stairs instead of an elevator.  Take a walk during your lunch break.  If you drive, park your car farther away from your work or school.  If you take  public transportation, get off one stop early and walk the rest of the way.  Stand up or walk around during all of your indoor phone calls.  Get up, stretch, and walk around every 30 minutes throughout the day.  Enjoy exercise with a friend. Support to continue exercising will help you keep a regular routine of activity.  What guidelines can I follow while exercising?  Before you start a new exercise program, talk with your health care provider.  Do not exercise so much that you hurt yourself, feel dizzy, or get very short of breath.  Wear comfortable clothes and wear shoes with good support.  Drink plenty of water while you exercise to prevent dehydration or heat stroke.  Work out until your breathing and your heartbeat get faster.  Where to find more information  [place] Department of Health and Human Services: www.hhs.gov  Centers for Disease Control and Prevention (CDC): www.cdc.gov  Summary  Exercising regularly is important. It will improve your overall fitness, flexibility, and endurance.  Regular exercise also will improve your overall health. It can help you control your weight, reduce stress, and improve your bone density.  Do not exercise so much that you hurt yourself, feel dizzy, or get very short of breath.  Before you start a new exercise program, talk with your health care provider.  This information is not intended to replace advice given to you by your health care provider. Make sure you discuss any questions you have with your health care provider.  Document Revised: 11/30/2018 Document Reviewed: 11/08/2018  [employer] Patient Education © 2021 [employer]    Advance Care Planning and Advance Directives     You make decisions on a daily basis - decisions about where you want to live, your career, your home, your life. Perhaps one of the most important decisions you face is your choice for future medical care. Take time to talk with your family and your healthcare team and start planning  today.  Advance Care Planning is a process that can help you:  Understand possible future healthcare decisions in light of your own experiences  Reflect on those decision in light of your goals and values  Discuss your decisions with those closest to you and the healthcare professionals that care for you  Make a plan by creating a document that reflects your wishes    Surrogate Decision Maker  In the event of a medical emergency, which has left you unable to communicate or to make your own decisions, you would need someone to make decisions for you.  It is important to discuss your preferences for medical treatment with this person while you are in good health.     Qualities of a surrogate decision maker:  Willing to take on this role and responsibility  Knows what you want for future medical care  Willing to follow your wishes even if they don't agree with them  Able to make difficult medical decisions under stressful circumstances    Advance Directives  These are legal documents you can create that will guide your healthcare team and decision maker(s) when needed. These documents can be stored in the electronic medical record.    Living Will - a legal document to guide your care if you have a terminal condition or a serious illness and are unable to communicate. States vary by statute in document names/types, but most forms may include one or more of the following:        -  Directions regarding life-prolonging treatments        -  Directions regarding artificially provided nutrition/hydration        -  Choosing a healthcare decision maker        -  Direction regarding organ/tissue donation    Durable Power of  for Healthcare - this document names an -in-fact to make medical decisions for you, but it may also allow this person to make personal and financial decisions for you. Please seek the advice of an  if you need this type of document.    **Advance Directives are not required and no one  may discriminate against you if you do not sign one.    Medical Orders  Many states allow specific forms/orders signed by your physician to record your wishes for medical treatment in your current state of health. This form, signed in personal communication with your physician, addresses resuscitation and other medical interventions that you may or may not want.      For more information or to schedule a time with a [facility] Advance Care Planning Facilitator contact: [email or web address] or call [phone] and someone will contact you directly.

## 2022-08-24 RX ORDER — LANCETS 30 GAUGE
EACH MISCELLANEOUS
Qty: 180 EACH | Refills: 3 | Status: SHIPPED | OUTPATIENT
Start: 2022-08-24 | End: 2023-04-05

## 2022-08-26 LAB — DRUGS UR: NORMAL

## 2022-09-02 ENCOUNTER — TELEMEDICINE (OUTPATIENT)
Dept: PULMONOLOGY | Facility: CLINIC | Age: 67
End: 2022-09-02

## 2022-09-02 DIAGNOSIS — F17.211 CIGARETTE NICOTINE DEPENDENCE IN REMISSION: ICD-10-CM

## 2022-09-02 DIAGNOSIS — R91.1 LUNG NODULE: Primary | ICD-10-CM

## 2022-09-02 DIAGNOSIS — D35.02 ADENOMA OF LEFT ADRENAL GLAND: ICD-10-CM

## 2022-09-02 PROCEDURE — 99213 OFFICE O/P EST LOW 20 MIN: CPT | Performed by: INTERNAL MEDICINE

## 2022-09-02 NOTE — PROGRESS NOTES
New Pulmonary Patient Office Visit      Patient Name: Yevgeniy Vaughn    You have chosen to receive care through a telehealth visit.  Do you consent to use a video/audio connection for your medical care today? Yes      Chief Complaint:    Chief Complaint   Patient presents with   • Lung Nodule              History of Present Illness: Yevgeniy Vaughn is a 66 y.o. male who is here today for follow up after chest CT.    65-year-old male who is on and off smoker for last many years with over close to 20-pack-year smoking history and recently quit again, history of diabetes, he was referred here for abnormal chest CT scan showing 6 mm the right lower lobe lung nodule.  Patient was undergoing work-up for inguinal and ventral hernia and underwent CT of abdomen and incidentally found to have right lower lobe lung nodule. Pt had hernia repair with mesh recently and is still recovering from it. States that he did Ok with surgery and no significant perioperative pulmonary complications. No recent change in respiratory symptoms. Denies any chest pain or shortness of breath. Denies any cough or sputum production. No hemoptysis. No recent pneumonias or hospitalizations with resp problems. Denies any fevers, chills or night sweats.  Denies any change in appetite or weight loss.      Patient smoked off and on for a number of years but now quit again and states that he is not planning to start smoking again.     Patient here for follow-up for the telemedicine visit to review CT scan.  Denies any new respiratory complaints.  Fortunately he has not started smoking again.  He was congratulated on that.    Subjective      Review of Systems:   Review of Systems   Constitutional: Negative.    HENT: Negative.    Respiratory: Negative.    Cardiovascular: Negative.    Gastrointestinal:        Soreness at surgical site   Endocrine: Negative.    Musculoskeletal: Positive for arthralgias.   Skin: Negative.    Neurological: Negative.     Hematological: Negative.    Psychiatric/Behavioral: Negative.    All other systems reviewed and are negative.      Past Medical History:   Past Medical History:   Diagnosis Date   • Acute recurrent pancreatitis     Description: s/p muple hospitalizations   • Adenoma of left adrenal gland     Dx 6/21 by CT   • Anxiety disorder     Description: and panic disorder dx 711.   • Benign colonic polyp     Description: dx 12/12   • Chronic neck pain    • Chronic pain syndrome    • Cigarette nicotine dependence without complication    • Degenerative cervical disc    • Emphysema lung (HCC)    • Erectile dysfunction    • Hyperlipidemia     Description: Diagnosed 2000   • Hypertension    • Insomnia    • Low sodium levels    • Lung nodule     RLL 6 mm   • Obesity     h/o phen-fen use   • PONV (postoperative nausea and vomiting)    • Psoriasis    • Ptosis of eyelid    • Shoulder joint pain     Description: Bilateral   • Type 2 diabetes mellitus (HCC)     Description: Diagnosed 1992 (Normal Stress Test 1/99)   • Unknown varicella vaccination status    • Vitamin D insufficiency 8/19/2022    Dx 7/2021   • Wears dentures    • Wears reading eyeglasses        Past Surgical History:   Past Surgical History:   Procedure Laterality Date   • CHOLECYSTECTOMY  1998   • COLONOSCOPY      up to date    • HUMERUS SURGERY Right     Repair of Humerus / Arm Right  Description: 1999- repair of biceps tendon rupture. 10/7/15- right biceps tendon repair.  12/7/15- debridement of wound and re-repair of right biceps tendon.   • INGUINAL HERNIA REPAIR Bilateral 7/12/2021    Procedure: ROBOT ASSISTED LAPAROSCOPIC BILATERAL INGUINAL HERNIA REPAIR WITH MESH;  Surgeon: Abraham Johnson MD;  Location: [facility];  Service: Robotics - [facility];  Laterality: Bilateral;   • ROTATOR CUFF REPAIR Left 12/2010    s/p Biceps muscle repair   • SHOULDER SURGERY Right 05/01/2005    repair of labrum and biceps tendon   • SKIN BIOPSY     • UMBILICAL HERNIA REPAIR N/A  2021    Procedure: OPEN UMBILICAL HERNIA REPAIR W/MESH;  Surgeon: Abraham Johnson MD;  Location[facility];  Service: General;  Laterality: N/A;       Family History:   Family History   Problem Relation Age of Onset   • Rheum arthritis Mother    • Diabetes type II Mother    • Heart failure Sister 48   • Diabetes type II Sister    • Coronary artery disease Brother 55        Coronary artery bypass grafting (CABG)   • Hyperlipidemia Brother    • Diabetes type II Brother    • Diabetes Brother    • Diverticulosis Brother    • Aneurysm Neg Hx         AAA       Social History:   Social History     Socioeconomic History   • Marital status:    • Number of children: 4   Tobacco Use   • Smoking status: Former Smoker     Packs/day: 1.00     Years: 45.00     Pack years: 45.00     Types: Cigarettes     Start date: 1977     Quit date: 2022     Years since quittin.2   • Smokeless tobacco: Former User     Types: Chew     Quit date:    • Tobacco comment: - present ,1  ppd. Quit 14. Re-started approx 2015, 3/4 ppd, then 1 ppd. Quit 18, re-started 2018, 1 ppd. Quit 20. Re-started 2021 1 ppd, Quit 22   Vaping Use   • Vaping Use: Never used   Substance and Sexual Activity   • Alcohol use: No   • Drug use: No   • Sexual activity: Defer     Partners: Female       Medications:     Current Outpatient Medications:   •  ALPRAZolam (XANAX) 0.5 MG tablet, Take 1 tablet by mouth twice daily as needed for anxiety, Disp: 60 tablet, Rfl: 2  •  APO-Varenicline 1 MG tablet, TAKE 1 TABLET BY MOUTH TWICE DAILY FOR  12  WEEKS, Disp: 168 tablet, Rfl: 0  •  aspirin 81 MG chewable tablet, Chew 81 mg Daily., Disp: , Rfl:   •  atorvastatin (LIPITOR) 20 MG tablet, TAKE 1 TABLET BY MOUTH AT BEDTIME, Disp: 90 tablet, Rfl: 1  •  B Complex-C (SUPER B COMPLEX/VITAMIN C PO), Take  by mouth., Disp: , Rfl:   •  clobetasol (TEMOVATE) 0.05 % cream, Apply  topically to the appropriate area as  directed 2 (Two) Times a Day As Needed (rash)., Disp: 60 g, Rfl: 3  •  gabapentin (NEURONTIN) 800 MG tablet, 800 mg 4 (Four) Times a Day., Disp: , Rfl:   •  glucose blood test strip, One Touch test strips, twice daily use as instructed, Disp: 180 each, Rfl: 3  •  glucose monitor monitoring kit, 1 each Daily., Disp: 1 each, Rfl: 0  •  ibuprofen (ADVIL,MOTRIN) 800 MG tablet, Take 1 tablet by mouth 3 (Three) Times a Day As Needed (prn)., Disp: 180 tablet, Rfl: 3  •  Januvia 100 MG tablet, Take 1 tablet by mouth once daily, Disp: 90 tablet, Rfl: 1  •  Lancets misc, Check sugars twice daily as directed, Disp: 180 each, Rfl: 3  •  lisinopril (PRINIVIL,ZESTRIL) 40 MG tablet, TAKE 1 TABLET BY MOUTH AT BEDTIME, Disp: 35 tablet, Rfl: 5  •  melatonin 5 MG tablet tablet, Take 2 tablets by mouth At Night As Needed (insomnia)., Disp: , Rfl:   •  metFORMIN (GLUCOPHAGE) 1000 MG tablet, Take 1 tablet by mouth twice daily, Disp: 180 tablet, Rfl: 1  •  metFORMIN (GLUCOPHAGE) 500 MG tablet, TAKE 1 TABLET BY MOUTH TWICE DAILY WITH MEALS (Patient taking differently: 500 mg Daily. Mid day), Disp: 180 tablet, Rfl: 0  •  naloxone (NARCAN) 4 MG/0.1ML nasal spray, 1 spray into the nostril(s) as directed by provider As Needed (medication overdose)., Disp: 1 each, Rfl: 5  •  Omega-3 Fatty Acids (FISH OIL) 1000 MG capsule capsule, Take 500 mg by mouth Daily With Breakfast., Disp: , Rfl:   •  oxyCODONE-acetaminophen (PERCOCET) 7.5-325 MG per tablet, Take 1 tablet by mouth 3 (three) times a day., Disp: , Rfl:   •  pioglitazone (ACTOS) 45 MG tablet, Take 1 tablet by mouth Daily., Disp: 90 tablet, Rfl: 2  •  promethazine (PHENERGAN) 25 MG tablet, TAKE ONE TABLET BY MOUTH EVERY 4 TO 6 HOURS AS NEEDED FOR NAUSEA AND VOMITING, Disp: 30 tablet, Rfl: 5  •  sildenafil (Viagra) 100 MG tablet, Take 1 tablet by mouth Daily As Needed for Erectile Dysfunction., Disp: 10 tablet, Rfl: 5  •  sodium chloride 1 g tablet, Take 1 tablet by mouth 2 (Two) Times a Day.,  Disp: 60 tablet, Rfl: 5  •  tiZANidine (ZANAFLEX) 2 MG tablet, 1 tablet 2 (Two) Times a Day., Disp: , Rfl:     Allergies:   Allergies   Allergen Reactions   • Lovaza [Omega-3-Acid Ethyl Esters] Other (See Comments)     Other reaction(s): CRAWLING FEELING   • Trazodone Other (See Comments)     Depletes sodium    • Farxiga [Dapagliflozin] Other (See Comments)     Pain in testicle   Low back pain    • Steglatro [Ertugliflozin] Nausea Only and Rash       Objective     Physical Exam:  Vital Signs:   There were no vitals filed for this visit.    Physical Exam  Constitutional:       General: He is not in acute distress.     Appearance: He is well-developed. He is not diaphoretic.   HENT:      Head: Normocephalic and atraumatic.      Comments: Mallampati 3 airway  Neck:      Thyroid: No thyromegaly.      Trachea: No tracheal deviation.   Pulmonary:      Effort: Pulmonary effort is normal. No respiratory distress.      Breath sounds: No stridor.   Neurological:      Mental Status: He is alert and oriented to person, place, and time.   Psychiatric:         Behavior: Behavior normal.         Thought Content: Thought content normal.         Judgment: Judgment normal.         Results Review:   I reviewed the patient's new clinical results.    CT scan of the chest done reviewed.  Right lower lobe 6 mm nodule appears stable.  There are some calcified lymph nodes and granulomas in the spleen and in the lung which are all stable.  Left adrenal adenoma appears stable as well.    PFT not available.      Assessment / Plan      Assessment:   Problem List Items Addressed This Visit        Hematology and Neoplasia    Adenoma of left adrenal gland    Overview     Dx 6/21 by CT            Pulmonary and Pneumonias    Lung nodule - Primary    Overview     RLL 6 mm           Other Visit Diagnoses     Cigarette nicotine dependence in remission              Plan:   1.  Patient with incidentally found right lower lobe lung nodule which is 6 mm  rounded opacity.  We have been following with serial CT scans and so far findings are stable.  No new untoward findings noted on the CT scan.  No new respiratory complaints.  Left adrenal adenoma is also stable.  Given patient's extensive smoking history I will recommend annual CT scan for lung cancer screening and he is amenable to that.  We will arrange for that next year and follow-up after.    2.  Patient is asymptomatic from his pulmonary disease process standpoint. Counseled strongly to stay off of smoking.  He was congratulated on staying off of smoking.    3.  Continue with activity to prevent further muscle loss and worsening dyspnea.      Follow Up:   1 year with chest CT scan.    Discussed plan of care in detail with patient today. Patient verbally understands and agrees.     Scott Villeda MD  Pulmonary Critical Care and Sleep Medicine

## 2022-09-06 DIAGNOSIS — R91.1 LUNG NODULE: Primary | ICD-10-CM

## 2022-09-13 RX ORDER — IBUPROFEN 800 MG/1
TABLET ORAL
Qty: 180 TABLET | Refills: 3 | Status: SHIPPED | OUTPATIENT
Start: 2022-09-13

## 2022-09-20 ENCOUNTER — APPOINTMENT (OUTPATIENT)
Dept: CT IMAGING | Facility: HOSPITAL | Age: 67
End: 2022-09-20

## 2022-09-27 ENCOUNTER — HOSPITAL ENCOUNTER (OUTPATIENT)
Dept: CT IMAGING | Facility: HOSPITAL | Age: 67
Discharge: HOME | End: 2022-09-27
Admitting: INTERNAL MEDICINE

## 2022-09-27 ENCOUNTER — TELEPHONE (OUTPATIENT)
Dept: INTERNAL MEDICINE | Facility: CLINIC | Age: 67
End: 2022-09-27

## 2022-09-27 DIAGNOSIS — Z13.6 SCREENING FOR CARDIOVASCULAR CONDITION: ICD-10-CM

## 2022-09-27 DIAGNOSIS — R93.1 ELEVATED CORONARY ARTERY CALCIUM SCORE: Primary | ICD-10-CM

## 2022-09-27 PROCEDURE — 75571 CT HRT W/O DYE W/CA TEST: CPT

## 2022-09-28 NOTE — TELEPHONE ENCOUNTER
Spoke to patient and notified of message, patient stated he is agreeable.     Patient verbalized good understanding.

## 2022-09-28 NOTE — TELEPHONE ENCOUNTER
Call patient please.  His Cardiac CT scan shows a very high score.  This indicates he needs to have further evaluation of his heart.  I recommend he see a cardiologist to see what the best testing would be for him.  If he is agreeable, I will enter an order.

## 2022-10-04 ENCOUNTER — DOCUMENTATION (OUTPATIENT)
Dept: CARDIOLOGY | Facility: CLINIC | Age: 67
End: 2022-10-04

## 2022-10-04 NOTE — PROGRESS NOTES
Confirmed appt with pt's wife    PCP in Epic     20+ years ago stress test but was normal     Ct scan epic    Fam Hx CAD- mom and brother,    Sister with Hx valve surgery

## 2022-10-10 NOTE — PROGRESS NOTES
[facility] CARDIOLOGY MAIN [place]    New Patient Office Visit    Patient Name: Yevgeniy Vaughn  : 1955   MRN: [id number]   Care Team: Patient Care Team:  Christine Rousseau MD as PCP - Abraham Glynn MD as Surgeon (General Surgery)  Soctt Villeda MD as Consulting Physician (Pulmonary Disease)  Ignacio Montero MD (Urology)  Hugh Patino MD as Consulting Physician (Gastroenterology)  Manoj Avila MD as Cardiologist (Cardiology)    Chief Complaint   Patient presents with   • ELEVATED CORONARY ARTERY CALCIUM SCORE        HPI: Yevgeniy Vaughn is a 66 y.o. male with a history of diabetes, hypertension, hyperlipidemia who presents today for evaluation for elevated calcium score.  Mr. Vaughn underwent CT coronary as part of restratification and was found to have a very elevated calcium score and was referred to cardiology.  The angiography portion of the scan was aborted due to concern for artifact due to the heavy calcium burden.    Describes is never been diagnosed with heart disease that he is aware of and reports having a stress test years ago that was negative.  Currently, he is able to walk 2.5 miles as well as bike 2.5 miles most days without exertional chest pain.  He does report some vague chest heaviness at times however this does not appear to be related to times when he is exerting himself.    Subjective   Review of Systems   Constitutional: Negative for activity change.   HENT: Positive for hearing loss and tinnitus.    Respiratory:        Snoring   Cardiovascular: Positive for chest pain and leg swelling.   Gastrointestinal: Positive for GERD and indigestion.   Musculoskeletal:        Leg pain with exertion, left anterior leg       Past Medical History:   Diagnosis Date   • Acute recurrent pancreatitis     Description: s/p muple hospitalizations   • Adenoma of left adrenal gland     Dx  by CT   • Anxiety disorder     Description: and panic  disorder dx 711.   • Benign colonic polyp     Description: dx 12/12   • Chronic neck pain    • Chronic pain syndrome    • Cigarette nicotine dependence without complication    • Degenerative cervical disc    • Elevated coronary artery calcium score 9/27/2022 9/27/22- Calcium Score 2361.9.   • Emphysema lung (HCC)    • Erectile dysfunction    • Hyperlipidemia     Description: Diagnosed 2000   • Hypertension    • Insomnia    • Low sodium levels    • Lung nodule     RLL 6 mm   • Obesity     h/o phen-fen use   • PONV (postoperative nausea and vomiting)    • Psoriasis    • Ptosis of eyelid    • Shoulder joint pain     Description: Bilateral   • Type 2 diabetes mellitus (HCC)     Description: Diagnosed 1992 (Normal Stress Test 1/99)   • Unknown varicella vaccination status    • Vitamin D insufficiency 8/19/2022    Dx 7/2021   • Wears dentures    • Wears reading eyeglasses        Past Surgical History:   Procedure Laterality Date   • CHOLECYSTECTOMY  1998   • COLONOSCOPY      up to date    • HUMERUS SURGERY Right     Repair of Humerus / Arm Right  Description: 1999- repair of biceps tendon rupture. 10/7/15- right biceps tendon repair.  12/7/15- debridement of wound and re-repair of right biceps tendon.   • INGUINAL HERNIA REPAIR Bilateral 7/12/2021    Procedure: ROBOT ASSISTED LAPAROSCOPIC BILATERAL INGUINAL HERNIA REPAIR WITH MESH;  Surgeon: Abraham Johnson MD;  Location:  [facility];  Service: Robotics - [facility];  Laterality: Bilateral;   • ROTATOR CUFF REPAIR Left 12/2010    s/p Biceps muscle repair   • SHOULDER SURGERY Right 05/01/2005    repair of labrum and biceps tendon   • SKIN BIOPSY     • UMBILICAL HERNIA REPAIR N/A 7/12/2021    Procedure: OPEN UMBILICAL HERNIA REPAIR W/MESH;  Surgeon: Abraham Johnson MD;  Location:  [facility];  Service: General;  Laterality: N/A;       Social History     Socioeconomic History   • Marital status:    • Number of children: 4   Tobacco Use   • Smoking status: Former      Packs/day: 1.00     Years: 45.00     Pack years: 45.00     Types: Cigarettes     Start date: 1977     Quit date: 2022     Years since quittin.3   • Smokeless tobacco: Former     Types: Chew     Quit date:    • Tobacco comments:     - present ,1  ppd. Quit 14. Re-started approx 2015, 3/4 ppd, then 1 ppd. Quit 18, re-started 2018, 1 ppd. Quit 20. Re-started 2021 1 ppd, Quit 22   Vaping Use   • Vaping Use: Never used   Substance and Sexual Activity   • Alcohol use: No   • Drug use: No   • Sexual activity: Defer     Partners: Female       Family History   Problem Relation Age of Onset   • Rheum arthritis Mother    • Diabetes type II Mother    • Heart failure Sister 48   • Diabetes type II Sister    • Coronary artery disease Brother 55        Coronary artery bypass grafting (CABG)   • Hyperlipidemia Brother    • Diabetes type II Brother    • Diabetes Brother    • Diverticulosis Brother    • Aneurysm Neg Hx         AAA       Social History     Tobacco Use   Smoking Status Former   • Packs/day: 1.00   • Years: 45.00   • Pack years: 45.00   • Types: Cigarettes   • Start date: 1977   • Quit date: 2022   • Years since quittin.3   Smokeless Tobacco Former   • Types: Chew   • Quit date:    Tobacco Comments    - present ,1  ppd. Quit 14. Re-started approx 2015, 3/4 ppd, then 1 ppd. Quit 18, re-started 2018, 1 ppd. Quit 20. Re-started 2021 1 ppd, Quit 22        Allergies   Allergen Reactions   • Lovaza [Omega-3-Acid Ethyl Esters] Other (See Comments)     Other reaction(s): CRAWLING FEELING   • Trazodone Other (See Comments)     Depletes sodium    • Farxiga [Dapagliflozin] Other (See Comments)     Pain in testicle   Low back pain    • Steglatro [Ertugliflozin] Nausea Only and Rash         Current Outpatient Medications:   •  ALPRAZolam (XANAX) 0.5 MG tablet, Take 1 tablet by mouth twice daily as needed for anxiety,  Disp: 60 tablet, Rfl: 2  •  APO-Varenicline 1 MG tablet, TAKE 1 TABLET BY MOUTH TWICE DAILY FOR  12  WEEKS, Disp: 168 tablet, Rfl: 0  •  aspirin 81 MG chewable tablet, Chew 81 mg Daily., Disp: , Rfl:   •  atorvastatin (LIPITOR) 40 MG tablet, Take 1 tablet by mouth every night at bedtime., Disp: 90 tablet, Rfl: 3  •  B Complex-C (SUPER B COMPLEX/VITAMIN C PO), Take  by mouth., Disp: , Rfl:   •  clobetasol (TEMOVATE) 0.05 % cream, Apply  topically to the appropriate area as directed 2 (Two) Times a Day As Needed (rash)., Disp: 60 g, Rfl: 3  •  gabapentin (NEURONTIN) 800 MG tablet, 800 mg 4 (Four) Times a Day., Disp: , Rfl:   •  glucose blood test strip, One Touch test strips, twice daily use as instructed, Disp: 180 each, Rfl: 3  •  glucose monitor monitoring kit, 1 each Daily., Disp: 1 each, Rfl: 0  •  ibuprofen (ADVIL,MOTRIN) 800 MG tablet, Take 1 tablet by mouth three times daily as needed, Disp: 180 tablet, Rfl: 3  •  Januvia 100 MG tablet, Take 1 tablet by mouth once daily, Disp: 90 tablet, Rfl: 1  •  Lancets misc, Check sugars twice daily as directed, Disp: 180 each, Rfl: 3  •  lisinopril (PRINIVIL,ZESTRIL) 40 MG tablet, TAKE 1 TABLET BY MOUTH AT BEDTIME, Disp: 35 tablet, Rfl: 5  •  melatonin 5 MG tablet tablet, Take 2 tablets by mouth At Night As Needed (insomnia)., Disp: , Rfl:   •  metFORMIN (GLUCOPHAGE) 1000 MG tablet, Take 1 tablet by mouth twice daily, Disp: 180 tablet, Rfl: 1  •  metFORMIN (GLUCOPHAGE) 500 MG tablet, Take 1 tablet by mouth Daily With Breakfast., Disp: , Rfl:   •  naloxone (NARCAN) 4 MG/0.1ML nasal spray, 1 spray into the nostril(s) as directed by provider As Needed (medication overdose)., Disp: 1 each, Rfl: 5  •  Omega-3 Fatty Acids (FISH OIL) 1000 MG capsule capsule, Take 500 mg by mouth Daily With Breakfast., Disp: , Rfl:   •  oxyCODONE-acetaminophen (PERCOCET) 7.5-325 MG per tablet, Take 1 tablet by mouth 3 (three) times a day., Disp: , Rfl:   •  pioglitazone (ACTOS) 45 MG tablet, Take  "1 tablet by mouth Daily., Disp: 90 tablet, Rfl: 2  •  promethazine (PHENERGAN) 25 MG tablet, TAKE ONE TABLET BY MOUTH EVERY 4 TO 6 HOURS AS NEEDED FOR NAUSEA AND VOMITING, Disp: 30 tablet, Rfl: 5  •  sildenafil (Viagra) 100 MG tablet, Take 1 tablet by mouth Daily As Needed for Erectile Dysfunction., Disp: 10 tablet, Rfl: 5  •  sodium chloride 1 g tablet, Take 1 tablet by mouth 2 (Two) Times a Day., Disp: 60 tablet, Rfl: 5  •  tiZANidine (ZANAFLEX) 2 MG tablet, 1 tablet 2 (Two) Times a Day., Disp: , Rfl:     Objective     Vitals:    10/11/22 1428   BP: 142/64   BP Location: Right arm   Patient Position: Sitting   Pulse: 88   SpO2: 97%   Weight: 95.3 kg (210 lb)   Height: 174 cm (68.5\")     Body mass index is 31.47 kg/m².    Physical Exam  Constitutional:       Appearance: Normal appearance.   HENT:      Head: Normocephalic and atraumatic.      Nose: No congestion.   Eyes:      General: No scleral icterus.     Conjunctiva/sclera: Conjunctivae normal.   Neck:      Vascular: No carotid bruit.   Cardiovascular:      Rate and Rhythm: Normal rate and regular rhythm.      Pulses: Normal pulses.      Heart sounds: No murmur heard.  Pulmonary:      Effort: Pulmonary effort is normal. No respiratory distress.      Breath sounds: Normal breath sounds. No wheezing or rales.   Abdominal:      General: Bowel sounds are normal. There is no distension.      Palpations: Abdomen is soft.      Tenderness: There is no abdominal tenderness.   Musculoskeletal:         General: No swelling. Normal range of motion.      Cervical back: Normal range of motion and neck supple.   Skin:     General: Skin is warm and dry.      Capillary Refill: Capillary refill takes less than 2 seconds.      Coloration: Skin is not jaundiced.   Neurological:      General: No focal deficit present.      Mental Status: He is alert and oriented to person, place, and time.      Gait: Gait normal.   Psychiatric:         Mood and Affect: Mood normal.         Behavior: " Behavior normal.         Thought Content: Thought content normal.         Judgment: Judgment normal.             Most recent PCP note, imaging tests, and labs reviewed.    RESULTS:     Labs:  Lab Results   Component Value Date    WBC 6.06 08/19/2022    HGB 12.9 (L) 08/19/2022    HCT 37.8 08/19/2022    MCV 91.1 08/19/2022     08/19/2022     Lab Results   Component Value Date    GLUCOSE 160 (H) 08/19/2022    BUN 12 08/19/2022    CREATININE 0.89 08/19/2022    EGFRIFNONA 87 01/27/2022    BCR 13.5 08/19/2022    K 4.5 08/19/2022    CO2 25.2 08/19/2022    CALCIUM 9.8 08/19/2022    ALBUMIN 4.80 08/19/2022    AST 26 08/19/2022    ALT 22 08/19/2022     Lab Results   Component Value Date    HGBA1C 9.5 08/19/2022     Lab Results   Component Value Date    CHOL 173 08/19/2022    TRIG 276 (H) 08/19/2022    HDL 41 08/19/2022    LDL 86 08/19/2022 9/27/22 - CT Calcium Scoring  Agatston scores for individual coronary arteries are as follows:  Left main: 223.7  Left anterior descending (LAD): 514.3  Left circumflex: 813.6  Right coronary artery (RCA): 810.3     IMPRESSION:  1.  Calcium score (Agatston): 2361.9.       ECG 12 Lead    Date/Time: 10/11/2022 5:23 PM  Performed by: Manoj Avila MD  Authorized by: Manoj Avila MD   Comparison: compared with previous ECG from 7/9/2021  Similar to previous ECG  Rhythm: sinus rhythm  Rate: normal  BPM: 88  Conduction: conduction normal  ST Segments: ST segments normal  T Waves: T waves normal  QRS axis: normal  Other: no other findings    Clinical impression: normal ECG            This patient's ACP documentation is up to date, and there's nothing further left to document.  Assessment & Plan       ICD-10-CM ICD-9-CM   1. Elevated coronary artery calcium score  R93.1 414.00   2. Other hyperlipidemia  E78.49 272.4   3. Primary hypertension  I10 401.9   4. Type 2 diabetes mellitus without complication, without long-term current use of insulin (HCC)  E11.9 250.00   5. Precordial  pain  R07.2 786.51     Elevated coronary artery calcium score   - His current activity level and baseline ECG is reassuring that he does not have focally obstructive disease however with his history of diabetes there is increased risk that he does have atypical symptoms.   - We will proceed with stress MPI, exercise if possible and vasodilator if needed   - We will escalate statin to high intensity and continue aspirin    Primary prevention of ASCVD   - Despite extensive calcification the patient has no known history of MI or stroke   - We will continue aspirin and statin as above    Primary hypertension   - Above goal today, will monitor at follow-up and titrate medications as needed   - If stress is abnormal will have a compelling indication for beta-blocker or calcium channel blocker  Return in about 4 months (around 2/11/2023).    FRANKLYN Avila MD, MS  10/11/22    [facility] Cardiology  [address] [place] [id number] [phone]

## 2022-10-11 ENCOUNTER — OFFICE VISIT (OUTPATIENT)
Dept: CARDIOLOGY | Facility: CLINIC | Age: 67
End: 2022-10-11

## 2022-10-11 VITALS
OXYGEN SATURATION: 97 % | HEART RATE: 88 BPM | DIASTOLIC BLOOD PRESSURE: 64 MMHG | BODY MASS INDEX: 31.1 KG/M2 | WEIGHT: 210 LBS | HEIGHT: 69 IN | SYSTOLIC BLOOD PRESSURE: 142 MMHG

## 2022-10-11 DIAGNOSIS — R07.2 PRECORDIAL PAIN: ICD-10-CM

## 2022-10-11 DIAGNOSIS — I10 PRIMARY HYPERTENSION: ICD-10-CM

## 2022-10-11 DIAGNOSIS — E11.9 TYPE 2 DIABETES MELLITUS WITHOUT COMPLICATION, WITHOUT LONG-TERM CURRENT USE OF INSULIN: ICD-10-CM

## 2022-10-11 DIAGNOSIS — R93.1 ELEVATED CORONARY ARTERY CALCIUM SCORE: Primary | ICD-10-CM

## 2022-10-11 DIAGNOSIS — E78.49 OTHER HYPERLIPIDEMIA: ICD-10-CM

## 2022-10-11 PROCEDURE — 93000 ELECTROCARDIOGRAM COMPLETE: CPT | Performed by: INTERNAL MEDICINE

## 2022-10-11 PROCEDURE — 99204 OFFICE O/P NEW MOD 45 MIN: CPT | Performed by: INTERNAL MEDICINE

## 2022-10-11 RX ORDER — ATORVASTATIN CALCIUM 40 MG/1
40 TABLET, FILM COATED ORAL
Qty: 90 TABLET | Refills: 3 | Status: SHIPPED | OUTPATIENT
Start: 2022-10-11 | End: 2023-02-15

## 2022-10-28 DIAGNOSIS — F41.1 GENERALIZED ANXIETY DISORDER: ICD-10-CM

## 2022-10-31 DIAGNOSIS — F41.1 GENERALIZED ANXIETY DISORDER: ICD-10-CM

## 2022-10-31 RX ORDER — ALPRAZOLAM 0.5 MG/1
0.5 TABLET ORAL 2 TIMES DAILY PRN
Qty: 60 TABLET | Refills: 5 | Status: SHIPPED | OUTPATIENT
Start: 2022-10-31

## 2022-11-01 RX ORDER — ALPRAZOLAM 0.5 MG/1
TABLET ORAL
Qty: 60 TABLET | Refills: 0 | OUTPATIENT
Start: 2022-11-01

## 2022-11-09 ENCOUNTER — HOSPITAL ENCOUNTER (OUTPATIENT)
Dept: CARDIOLOGY | Facility: HOSPITAL | Age: 67
Discharge: HOME | End: 2022-11-09
Admitting: INTERNAL MEDICINE

## 2022-11-09 VITALS
SYSTOLIC BLOOD PRESSURE: 140 MMHG | HEIGHT: 69 IN | HEART RATE: 78 BPM | BODY MASS INDEX: 31.12 KG/M2 | WEIGHT: 210.1 LBS | DIASTOLIC BLOOD PRESSURE: 70 MMHG

## 2022-11-09 DIAGNOSIS — R07.2 PRECORDIAL PAIN: ICD-10-CM

## 2022-11-09 DIAGNOSIS — R93.1 ELEVATED CORONARY ARTERY CALCIUM SCORE: ICD-10-CM

## 2022-11-09 PROCEDURE — 78452 HT MUSCLE IMAGE SPECT MULT: CPT | Performed by: INTERNAL MEDICINE

## 2022-11-09 PROCEDURE — 78452 HT MUSCLE IMAGE SPECT MULT: CPT

## 2022-11-09 PROCEDURE — 93017 CV STRESS TEST TRACING ONLY: CPT

## 2022-11-09 PROCEDURE — A9500 TC99M SESTAMIBI: HCPCS | Performed by: INTERNAL MEDICINE

## 2022-11-09 PROCEDURE — 93018 CV STRESS TEST I&R ONLY: CPT | Performed by: INTERNAL MEDICINE

## 2022-11-09 PROCEDURE — 0 TECHNETIUM SESTAMIBI: Performed by: INTERNAL MEDICINE

## 2022-11-09 RX ADMIN — TECHNETIUM TC 99M SESTAMIBI 1 DOSE: 1 INJECTION INTRAVENOUS at 07:36

## 2022-11-09 RX ADMIN — TECHNETIUM TC 99M SESTAMIBI 1 DOSE: 1 INJECTION INTRAVENOUS at 09:45

## 2022-11-14 LAB
BH CV REST NUCLEAR ISOTOPE DOSE: 9.9 MCI
BH CV STRESS BP STAGE 1: NORMAL
BH CV STRESS DURATION MIN STAGE 1: 3
BH CV STRESS DURATION MIN STAGE 2: 3
BH CV STRESS DURATION SEC STAGE 1: 0
BH CV STRESS DURATION SEC STAGE 2: 8
BH CV STRESS GRADE STAGE 1: 10
BH CV STRESS GRADE STAGE 2: 12
BH CV STRESS HR STAGE 1: 118
BH CV STRESS HR STAGE 2: 131
BH CV STRESS METS STAGE 1: 5
BH CV STRESS METS STAGE 2: 7.5
BH CV STRESS NUCLEAR ISOTOPE DOSE: 33 MCI
BH CV STRESS O2 STAGE 1: 97
BH CV STRESS O2 STAGE 2: 98
BH CV STRESS PROTOCOL 1: NORMAL
BH CV STRESS RECOVERY BP: NORMAL MMHG
BH CV STRESS RECOVERY HR: 84 BPM
BH CV STRESS RECOVERY O2: 98 %
BH CV STRESS SPEED STAGE 1: 1.7
BH CV STRESS SPEED STAGE 2: 2.5
BH CV STRESS STAGE 1: 1
BH CV STRESS STAGE 2: 2
LV EF NUC BP: 61 %
MAXIMAL PREDICTED HEART RATE: 155 BPM
PERCENT MAX PREDICTED HR: 86.45 %
STRESS BASELINE BP: NORMAL MMHG
STRESS BASELINE HR: 76 BPM
STRESS O2 SAT REST: 94 %
STRESS PERCENT HR: 102 %
STRESS POST ESTIMATED WORKLOAD: 7 METS
STRESS POST EXERCISE DUR MIN: 6 MIN
STRESS POST EXERCISE DUR SEC: 8 SEC
STRESS POST O2 SAT PEAK: 98 %
STRESS POST PEAK BP: NORMAL MMHG
STRESS POST PEAK HR: 134 BPM
STRESS TARGET HR: 132 BPM

## 2022-11-15 ENCOUNTER — TELEPHONE (OUTPATIENT)
Dept: CARDIOLOGY | Facility: CLINIC | Age: 67
End: 2022-11-15

## 2022-11-15 NOTE — TELEPHONE ENCOUNTER
Relayed results to pt and his wife. They verbalized understanding. He is complaining of leg cramps at night on the 40 mg Atrovastatin. He will alternate 20 and 40 mg for a few weeks and then pump back up to 40 mg daily. If leg cramps persist, he will call us back and we will discuss further with Dr. Avila. Pt and wife verbalized understanding.

## 2022-11-15 NOTE — TELEPHONE ENCOUNTER
----- Message from Manoj Avila MD sent at 11/14/2022 10:02 PM EST -----  Can you please call the patient to let them know that their stress test did not show any evidence of previous heart attack or ischemia with ambulation. He is okay to continue his exercise regimen and we'll keep him on the aspirin and statin to decrease the risk of heart attack.

## 2022-11-18 ENCOUNTER — OFFICE VISIT (OUTPATIENT)
Dept: INTERNAL MEDICINE | Facility: CLINIC | Age: 67
End: 2022-11-18

## 2022-11-18 VITALS
SYSTOLIC BLOOD PRESSURE: 130 MMHG | RESPIRATION RATE: 16 BRPM | HEIGHT: 69 IN | BODY MASS INDEX: 31.55 KG/M2 | TEMPERATURE: 98.4 F | DIASTOLIC BLOOD PRESSURE: 60 MMHG | HEART RATE: 84 BPM | OXYGEN SATURATION: 97 % | WEIGHT: 213 LBS

## 2022-11-18 DIAGNOSIS — Z23 NEED FOR IMMUNIZATION AGAINST INFLUENZA: ICD-10-CM

## 2022-11-18 DIAGNOSIS — E11.65 TYPE 2 DIABETES MELLITUS WITH HYPERGLYCEMIA, WITHOUT LONG-TERM CURRENT USE OF INSULIN: Primary | ICD-10-CM

## 2022-11-18 DIAGNOSIS — Z23 NEED FOR COVID-19 VACCINE: ICD-10-CM

## 2022-11-18 DIAGNOSIS — E78.49 OTHER HYPERLIPIDEMIA: ICD-10-CM

## 2022-11-18 DIAGNOSIS — I10 PRIMARY HYPERTENSION: ICD-10-CM

## 2022-11-18 DIAGNOSIS — F41.1 GENERALIZED ANXIETY DISORDER: ICD-10-CM

## 2022-11-18 LAB
EXPIRATION DATE: NORMAL
HBA1C MFR BLD: 11.3 %
Lab: NORMAL

## 2022-11-18 PROCEDURE — 0124A COVID-19 (PFIZER) BIVALENT BOOSTER 12+YRS: CPT | Performed by: INTERNAL MEDICINE

## 2022-11-18 PROCEDURE — G0008 ADMIN INFLUENZA VIRUS VAC: HCPCS | Performed by: INTERNAL MEDICINE

## 2022-11-18 PROCEDURE — 99214 OFFICE O/P EST MOD 30 MIN: CPT | Performed by: INTERNAL MEDICINE

## 2022-11-18 PROCEDURE — 90662 IIV NO PRSV INCREASED AG IM: CPT | Performed by: INTERNAL MEDICINE

## 2022-11-18 PROCEDURE — 83036 HEMOGLOBIN GLYCOSYLATED A1C: CPT | Performed by: INTERNAL MEDICINE

## 2022-11-18 PROCEDURE — 3046F HEMOGLOBIN A1C LEVEL >9.0%: CPT | Performed by: INTERNAL MEDICINE

## 2022-11-18 PROCEDURE — 91312 COVID-19 (PFIZER) BIVALENT BOOSTER 12+YRS: CPT | Performed by: INTERNAL MEDICINE

## 2022-11-20 DIAGNOSIS — E11.65 TYPE 2 DIABETES MELLITUS WITH HYPERGLYCEMIA, WITHOUT LONG-TERM CURRENT USE OF INSULIN: ICD-10-CM

## 2023-01-09 DIAGNOSIS — E11.65 TYPE 2 DIABETES MELLITUS WITH HYPERGLYCEMIA, WITHOUT LONG-TERM CURRENT USE OF INSULIN: ICD-10-CM

## 2023-01-09 RX ORDER — PIOGLITAZONEHYDROCHLORIDE 45 MG/1
TABLET ORAL
Qty: 90 TABLET | Refills: 0 | Status: SHIPPED | OUTPATIENT
Start: 2023-01-09 | End: 2023-03-27

## 2023-01-09 RX ORDER — SITAGLIPTIN 100 MG/1
TABLET, FILM COATED ORAL
Qty: 90 TABLET | Refills: 0 | Status: SHIPPED | OUTPATIENT
Start: 2023-01-09 | End: 2023-04-05

## 2023-01-11 ENCOUNTER — TELEPHONE (OUTPATIENT)
Dept: INTERNAL MEDICINE | Facility: CLINIC | Age: 68
End: 2023-01-11
Payer: MEDICARE

## 2023-01-11 NOTE — TELEPHONE ENCOUNTER
Caller: Sue Vaughn    Relationship: Emergency Contact    Best call back number: [phone]    What is the best time to reach you: ANY TIME     Who are you requesting to speak with (clinical staff, provider,  specific staff member): MD. LOVE     Do you know the name of the person who called: SUE    What was the call regarding: PATIENT NEED PCP TO GO ON THE INSURANCE WEBSITE AND FILL OUT THE WELLNESS CHECK UP FORMS     Do you require a callback: YES PLEASE

## 2023-01-11 NOTE — TELEPHONE ENCOUNTER
They will need to call the insurance company to find out how they can print the letter off and send it to me.

## 2023-01-11 NOTE — TELEPHONE ENCOUNTER
Spoke with Sue   She states she needs us to print off a medical form for Chronic medical problems off the [email or web address] site and have Dr Rousseau fill out and send to [employer].   Went on [employer]  Could not find the form .  Sue said she will call [employer] and have them fax the form to Dr Rousseau     We will wait to here from her.

## 2023-01-11 NOTE — TELEPHONE ENCOUNTER
Pt's wife danitza called us back. The form cannot be faxed to us.       Wife asks if dr prasad can log onto [facility] website under provider tab and look for attestation letter.  Without this letter he will not receive benefits.       Call danitza  [phone]

## 2023-01-13 DIAGNOSIS — I10 PRIMARY HYPERTENSION: ICD-10-CM

## 2023-01-13 RX ORDER — LISINOPRIL 40 MG/1
40 TABLET ORAL
Qty: 90 TABLET | Refills: 3 | Status: SHIPPED | OUTPATIENT
Start: 2023-01-13

## 2023-01-15 PROBLEM — H91.93 BILATERAL HEARING LOSS: Status: ACTIVE | Noted: 2023-01-15

## 2023-01-16 NOTE — TELEPHONE ENCOUNTER
Called patients wife- Sue. They will  the forms when they come for his visit on 2/20/23. Forms back to .

## 2023-02-15 ENCOUNTER — OFFICE VISIT (OUTPATIENT)
Dept: CARDIOLOGY | Facility: CLINIC | Age: 68
End: 2023-02-15
Payer: MEDICARE

## 2023-02-15 VITALS
WEIGHT: 211 LBS | OXYGEN SATURATION: 97 % | BODY MASS INDEX: 31.25 KG/M2 | HEIGHT: 69 IN | HEART RATE: 93 BPM | DIASTOLIC BLOOD PRESSURE: 76 MMHG | SYSTOLIC BLOOD PRESSURE: 146 MMHG

## 2023-02-15 DIAGNOSIS — E11.9 TYPE 2 DIABETES MELLITUS WITHOUT COMPLICATION, WITHOUT LONG-TERM CURRENT USE OF INSULIN: ICD-10-CM

## 2023-02-15 DIAGNOSIS — E78.49 OTHER HYPERLIPIDEMIA: ICD-10-CM

## 2023-02-15 DIAGNOSIS — I10 PRIMARY HYPERTENSION: ICD-10-CM

## 2023-02-15 DIAGNOSIS — R93.1 ELEVATED CORONARY ARTERY CALCIUM SCORE: Primary | ICD-10-CM

## 2023-02-15 PROCEDURE — 99214 OFFICE O/P EST MOD 30 MIN: CPT | Performed by: INTERNAL MEDICINE

## 2023-02-15 RX ORDER — ATORVASTATIN CALCIUM 20 MG/1
20 TABLET, FILM COATED ORAL
Qty: 90 TABLET | Refills: 3 | Status: SHIPPED | OUTPATIENT
Start: 2023-02-15

## 2023-02-15 RX ORDER — CARVEDILOL 6.25 MG/1
6.25 TABLET ORAL 2 TIMES DAILY
Qty: 180 TABLET | Refills: 3 | Status: SHIPPED | OUTPATIENT
Start: 2023-02-15

## 2023-02-15 NOTE — PROGRESS NOTES
[facility] CARDIOLOGY MAIN CAMPUS    Return Patient Office Visit    Patient Name: Yevgeniy Vaughn  : 1955   MRN: [id number]   Care Team: Patient Care Team:  Christine Rousseau MD as PCP - General  Abraham Johnson MD as Surgeon (General Surgery)  Scott Villeda MD as Consulting Physician (Pulmonary Disease)  Ignacio Montero MD (Urology)  Hugh Patino MD as Consulting Physician (Gastroenterology)  Manoj Avila MD as Cardiologist (Cardiology)    Chief Complaint   Patient presents with   • Elevated coronary artery calcium score     HPI: Yevgeniy Vaughn is a 67 y.o. male with a history of diabetes, hypertension, hyperlipidemia who presents today for routine follow-up of his elevated calcium score and ASCVD risk factors.  Since his last visit he underwent exercise stress MPI which was negative for infarct or ischemia.  He continues the same activity regimen with walking and biking without angina or undue dyspnea.  He continues to have some sharp stabbing pains which seem to occur randomly and often at rest.  These are brief and are not related to exertion.  He had been having some nocturnal leg cramping which improved after decreasing his atorvastatin from 40 mg to 20 mg.    Subjective   Review of Systems   Constitutional: Negative for activity change and fatigue.   HENT: Positive for hearing loss.    Respiratory: Negative for shortness of breath.    Cardiovascular: Positive for chest pain. Negative for palpitations and leg swelling.   Gastrointestinal: Positive for GERD and indigestion.     Social History     Tobacco Use   Smoking Status Former   • Packs/day: 2.00   • Years: 15.00   • Pack years: 30.00   • Types: Cigarettes   • Start date: 1978   • Quit date: 2021   • Years since quittin.7   Smokeless Tobacco Former   • Types: Chew   • Quit date:    Tobacco Comments    - present ,1  ppd. Quit 14. Re-started approx 2015, 3/ ppd,  then 1 ppd. Quit 4/21/18, re-started Sept 2018, 1 ppd. Quit 6/11/20. Re-started 8/2021 1 ppd, Quit 5/1/22     Allergies   Allergen Reactions   • Trazodone Other (See Comments)     Depletes sodium    • Farxiga [Dapagliflozin] Other (See Comments)     Pain in testicle   Low back pain    • Lovaza [Omega-3-Acid Ethyl Esters] Other (See Comments)     Other reaction(s): CRAWLING FEELING   • Steglatro [Ertugliflozin] Nausea Only and Rash       Current Outpatient Medications:   •  ALPRAZolam (XANAX) 0.5 MG tablet, Take 1 tablet by mouth 2 (Two) Times a Day As Needed for Anxiety. for anxiety, Disp: 60 tablet, Rfl: 5  •  aspirin 81 MG chewable tablet, Chew 81 mg Daily., Disp: , Rfl:   •  atorvastatin (LIPITOR) 40 MG tablet, Take 1 tablet by mouth every night at bedtime. (Patient taking differently: Take 20 mg by mouth every night at bedtime.), Disp: 90 tablet, Rfl: 3  •  B Complex-C (SUPER B COMPLEX/VITAMIN C PO), Take  by mouth., Disp: , Rfl:   •  clobetasol (TEMOVATE) 0.05 % cream, Apply  topically to the appropriate area as directed 2 (Two) Times a Day As Needed (rash)., Disp: 60 g, Rfl: 3  •  empagliflozin (Jardiance) 10 MG tablet tablet, Take 1 tablet by mouth Daily., Disp: 30 tablet, Rfl: 5  •  gabapentin (NEURONTIN) 800 MG tablet, 800 mg 4 (Four) Times a Day., Disp: , Rfl:   •  glucose blood test strip, One Touch test strips, twice daily use as instructed, Disp: 180 each, Rfl: 3  •  glucose monitor monitoring kit, 1 each Daily., Disp: 1 each, Rfl: 0  •  ibuprofen (ADVIL,MOTRIN) 800 MG tablet, Take 1 tablet by mouth three times daily as needed, Disp: 180 tablet, Rfl: 3  •  Januvia 100 MG tablet, Take 1 tablet by mouth once daily, Disp: 90 tablet, Rfl: 0  •  Lancets misc, Check sugars twice daily as directed, Disp: 180 each, Rfl: 3  •  lisinopril (PRINIVIL,ZESTRIL) 40 MG tablet, Take 1 tablet by mouth every night at bedtime., Disp: 90 tablet, Rfl: 3  •  melatonin 5 MG tablet tablet, Take 2 tablets by mouth At Night As  "Needed (insomnia)., Disp: , Rfl:   •  metFORMIN (GLUCOPHAGE) 1000 MG tablet, Take 1 tablet by mouth twice daily, Disp: 180 tablet, Rfl: 1  •  metFORMIN (GLUCOPHAGE) 500 MG tablet, Take 1 tablet by mouth Daily With Breakfast., Disp: , Rfl:   •  naloxone (NARCAN) 4 MG/0.1ML nasal spray, 1 spray into the nostril(s) as directed by provider As Needed (medication overdose)., Disp: 1 each, Rfl: 5  •  Omega-3 Fatty Acids (FISH OIL) 1000 MG capsule capsule, Take 500 mg by mouth Daily With Breakfast., Disp: , Rfl:   •  oxyCODONE-acetaminophen (PERCOCET) 7.5-325 MG per tablet, Take 1 tablet by mouth 4 (Four) Times a Day., Disp: , Rfl:   •  pioglitazone (ACTOS) 45 MG tablet, Take 1 tablet by mouth once daily, Disp: 90 tablet, Rfl: 0  •  promethazine (PHENERGAN) 25 MG tablet, TAKE ONE TABLET BY MOUTH EVERY 4 TO 6 HOURS AS NEEDED FOR NAUSEA AND VOMITING, Disp: 30 tablet, Rfl: 5  •  sildenafil (Viagra) 100 MG tablet, Take 1 tablet by mouth Daily As Needed for Erectile Dysfunction., Disp: 10 tablet, Rfl: 5  •  sodium chloride 1 g tablet, Take 1 tablet by mouth 2 (Two) Times a Day., Disp: 60 tablet, Rfl: 5  •  tiZANidine (ZANAFLEX) 2 MG tablet, 1 tablet 2 (Two) Times a Day., Disp: , Rfl:   •  APO-Varenicline 1 MG tablet, TAKE 1 TABLET BY MOUTH TWICE DAILY FOR  12  WEEKS, Disp: 168 tablet, Rfl: 0    Objective     Vitals:    02/15/23 1452 02/15/23 1519   BP: 161/87 146/76   BP Location: Left arm    Patient Position: Sitting    Pulse: 93    SpO2: 97%    Weight: 95.7 kg (211 lb)    Height: 174 cm (68.5\")      Body mass index is 31.62 kg/m².    Physical Exam  Constitutional:       Appearance: Normal appearance.   HENT:      Head: Normocephalic and atraumatic.      Nose: No congestion.   Eyes:      General: No scleral icterus.     Conjunctiva/sclera: Conjunctivae normal.   Neck:      Vascular: No carotid bruit.   Cardiovascular:      Rate and Rhythm: Normal rate and regular rhythm.      Pulses: Normal pulses.           Radial pulses are " 2+ on the right side and 2+ on the left side.      Heart sounds: No murmur heard.  Pulmonary:      Effort: Pulmonary effort is normal. No respiratory distress.      Breath sounds: Normal breath sounds. No wheezing or rales.   Musculoskeletal:         General: No swelling. Normal range of motion.      Cervical back: Normal range of motion and neck supple.      Right lower leg: No edema.      Left lower leg: No edema.   Skin:     General: Skin is warm and dry.      Coloration: Skin is not jaundiced.       Most recent PCP note, imaging tests, and labs reviewed.    RESULTS:     Labs:  Lab Results   Component Value Date    WBC 6.06 08/19/2022    HGB 12.9 (L) 08/19/2022    HCT 37.8 08/19/2022    MCV 91.1 08/19/2022     08/19/2022     Lab Results   Component Value Date    GLUCOSE 160 (H) 08/19/2022    BUN 12 08/19/2022    CREATININE 0.89 08/19/2022    EGFRIFNONA 87 01/27/2022    BCR 13.5 08/19/2022    K 4.5 08/19/2022    CO2 25.2 08/19/2022    CALCIUM 9.8 08/19/2022    ALBUMIN 4.80 08/19/2022    AST 26 08/19/2022    ALT 22 08/19/2022     Lab Results   Component Value Date    HGBA1C 11.3 11/18/2022     Lab Results   Component Value Date    CHOL 173 08/19/2022    TRIG 276 (H) 08/19/2022    HDL 41 08/19/2022    LDL 86 08/19/2022 9/27/22 - CT Calcium Scoring  Agatston scores for individual coronary arteries are as follows:  Left main: 223.7  Left anterior descending (LAD): 514.3  Left circumflex: 813.6  Right coronary artery (RCA): 810.3   Total Calcium score (Agatston): 2361.9.     11/14/2023 - Exercise stress with MPI  •  Exercise stress testing performed with Joe protocol.  Expected exercise duration = 8:00. Actual = 6:08. FRANKI (+26). Patient requested to stop due to fatigue and achieved a workload of 7.0 METS.  This is fair to average for his age and gender.  •  Baseline ECG was normal.  There was upsloping 1 mm ST depression noted in leads II, III, aVF, V4-V6 which occurred after 5 minutes of exercise that  resolved quickly in recovery. No significant T wave changes noted.  •  Moderate risk for ischemic heart disease based on [facility] treadmill score.  •  Left ventricular ejection fraction is normal (Calculated EF = 61%).  •  Myocardial perfusion imaging indicates a normal myocardial perfusion study with no evidence of ischemia.  •  Impressions are consistent with a low risk study.    Procedures    Advance Care Planning   ACP discussion was declined by the patient. Patient does not have an advance directive, information provided.       Assessment & Plan       ICD-10-CM ICD-9-CM   1. Elevated coronary artery calcium score  R93.1 414.00   2. Other hyperlipidemia  E78.49 272.4   3. Primary hypertension  I10 401.9   4. Type 2 diabetes mellitus without complication, without long-term current use of insulin (HCC)  E11.9 250.00      Elevated coronary artery calcium score  Subclinical ASCVD  Primary prevention of clinical ASCVD   - Stress MPI negative Nov 2022   - aspirin   - continue statin.  He will again try to alternate 20 mg of 40 mg and follow his symptoms   - If LDL remains above 70 would consider addition of ezetimibe.  Would expect that his triglycerides will improve with improved diabetes control however we could also consider icosapent ethyl   - To minimize pill burden will DC OTC Krill oil given lack of demonstrated benefit    Primary hypertension   - Above goal today, will add carvedilol 6.25 mg twice daily for additional control and antianginal effect    Return in about 6 months (around 8/15/2023).    FRANKLYN Avila MD  02/15/23    [facility]  [address] [address] [phone]

## 2023-02-27 ENCOUNTER — OFFICE VISIT (OUTPATIENT)
Dept: INTERNAL MEDICINE | Facility: CLINIC | Age: 68
End: 2023-02-27
Payer: MEDICARE

## 2023-02-27 VITALS
BODY MASS INDEX: 31.2 KG/M2 | HEART RATE: 92 BPM | DIASTOLIC BLOOD PRESSURE: 82 MMHG | SYSTOLIC BLOOD PRESSURE: 140 MMHG | TEMPERATURE: 97.3 F | WEIGHT: 208.2 LBS | OXYGEN SATURATION: 96 %

## 2023-02-27 DIAGNOSIS — E87.1 HYPONATREMIA: ICD-10-CM

## 2023-02-27 DIAGNOSIS — F51.01 PRIMARY INSOMNIA: ICD-10-CM

## 2023-02-27 DIAGNOSIS — R93.1 ELEVATED CORONARY ARTERY CALCIUM SCORE: ICD-10-CM

## 2023-02-27 DIAGNOSIS — E11.65 TYPE 2 DIABETES MELLITUS WITH HYPERGLYCEMIA, WITHOUT LONG-TERM CURRENT USE OF INSULIN: Primary | ICD-10-CM

## 2023-02-27 DIAGNOSIS — R91.1 LUNG NODULE: ICD-10-CM

## 2023-02-27 DIAGNOSIS — Z12.5 SCREENING FOR PROSTATE CANCER: ICD-10-CM

## 2023-02-27 DIAGNOSIS — F41.1 GENERALIZED ANXIETY DISORDER: ICD-10-CM

## 2023-02-27 DIAGNOSIS — K63.5 BENIGN COLONIC POLYP: ICD-10-CM

## 2023-02-27 DIAGNOSIS — G89.29 CHRONIC MIDLINE LOW BACK PAIN WITH BILATERAL SCIATICA: ICD-10-CM

## 2023-02-27 DIAGNOSIS — I10 PRIMARY HYPERTENSION: ICD-10-CM

## 2023-02-27 DIAGNOSIS — G89.4 CHRONIC PAIN SYNDROME: ICD-10-CM

## 2023-02-27 DIAGNOSIS — E78.49 OTHER HYPERLIPIDEMIA: ICD-10-CM

## 2023-02-27 DIAGNOSIS — M54.41 CHRONIC MIDLINE LOW BACK PAIN WITH BILATERAL SCIATICA: ICD-10-CM

## 2023-02-27 DIAGNOSIS — L40.9 PSORIASIS: ICD-10-CM

## 2023-02-27 DIAGNOSIS — E55.9 VITAMIN D INSUFFICIENCY: ICD-10-CM

## 2023-02-27 DIAGNOSIS — M54.42 CHRONIC MIDLINE LOW BACK PAIN WITH BILATERAL SCIATICA: ICD-10-CM

## 2023-02-27 LAB
ALBUMIN SERPL-MCNC: 4.6 G/DL (ref 3.5–5.2)
ALBUMIN/GLOB SERPL: 1.5 G/DL
ALP SERPL-CCNC: 72 U/L (ref 39–117)
ALT SERPL W P-5'-P-CCNC: 16 U/L (ref 1–41)
ANION GAP SERPL CALCULATED.3IONS-SCNC: 12.8 MMOL/L (ref 5–15)
AST SERPL-CCNC: 19 U/L (ref 1–40)
BASOPHILS # BLD AUTO: 0.02 10*3/MM3 (ref 0–0.2)
BASOPHILS NFR BLD AUTO: 0.3 % (ref 0–1.5)
BILIRUB SERPL-MCNC: <0.2 MG/DL (ref 0–1.2)
BUN SERPL-MCNC: 12 MG/DL (ref 8–23)
BUN/CREAT SERPL: 12.5 (ref 7–25)
CALCIUM SPEC-SCNC: 10.1 MG/DL (ref 8.6–10.5)
CHLORIDE SERPL-SCNC: 94 MMOL/L (ref 98–107)
CHOLEST SERPL-MCNC: 226 MG/DL (ref 0–200)
CO2 SERPL-SCNC: 28.2 MMOL/L (ref 22–29)
CREAT SERPL-MCNC: 0.96 MG/DL (ref 0.76–1.27)
DEPRECATED RDW RBC AUTO: 41 FL (ref 37–54)
EGFRCR SERPLBLD CKD-EPI 2021: 87.2 ML/MIN/1.73
EOSINOPHIL # BLD AUTO: 0.22 10*3/MM3 (ref 0–0.4)
EOSINOPHIL NFR BLD AUTO: 3.6 % (ref 0.3–6.2)
ERYTHROCYTE [DISTWIDTH] IN BLOOD BY AUTOMATED COUNT: 12.9 % (ref 12.3–15.4)
EXPIRATION DATE: NORMAL
GLOBULIN UR ELPH-MCNC: 3.1 GM/DL
GLUCOSE SERPL-MCNC: 204 MG/DL (ref 65–99)
HBA1C MFR BLD: 12.5 %
HCT VFR BLD AUTO: 42.3 % (ref 37.5–51)
HDLC SERPL-MCNC: 32 MG/DL (ref 40–60)
HGB BLD-MCNC: 14.6 G/DL (ref 13–17.7)
IMM GRANULOCYTES # BLD AUTO: 0.06 10*3/MM3 (ref 0–0.05)
IMM GRANULOCYTES NFR BLD AUTO: 1 % (ref 0–0.5)
LDLC SERPL CALC-MCNC: 90 MG/DL (ref 0–100)
LDLC/HDLC SERPL: 2.12 {RATIO}
LYMPHOCYTES # BLD AUTO: 1.94 10*3/MM3 (ref 0.7–3.1)
LYMPHOCYTES NFR BLD AUTO: 31.4 % (ref 19.6–45.3)
Lab: NORMAL
MCH RBC QN AUTO: 30.2 PG (ref 26.6–33)
MCHC RBC AUTO-ENTMCNC: 34.5 G/DL (ref 31.5–35.7)
MCV RBC AUTO: 87.4 FL (ref 79–97)
MONOCYTES # BLD AUTO: 0.51 10*3/MM3 (ref 0.1–0.9)
MONOCYTES NFR BLD AUTO: 8.3 % (ref 5–12)
NEUTROPHILS NFR BLD AUTO: 3.42 10*3/MM3 (ref 1.7–7)
NEUTROPHILS NFR BLD AUTO: 55.4 % (ref 42.7–76)
NRBC BLD AUTO-RTO: 0 /100 WBC (ref 0–0.2)
PLATELET # BLD AUTO: 307 10*3/MM3 (ref 140–450)
PMV BLD AUTO: 8.6 FL (ref 6–12)
POTASSIUM SERPL-SCNC: 4.5 MMOL/L (ref 3.5–5.2)
PROT SERPL-MCNC: 7.7 G/DL (ref 6–8.5)
PSA SERPL-MCNC: 0.22 NG/ML (ref 0–4)
RBC # BLD AUTO: 4.84 10*6/MM3 (ref 4.14–5.8)
SODIUM SERPL-SCNC: 135 MMOL/L (ref 136–145)
TRIGL SERPL-MCNC: 631 MG/DL (ref 0–150)
TSH SERPL DL<=0.05 MIU/L-ACNC: 0.51 UIU/ML (ref 0.27–4.2)
VLDLC SERPL-MCNC: 104 MG/DL (ref 5–40)
WBC NRBC COR # BLD: 6.17 10*3/MM3 (ref 3.4–10.8)

## 2023-02-27 PROCEDURE — 80053 COMPREHEN METABOLIC PANEL: CPT | Performed by: INTERNAL MEDICINE

## 2023-02-27 PROCEDURE — 36415 COLL VENOUS BLD VENIPUNCTURE: CPT | Performed by: INTERNAL MEDICINE

## 2023-02-27 PROCEDURE — 83036 HEMOGLOBIN GLYCOSYLATED A1C: CPT | Performed by: INTERNAL MEDICINE

## 2023-02-27 PROCEDURE — 85025 COMPLETE CBC W/AUTO DIFF WBC: CPT | Performed by: INTERNAL MEDICINE

## 2023-02-27 PROCEDURE — 99214 OFFICE O/P EST MOD 30 MIN: CPT | Performed by: INTERNAL MEDICINE

## 2023-02-27 PROCEDURE — 3046F HEMOGLOBIN A1C LEVEL >9.0%: CPT | Performed by: INTERNAL MEDICINE

## 2023-02-27 PROCEDURE — G0103 PSA SCREENING: HCPCS | Performed by: INTERNAL MEDICINE

## 2023-02-27 PROCEDURE — 80061 LIPID PANEL: CPT | Performed by: INTERNAL MEDICINE

## 2023-02-27 PROCEDURE — 84443 ASSAY THYROID STIM HORMONE: CPT | Performed by: INTERNAL MEDICINE

## 2023-02-27 RX ORDER — CLOBETASOL PROPIONATE 0.5 MG/G
CREAM TOPICAL 2 TIMES DAILY PRN
Qty: 60 G | Refills: 3
Start: 2023-02-27 | End: 2023-02-28 | Stop reason: SDUPTHER

## 2023-02-28 ENCOUNTER — TELEPHONE (OUTPATIENT)
Dept: INTERNAL MEDICINE | Facility: CLINIC | Age: 68
End: 2023-02-28
Payer: MEDICARE

## 2023-02-28 RX ORDER — CLOBETASOL PROPIONATE 0.5 MG/G
CREAM TOPICAL 2 TIMES DAILY PRN
Qty: 60 G | Refills: 3 | Status: SHIPPED | OUTPATIENT
Start: 2023-02-28

## 2023-02-28 NOTE — TELEPHONE ENCOUNTER
Caller: Sue Vaughn    Relationship: Emergency Contact    Best call back number: [phone]    What medications are you currently taking:   Current Outpatient Medications on File Prior to Visit   Medication Sig Dispense Refill   • ALPRAZolam (XANAX) 0.5 MG tablet Take 1 tablet by mouth 2 (Two) Times a Day As Needed for Anxiety. for anxiety 60 tablet 5   • APO-Varenicline 1 MG tablet TAKE 1 TABLET BY MOUTH TWICE DAILY FOR  12  WEEKS 168 tablet 0   • aspirin 81 MG chewable tablet Chew 81 mg Daily.     • atorvastatin (LIPITOR) 20 MG tablet Take 1 tablet by mouth every night at bedtime. 90 tablet 3   • B Complex-C (SUPER B COMPLEX/VITAMIN C PO) Take  by mouth.     • carvedilol (COREG) 6.25 MG tablet Take 1 tablet by mouth 2 (Two) Times a Day. 180 tablet 3   • clobetasol (TEMOVATE) 0.05 % cream Apply  topically to the appropriate area as directed 2 (Two) Times a Day As Needed (rash). 60 g 3   • empagliflozin (Jardiance) 10 MG tablet tablet Take 1 tablet by mouth Daily. 30 tablet 5   • gabapentin (NEURONTIN) 800 MG tablet 800 mg 4 (Four) Times a Day.     • glucose blood test strip One Touch test strips, twice daily use as instructed 180 each 3   • glucose monitor monitoring kit 1 each Daily. 1 each 0   • ibuprofen (ADVIL,MOTRIN) 800 MG tablet Take 1 tablet by mouth three times daily as needed 180 tablet 3   • Januvia 100 MG tablet Take 1 tablet by mouth once daily 90 tablet 0   • Lancets misc Check sugars twice daily as directed 180 each 3   • lisinopril (PRINIVIL,ZESTRIL) 40 MG tablet Take 1 tablet by mouth every night at bedtime. 90 tablet 3   • melatonin 5 MG tablet tablet Take 2 tablets by mouth At Night As Needed (insomnia).     • metFORMIN (GLUCOPHAGE) 1000 MG tablet Take 1 tablet by mouth twice daily 180 tablet 1   • metFORMIN (GLUCOPHAGE) 500 MG tablet Take 1 tablet by mouth Daily With Breakfast.     • naloxone (NARCAN) 4 MG/0.1ML nasal spray 1 spray into the nostril(s) as directed by provider As Needed  (medication overdose). 1 each 5   • oxyCODONE-acetaminophen (PERCOCET) 7.5-325 MG per tablet Take 1 tablet by mouth 4 (Four) Times a Day.     • pioglitazone (ACTOS) 45 MG tablet Take 1 tablet by mouth once daily 90 tablet 0   • promethazine (PHENERGAN) 25 MG tablet TAKE ONE TABLET BY MOUTH EVERY 4 TO 6 HOURS AS NEEDED FOR NAUSEA AND VOMITING 30 tablet 5   • sildenafil (Viagra) 100 MG tablet Take 1 tablet by mouth Daily As Needed for Erectile Dysfunction. 10 tablet 5   • sodium chloride 1 g tablet Take 1 tablet by mouth 2 (Two) Times a Day. 60 tablet 5   • tiZANidine (ZANAFLEX) 2 MG tablet 1 tablet 2 (Two) Times a Day.       No current facility-administered medications on file prior to visit.          Which medication are you concerned about: DANDY     Who prescribed you this medication: DR LOVE    What are your concerns: PRESCRIPTION NEVER MADE IT TO PHARMACY FROM YESTERDAY. PLEASE SEND TO PHARMACY.

## 2023-03-01 ENCOUNTER — TELEPHONE (OUTPATIENT)
Dept: INTERNAL MEDICINE | Facility: CLINIC | Age: 68
End: 2023-03-01
Payer: MEDICARE

## 2023-03-01 DIAGNOSIS — E11.65 TYPE 2 DIABETES MELLITUS WITH HYPERGLYCEMIA, WITHOUT LONG-TERM CURRENT USE OF INSULIN: ICD-10-CM

## 2023-03-01 DIAGNOSIS — E78.49 OTHER HYPERLIPIDEMIA: Primary | ICD-10-CM

## 2023-03-02 RX ORDER — EZETIMIBE 10 MG/1
10 TABLET ORAL DAILY
Qty: 30 TABLET | Refills: 11 | Status: SHIPPED | OUTPATIENT
Start: 2023-03-02

## 2023-03-02 NOTE — TELEPHONE ENCOUNTER
Called and notified patient that the prescriptions had been sent over, and that you have completed the form and they should be notifying him as to rather or not he qualifies for the program.     Verbal understanding and appreciation given.    Told them to call back with any further questions.

## 2023-03-02 NOTE — TELEPHONE ENCOUNTER
Called and read message to patient and his wife. Verbal understanding given. He agrees to increasing his Jardiance, as well as adding the Zetia.    They would like to know if you where able to complete the attestation form?    They would like a Mailed  lab letter to their house as well. So they can take it with them to the pain clinic.

## 2023-03-02 NOTE — TELEPHONE ENCOUNTER
Call patient please.    His hemoglobin A1c was 12.5, which has gone up, I would like to increase his Jardiance to 25 mg daily.  If he is agreeable, I will send a prescription to the pharmacy.    In addition, his cholesterol levels are not at goal.  Since he has been unable to tolerate an increased dose of the Atorvastatin, I recommend adding Zetia 10 mg daily.  If he is agreeable, I will send a prescription to the pharmacy.    I will also send a lab letter.

## 2023-03-02 NOTE — TELEPHONE ENCOUNTER
Prescription was for Zetia and the increased dose of Jardiance sent to the pharmacy.    I will send a lab letter with a copy of the labs.    I did complete the attestation form.  They apparently will send him notification whether he qualifies for the program.  Please inform the patient he only met 2 of the 3 criteria on the attestation form.

## 2023-03-07 ENCOUNTER — TELEPHONE (OUTPATIENT)
Dept: INTERNAL MEDICINE | Facility: CLINIC | Age: 68
End: 2023-03-07
Payer: MEDICARE

## 2023-03-07 NOTE — TELEPHONE ENCOUNTER
Patient's wife, Sue, called and Feliciano is having very bad leg cramps, charley horses, in both legs and in groin area. Please call: [phone]

## 2023-03-07 NOTE — TELEPHONE ENCOUNTER
Caller: Artur Vaughncy    Relationship: Emergency Contact    Best call back number: [phone]    What medication are you requesting: PAIN MEDICATION    What are your current symptoms: LEG CRAMPING    How long have you been experiencing symptoms:     Have you had these symptoms before:    [x] Yes  [] No    Have you been treated for these symptoms before:   [x] Yes  [] No    If a prescription is needed, what is your preferred pharmacy and phone number: [facility] [address] [place] [address] [phone] [facility] [phone] FX     Additional notes:

## 2023-03-08 ENCOUNTER — TELEPHONE (OUTPATIENT)
Dept: INTERNAL MEDICINE | Facility: CLINIC | Age: 68
End: 2023-03-08

## 2023-03-08 NOTE — TELEPHONE ENCOUNTER
Called and spoke patient and he said he would try the tizanidine 4mg instead of trying a new medication at this time.

## 2023-03-08 NOTE — TELEPHONE ENCOUNTER
Patient is in severe pain, severe charley horses in both legs. Legs are sore afterwards and it is hard for him to work. The pain is to the point to where his feet are drawing up.     Patients wife stated that any medication you could give would be greatly appreciated as he is in a lot of pain.

## 2023-03-08 NOTE — TELEPHONE ENCOUNTER
Please confirm he is currently taking tizanidine 2 mg, 1 tablet twice daily.  If yes, I would recommend increasing to 2 tablets 3 times daily.  If this works for him, the higher dose can be prescribed for him.

## 2023-03-26 DIAGNOSIS — E11.65 TYPE 2 DIABETES MELLITUS WITH HYPERGLYCEMIA, WITHOUT LONG-TERM CURRENT USE OF INSULIN: ICD-10-CM

## 2023-03-27 RX ORDER — PIOGLITAZONEHYDROCHLORIDE 45 MG/1
TABLET ORAL
Qty: 90 TABLET | Refills: 0 | Status: SHIPPED | OUTPATIENT
Start: 2023-03-27

## 2023-04-03 DIAGNOSIS — E11.65 TYPE 2 DIABETES MELLITUS WITH HYPERGLYCEMIA, WITHOUT LONG-TERM CURRENT USE OF INSULIN: ICD-10-CM

## 2023-04-05 DIAGNOSIS — E11.65 TYPE 2 DIABETES MELLITUS WITH HYPERGLYCEMIA, WITHOUT LONG-TERM CURRENT USE OF INSULIN: ICD-10-CM

## 2023-04-05 RX ORDER — SITAGLIPTIN 100 MG/1
TABLET, FILM COATED ORAL
Qty: 90 TABLET | Refills: 1 | Status: SHIPPED | OUTPATIENT
Start: 2023-04-05

## 2023-04-05 RX ORDER — LANCETS 33 GAUGE
EACH MISCELLANEOUS
Qty: 100 EACH | Refills: 3 | Status: SHIPPED | OUTPATIENT
Start: 2023-04-05

## 2023-04-23 DIAGNOSIS — E87.1 HYPONATREMIA: ICD-10-CM

## 2023-04-24 RX ORDER — SODIUM CHLORIDE 1 G/1
TABLET ORAL
Qty: 60 TABLET | Refills: 11 | Status: SHIPPED | OUTPATIENT
Start: 2023-04-24

## 2023-04-26 ENCOUNTER — TELEPHONE (OUTPATIENT)
Dept: INTERNAL MEDICINE | Facility: CLINIC | Age: 68
End: 2023-04-26
Payer: MEDICARE

## 2023-04-26 NOTE — TELEPHONE ENCOUNTER
Called and spoke to patient regarding the letter on the recall of the 25mg of Jardiance. He states that he will contact his pharmacy to see if his pills where affected.   Verbalized appreciation and understanding.

## 2023-04-28 DIAGNOSIS — F41.1 GENERALIZED ANXIETY DISORDER: ICD-10-CM

## 2023-05-01 RX ORDER — ALPRAZOLAM 0.5 MG/1
TABLET ORAL
Qty: 60 TABLET | Refills: 5 | Status: SHIPPED | OUTPATIENT
Start: 2023-05-01

## 2023-05-01 NOTE — TELEPHONE ENCOUNTER
Last office visit (LOV) for chronic conditions 2/27/23  Next office visit (NOV) 5/30/23  Urine drug screen (UDS) 8/19/22  Controlled substance agreement (CSA) 8/19/22

## 2023-05-03 ENCOUNTER — TELEPHONE (OUTPATIENT)
Dept: INTERNAL MEDICINE | Facility: CLINIC | Age: 68
End: 2023-05-03
Payer: MEDICARE

## 2023-05-03 DIAGNOSIS — E78.49 OTHER HYPERLIPIDEMIA: Primary | ICD-10-CM

## 2023-05-03 RX ORDER — ROSUVASTATIN CALCIUM 10 MG/1
10 TABLET, COATED ORAL DAILY
Qty: 90 TABLET | Refills: 1 | Status: SHIPPED | OUTPATIENT
Start: 2023-05-03

## 2023-05-03 NOTE — TELEPHONE ENCOUNTER
Caller: Sue Vaughn    Relationship: Emergency Contact    Best call back number:   [phone]        What was the call regarding: PATIENT CAN NOT TAKE atorvastatin (LIPITOR) 20 MG tablet    PATIENT IS HAVING SIDE EFFECTS SUCH CRAMPED LEGS, RODOLFO HORSE, WITHDRAWALS, AND OTHER. PATIENT IS REQUESTING A DIFFERENT MEDICATION.     Do you require a callback: YES

## 2023-05-13 DIAGNOSIS — E11.65 TYPE 2 DIABETES MELLITUS WITH HYPERGLYCEMIA, WITHOUT LONG-TERM CURRENT USE OF INSULIN: ICD-10-CM

## 2023-06-06 ENCOUNTER — OFFICE VISIT (OUTPATIENT)
Dept: INTERNAL MEDICINE | Facility: CLINIC | Age: 68
End: 2023-06-06
Payer: MEDICARE

## 2023-06-06 VITALS
DIASTOLIC BLOOD PRESSURE: 72 MMHG | BODY MASS INDEX: 28.92 KG/M2 | WEIGHT: 193 LBS | TEMPERATURE: 97.8 F | SYSTOLIC BLOOD PRESSURE: 146 MMHG | HEART RATE: 68 BPM | RESPIRATION RATE: 14 BRPM

## 2023-06-06 DIAGNOSIS — E78.49 OTHER HYPERLIPIDEMIA: ICD-10-CM

## 2023-06-06 DIAGNOSIS — I10 PRIMARY HYPERTENSION: ICD-10-CM

## 2023-06-06 DIAGNOSIS — F41.1 GENERALIZED ANXIETY DISORDER: ICD-10-CM

## 2023-06-06 DIAGNOSIS — Z23 NEED FOR VACCINATION FOR PNEUMOCOCCUS: ICD-10-CM

## 2023-06-06 DIAGNOSIS — E11.65 TYPE 2 DIABETES MELLITUS WITH HYPERGLYCEMIA, WITHOUT LONG-TERM CURRENT USE OF INSULIN: Primary | ICD-10-CM

## 2023-06-06 LAB
ALBUMIN SERPL-MCNC: 4.5 G/DL (ref 3.5–5.2)
ALBUMIN/GLOB SERPL: 1.4 G/DL
ALP SERPL-CCNC: 69 U/L (ref 39–117)
ALT SERPL W P-5'-P-CCNC: 6 U/L (ref 1–41)
ANION GAP SERPL CALCULATED.3IONS-SCNC: 10.7 MMOL/L (ref 5–15)
AST SERPL-CCNC: 13 U/L (ref 1–40)
BILIRUB SERPL-MCNC: <0.2 MG/DL (ref 0–1.2)
BUN SERPL-MCNC: 15 MG/DL (ref 8–23)
BUN/CREAT SERPL: 16.5 (ref 7–25)
CALCIUM SPEC-SCNC: 10.5 MG/DL (ref 8.6–10.5)
CHLORIDE SERPL-SCNC: 94 MMOL/L (ref 98–107)
CHOLEST SERPL-MCNC: 245 MG/DL (ref 0–200)
CO2 SERPL-SCNC: 26.3 MMOL/L (ref 22–29)
CREAT SERPL-MCNC: 0.91 MG/DL (ref 0.76–1.27)
EGFRCR SERPLBLD CKD-EPI 2021: 93 ML/MIN/1.73
EXPIRATION DATE: NORMAL
GLOBULIN UR ELPH-MCNC: 3.3 GM/DL
GLUCOSE SERPL-MCNC: 134 MG/DL (ref 65–99)
HBA1C MFR BLD: 8.5 %
HDLC SERPL-MCNC: 30 MG/DL (ref 40–60)
LDLC SERPL CALC-MCNC: 144 MG/DL (ref 0–100)
LDLC/HDLC SERPL: 4.61 {RATIO}
Lab: NORMAL
POTASSIUM SERPL-SCNC: 5.2 MMOL/L (ref 3.5–5.2)
PROT SERPL-MCNC: 7.8 G/DL (ref 6–8.5)
SODIUM SERPL-SCNC: 131 MMOL/L (ref 136–145)
TRIGL SERPL-MCNC: 383 MG/DL (ref 0–150)
VLDLC SERPL-MCNC: 71 MG/DL (ref 5–40)

## 2023-06-06 PROCEDURE — 80061 LIPID PANEL: CPT | Performed by: INTERNAL MEDICINE

## 2023-06-06 PROCEDURE — 80053 COMPREHEN METABOLIC PANEL: CPT | Performed by: INTERNAL MEDICINE

## 2023-06-06 RX ORDER — ATORVASTATIN CALCIUM 40 MG/1
40 TABLET, FILM COATED ORAL DAILY
COMMUNITY
Start: 2023-04-01 | End: 2023-06-06 | Stop reason: SINTOL

## 2023-06-06 RX ORDER — ESCITALOPRAM OXALATE 10 MG/1
10 TABLET ORAL DAILY
Qty: 30 TABLET | Refills: 5 | Status: SHIPPED | OUTPATIENT
Start: 2023-06-06

## 2023-06-06 NOTE — PROGRESS NOTES
Subjective       Yevgeniy Vaughn is a 67 y.o. male.     Chief Complaint   Patient presents with    Type 2 diabetes mellitus with hyperglycemia, without long-t     3 month recheck       History obtained from the patient and his wife.      History of Present Illness     Primary Care Cardiac Diagnostic Constellation:      His Diabetes Mellitus has been unstable.  Medication: Metformin, Januvia, Jardiance, Actos, and Lisinopril.  Off Atorvastatin and Rosuvastatin.  His Hypertension has been stable.   Medication: Lisinopril.   His Hyperlipidemia has been unstable.   His LDL goal is < 70 and last LDL was 104, .   Medication: Zetia.  Off Atorvastatin and Rosuvastatin.  Side Effects: Leg cramps with Atorvastatin and Rosuvastatin.     Comorbid Illness: Coronary Artery Calcification.  On Aspirin. Off Atorvastatin and Rosuvastatin.     Procedures: On 9/27/2022 Coronary Artery Calcium Score was 2361.9.  On 11/9/2022, Cardiolite stress test showed  EF 61%.  Myocardial perfusion study normal, overall low risk.        Interval Events: He saw Dr. Avila in Cardiology on 2/15/2013.  Blood pressure was 146/76 Coreg was added for better blood pressure control, but the patient stating it made his leg pain worse.  At his visit with me on 2/27/2023, Coreg was restarted.  The patient was unable to tolerate an increased dose of Atorvastatin, so Zetia was added on 3/1/2023.  He persisted in having significant leg cramps.  On 5/3/2023, Atorvastatin was discontinued and Rosuvastatin was started.  On 11/18/2022, HgA1C was 11.3 (up from 9.5 on 8/19/22).  Jardiance was added.  Last HgA1C on 2/27/2023 was 12.5.  Jardiance dose was increased to 25 mg daily.  Since then, he states his blood sugar at home has [id number] fasting and < 170 postprandial.  He denies episodes of low blood sugar. The patient states he checks his feet regularly.  His last Ophthalmology appointment was 5/24/2022, trace bilateral cataracts but no retinopathy.  He  states he has an appointment scheduled in August 2023.     Symptoms: Denies chest pain, dyspnea, SALAMANCA, orthopnea, PND, palpitations, syncope, lower extremity edema, claudication, lightheadedness, and dizziness.  Associated Symptoms: Weight down 15 pounds intentionally in the past 3 months.  Reports stable myalgias, arthralgias, and polydipsia. No fatigue, headache, polyuria, visual impairment, memory loss, or concentration issues.  Stable pain, numbness, and tingling of the feet, but denies a foot ulcer. On Neurontin (maximum dose).      Lifestyle: He reports he does have a healthy diet.  He has not been exercising due to his back pain.  Tobacco Use:   Current smoker. He quit smoking completely 4/1/18. Re-started Sept 2018, 1 ppd.  Quit completely 6/11/2020. Re-sarted 8/2021 1 ppd, Quit 5/25/21.  He stopped Chantix due to the recall.  He started smoking 1 ppd in December 2021.  Quit 5/22/2022.  He re-started 3/4 ppd in February 2023 due to stress.    Anxiety Disorder Follow-Up: The patient is being seen for follow-up of Anxiey, which is unstable.    Comorbid Illnesses: Insomnia.  Interval Events: He is under a lot of stress due to family issues.  He is requesting the same medication his wife is taking (Lexapro)  Symptoms: Worsened anxiety and stable insomnia.  He states he has panic attacks every 7 to 10 days.  Denies depression, anhedonia, memory loss, and difficulty concentrating.    Associated Symptoms: no suicidal ideation or thoughts of self-harm.   Medication: Alprazolam BID.  Side Effects: None.     Current Outpatient Medications on File Prior to Visit   Medication Sig Dispense Refill    ALPRAZolam (XANAX) 0.5 MG tablet Take 1 tablet by mouth twice daily as needed for anxiety 60 tablet 5    APO-Varenicline 1 MG tablet TAKE 1 TABLET BY MOUTH TWICE DAILY FOR  12  WEEKS 168 tablet 0    aspirin 81 MG chewable tablet Chew 1 tablet Daily.      B Complex-C (SUPER B COMPLEX/VITAMIN C PO) Take  by mouth.       carvedilol (COREG) 6.25 MG tablet Take 1 tablet by mouth 2 (Two) Times a Day. 180 tablet 3    clobetasol (TEMOVATE) 0.05 % cream Apply  topically to the appropriate area as directed 2 (Two) Times a Day As Needed (rash). 60 g 3    empagliflozin (Jardiance) 25 MG tablet tablet Take 1 tablet by mouth Daily. 30 tablet 11    ezetimibe (Zetia) 10 MG tablet Take 1 tablet by mouth Daily. 30 tablet 11    gabapentin (NEURONTIN) 800 MG tablet 1 tablet 4 (Four) Times a Day.      glucose blood (OneTouch Verio) test strip USE 1 STRIP TO CHECK GLUCOSE TWICE DAILY AS DIRECTED 100 each 0    glucose monitor monitoring kit 1 each Daily. 1 each 0    ibuprofen (ADVIL,MOTRIN) 800 MG tablet Take 1 tablet by mouth three times daily as needed 180 tablet 3    Januvia 100 MG tablet Take 1 tablet by mouth once daily 90 tablet 1    Lancets (OneTouch Delica Plus Yektet75T) misc USE 1  TO CHECK GLUCOSE ONCE DAILY AS DIRECTED 100 each 3    lisinopril (PRINIVIL,ZESTRIL) 40 MG tablet Take 1 tablet by mouth every night at bedtime. 90 tablet 3    melatonin 5 MG tablet tablet Take 2 tablets by mouth At Night As Needed (insomnia).      metFORMIN (GLUCOPHAGE) 1000 MG tablet Take 1 tablet by mouth twice daily 180 tablet 1    metFORMIN (GLUCOPHAGE) 500 MG tablet Take 1 tablet by mouth Daily With Breakfast.      naloxone (NARCAN) 4 MG/0.1ML nasal spray 1 spray into the nostril(s) as directed by provider As Needed (medication overdose). 1 each 5    oxyCODONE-acetaminophen (PERCOCET) 7.5-325 MG per tablet Take 1 tablet by mouth 4 (Four) Times a Day.      pioglitazone (ACTOS) 45 MG tablet Take 1 tablet by mouth once daily 90 tablet 0    promethazine (PHENERGAN) 25 MG tablet TAKE ONE TABLET BY MOUTH EVERY 4 TO 6 HOURS AS NEEDED FOR NAUSEA AND VOMITING 30 tablet 5    sildenafil (Viagra) 100 MG tablet Take 1 tablet by mouth Daily As Needed for Erectile Dysfunction. 10 tablet 5    sodium chloride 1 g tablet Take 1 tablet by mouth twice daily 60 tablet 11     tiZANidine (ZANAFLEX) 2 MG tablet 1 tablet 2 (Two) Times a Day.      [DISCONTINUED] atorvastatin (LIPITOR) 40 MG tablet Take 1 tablet by mouth Daily. (Patient not taking: Reported on 6/6/2023)      [DISCONTINUED] rosuvastatin (Crestor) 10 MG tablet Take 1 tablet by mouth Daily. (Patient not taking: Reported on 6/6/2023) 90 tablet 1     No current facility-administered medications on file prior to visit.       Current outpatient and discharge medications have been reconciled for the patient.  Reviewed by: Christine Rousseau MD        The following portions of the patient's history were reviewed and updated as appropriate: allergies, current medications, past family history, past medical history, past social history, past surgical history, and problem list.    Review of Systems   Constitutional:  Negative for fatigue and unexpected weight change.   Eyes:  Negative for visual disturbance.   Respiratory:  Negative for cough, shortness of breath and wheezing.    Cardiovascular:  Negative for chest pain, palpitations and leg swelling.        No SALAMANCA, orthopnea, or claudication.   Gastrointestinal:  Positive for abdominal pain (RLQ since inguinal hernia surgery). Negative for blood in stool, constipation, diarrhea, nausea and vomiting.        Denies melena.   Endocrine: Negative for polydipsia and polyuria.   Musculoskeletal:  Positive for arthralgias, back pain and myalgias.   Neurological:  Positive for numbness. Negative for dizziness, syncope, light-headedness and headaches.        No memory issues.   Psychiatric/Behavioral:  Negative for decreased concentration.        Objective       Blood pressure 146/72, pulse 68, temperature 97.8 °F (36.6 °C), temperature source Infrared, resp. rate 14, weight 87.5 kg (193 lb).  Body mass index is 28.92 kg/m².      Physical Exam  Vitals and nursing note reviewed.   Constitutional:       Appearance: He is well-developed.      Comments: BMI greater than 25   Neck:      Thyroid: No  thyroid mass or thyromegaly.      Vascular: No carotid bruit.   Cardiovascular:      Rate and Rhythm: Normal rate and regular rhythm.      Pulses: Normal pulses.      Heart sounds: Normal heart sounds. No murmur heard.    No friction rub. No gallop.   Pulmonary:      Effort: Pulmonary effort is normal.      Breath sounds: Normal breath sounds.   Musculoskeletal:      Right lower leg: No edema.      Left lower leg: No edema.   Neurological:      Mental Status: He is alert.   Psychiatric:         Mood and Affect: Mood normal.     Results for orders placed or performed in visit on 06/06/23   POC Glycosylated Hemoglobin (Hb A1C)    Specimen: Blood   Result Value Ref Range    Hemoglobin A1C 8.5 %    Lot Number [id number]     Expiration Date 2/19/25        Assessment / Plan:  Diagnoses and all orders for this visit:    1. Type 2 diabetes mellitus with hyperglycemia, without long-term current use of insulin (Primary)  -     POC Glycosylated Hemoglobin (Hb A1C)  -     Lipid Panel  -     Comprehensive Metabolic Panel   Continue current medication(s) as noted in the history of present illness.   Consider Repatha.    2. Primary hypertension  -     Lipid Panel  -     Comprehensive Metabolic Panel   Continue current medication(s) as noted in the history of present illness.    3. Other hyperlipidemia  -     Lipid Panel  -     Comprehensive Metabolic Panel   Continue current medication(s) as noted in the history of present illness.    4. Generalized anxiety disorder  -     escitalopram (Lexapro) 10 MG tablet; Take 1 tablet by mouth Daily.  Dispense: 30 tablet; Refill: 5- NEW   Lexapro side effects discussed with the patient.   Continue Alprazolam.    The patient was instructed in the side effects of the medication.  Risks of the potential for tolerance, dependence, and addiction were discussed.  The patient was instructed to take the lowest dosage of the medication, at the lowest frequency, and for the shortest period of time  possible.  The patient was instructed not to receive controlled substances or narcotics from other doctors, and not to giveaway or sell the medication.    The patient was instructed to abstain from illicit drug use.  The patient was instructed to avoid alcohol while taking these medications.      Narcotics/controlled substance agreement, Lonnie report, and Urine Drug Screen were updated today if needed.    5. Need for vaccination for pneumococcus  -     Pneumococcal Conjugate Vaccine 20-Valent All          Return in about 3 weeks (around 6/27/2023) for Recheck Depression, video visit.

## 2023-06-06 NOTE — LETTER
"VACCINE CONSENT FORM      Patient Name:  Yevgeniy Vaughn    Patient :  1955      I/We have read, or have been explained, the information about the diseases and the vaccines listed below.  There was an opportunity to ask questions and any questions were answered satisfactorily.  I/We believe that I/we understand the benefits and risks of the vaccines(s) cited, and ask the vaccine(s) listed below be given to me/us or the person named above (for which i have authorized to make the request).      Vaccine(s) give:    Orders Placed This Encounter   Procedures   • Pneumococcal Conjugate Vaccine 20-Valent All         Medicare patients:    The only vaccine covered under your medical benefit is flu/pneumonia and hepatitis B.  All other may be covered under your \"Part D\" prescription plan and requires you to go to a pharmacy with a Physician orders for administration.  If you still prefer to have it administered at our office, you will receive a bill for the vaccine and administration cost.               Patient Initials                     Patient or Parent/Guardian Signature                    Date        A copy of the appropriate Centers for Disease Control and Prevention Vaccine Information Statements has been provided.   "

## 2023-06-08 ENCOUNTER — TELEPHONE (OUTPATIENT)
Dept: INTERNAL MEDICINE | Facility: CLINIC | Age: 68
End: 2023-06-08
Payer: MEDICARE

## 2023-06-08 DIAGNOSIS — E78.49 OTHER HYPERLIPIDEMIA: Primary | ICD-10-CM

## 2023-06-08 NOTE — TELEPHONE ENCOUNTER
Called patient and he stated that at his appointment you all discussed switching him to an injection medication performed weekly if his test results came back with him still having high cholesterol since he is not able to tolerate statins. He stated if that is what's recommended then he is agreeable.

## 2023-06-08 NOTE — TELEPHONE ENCOUNTER
Caller: Sue Vaughn    Relationship: Emergency Contact    Best call back number: [phone]     What is the best time to reach you: ANYTIME    Who are you requesting to speak with (clinical staff, provider,  specific staff member): PCP/MA        What was the call regarding: PATIENTS WIFE STATED THE PATIENT WANTS TO DISCUSS THE TEST RESULTS AND THE NON STATIN CHOLESTEROL MEDICATION    Is it okay if the provider responds through MyChart: CALLBACK

## 2023-06-09 ENCOUNTER — TELEPHONE (OUTPATIENT)
Dept: INTERNAL MEDICINE | Facility: CLINIC | Age: 68
End: 2023-06-09
Payer: MEDICARE

## 2023-06-09 DIAGNOSIS — E78.49 OTHER HYPERLIPIDEMIA: Primary | ICD-10-CM

## 2023-06-09 NOTE — TELEPHONE ENCOUNTER
WIFE OF PATIENT HAS CALLED REQUESTING A CALL BACK IN REGARDS TO PRESCRIPTION THAT WAS REQUESTED YESTERDAY.    CALL BACK NUMBER [phone]

## 2023-06-09 NOTE — TELEPHONE ENCOUNTER
Reached Ms. Sue. Insurance is not covering Repatha but will cover Traluent.  Please advise if medicine change is okay or if PA needs to be initiated for Repatha.

## 2023-06-09 NOTE — TELEPHONE ENCOUNTER
In regards to medication management and prior authorization recommendations I will defer this to Dr. Rousseau when she returns

## 2023-06-12 ENCOUNTER — TELEPHONE (OUTPATIENT)
Dept: INTERNAL MEDICINE | Facility: CLINIC | Age: 68
End: 2023-06-12
Payer: MEDICARE

## 2023-06-12 RX ORDER — ALIROCUMAB 75 MG/ML
75 INJECTION, SOLUTION SUBCUTANEOUS
Qty: 2 ML | Refills: 11 | Status: SHIPPED | OUTPATIENT
Start: 2023-06-12

## 2023-06-12 NOTE — TELEPHONE ENCOUNTER
Called patient and left .     HUB OKAY TO READ:   Prescription for Praluent sent to the pharmacy.

## 2023-06-12 NOTE — TELEPHONE ENCOUNTER
"  Caller: Yevgeniy Vaughn \"Feliciano\"    Relationship: Self    Best call back number: [phone]    Who are you requesting to speak with (clinical staff, provider,  specific staff member):CLINICAL    Do you know the name of the person who called: PAULA    What was the call regarding: CALLBACK ABOUT WHAT INSURANCE WOULD P[AY FOR REGARDING [place]    Is it okay if the provider responds through MyChart: NO    "

## 2023-06-12 NOTE — TELEPHONE ENCOUNTER
Called patient and left vm. There is another message regarding medications. Waiting for a call back.

## 2023-06-12 NOTE — TELEPHONE ENCOUNTER
PA for Praluent 75MG/ML auto-injectors:  Approved   Approved quantity: 6 ML per 84 day(s). You may fill up to a 90 day supply except for those on Specialty Tier 5, which can be filled up to a 30 day supply.     Patient notified. Good verb given.

## 2023-08-12 DIAGNOSIS — E11.65 TYPE 2 DIABETES MELLITUS WITH HYPERGLYCEMIA, WITHOUT LONG-TERM CURRENT USE OF INSULIN: ICD-10-CM

## 2023-08-14 RX ORDER — PIOGLITAZONEHYDROCHLORIDE 45 MG/1
TABLET ORAL
Qty: 90 TABLET | Refills: 0 | Status: SHIPPED | OUTPATIENT
Start: 2023-08-14

## 2023-08-14 RX ORDER — BLOOD SUGAR DIAGNOSTIC
STRIP MISCELLANEOUS
Qty: 100 EACH | Refills: 0 | Status: SHIPPED | OUTPATIENT
Start: 2023-08-14

## 2023-08-14 RX ORDER — SITAGLIPTIN 100 MG/1
TABLET, FILM COATED ORAL
Qty: 90 TABLET | Refills: 0 | Status: SHIPPED | OUTPATIENT
Start: 2023-08-14

## 2023-08-14 RX ORDER — IBUPROFEN 800 MG/1
TABLET ORAL
Qty: 180 TABLET | Refills: 0 | Status: SHIPPED | OUTPATIENT
Start: 2023-08-14

## 2023-08-27 DIAGNOSIS — F41.1 GENERALIZED ANXIETY DISORDER: ICD-10-CM

## 2023-08-28 RX ORDER — ESCITALOPRAM OXALATE 10 MG/1
10 TABLET ORAL DAILY
Qty: 30 TABLET | Refills: 5 | Status: SHIPPED | OUTPATIENT
Start: 2023-08-28

## 2023-08-29 ENCOUNTER — OFFICE VISIT (OUTPATIENT)
Dept: INTERNAL MEDICINE | Facility: CLINIC | Age: 68
End: 2023-08-29
Payer: MEDICARE

## 2023-08-29 VITALS
BODY MASS INDEX: 27.25 KG/M2 | TEMPERATURE: 98.4 F | HEART RATE: 72 BPM | RESPIRATION RATE: 16 BRPM | HEIGHT: 69 IN | SYSTOLIC BLOOD PRESSURE: 146 MMHG | WEIGHT: 184 LBS | DIASTOLIC BLOOD PRESSURE: 72 MMHG

## 2023-08-29 DIAGNOSIS — Z00.00 MEDICARE ANNUAL WELLNESS VISIT, SUBSEQUENT: Primary | ICD-10-CM

## 2023-08-29 DIAGNOSIS — Z79.899 HIGH RISK MEDICATION USE: ICD-10-CM

## 2023-08-29 DIAGNOSIS — E11.65 TYPE 2 DIABETES MELLITUS WITH HYPERGLYCEMIA, WITHOUT LONG-TERM CURRENT USE OF INSULIN: ICD-10-CM

## 2023-08-29 DIAGNOSIS — F41.1 GENERALIZED ANXIETY DISORDER: ICD-10-CM

## 2023-08-29 DIAGNOSIS — N52.9 ERECTILE DYSFUNCTION, UNSPECIFIED ERECTILE DYSFUNCTION TYPE: ICD-10-CM

## 2023-08-29 LAB
ALBUMIN/CREATININE RATIO, URINE: NORMAL
BILIRUB BLD-MCNC: NEGATIVE MG/DL
CLARITY, POC: CLEAR
COLOR UR: YELLOW
EXPIRATION DATE: ABNORMAL
EXPIRATION DATE: NORMAL
GLUCOSE UR STRIP-MCNC: ABNORMAL MG/DL
KETONES UR QL: NEGATIVE
LEUKOCYTE EST, POC: NEGATIVE
Lab: ABNORMAL
Lab: NORMAL
NITRITE UR-MCNC: NEGATIVE MG/ML
PH UR: 6.5 [PH] (ref 5–8)
POC CREATININE URINE: 50
POC MICROALBUMIN URINE: 10
PROT UR STRIP-MCNC: NEGATIVE MG/DL
RBC # UR STRIP: NEGATIVE /UL
SP GR UR: 1 (ref 1–1.03)
UROBILINOGEN UR QL: NORMAL

## 2023-08-29 RX ORDER — TADALAFIL 20 MG/1
20 TABLET ORAL DAILY PRN
Qty: 20 TABLET | Refills: 11 | Status: SHIPPED | OUTPATIENT
Start: 2023-08-29

## 2023-08-29 NOTE — PATIENT INSTRUCTIONS
Health Maintenance, Male  Adopting a healthy lifestyle and getting preventive care are important in promoting health and wellness. Ask your health care provider about:  The right schedule for you to have regular tests and exams.  Things you can do on your own to prevent diseases and keep yourself healthy.  What should I know about diet, weight, and exercise?  Eat a healthy diet    Eat a diet that includes plenty of vegetables, fruits, low-fat dairy products, and lean protein.  Do not eat a lot of foods that are high in solid fats, added sugars, or sodium.  Maintain a healthy weight  Body mass index (BMI) is a measurement that can be used to identify possible weight problems. It estimates body fat based on height and weight. Your health care provider can help determine your BMI and help you achieve or maintain a healthy weight.  Get regular exercise  Get regular exercise. This is one of the most important things you can do for your health. Most adults should:  Exercise for at least 150 minutes each week. The exercise should increase your heart rate and make you sweat (moderate-intensity exercise).  Do strengthening exercises at least twice a week. This is in addition to the moderate-intensity exercise.  Spend less time sitting. Even light physical activity can be beneficial.  Watch cholesterol and blood lipids  Have your blood tested for lipids and cholesterol at 20 years of age, then have this test every 5 years.  You may need to have your cholesterol levels checked more often if:  Your lipid or cholesterol levels are high.  You are older than 40 years of age.  You are at high risk for heart disease.  What should I know about cancer screening?  Many types of cancers can be detected early and may often be prevented. Depending on your health history and family history, you may need to have cancer screening at various ages. This may include screening for:  Colorectal cancer.  Prostate cancer.  Skin cancer.  Lung  cancer.  What should I know about heart disease, diabetes, and high blood pressure?  Blood pressure and heart disease  High blood pressure causes heart disease and increases the risk of stroke. This is more likely to develop in people who have high blood pressure readings or are overweight.  Talk with your health care provider about your target blood pressure readings.  Have your blood pressure checked:  Every 3-5 years if you are 18-39 years of age.  Every year if you are 40 years old or older.  If you are between the ages of 65 and 75 and are a current or former smoker, ask your health care provider if you should have a one-time screening for abdominal aortic aneurysm (AAA).  Diabetes  Have regular diabetes screenings. This checks your fasting blood sugar level. Have the screening done:  Once every three years after age 45 if you are at a normal weight and have a low risk for diabetes.  More often and at a younger age if you are overweight or have a high risk for diabetes.  What should I know about preventing infection?  Hepatitis B  If you have a higher risk for hepatitis B, you should be screened for this virus. Talk with your health care provider to find out if you are at risk for hepatitis B infection.  Hepatitis C  Blood testing is recommended for:  Everyone born from 1945 through 1965.  Anyone with known risk factors for hepatitis C.  Sexually transmitted infections (STIs)  You should be screened each year for STIs, including gonorrhea and chlamydia, if:  You are sexually active and are younger than 24 years of age.  You are older than 24 years of age and your health care provider tells you that you are at risk for this type of infection.  Your sexual activity has changed since you were last screened, and you are at increased risk for chlamydia or gonorrhea. Ask your health care provider if you are at risk.  Ask your health care provider about whether you are at high risk for HIV. Your health care provider  may recommend a prescription medicine to help prevent HIV infection. If you choose to take medicine to prevent HIV, you should first get tested for HIV. You should then be tested every 3 months for as long as you are taking the medicine.  Follow these instructions at home:  Alcohol use  Do not drink alcohol if your health care provider tells you not to drink.  If you drink alcohol:  Limit how much you have to 0-2 drinks a day.  Know how much alcohol is in your drink. In the [place] one drink equals one 12 oz bottle of beer (355 mL), one 5 oz glass of wine (148 mL), or one 1½ oz glass of hard liquor (44 mL).  Lifestyle  Do not use any products that contain nicotine or tobacco. These products include cigarettes, chewing tobacco, and vaping devices, such as e-cigarettes. If you need help quitting, ask your health care provider.  Do not use street drugs.  Do not share needles.  Ask your health care provider for help if you need support or information about quitting drugs.  General instructions  Schedule regular health, dental, and eye exams.  Stay current with your vaccines.  Tell your health care provider if:  You often feel depressed.  You have ever been abused or do not feel safe at home.  Summary  Adopting a healthy lifestyle and getting preventive care are important in promoting health and wellness.  Follow your health care provider's instructions about healthy diet, exercising, and getting tested or screened for diseases.  Follow your health care provider's instructions on monitoring your cholesterol and blood pressure.  This information is not intended to replace advice given to you by your health care provider. Make sure you discuss any questions you have with your health care provider.  Document Revised: 05/09/2022 Document Reviewed: 05/09/2022  [employer] Patient Education © 2023 [employer]  MyPlate from [employer]    MyPlate is an outline of a general healthy diet based on the Dietary Guidelines for Americans,  1963-8349, from the [place]. Department of Agriculture (USDA). It sets guidelines for how much food you should eat from each food group based on your age, sex, and level of physical activity.  What are tips for following MyPlate?  To follow MyPlate recommendations:  Eat a wide variety of fruits and vegetables, grains, and protein foods.  Serve smaller portions and eat less food throughout the day.  Limit portion sizes to avoid overeating.  Enjoy your food.  Get at least 150 minutes of exercise every week. This is about 30 minutes each day, 5 or more days per week.  It can be difficult to have every meal look like MyPlate. Think about MyPlate as eating guidelines for an entire day, rather than each individual meal.  Fruits and vegetables  Make one half of your plate fruits and vegetables.  Eat many different colors of fruits and vegetables each day.  For a 2,000-calorie daily food plan, eat:  2½ cups of vegetables every day.  2 cups of fruit every day.  1 cup is equal to:  1 cup raw or cooked vegetables.  1 cup raw fruit.  1 medium-sized orange, apple, or banana.  1 cup 100% fruit or vegetable juice.  2 cups raw leafy greens, such as lettuce, spinach, or kale.  ½ cup dried fruit.  Grains  One fourth of your plate should be grains.  Make at least half of the grains you eat each day whole grains.  For a 2,000-calorie daily food plan, eat 6 oz of grains every day.  1 oz is equal to:  1 slice bread.  1 cup cereal.  ½ cup cooked rice, cereal, or pasta.  Protein  One fourth of your plate should be protein.  Eat a wide variety of protein foods, including meat, poultry, fish, eggs, beans, nuts, and tofu.  For a 2,000-calorie daily food plan, eat 5½ oz of protein every day.  1 oz is equal to:  1 oz meat, poultry, or fish.  ¼ cup cooked beans.  1 egg.  ½ oz nuts or seeds.  1 Tbsp peanut butter.  Dairy  Drink fat-free or low-fat (1%) milk.  Eat or drink dairy as a side to meals.  For a 2,000-calorie daily food plan, eat or drink 3  cups of dairy every day.  1 cup is equal to:  1 cup milk, yogurt, cottage cheese, or soy milk (soy beverage).  2 oz processed cheese.  1½ oz natural cheese.  Fats, oils, salt, and sugars  Only small amounts of oils are recommended.  Avoid foods that are high in calories and low in nutritional value (empty calories), like foods high in fat or added sugars.  Choose foods that are low in salt (sodium). Choose foods that have less than 140 milligrams (mg) of sodium per serving.  Drink water instead of sugary drinks. Drink enough fluid to keep your urine pale yellow.  Where to find support  Work with your health care provider or a dietitian to develop a customized eating plan that is right for you.  Download an raquel (mobile application) to help you track your daily food intake.  Where to find more information  USDA: ChooseMyPlate.gov  Summary  MyPlate is a general guideline for healthy eating from the USDA. It is based on the Dietary Guidelines for Americans, 1475-1081.  In general, fruits and vegetables should take up one half of your plate, grains should take up one fourth of your plate, and protein should take up one fourth of your plate.  This information is not intended to replace advice given to you by your health care provider. Make sure you discuss any questions you have with your health care provider.  Document Revised: 11/08/2021 Document Reviewed: 11/08/2021  [employer] Patient Education © 2023 [employer]  Calorie Counting for Weight Loss  Calories are units of energy. Your body needs a certain number of calories from food to keep going throughout the day. When you eat or drink more calories than your body needs, your body stores the extra calories mostly as fat. When you eat or drink fewer calories than your body needs, your body burns fat to get the energy it needs.  Calorie counting means keeping track of how many calories you eat and drink each day. Calorie counting can be helpful if you need to lose  weight. If you eat fewer calories than your body needs, you should lose weight. Ask your health care provider what a healthy weight is for you.  For calorie counting to work, you will need to eat the right number of calories each day to lose a healthy amount of weight per week. A dietitian can help you figure out how many calories you need in a day and will suggest ways to reach your calorie goal.  A healthy amount of weight to lose each week is usually 1-2 lb (0.5-0.9 kg). This usually means that your daily calorie intake should be reduced by 500-750 calories.  Eating 1,200-1,500 calories a day can help most women lose weight.  Eating 1,500-1,800 calories a day can help most men lose weight.  What do I need to know about calorie counting?  Work with your health care provider or dietitian to determine how many calories you should get each day. To meet your daily calorie goal, you will need to:  Find out how many calories are in each food that you would like to eat. Try to do this before you eat.  Decide how much of the food you plan to eat.  Keep a food log. Do this by writing down what you ate and how many calories it had.  To successfully lose weight, it is important to balance calorie counting with a healthy lifestyle that includes regular activity.  Where do I find calorie information?    The number of calories in a food can be found on a Nutrition Facts label. If a food does not have a Nutrition Facts label, try to look up the calories online or ask your dietitian for help.  Remember that calories are listed per serving. If you choose to have more than one serving of a food, you will have to multiply the calories per serving by the number of servings you plan to eat. For example, the label on a package of bread might say that a serving size is 1 slice and that there are 90 calories in a serving. If you eat 1 slice, you will have eaten 90 calories. If you eat 2 slices, you will have eaten 180 calories.  How do I  keep a food log?  After each time that you eat, record the following in your food log as soon as possible:  What you ate. Be sure to include toppings, sauces, and other extras on the food.  How much you ate. This can be measured in cups, ounces, or number of items.  How many calories were in each food and drink.  The total number of calories in the food you ate.  Keep your food log near you, such as in a pocket-sized notebook or on an raquel or website on your mobile phone. Some programs will calculate calories for you and show you how many calories you have left to meet your daily goal.  What are some portion-control tips?  Know how many calories are in a serving. This will help you know how many servings you can have of a certain food.  Use a measuring cup to measure serving sizes. You could also try weighing out portions on a kitchen scale. With time, you will be able to estimate serving sizes for some foods.  Take time to put servings of different foods on your favorite plates or in your favorite bowls and cups so you know what a serving looks like.  Try not to eat straight from a food's packaging, such as from a bag or box. Eating straight from the package makes it hard to see how much you are eating and can lead to overeating. Put the amount you would like to eat in a cup or on a plate to make sure you are eating the right portion.  Use smaller plates, glasses, and bowls for smaller portions and to prevent overeating.  Try not to multitask. For example, avoid watching TV or using your computer while eating. If it is time to eat, sit down at a table and enjoy your food. This will help you recognize when you are full. It will also help you be more mindful of what and how much you are eating.  What are tips for following this plan?  Reading food labels  Check the calorie count compared with the serving size. The serving size may be smaller than what you are used to eating.  Check the source of the calories. Try to  choose foods that are high in protein, fiber, and vitamins, and low in saturated fat, trans fat, and sodium.  Shopping  Read nutrition labels while you shop. This will help you make healthy decisions about which foods to buy.  Pay attention to nutrition labels for low-fat or fat-free foods. These foods sometimes have the same number of calories or more calories than the full-fat versions. They also often have added sugar, starch, or salt to make up for flavor that was removed with the fat.  Make a grocery list of lower-calorie foods and stick to it.  Cooking  Try to cook your favorite foods in a healthier way. For example, try baking instead of frying.  Use low-fat dairy products.  Meal planning  Use more fruits and vegetables. One-half of your plate should be fruits and vegetables.  Include lean proteins, such as chicken, turkey, and fish.  Lifestyle  Each week, aim to do one of the followin minutes of moderate exercise, such as walking.  75 minutes of vigorous exercise, such as running.  General information  Know how many calories are in the foods you eat most often. This will help you calculate calorie counts faster.  Find a way of tracking calories that works for you. Get creative. Try different apps or programs if writing down calories does not work for you.  What foods should I eat?    Eat nutritious foods. It is better to have a nutritious, high-calorie food, such as an avocado, than a food with few nutrients, such as a bag of potato chips.  Use your calories on foods and drinks that will fill you up and will not leave you hungry soon after eating.  Examples of foods that fill you up are nuts and nut butters, vegetables, lean proteins, and high-fiber foods such as whole grains. High-fiber foods are foods with more than 5 g of fiber per serving.  Pay attention to calories in drinks. Low-calorie drinks include water and unsweetened drinks.  The items listed above may not be a complete list of foods and  beverages you can eat. Contact a dietitian for more information.  What foods should I limit?  Limit foods or drinks that are not good sources of vitamins, minerals, or protein or that are high in unhealthy fats. These include:  Candy.  Other sweets.  Sodas, specialty coffee drinks, alcohol, and juice.  The items listed above may not be a complete list of foods and beverages you should avoid. Contact a dietitian for more information.  How do I count calories when eating out?  Pay attention to portions. Often, portions are much larger when eating out. Try these tips to keep portions smaller:  Consider sharing a meal instead of getting your own.  If you get your own meal, eat only half of it. Before you start eating, ask for a container and put half of your meal into it.  When available, consider ordering smaller portions from the menu instead of full portions.  Pay attention to your food and drink choices. Knowing the way food is cooked and what is included with the meal can help you eat fewer calories.  If calories are listed on the menu, choose the lower-calorie options.  Choose dishes that include vegetables, fruits, whole grains, low-fat dairy products, and lean proteins.  Choose items that are boiled, broiled, grilled, or steamed. Avoid items that are buttered, battered, fried, or served with cream sauce. Items labeled as crispy are usually fried, unless stated otherwise.  Choose water, low-fat milk, unsweetened iced tea, or other drinks without added sugar. If you want an alcoholic beverage, choose a lower-calorie option, such as a glass of wine or light beer.  Ask for dressings, sauces, and syrups on the side. These are usually high in calories, so you should limit the amount you eat.  If you want a salad, choose a garden salad and ask for grilled meats. Avoid extra toppings such as fields, cheese, or fried items. Ask for the dressing on the side, or ask for olive oil and vinegar or lemon to use as  dressing.  Estimate how many servings of a food you are given. Knowing serving sizes will help you be aware of how much food you are eating at restaurants.  Where to find more information  Centers for Disease Control and Prevention: www.cdc.gov  [place] Department of Agriculture: myplate.gov  Summary  Calorie counting means keeping track of how many calories you eat and drink each day. If you eat fewer calories than your body needs, you should lose weight.  A healthy amount of weight to lose per week is usually 1-2 lb (0.5-0.9 kg). This usually means reducing your daily calorie intake by 500-750 calories.  The number of calories in a food can be found on a Nutrition Facts label. If a food does not have a Nutrition Facts label, try to look up the calories online or ask your dietitian for help.  Use smaller plates, glasses, and bowls for smaller portions and to prevent overeating.  Use your calories on foods and drinks that will fill you up and not leave you hungry shortly after a meal.  This information is not intended to replace advice given to you by your health care provider. Make sure you discuss any questions you have with your health care provider.  Document Revised: 01/28/2021 Document Reviewed: 01/28/2021  [employer] Patient Education © 2023 [employer]  Exercising to Lose Weight  Getting regular exercise is important for everyone. It is especially important if you are overweight. Being overweight increases your risk of heart disease, stroke, diabetes, high blood pressure, and several types of cancer. Exercising, and reducing the calories you consume, can help you lose weight and improve fitness and health.  Exercise can be moderate or vigorous intensity. To lose weight, most people need to do a certain amount of moderate or vigorous-intensity exercise each week.  How can exercise affect me?  You lose weight when you exercise enough to burn more calories than you eat. Exercise also reduces body fat and builds  muscle. The more muscle you have, the more calories you burn. Exercise also:  Improves mood.  Reduces stress and tension.  Improves your overall fitness, flexibility, and endurance.  Increases bone strength.  Moderate-intensity exercise    Moderate-intensity exercise is any activity that gets you moving enough to burn at least three times more energy (calories) than if you were sitting.  Examples of moderate exercise include:  Walking a mile in 15 minutes.  Doing light yard work.  Biking at an easy pace.  Most people should get at least 150 minutes of moderate-intensity exercise a week to maintain their body weight.  Vigorous-intensity exercise  Vigorous-intensity exercise is any activity that gets you moving enough to burn at least six times more calories than if you were sitting. When you exercise at this intensity, you should be working hard enough that you are not able to carry on a conversation.  Examples of vigorous exercise include:  Running.  Playing a team sport, such as football, basketball, and soccer.  Jumping rope.  Most people should get at least 75 minutes a week of vigorous exercise to maintain their body weight.  What actions can I take to lose weight?  The amount of exercise you need to lose weight depends on:  Your age.  The type of exercise.  Any health conditions you have.  Your overall physical ability.  Talk to your health care provider about how much exercise you need and what types of activities are safe for you.  Nutrition    Make changes to your diet as told by your health care provider or diet and nutrition specialist (dietitian). This may include:  Eating fewer calories.  Eating more protein.  Eating less unhealthy fats.  Eating a diet that includes fresh fruits and vegetables, whole grains, low-fat dairy products, and lean protein.  Avoiding foods with added fat, salt, and sugar.  Drink plenty of water while you exercise to prevent dehydration or heat stroke.  Activity  Choose an  activity that you enjoy and set realistic goals. Your health care provider can help you make an exercise plan that works for you.  Exercise at a moderate or vigorous intensity most days of the week.  The intensity of exercise may vary from person to person. You can tell how intense a workout is for you by paying attention to your breathing and heartbeat. Most people will notice their breathing and heartbeat get faster with more intense exercise.  Do resistance training twice each week, such as:  Push-ups.  Sit-ups.  Lifting weights.  Using resistance bands.  Getting short amounts of exercise can be just as helpful as long, structured periods of exercise. If you have trouble finding time to exercise, try doing these things as part of your daily routine:  Get up, stretch, and walk around every 30 minutes throughout the day.  Go for a walk during your lunch break.  Park your car farther away from your destination.  If you take public transportation, get off one stop early and walk the rest of the way.  Make phone calls while standing up and walking around.  Take the stairs instead of elevators or escalators.  Wear comfortable clothes and shoes with good support.  Do not exercise so much that you hurt yourself, feel dizzy, or get very short of breath.  Where to find more information  [place] Department of Health and Human Services: www.hhs.gov  Centers for Disease Control and Prevention: www.cdc.gov  Contact a health care provider:  Before starting a new exercise program.  If you have questions or concerns about your weight.  If you have a medical problem that keeps you from exercising.  Get help right away if:  You have any of the following while exercising:  Injury.  Dizziness.  Difficulty breathing or shortness of breath that does not go away when you stop exercising.  Chest pain.  Rapid heartbeat.  These symptoms may represent a serious problem that is an emergency. Do not wait to see if the symptoms will go away. Get  medical help right away. Call your local emergency services (911 in the [place] Do not drive yourself to the hospital.  Summary  Getting regular exercise is especially important if you are overweight.  Being overweight increases your risk of heart disease, stroke, diabetes, high blood pressure, and several types of cancer.  Losing weight happens when you burn more calories than you eat.  Reducing the amount of calories you eat, and getting regular moderate or vigorous exercise each week, helps you lose weight.  This information is not intended to replace advice given to you by your health care provider. Make sure you discuss any questions you have with your health care provider.  Document Revised: 02/13/2022 Document Reviewed: 02/13/2022  [employer] Patient Education © 2023 [employer]  Tobacco Use Disorder  Tobacco use disorder (TUD) occurs when a person craves, seeks, and uses tobacco, regardless of the consequences. This disorder can cause problems with mental and physical health. It can affect your ability to have healthy relationships. It can also keep you from meeting your responsibilities at work, home, or school.  Tobacco products contain a dangerous chemical called nicotine. Nicotine triggers hormones that make the body feel stimulated and works on areas of the brain that make a person feel good. These effects can make the person depend on nicotine, which makes it hard to quit tobacco.  Tobacco may be:  Smoked as a cigarette or cigar.  Inhaled using vaping devices, such as e-cigarettes.  Smoked in a pipe or hookah.  Chewed as smokeless tobacco.  Inhaled into the nostrils as snuff.  What are the causes?  This condition is caused by using nicotine. Nicotine causes changes in your brain that make you want more and more. This is called addiction. This can make it hard to stop using tobacco once you start.  What are the signs or symptoms?  Symptoms of TUD may include:  Being unable to stop your tobacco use even  after planned quit attempts.  Spending an abnormal amount of time getting or using tobacco.  Craving tobacco.  Tobacco use that:  Interferes with your work, school, or home life.  Interferes with your personal and social relationships.  Makes you give up activities that you once enjoyed or found important.  Using tobacco even though you know that it is:  Dangerous or bad for your health or someone else's health.  Causing problems in your life.  Needing more and more of the substance to get the same effect (developing tolerance).  Using the substance to avoid unpleasant symptoms if you do not use the substance (withdrawal).  How is this diagnosed?  This condition may be diagnosed based on:  Your current and past tobacco use. Your health care provider may ask questions about how your tobacco use affects your life.  A physical exam.  You may be diagnosed with TUD if you have at least two symptoms within a 12-month period.  How is this treated?  This condition is treated by stopping tobacco use. Many people are unable to quit on their own and need help. Treatment may include:  Nicotine replacement therapy (NRT). NRT provides nicotine without the other harmful chemicals in tobacco. NRT gradually lowers the dosage of nicotine in the body and reduces withdrawal symptoms. NRT is available as:  Over-the-counter gums, lozenges, and skin patches.  Prescription mouth inhalers and nasal sprays.  Medicine that acts on the brain to reduce cravings and withdrawal symptoms.  A type of talk therapy that examines your triggers for tobacco use, how to avoid them, and how to cope with cravings (behavioral therapy).  Hypnosis. This may help with withdrawal symptoms.  Joining a support group for people coping with TUD.  The best treatment for TUD is usually a combination of medicine, talk therapy, and support groups. Recovery can be a long process. Many people start using tobacco again after stopping (relapse). If you relapse, it does not  mean that treatment will not work.  Follow these instructions at home:    Lifestyle  Do not use any products that contain nicotine or tobacco. These products include cigarettes, chewing tobacco, and vaping devices, such as e-cigarettes. If you need help quitting, ask your health care provider.  Avoid things that trigger tobacco use as much as you can. Triggers include people and situations that usually cause you to use tobacco.  Avoid drinks that contain caffeine, including coffee. These may worsen some withdrawal symptoms.  Find ways to manage stress. Wanting to smoke may cause stress, and stress can make you want to smoke. Relaxation techniques such as deep breathing, meditation, and yoga may help.  Attend support groups as needed. These groups are an important part of long-term recovery for many people.  General instructions  Take over-the-counter and prescription medicines only as told by your health care provider.  Check with your health care provider before taking any new prescription or over-the-counter medicines.  Decide on a friend, family member, or smoking quit-line (such as 1-800-QUIT-NOW in the [place] that you can call or text when you feel the urge to smoke or when you need help coping with cravings.  Keep all follow-up visits. This is important.  Contact a health care provider if:  You are not able to take your medicines as told by your health care provider.  Your symptoms get worse, even with treatment.  Summary  Tobacco use disorder (TUD) occurs when a person craves, seeks, and uses tobacco regardless of the consequences.  This condition may be diagnosed based on your current and past tobacco use and a physical exam.  Many people are unable to quit on their own and need help. Recovery can be a long process.  The most effective treatment for TUD is usually a combination of medicine, talk therapy, and support groups.  This information is not intended to replace advice given to you by your health care  provider. Make sure you discuss any questions you have with your health care provider.  Document Revised: 12/13/2022 Document Reviewed: 12/13/2022  [employer] Patient Education © 2023 [employer]  For more information:    Quit Now [place]  1-800-QUIT-NOW  https://[place].quitlogix.org/en-[place]/  Steps to Quit Smoking  Smoking tobacco can be harmful to your health and can affect almost every organ in your body. Smoking puts you, and those around you, at risk for developing many serious chronic diseases. Quitting smoking is difficult, but it is one of the best things that you can do for your health. It is never too late to quit.  What are the benefits of quitting smoking?  When you quit smoking, you lower your risk of developing serious diseases and conditions, such as:  Lung cancer or lung disease, such as COPD.  Heart disease.  Stroke.  Heart attack.  Infertility.  Osteoporosis and bone fractures.  Additionally, symptoms such as coughing, wheezing, and shortness of breath may get better when you quit. You may also find that you get sick less often because your body is stronger at fighting off colds and infections. If you are pregnant, quitting smoking can help to reduce your chances of having a baby of low birth weight.  How do I get ready to quit?  When you decide to quit smoking, create a plan to make sure that you are successful. Before you quit:  Pick a date to quit. Set a date within the next two weeks to give you time to prepare.  Write down the reasons why you are quitting. Keep this list in places where you will see it often, such as on your bathroom mirror or in your car or wallet.  Identify the people, places, things, and activities that make you want to smoke (triggers) and avoid them. Make sure to take these actions:  Throw away all cigarettes at home, at work, and in your car.  Throw away smoking accessories, such as ashtrays and lighters.  Clean your car and make sure to empty the ashtray.  Clean your  home, including curtains and carpets.  Tell your family, friends, and coworkers that you are quitting. Support from your loved ones can make quitting easier.  Talk with your health care provider about your options for quitting smoking.  Find out what treatment options are covered by your health insurance.  What strategies can I use to quit smoking?  Talk with your healthcare provider about different strategies to quit smoking. Some strategies include:  Quitting smoking altogether instead of gradually lessening how much you smoke over a period of time. Research shows that quitting “cold turkey” is more successful than gradually quitting.  Attending in-person counseling to help you build problem-solving skills. You are more likely to have success in quitting if you attend several counseling sessions. Even short sessions of 10 minutes can be effective.  Finding resources and support systems that can help you to quit smoking and remain smoke-free after you quit. These resources are most helpful when you use them often. They can include:  Online chats with a counselor.  Telephone quitlines.  Printed self-help materials.  Support groups or group counseling.  Text messaging programs.  Mobile phone applications.  Taking medicines to help you quit smoking. (If you are pregnant or breastfeeding, talk with your health care provider first.) Some medicines contain nicotine and some do not. Both types of medicines help with cravings, but the medicines that include nicotine help to relieve withdrawal symptoms. Your health care provider may recommend:  Nicotine patches, gum, or lozenges.  Nicotine inhalers or sprays.  Non-nicotine medicine that is taken by mouth.  Talk with your health care provider about combining strategies, such as taking medicines while you are also receiving in-person counseling. Using these two strategies together makes you more likely to succeed in quitting than if you used either strategy on its own.  If  you are pregnant or breastfeeding, talk with your health care provider about finding counseling or other support strategies to quit smoking. Do not take medicine to help you quit smoking unless told to do so by your health care provider.  What things can I do to make it easier to quit?  Quitting smoking might feel overwhelming at first, but there is a lot that you can do to make it easier. Take these important actions:  Reach out to your family and friends and ask that they support and encourage you during this time. Call telephone quitlines, reach out to support groups, or work with a counselor for support.  Ask people who smoke to avoid smoking around you.  Avoid places that trigger you to smoke, such as bars, parties, or smoke-break areas at work.  Spend time around people who do not smoke.  Lessen stress in your life, because stress can be a smoking trigger for some people. To lessen stress, try:  Exercising regularly.  Deep-breathing exercises.  Yoga.  Meditating.  Performing a body scan. This involves closing your eyes, scanning your body from head to toe, and noticing which parts of your body are particularly tense. Purposefully relax the muscles in those areas.  Download or purchase mobile phone or tablet apps (applications) that can help you stick to your quit plan by providing reminders, tips, and encouragement. There are many free apps, such as QuitGuide from the CDC (Centers for Disease Control and Prevention). You can find other support for quitting smoking (smoking cessation) through smokefree.gov and other websites.  How will I feel when I quit smoking?  Within the first 24 hours of quitting smoking, you may start to feel some withdrawal symptoms. These symptoms are usually most noticeable 2-3 days after quitting, but they usually do not last beyond 2-3 weeks. Changes or symptoms that you might experience include:  Mood swings.  Restlessness, anxiety, or irritation.  Difficulty  concentrating.  Dizziness.  Strong cravings for sugary foods in addition to nicotine.  Mild weight gain.  Constipation.  Nausea.  Coughing or a sore throat.  Changes in how your medicines work in your body.  A depressed mood.  Difficulty sleeping (insomnia).  After the first 2-3 weeks of quitting, you may start to notice more positive results, such as:  Improved sense of smell and taste.  Decreased coughing and sore throat.  Slower heart rate.  Lower blood pressure.  Clearer skin.  The ability to breathe more easily.  Fewer sick days.  Quitting smoking is very challenging for most people. Do not get discouraged if you are not successful the first time. Some people need to make many attempts to quit before they achieve long-term success. Do your best to stick to your quit plan, and talk with your health care provider if you have any questions or concerns.  This information is not intended to replace advice given to you by your health care provider. Make sure you discuss any questions you have with your health care provider.  Document Released: 12/12/2002 Document Revised: 08/15/2017 Document Reviewed: 05/03/2016  [employer] Patient Education © 2017 [employer]    Advance Care Planning and Advance Directives     You make decisions on a daily basis - decisions about where you want to live, your career, your home, your life. Perhaps one of the most important decisions you face is your choice for future medical care. Take time to talk with your family and your healthcare team and start planning today.  Advance Care Planning is a process that can help you:  Understand possible future healthcare decisions in light of your own experiences  Reflect on those decision in light of your goals and values  Discuss your decisions with those closest to you and the healthcare professionals that care for you  Make a plan by creating a document that reflects your wishes    Surrogate Decision Maker  In the event of a medical  emergency, which has left you unable to communicate or to make your own decisions, you would need someone to make decisions for you.  It is important to discuss your preferences for medical treatment with this person while you are in good health.     Qualities of a surrogate decision maker:  Willing to take on this role and responsibility  Knows what you want for future medical care  Willing to follow your wishes even if they don't agree with them  Able to make difficult medical decisions under stressful circumstances    Advance Directives  These are legal documents you can create that will guide your healthcare team and decision maker(s) when needed. These documents can be stored in the electronic medical record.    Living Will - a legal document to guide your care if you have a terminal condition or a serious illness and are unable to communicate. States vary by statute in document names/types, but most forms may include one or more of the following:        -  Directions regarding life-prolonging treatments        -  Directions regarding artificially provided nutrition/hydration        -  Choosing a healthcare decision maker        -  Direction regarding organ/tissue donation    Durable Power of  for Healthcare - this document names an -in-fact to make medical decisions for you, but it may also allow this person to make personal and financial decisions for you. Please seek the advice of an  if you need this type of document.    **Advance Directives are not required and no one may discriminate against you if you do not sign one.    Medical Orders  Many states allow specific forms/orders signed by your physician to record your wishes for medical treatment in your current state of health. This form, signed in personal communication with your physician, addresses resuscitation and other medical interventions that you may or may not want.      For more information or to schedule a time with a  [facility] Advance Care Planning Facilitator contact: [email or web address]/ACP or call [phone] and someone will contact you directly.

## 2023-08-29 NOTE — PROGRESS NOTES
Subjective     Chief Complaint:  Physical Exam.    History of Present Illness    History obtained from the patient.    The patient had his general medical follow-up on 6/6/2023.  Hemoglobin A1c was 8.5 which was improved.  There were no medication changes.    Anxiety Disorder Follow-Up: The patient is being seen for follow-up of Anxiey, which is unstable.    Comorbid Illnesses: Insomnia.  Interval Events: At his visit on 6/6/2023, he reported he was under a lot of stress due to family issues.  He requested the same medication his wife was taking (Lexapro), and it was started.  Symptoms: Improved anxiety.  Stable depression and insomnia.  Panic attacks resolved.  Denies anhedonia, memory loss, and difficulty concentrating.    Associated Symptoms: no suicidal ideation or thoughts of self-harm.   Medication: Lexapro daily and Alprazolam BID.  Side Effects: Reports erectile dysfunction..      Yevgeniy Vaughn is a 67 y.o. male who presents for an Annual Physical.      PMH, PSH, SocHx, FamHx, Allergies, and Medications: Reviewed and updated.    Outpatient Medications Prior to Visit   Medication Sig Dispense Refill    Alirocumab (Praluent) 75 MG/ML solution auto-injector Inject 1 mL under the skin into the appropriate area as directed Every 14 (Fourteen) Days. 2 mL 11    ALPRAZolam (XANAX) 0.5 MG tablet Take 1 tablet by mouth twice daily as needed for anxiety 60 tablet 5    APO-Varenicline 1 MG tablet TAKE 1 TABLET BY MOUTH TWICE DAILY FOR  12  WEEKS 168 tablet 0    aspirin 81 MG chewable tablet Chew 1 tablet Daily.      B Complex-C (SUPER B COMPLEX/VITAMIN C PO) Take  by mouth.      carvedilol (COREG) 6.25 MG tablet Take 1 tablet by mouth 2 (Two) Times a Day. 180 tablet 3    clobetasol (TEMOVATE) 0.05 % cream Apply  topically to the appropriate area as directed 2 (Two) Times a Day As Needed (rash). 60 g 3    empagliflozin (Jardiance) 25 MG tablet tablet Take 1 tablet by mouth Daily. 30 tablet 11     escitalopram (Lexapro) 10 MG tablet Take 1 tablet by mouth Daily. 30 tablet 5    ezetimibe (Zetia) 10 MG tablet Take 1 tablet by mouth Daily. 30 tablet 11    gabapentin (NEURONTIN) 800 MG tablet 1 tablet 4 (Four) Times a Day.      glucose monitor monitoring kit 1 each Daily. 1 each 0    ibuprofen (ADVIL,MOTRIN) 800 MG tablet Take 1 tablet by mouth three times daily as needed 180 tablet 0    Januvia 100 MG tablet Take 1 tablet by mouth once daily 90 tablet 0    Lancets (OneTouch Delica Plus [id number]) misc USE 1  TO CHECK GLUCOSE ONCE DAILY AS DIRECTED 100 each 3    lisinopril (PRINIVIL,ZESTRIL) 40 MG tablet Take 1 tablet by mouth every night at bedtime. 90 tablet 3    melatonin 5 MG tablet tablet Take 2 tablets by mouth At Night As Needed (insomnia).      metFORMIN (GLUCOPHAGE) 1000 MG tablet Take 1 tablet by mouth twice daily 180 tablet 0    metFORMIN (GLUCOPHAGE) 500 MG tablet Take 1 tablet by mouth Daily With Breakfast.      naloxone (NARCAN) 4 MG/0.1ML nasal spray 1 spray into the nostril(s) as directed by provider As Needed (medication overdose). 1 each 5    OneTouch Verio test strip USE 1 STRIP TO CHECK GLUCOSE TWICE DAILY AS DIRECTED 100 each 0    oxyCODONE-acetaminophen (PERCOCET) 7.5-325 MG per tablet Take 1 tablet by mouth 4 (Four) Times a Day.      pioglitazone (ACTOS) 45 MG tablet Take 1 tablet by mouth once daily 90 tablet 0    promethazine (PHENERGAN) 25 MG tablet TAKE ONE TABLET BY MOUTH EVERY 4 TO 6 HOURS AS NEEDED FOR NAUSEA AND VOMITING 30 tablet 5    sodium chloride 1 g tablet Take 1 tablet by mouth twice daily 60 tablet 11    tiZANidine (ZANAFLEX) 2 MG tablet 1 tablet 2 (Two) Times a Day.      sildenafil (Viagra) 100 MG tablet Take 1 tablet by mouth Daily As Needed for Erectile Dysfunction. 10 tablet 5     No facility-administered medications prior to visit.       Immunization History   Administered Date(s) Administered    COVID-19 (PFIZER) BIVALENT 12+YRS 11/18/2022    COVID-19 (PFIZER) Purple  Cap Monovalent 03/22/2021, 04/12/2021, 11/20/2021    Covid-19 (Pfizer) Gray Cap Monovalent 06/04/2022    Flu Vaccine Quad PF >36MO 10/09/2018, 11/01/2020    FluMist 2-49yrs 12/22/2011, 10/21/2014, 11/04/2015    Flublock Quad =>18yrs 10/18/2020    Fluzone >6mos 12/11/2019    Fluzone High-Dose 65+yrs 01/27/2022, 11/18/2022    Fluzone Quad >6mos (Multi-dose) 09/14/2016    Hepatitis A 04/29/2018, 11/07/2018    Influenza Quad Vaccine (Inpatient) 10/09/2017, 10/09/2017    Pneumococcal Conjugate 13-Valent (PCV13) 07/08/2016    Pneumococcal Conjugate 20-Valent (PCV20) 06/06/2023    Pneumococcal Polysaccharide (PPSV23) 11/19/2008, 01/27/2022    Shingrix 01/31/2021, 05/15/2021    Td, Not Adsorbed 07/01/2016    Tdap 11/19/2008         Patient Active Problem List   Diagnosis    Acute recurrent pancreatitis    Anxiety disorder    Benign colonic polyp    Chronic neck pain    Chronic pain syndrome    Erectile dysfunction    Hyperlipidemia    Hypertension    Insomnia    Shoulder joint pain    Psoriasis    Ptosis of eyelid    Type 2 diabetes mellitus    Cigarette nicotine dependence without complication    Hyponatremia    Adenoma of left adrenal gland    Lung nodule    Vitamin D insufficiency    Elevated coronary artery calcium score    Bilateral hearing loss       Health Habits:  Dental Exam. up to date  Eye Exam. up to date  Hearing Loss:  Yes, right  Exercise: 7 times/week.  Current exercise activities include: yard work and building work on house  Diet: Healthy  Multivitamin: Yes    Safe Driving:  Yes  Seat Belt:  Yes  Bike Helmet:  Yes  Skin Screening:  Yes  Sunscreen: Yes  SBE / DEVIN: Yes  Sexual Activity:  Yes  Birth Control:  BTL  STD Prevention:  N/A    Last Pap: N/A  Last Mammogram:  N/A  Last DEXA Scan: N/A  Last Colonoscopy: 3/2/2022 showed 2 sessile (HP) polyps in the cecum and rectum.   Last PSA: 2/27/23- 0.223    Social:    Social History     Socioeconomic History    Marital status:     Number of children: 4    Tobacco Use    Smoking status: Every Day     Packs/day: 2.00     Years: 15.00     Pack years: 30.00     Types: Cigarettes     Start date: 1978     Last attempt to quit: 2021     Years since quittin.3     Passive exposure: Past (son)    Smokeless tobacco: Former     Types: Chew     Quit date:     Tobacco comments:     - present ,1 1/2 ppd. Quit 14. Re-started approx 2015, 3/4 ppd, then 1 ppd. Quit 18, re-started 2018, 1 ppd. Quit 20. Re-started 2021 1 ppd, Quit 22. Re-started 1/2 ppd 2023 (has patches)   Vaping Use    Vaping Use: Never used   Substance and Sexual Activity    Alcohol use: No    Drug use: No    Sexual activity: Yes     Partners: Female         Current Medical Providers:    Christine Rousseau MD (Internal Medicine / Pediatrics)    The [facility] providers who are involved in the care of this patient are listed above.         Review of Systems   Constitutional:  Negative for appetite change, chills, fatigue, fever and unexpected weight change (24 pound intentional weight loss in 6 months).         No night sweats.     HENT:  Positive for hearing loss and tinnitus (occasional right, not new). Negative for congestion, ear discharge, ear pain, facial swelling, nosebleeds, postnasal drip, rhinorrhea, sinus pressure, sinus pain, sneezing, sore throat, trouble swallowing and voice change.         Denies snoring.   Eyes:  Negative for photophobia, pain, discharge, redness, itching and visual disturbance.   Respiratory:  Negative for cough, chest tightness, shortness of breath and wheezing.         No chest congestion.  No hemoptysis.    Cardiovascular:  Positive for leg swelling (feet, not new). Negative for chest pain and palpitations.        No orthopnea, SALAMANCA, or PND.  No claudication or syncope.   Gastrointestinal:  Negative for abdominal distention, abdominal pain, blood in stool, constipation, diarrhea, nausea and vomiting.        No melena,  "heartburn, odynophagia, dysphagia, early satiety, belching, or bloating.   Endocrine: Negative for cold intolerance, heat intolerance, polydipsia, polyphagia and polyuria.        No hair loss or dry skin.    Genitourinary:  Negative for decreased urine volume, difficulty urinating, dysuria, flank pain, frequency, hematuria, penile discharge, scrotal swelling, testicular pain and urgency.        No nocturia, incomplete emptying, or incontinence. Reports erectile dysfunction.   Musculoskeletal:  Positive for arthralgias and back pain. Negative for gait problem, joint swelling, myalgias, neck pain and neck stiffness.        Has joint stiffness.   Skin:  Negative for rash.        No new skin lesions or changes in skin lesions.     Neurological:  Positive for numbness (and tingling in the feet). Negative for dizziness, tremors, speech difficulty, weakness, light-headedness and headaches.        No memory loss. No decreased concentration.   Hematological:  Negative for adenopathy. Does not bruise/bleed easily.   Psychiatric/Behavioral:  Negative for confusion, self-injury, sleep disturbance and suicidal ideas. The patient is nervous/anxious.         No depression.         Objective     Vitals:    08/29/23 0824   BP: 146/72   BP Location: Right arm   Patient Position: Sitting   Cuff Size: Adult   Pulse: 72   Resp: 16   Temp: 98.4 °F (36.9 °C)   TempSrc: Infrared   Weight: 83.5 kg (184 lb)   Height: 174 cm (68.5\")   PainSc:   6   PainLoc: Back       Body mass index is 27.57 kg/m².    Physical Exam  Vitals and nursing note reviewed.   Constitutional:       Appearance: Normal appearance. He is well-developed.      Comments: BMI greater than 25   HENT:      Head: Normocephalic and atraumatic.      Right Ear: Tympanic membrane, ear canal and external ear normal.      Left Ear: Tympanic membrane, ear canal and external ear normal.      Mouth/Throat:      Mouth: Mucous membranes are moist. No oral lesions.      Pharynx: " Oropharynx is clear.      Comments: Tonsils normal.  Eyes:      Extraocular Movements: Extraocular movements intact.      Conjunctiva/sclera: Conjunctivae normal.      Pupils: Pupils are equal, round, and reactive to light.   Neck:      Thyroid: No thyroid mass or thyromegaly.      Vascular: No carotid bruit.   Cardiovascular:      Rate and Rhythm: Normal rate and regular rhythm.      Pulses: Normal pulses.      Heart sounds: No murmur heard.    No friction rub. No gallop.   Pulmonary:      Effort: Pulmonary effort is normal.      Breath sounds: Normal breath sounds.   Abdominal:      General: Bowel sounds are normal. There is no distension or abdominal bruit.      Palpations: Abdomen is soft. There is no hepatomegaly, splenomegaly or mass.      Tenderness: There is no abdominal tenderness.   Genitourinary:     Penis: Normal.       Testes:         Right: Mass, tenderness or swelling not present.         Left: Mass, tenderness or swelling not present.      Prostate: Normal.      Rectum: Normal.   Musculoskeletal:         General: Normal range of motion.      Cervical back: Normal range of motion and neck supple.      Right lower leg: No edema.      Left lower leg: No edema.   Feet:      Right foot:      Skin integrity: Dry skin (mild) present. No ulcer, blister, skin breakdown or callus.      Toenail Condition: Fungal disease present.     Left foot:      Skin integrity: Dry skin (mild) present. No ulcer, blister, skin breakdown, erythema or callus.      Toenail Condition: Fungal disease present.  Lymphadenopathy:      Cervical: No cervical adenopathy.      Upper Body:      Right upper body: No supraclavicular adenopathy.      Left upper body: No supraclavicular adenopathy.      Lower Body: No right inguinal adenopathy. No left inguinal adenopathy.   Skin:     Findings: No lesion or rash.      Comments: No atypical skin lesions.   Neurological:      Mental Status: He is alert.      Cranial Nerves: No cranial nerve  deficit.      Motor: Motor function is intact. No abnormal muscle tone.      Coordination: Coordination normal.      Gait: Gait normal.      Deep Tendon Reflexes: Reflexes are normal and symmetric.   Psychiatric:         Mood and Affect: Mood normal.     Diabetic Foot Exam Performed  Monofilament Test Performed    PHQ-2 Depression Screening  Little interest or pleasure in doing things? 0-->not at all   Feeling down, depressed, or hopeless? 0-->not at all   PHQ-2 Total Score 0         Counseling was given to patient for the following topics: appropriate exercise, healthy eating habits, disease prevention, risk factors for cancer, importance of self testicular exam, importance of immunizations, including risks and benefits, sun safety, seatbelt use, and safe driving. Also discussed the importance of regular dental and vision care, as well recommendation for a yearly screening skin exam after age 40.  Written information provided to patient on these topics and other health maintenance issues.    Results for orders placed or performed in visit on 08/29/23   POC Microalbumin    Specimen: Urine   Result Value Ref Range    Microalbumin, Urine 10     Creatinine, Urine 50     Lot Number [id number]     Expiration Date 9/30/24     Albumin/Creatinine Ratio, Urine     POC Urinalysis Dipstick, Automated    Specimen: Urine   Result Value Ref Range    Color Yellow Yellow, Straw, Dark Yellow, Loli    Clarity, UA Clear Clear    Specific Gravity  1.005 1.005 - 1.030    pH, Urine 6.5 5.0 - 8.0    Leukocytes Negative Negative    Nitrite, UA Negative Negative    Protein, POC Negative Negative mg/dL    Glucose, UA >=1000 mg/dL (3+) (A) Negative mg/dL    Ketones, UA Negative Negative    Urobilinogen, UA Normal Normal, 0.2 E.U./dL    Bilirubin Negative Negative    Blood, UA Negative Negative    Lot Number [id number]     Expiration Date 6/30/24          Diagnoses and all orders for this visit:    1. Medicare annual wellness visit,  subsequent (Primary)    2. Generalized anxiety disorder   Continue current medication(s) as noted in the history of present illness.    The patient was instructed in the side effects of the medication.  Risks of the potential for tolerance, dependence, and addiction were discussed.  The patient was instructed to take the lowest dosage of the medication, at the lowest frequency, and for the shortest period of time possible.  The patient was instructed not to receive controlled substances or narcotics from other doctors, and not to giveaway or sell the medication.    The patient was instructed to abstain from illicit drug use.  The patient was instructed to avoid alcohol while taking these medications.      Narcotics/controlled substance agreement, Lonnie report, and Urine Drug Screen were updated today if needed.    3. Type 2 diabetes mellitus with hyperglycemia, without long-term current use of insulin  -     POC Microalbumin  -     POC Urinalysis Dipstick, Automated   Continue current medication(s).    4. Erectile dysfunction, unspecified erectile dysfunction type  -     tadalafil (Cialis) 20 MG tablet; Take 1 tablet by mouth Daily As Needed for Erectile Dysfunction.  Dispense: 20 tablet; Refill: 11- NEW   Viagra discontinued (inefficacy).    5. High risk medication use  -     Compliance Drug Analysis, Ur - Urine, Clean Catch        BMI is >= 25 and <30. (Overweight) The following options were offered after discussion;: exercise counseling/recommendations and nutrition counseling/recommendations            Return in about 3 months (around 11/29/2023) for Recheck NIDDM, fasting.

## 2023-08-29 NOTE — PROGRESS NOTES
Subjective       Yevgeniy Vaughn is a 67 y.o. male.     Chief Complaint   Patient presents with    Medicare Wellness-subsequent       History obtained from {source of history:903897}.      History of Present Illness      Anxiety Disorder Follow-Up: The patient is being seen for follow-up of Anxiey, which is unstable.    Comorbid Illnesses: Insomnia.  Interval Events: He is under a lot of stress due to family issues.  He is requesting the same medication his wife is taking (Lexapro)  Symptoms: Worsened anxiety and stable insomnia.  He states he has panic attacks every 7 to 10 days.  Denies depression, anhedonia, memory loss, and difficulty concentrating.    Associated Symptoms: no suicidal ideation or thoughts of self-harm.   Medication: Alprazolam BID.  Side Effects: None.      Current Outpatient Medications on File Prior to Visit   Medication Sig Dispense Refill    Alirocumab (Praluent) 75 MG/ML solution auto-injector Inject 1 mL under the skin into the appropriate area as directed Every 14 (Fourteen) Days. 2 mL 11    ALPRAZolam (XANAX) 0.5 MG tablet Take 1 tablet by mouth twice daily as needed for anxiety 60 tablet 5    APO-Varenicline 1 MG tablet TAKE 1 TABLET BY MOUTH TWICE DAILY FOR  12  WEEKS 168 tablet 0    aspirin 81 MG chewable tablet Chew 1 tablet Daily.      B Complex-C (SUPER B COMPLEX/VITAMIN C PO) Take  by mouth.      carvedilol (COREG) 6.25 MG tablet Take 1 tablet by mouth 2 (Two) Times a Day. 180 tablet 3    clobetasol (TEMOVATE) 0.05 % cream Apply  topically to the appropriate area as directed 2 (Two) Times a Day As Needed (rash). 60 g 3    empagliflozin (Jardiance) 25 MG tablet tablet Take 1 tablet by mouth Daily. 30 tablet 11    escitalopram (Lexapro) 10 MG tablet Take 1 tablet by mouth Daily. 30 tablet 5    ezetimibe (Zetia) 10 MG tablet Take 1 tablet by mouth Daily. 30 tablet 11    gabapentin (NEURONTIN) 800 MG tablet 1 tablet 4 (Four) Times a Day.      glucose monitor monitoring kit 1 each  "Daily. 1 each 0    ibuprofen (ADVIL,MOTRIN) 800 MG tablet Take 1 tablet by mouth three times daily as needed 180 tablet 0    Januvia 100 MG tablet Take 1 tablet by mouth once daily 90 tablet 0    Lancets (OneTouch Delica Plus [id number]) misc USE 1  TO CHECK GLUCOSE ONCE DAILY AS DIRECTED 100 each 3    lisinopril (PRINIVIL,ZESTRIL) 40 MG tablet Take 1 tablet by mouth every night at bedtime. 90 tablet 3    melatonin 5 MG tablet tablet Take 2 tablets by mouth At Night As Needed (insomnia).      metFORMIN (GLUCOPHAGE) 1000 MG tablet Take 1 tablet by mouth twice daily 180 tablet 0    metFORMIN (GLUCOPHAGE) 500 MG tablet Take 1 tablet by mouth Daily With Breakfast.      naloxone (NARCAN) 4 MG/0.1ML nasal spray 1 spray into the nostril(s) as directed by provider As Needed (medication overdose). 1 each 5    OneTouch Verio test strip USE 1 STRIP TO CHECK GLUCOSE TWICE DAILY AS DIRECTED 100 each 0    oxyCODONE-acetaminophen (PERCOCET) 7.5-325 MG per tablet Take 1 tablet by mouth 4 (Four) Times a Day.      pioglitazone (ACTOS) 45 MG tablet Take 1 tablet by mouth once daily 90 tablet 0    promethazine (PHENERGAN) 25 MG tablet TAKE ONE TABLET BY MOUTH EVERY 4 TO 6 HOURS AS NEEDED FOR NAUSEA AND VOMITING 30 tablet 5    sildenafil (Viagra) 100 MG tablet Take 1 tablet by mouth Daily As Needed for Erectile Dysfunction. 10 tablet 5    sodium chloride 1 g tablet Take 1 tablet by mouth twice daily 60 tablet 11    tiZANidine (ZANAFLEX) 2 MG tablet 1 tablet 2 (Two) Times a Day.       No current facility-administered medications on file prior to visit.       Current outpatient and discharge medications have been reconciled for the patient.  Reviewed by: Christine Rousseau MD        {Common H&P Review Areas:19073}    Review of Systems      Objective       Blood pressure 146/72, pulse 72, temperature 98.4 °F (36.9 °C), temperature source Infrared, resp. rate 16, height 174 cm (68.5\"), weight 83.5 kg (184 lb).  Body mass index is 27.57 " kg/m².      Physical Exam    Assessment / Plan:  {Assess/PlanSmartLinks:36457}      {BMI is >= 25 and <30. (Overweight) The following options were offered after discussion;:[id number]}          No follow-ups on file.

## 2023-08-29 NOTE — PROGRESS NOTES
The ABCs of the Annual Wellness Visit  Subsequent Medicare Wellness Visit    Subjective      Yevgeniy Vaughn is a 67 y.o. male who presents for a Subsequent Medicare Wellness Visit.    The following portions of the patient's history were reviewed and   updated as appropriate: allergies, current medications, past family history, past medical history, past social history, past surgical history, and problem list.    Compared to one year ago, the patient feels his physical   health is the same.    Compared to one year ago, the patient feels his mental   health is better.    Recent Hospitalizations:  He was not admitted to the hospital during the last year.       Current Medical Providers:  Patient Care Team:  Christine Rousseau MD as PCP - General  Abraham Johnson MD as Surgeon (General Surgery)  Scott Villeda MD as Consulting Physician (Pulmonary Disease)  Ignacio Montero MD (Urology)  Hugh Patino MD as Consulting Physician (Gastroenterology)  Manoj Avila MD as Cardiologist (Cardiology)  Manoj Kwon MD as Consulting Physician (Orthopedic Surgery)    Outpatient Medications Prior to Visit   Medication Sig Dispense Refill    Alirocumab (Praluent) 75 MG/ML solution auto-injector Inject 1 mL under the skin into the appropriate area as directed Every 14 (Fourteen) Days. 2 mL 11    ALPRAZolam (XANAX) 0.5 MG tablet Take 1 tablet by mouth twice daily as needed for anxiety 60 tablet 5    APO-Varenicline 1 MG tablet TAKE 1 TABLET BY MOUTH TWICE DAILY FOR  12  WEEKS 168 tablet 0    aspirin 81 MG chewable tablet Chew 1 tablet Daily.      B Complex-C (SUPER B COMPLEX/VITAMIN C PO) Take  by mouth.      carvedilol (COREG) 6.25 MG tablet Take 1 tablet by mouth 2 (Two) Times a Day. 180 tablet 3    clobetasol (TEMOVATE) 0.05 % cream Apply  topically to the appropriate area as directed 2 (Two) Times a Day As Needed (rash). 60 g 3    empagliflozin (Jardiance) 25 MG tablet tablet Take 1 tablet by mouth  Daily. 30 tablet 11    escitalopram (Lexapro) 10 MG tablet Take 1 tablet by mouth Daily. 30 tablet 5    ezetimibe (Zetia) 10 MG tablet Take 1 tablet by mouth Daily. 30 tablet 11    gabapentin (NEURONTIN) 800 MG tablet 1 tablet 4 (Four) Times a Day.      glucose monitor monitoring kit 1 each Daily. 1 each 0    ibuprofen (ADVIL,MOTRIN) 800 MG tablet Take 1 tablet by mouth three times daily as needed 180 tablet 0    Januvia 100 MG tablet Take 1 tablet by mouth once daily 90 tablet 0    Lancets (OneTouch Delica Plus [id number]) misc USE 1  TO CHECK GLUCOSE ONCE DAILY AS DIRECTED 100 each 3    lisinopril (PRINIVIL,ZESTRIL) 40 MG tablet Take 1 tablet by mouth every night at bedtime. 90 tablet 3    melatonin 5 MG tablet tablet Take 2 tablets by mouth At Night As Needed (insomnia).      metFORMIN (GLUCOPHAGE) 1000 MG tablet Take 1 tablet by mouth twice daily 180 tablet 0    metFORMIN (GLUCOPHAGE) 500 MG tablet Take 1 tablet by mouth Daily With Breakfast.      naloxone (NARCAN) 4 MG/0.1ML nasal spray 1 spray into the nostril(s) as directed by provider As Needed (medication overdose). 1 each 5    OneTouch Verio test strip USE 1 STRIP TO CHECK GLUCOSE TWICE DAILY AS DIRECTED 100 each 0    oxyCODONE-acetaminophen (PERCOCET) 7.5-325 MG per tablet Take 1 tablet by mouth 4 (Four) Times a Day.      pioglitazone (ACTOS) 45 MG tablet Take 1 tablet by mouth once daily 90 tablet 0    promethazine (PHENERGAN) 25 MG tablet TAKE ONE TABLET BY MOUTH EVERY 4 TO 6 HOURS AS NEEDED FOR NAUSEA AND VOMITING 30 tablet 5    sodium chloride 1 g tablet Take 1 tablet by mouth twice daily 60 tablet 11    tiZANidine (ZANAFLEX) 2 MG tablet 1 tablet 2 (Two) Times a Day.      sildenafil (Viagra) 100 MG tablet Take 1 tablet by mouth Daily As Needed for Erectile Dysfunction. 10 tablet 5     No facility-administered medications prior to visit.       Opioid medication/s are on active medication list.  and I have evaluated his active treatment plan and pain  "score trends (see table).  Vitals:    08/29/23 0824   PainSc:   6   PainLoc: Back     I have reviewed the chart for potential of high risk medication and harmful drug interactions in the elderly.          Aspirin is on active medication list. Aspirin use is indicated based on review of current medical condition/s. Pros and cons of this therapy have been discussed today. Benefits of this medication outweigh potential harm.  Patient has been encouraged to continue taking this medication.  .      Patient Active Problem List   Diagnosis    Acute recurrent pancreatitis    Anxiety disorder    Benign colonic polyp    Chronic neck pain    Chronic pain syndrome    Erectile dysfunction    Hyperlipidemia    Hypertension    Insomnia    Shoulder joint pain    Psoriasis    Ptosis of eyelid    Type 2 diabetes mellitus    Cigarette nicotine dependence without complication    Hyponatremia    Adenoma of left adrenal gland    Lung nodule    Vitamin D insufficiency    Elevated coronary artery calcium score    Bilateral hearing loss     Advance Care Planning   Advance Care Planning     Advance Directive is not on file.  ACP discussion was held with the patient during this visit. Patient does not have an advance directive, information provided.  ACP information pamphlet given to the patient.  ACP information provided on the AVS.       Objective    Vitals:    08/29/23 0824   BP: 146/72   BP Location: Right arm   Patient Position: Sitting   Cuff Size: Adult   Pulse: 72   Resp: 16   Temp: 98.4 °F (36.9 °C)   TempSrc: Infrared   Weight: 83.5 kg (184 lb)   Height: 174 cm (68.5\")   PainSc:   6   PainLoc: Back     Estimated body mass index is 27.57 kg/m² as calculated from the following:    Height as of this encounter: 174 cm (68.5\").    Weight as of this encounter: 83.5 kg (184 lb).    BMI is >= 25 and <30. (Overweight) The following options were offered after discussion;: exercise counseling/recommendations    Finger Rub Hearing{Test (right " ear):failed  Finger Rub Hearing{Test (left ear):passed      Does the patient have evidence of cognitive impairment?   No              HEALTH RISK ASSESSMENT    Smoking Status:  Social History     Tobacco Use   Smoking Status Every Day    Packs/day: 2.00    Years: 15.00    Pack years: 30.00    Types: Cigarettes    Start date: 1978    Last attempt to quit: 2021    Years since quittin.3    Passive exposure: Past (son)   Smokeless Tobacco Former    Types: Chew    Quit date:    Tobacco Comments    - present ,1 1/2 ppd. Quit 14. Re-started approx 2015, 3/4 ppd, then 1 ppd. Quit 18, re-started 2018, 1 ppd. Quit 20. Re-started 2021 1 ppd, Quit 22. Re-started 1/2 ppd 2023 (has patches)     Alcohol Consumption:  Social History     Substance and Sexual Activity   Alcohol Use No     Fall Risk Screen:    MILDRED Fall Risk Assessment was completed, and patient is at LOW risk for falls.Assessment completed on:2023    Depression Screenin/29/2023     8:26 AM   PHQ-2/PHQ-9 Depression Screening   Little Interest or Pleasure in Doing Things 0-->not at all   Feeling Down, Depressed or Hopeless 0-->not at all   PHQ-9: Brief Depression Severity Measure Score 0       Health Habits and Functional and Cognitive Screenin/29/2023     8:25 AM   Functional & Cognitive Status   Do you have difficulty preparing food and eating? No   Do you have difficulty bathing yourself, getting dressed or grooming yourself? No   Do you have difficulty using the toilet? No   Do you have difficulty moving around from place to place? No   Do you have trouble with steps or getting out of a bed or a chair? No   Current Diet Well Balanced Diet   Dental Exam Up to date   Eye Exam Up to date   Exercise (times per week) 2 times per week   Current Exercises Include No Regular Exercise   Do you need help using the phone?  No   Are you deaf or do you have serious difficulty hearing?  Yes    Do you need help to go to places out of walking distance? No   Do you need help shopping? No   Do you need help preparing meals?  No   Do you need help with housework?  No   Do you need help with laundry? No   Do you need help taking your medications? No   Do you need help managing money? No   Do you ever drive or ride in a car without wearing a seat belt? No   Have you felt unusual stress, anger or loneliness in the last month? No   Who do you live with? Spouse   If you need help, do you have trouble finding someone available to you? No   Have you been bothered in the last four weeks by sexual problems? Yes   Do you have difficulty concentrating, remembering or making decisions? No       Age-appropriate Screening Schedule:  Refer to the list below for future screening recommendations based on patient's age, sex and/or medical conditions. Orders for these recommended tests are listed in the plan section. The patient has been provided with a written plan.    Health Maintenance   Topic Date Due    COVID-19 Vaccine (6 - Pfizer series) 03/18/2023    DIABETIC EYE EXAM  10/17/2023 (Originally 5/24/2023)    INFLUENZA VACCINE  10/01/2023    HEMOGLOBIN A1C  12/06/2023    PROSTATE CANCER SCREENING  02/27/2024    LIPID PANEL  06/06/2024    LUNG CANCER SCREENING  07/19/2024    ANNUAL WELLNESS VISIT  08/29/2024    URINE MICROALBUMIN  08/29/2024    BMI FOLLOWUP  08/29/2024    DIABETIC FOOT EXAM  09/10/2024    TDAP/TD VACCINES (3 - Td or Tdap) 07/01/2026    COLORECTAL CANCER SCREENING  03/02/2027    HEPATITIS C SCREENING  Completed    Pneumococcal Vaccine 65+  Completed    AAA SCREEN (ONE-TIME)  Completed    ZOSTER VACCINE  Completed                  CMS Preventative Services Quick Reference  Risk Factors Identified During Encounter:    Chronic Pain:  Follow up per Pain Management  Depression/Dysphoria: Current medication is effective, no change recommended  Immunizations Discussed/Encouraged: Influenza and COVID19  Tobacco  Use/Dependance Risk (smoking cessation counseling)- he will start Nicotine patches.  CT Lung Cancer Screening UTD  Dental Screening Recommended  Vision Screening Recommended    The above risks/problems have been discussed with the patient.  Pertinent information has been shared with the patient in the After Visit Summary.    Diagnoses and all orders for this visit:    1. Medicare annual wellness visit, subsequent (Primary)    2. Generalized anxiety disorder    3. Type 2 diabetes mellitus with hyperglycemia, without long-term current use of insulin  -     POC Microalbumin  -     POC Urinalysis Dipstick, Automated    4. Erectile dysfunction, unspecified erectile dysfunction type  -     tadalafil (Cialis) 20 MG tablet; Take 1 tablet by mouth Daily As Needed for Erectile Dysfunction.  Dispense: 20 tablet; Refill: 11    5. High risk medication use  -     Compliance Drug Analysis, Ur - Urine, Clean Catch; Future  -     Compliance Drug Analysis, Ur - Urine, Clean Catch        Follow Up:   Next Medicare Wellness visit to be scheduled in 1 year.      An After Visit Summary and PPPS were made available to the patient.

## 2023-09-06 LAB — DRUGS UR: NORMAL

## 2023-09-13 ENCOUNTER — TELEPHONE (OUTPATIENT)
Dept: CARDIOLOGY | Facility: CLINIC | Age: 68
End: 2023-09-13

## 2023-09-13 NOTE — TELEPHONE ENCOUNTER
Caller: Sue Vaughn    Relationship to patient: Emergency Contact    Best call back number: [phone]    Type of visit: FOLLOW UP    Requested date: ASAP     If rescheduling, when is the original appointment: 09/13/23     Additional notes:PATIENTS WIFE CALLED IN TO RESCHEDULE HIS APPOINTMENT DUE TO HIM FEELING UNWELL.        PATIENT IS NOT CURRENTLY HAVING ANY CARDIAC ISSUES

## 2023-09-18 NOTE — TELEPHONE ENCOUNTER
SW PT'S WIFE, PT STATES HE WILL CALL BACK TO SCHEDULE AS HIS BACK IS BOTHERING HIM AND HE WILL NEED INJECTIONS BEFORE HE CAN DECIDE IF HE CAN COME IN OR NOT

## 2023-09-25 RX ORDER — IBUPROFEN 800 MG/1
800 TABLET ORAL 3 TIMES DAILY PRN
Qty: 180 TABLET | Refills: 0 | Status: SHIPPED | OUTPATIENT
Start: 2023-09-25

## 2023-09-25 NOTE — TELEPHONE ENCOUNTER
Last office visit (LOV) for chronic condition  8/29/2023  Next office visit (NOV) 11/29/2023    Last rx sent on 8/14/2023  #180

## 2023-10-07 DIAGNOSIS — F41.1 GENERALIZED ANXIETY DISORDER: ICD-10-CM

## 2023-10-09 RX ORDER — ALPRAZOLAM 0.5 MG/1
0.5 TABLET ORAL 2 TIMES DAILY PRN
Qty: 60 TABLET | Refills: 5 | Status: SHIPPED | OUTPATIENT
Start: 2023-10-09

## 2023-10-09 NOTE — TELEPHONE ENCOUNTER
Last office visit (LOV) for chronic conditions 08/29/23  Next office visit (NOV) 11/29/23  Urine drug screen (UDS) 08/29/23  Controlled substance agreement (CSA) 08/29/23

## 2023-12-11 DIAGNOSIS — E11.65 TYPE 2 DIABETES MELLITUS WITH HYPERGLYCEMIA, WITHOUT LONG-TERM CURRENT USE OF INSULIN: ICD-10-CM

## 2023-12-21 RX ORDER — IBUPROFEN 800 MG/1
800 TABLET ORAL 3 TIMES DAILY PRN
Qty: 30 TABLET | Refills: 0 | Status: SHIPPED | OUTPATIENT
Start: 2023-12-21

## 2023-12-26 DIAGNOSIS — E11.65 TYPE 2 DIABETES MELLITUS WITH HYPERGLYCEMIA, WITHOUT LONG-TERM CURRENT USE OF INSULIN: ICD-10-CM

## 2023-12-26 NOTE — TELEPHONE ENCOUNTER
LOV 08/29/2023  NOV     Left message on machine for patient to call    Relay the following information:    Patient needs to schedule a follow up appointment for additional refills.  Please advise patient he needs to schedule and keep his appointment to continue to receive refills in the future.  If he is unable to keep his appointment we will not be able to provider further refills.  Once appointment has been scheduled please update message with date and time so we can process the request.  We will forward the message to the provider to review the refill request.

## 2023-12-28 RX ORDER — SITAGLIPTIN 100 MG/1
TABLET, FILM COATED ORAL
Qty: 90 TABLET | Refills: 0 | Status: SHIPPED | OUTPATIENT
Start: 2023-12-28

## 2023-12-28 NOTE — TELEPHONE ENCOUNTER
Prescription sent to the pharmacy with note to pharmacist that patient needs a follow-up appointment.

## 2023-12-28 NOTE — TELEPHONE ENCOUNTER
Spoke with patient and with insurance patient has found a new PCP. Patient is very appreciated of Dr. Rousseau and her care for 28 years. Please advise for refill request

## 2024-01-27 DIAGNOSIS — I10 PRIMARY HYPERTENSION: ICD-10-CM

## 2024-01-29 RX ORDER — LISINOPRIL 40 MG/1
40 TABLET ORAL
Qty: 90 TABLET | Refills: 0 | Status: SHIPPED | OUTPATIENT
Start: 2024-01-29

## 2024-02-03 DIAGNOSIS — E11.65 TYPE 2 DIABETES MELLITUS WITH HYPERGLYCEMIA, WITHOUT LONG-TERM CURRENT USE OF INSULIN: ICD-10-CM

## 2024-02-05 NOTE — TELEPHONE ENCOUNTER
LOV 08/29/2023  NOV     Tried to reach patient no answer left voicemail to return call    RELAY:  We recently received your message to refill your medication(s).  Our records indicate that you did not schedule a follow up appointment with your provider at your last visit on 08/29/2023. The medication that you are on requires a follow up appointment for additional refills. Patient was to schedule on or around 11/29/2023 for 3 month follow up  If he is unable to keep his appointment we will not be able to provider further refills.  Once appointment has been scheduled please update message with date and time so we can process the request.  We will forward the message to the provider to review the refill request.

## 2024-02-06 NOTE — TELEPHONE ENCOUNTER
2nd attempt     LOV 08/29/2023  NOV      Tried to reach patient no answer left voicemail to return call     RELAY:  We recently received your message to refill your medication(s).  Our records indicate that you did not schedule a follow up appointment with your provider at your last visit on 08/29/2023. The medication that you are on requires a follow up appointment for additional refills. Patient was to schedule on or around 11/29/2023 for 3 month follow up  If he is unable to keep his appointment we will not be able to provider further refills.  Once appointment has been scheduled please update message with date and time so we can process the request.  We will forward the message to the provider to review the refill request.

## 2024-02-07 RX ORDER — PIOGLITAZONEHYDROCHLORIDE 45 MG/1
TABLET ORAL
Qty: 90 TABLET | Refills: 0 | Status: SHIPPED | OUTPATIENT
Start: 2024-02-07

## 2024-02-07 NOTE — TELEPHONE ENCOUNTER
Prescription sent to the pharmacy with note to pharmacist to have patient schedule a follow-up appointment.

## 2024-02-07 NOTE — TELEPHONE ENCOUNTER
3rd attempt      LOV 08/29/2023  NOV      Tried to reach patient no answer left voicemail to return call     RELAY:  We recently received your message to refill your medication(s).  Our records indicate that you did not schedule a follow up appointment with your provider at your last visit on 08/29/2023. The medication that you are on requires a follow up appointment for additional refills. Patient was to schedule on or around 11/29/2023 for 3 month follow up  If he is unable to keep his appointment we will not be able to provider further refills.  Once appointment has been scheduled please update message with date and time so we can process the request.  We will forward the message to the provider to review the refill request.

## 2024-04-01 DIAGNOSIS — E78.49 OTHER HYPERLIPIDEMIA: ICD-10-CM

## 2024-04-01 RX ORDER — ALIROCUMAB 75 MG/ML
INJECTION, SOLUTION SUBCUTANEOUS
Qty: 2 ML | Refills: 0 | OUTPATIENT
Start: 2024-04-01

## 2024-04-17 DIAGNOSIS — F41.1 GENERALIZED ANXIETY DISORDER: ICD-10-CM

## 2024-04-17 DIAGNOSIS — E87.1 HYPONATREMIA: ICD-10-CM

## 2024-04-17 RX ORDER — ALPRAZOLAM 0.5 MG/1
0.5 TABLET ORAL 2 TIMES DAILY PRN
Qty: 60 TABLET | Refills: 0 | OUTPATIENT
Start: 2024-04-17

## 2024-04-17 RX ORDER — SODIUM CHLORIDE 1 G/1
TABLET ORAL
Qty: 180 TABLET | Refills: 0 | Status: SHIPPED | OUTPATIENT
Start: 2024-04-17

## 2024-04-17 NOTE — TELEPHONE ENCOUNTER
Called patient to informed that his medicare insurance NON-PAR and we can't see him here anymore. Patient's wife states that they are aware of it and have PCP outside [facility] and asked not to call them anymore.

## 2024-04-23 DIAGNOSIS — E11.65 TYPE 2 DIABETES MELLITUS WITH HYPERGLYCEMIA, WITHOUT LONG-TERM CURRENT USE OF INSULIN: ICD-10-CM

## 2024-04-23 RX ORDER — EMPAGLIFLOZIN 25 MG/1
25 TABLET, FILM COATED ORAL DAILY
Qty: 30 TABLET | Refills: 0 | Status: SHIPPED | OUTPATIENT
Start: 2024-04-23
